# Patient Record
Sex: FEMALE | Race: BLACK OR AFRICAN AMERICAN | Employment: UNEMPLOYED | URBAN - METROPOLITAN AREA
[De-identification: names, ages, dates, MRNs, and addresses within clinical notes are randomized per-mention and may not be internally consistent; named-entity substitution may affect disease eponyms.]

---

## 2019-12-01 ENCOUNTER — APPOINTMENT (OUTPATIENT)
Dept: GENERAL RADIOLOGY | Age: 84
DRG: 312 | End: 2019-12-01
Payer: COMMERCIAL

## 2019-12-01 ENCOUNTER — HOSPITAL ENCOUNTER (INPATIENT)
Age: 84
LOS: 2 days | Discharge: HOME HEALTH | DRG: 312 | End: 2019-12-03
Attending: EMERGENCY MEDICINE | Admitting: INTERNAL MEDICINE
Payer: COMMERCIAL

## 2019-12-01 DIAGNOSIS — I50.9 ACUTE ON CHRONIC CONGESTIVE HEART FAILURE, UNSPECIFIED HEART FAILURE TYPE (HCC): ICD-10-CM

## 2019-12-01 DIAGNOSIS — R55 NEAR SYNCOPE: Primary | ICD-10-CM

## 2019-12-01 DIAGNOSIS — N18.30 CKD (CHRONIC KIDNEY DISEASE) STAGE 3, GFR 30-59 ML/MIN (HCC): ICD-10-CM

## 2019-12-01 LAB
ABSOLUTE EOS #: 0.13 K/UL (ref 0–0.44)
ABSOLUTE IMMATURE GRANULOCYTE: <0.03 K/UL (ref 0–0.3)
ABSOLUTE LYMPH #: 1.14 K/UL (ref 1.1–3.7)
ABSOLUTE MONO #: 0.58 K/UL (ref 0.1–1.2)
ALBUMIN SERPL-MCNC: 3.4 G/DL (ref 3.5–5.2)
ALBUMIN/GLOBULIN RATIO: 1.1 (ref 1–2.5)
ALP BLD-CCNC: 224 U/L (ref 35–104)
ALT SERPL-CCNC: 15 U/L (ref 5–33)
ANION GAP SERPL CALCULATED.3IONS-SCNC: 13 MMOL/L (ref 9–17)
AST SERPL-CCNC: 19 U/L
BASOPHILS # BLD: 1 % (ref 0–2)
BASOPHILS ABSOLUTE: 0.04 K/UL (ref 0–0.2)
BILIRUB SERPL-MCNC: 0.42 MG/DL (ref 0.3–1.2)
BNP INTERPRETATION: ABNORMAL
BUN BLDV-MCNC: 33 MG/DL (ref 8–23)
BUN/CREAT BLD: ABNORMAL (ref 9–20)
CALCIUM SERPL-MCNC: 8.5 MG/DL (ref 8.6–10.4)
CHLORIDE BLD-SCNC: 103 MMOL/L (ref 98–107)
CO2: 26 MMOL/L (ref 20–31)
CREAT SERPL-MCNC: 1.15 MG/DL (ref 0.5–0.9)
DIFFERENTIAL TYPE: ABNORMAL
EOSINOPHILS RELATIVE PERCENT: 2 % (ref 1–4)
GFR AFRICAN AMERICAN: 54 ML/MIN
GFR NON-AFRICAN AMERICAN: 44 ML/MIN
GFR SERPL CREATININE-BSD FRML MDRD: ABNORMAL ML/MIN/{1.73_M2}
GFR SERPL CREATININE-BSD FRML MDRD: ABNORMAL ML/MIN/{1.73_M2}
GLUCOSE BLD-MCNC: 260 MG/DL (ref 70–99)
HCT VFR BLD CALC: 37 % (ref 36.3–47.1)
HEMOGLOBIN: 11.8 G/DL (ref 11.9–15.1)
IMMATURE GRANULOCYTES: 0 %
LYMPHOCYTES # BLD: 19 % (ref 24–43)
MCH RBC QN AUTO: 31.9 PG (ref 25.2–33.5)
MCHC RBC AUTO-ENTMCNC: 31.9 G/DL (ref 28.4–34.8)
MCV RBC AUTO: 100 FL (ref 82.6–102.9)
MONOCYTES # BLD: 10 % (ref 3–12)
NRBC AUTOMATED: 0 PER 100 WBC
PDW BLD-RTO: 12.8 % (ref 11.8–14.4)
PLATELET # BLD: 185 K/UL (ref 138–453)
PLATELET ESTIMATE: ABNORMAL
PMV BLD AUTO: 10.3 FL (ref 8.1–13.5)
POTASSIUM SERPL-SCNC: 3.7 MMOL/L (ref 3.7–5.3)
PRO-BNP: 1869 PG/ML
RBC # BLD: 3.7 M/UL (ref 3.95–5.11)
RBC # BLD: ABNORMAL 10*6/UL
SEG NEUTROPHILS: 68 % (ref 36–65)
SEGMENTED NEUTROPHILS ABSOLUTE COUNT: 4.16 K/UL (ref 1.5–8.1)
SODIUM BLD-SCNC: 142 MMOL/L (ref 135–144)
THYROXINE, FREE: 1.06 NG/DL (ref 0.93–1.7)
TOTAL PROTEIN: 6.4 G/DL (ref 6.4–8.3)
TROPONIN INTERP: ABNORMAL
TROPONIN T: ABNORMAL NG/ML
TROPONIN, HIGH SENSITIVITY: 20 NG/L (ref 0–14)
TROPONIN, HIGH SENSITIVITY: 21 NG/L (ref 0–14)
TROPONIN, HIGH SENSITIVITY: 22 NG/L (ref 0–14)
TSH SERPL DL<=0.05 MIU/L-ACNC: 11.97 MIU/L (ref 0.3–5)
WBC # BLD: 6.1 K/UL (ref 3.5–11.3)
WBC # BLD: ABNORMAL 10*3/UL

## 2019-12-01 PROCEDURE — 6360000002 HC RX W HCPCS: Performed by: STUDENT IN AN ORGANIZED HEALTH CARE EDUCATION/TRAINING PROGRAM

## 2019-12-01 PROCEDURE — 83880 ASSAY OF NATRIURETIC PEPTIDE: CPT

## 2019-12-01 PROCEDURE — 84439 ASSAY OF FREE THYROXINE: CPT

## 2019-12-01 PROCEDURE — 1200000000 HC SEMI PRIVATE

## 2019-12-01 PROCEDURE — 99222 1ST HOSP IP/OBS MODERATE 55: CPT | Performed by: INTERNAL MEDICINE

## 2019-12-01 PROCEDURE — 2580000003 HC RX 258: Performed by: STUDENT IN AN ORGANIZED HEALTH CARE EDUCATION/TRAINING PROGRAM

## 2019-12-01 PROCEDURE — 84443 ASSAY THYROID STIM HORMONE: CPT

## 2019-12-01 PROCEDURE — 6370000000 HC RX 637 (ALT 250 FOR IP): Performed by: STUDENT IN AN ORGANIZED HEALTH CARE EDUCATION/TRAINING PROGRAM

## 2019-12-01 PROCEDURE — 85025 COMPLETE CBC W/AUTO DIFF WBC: CPT

## 2019-12-01 PROCEDURE — 83036 HEMOGLOBIN GLYCOSYLATED A1C: CPT

## 2019-12-01 PROCEDURE — 80053 COMPREHEN METABOLIC PANEL: CPT

## 2019-12-01 PROCEDURE — 36415 COLL VENOUS BLD VENIPUNCTURE: CPT

## 2019-12-01 PROCEDURE — 99285 EMERGENCY DEPT VISIT HI MDM: CPT

## 2019-12-01 PROCEDURE — 93005 ELECTROCARDIOGRAM TRACING: CPT | Performed by: STUDENT IN AN ORGANIZED HEALTH CARE EDUCATION/TRAINING PROGRAM

## 2019-12-01 PROCEDURE — 71046 X-RAY EXAM CHEST 2 VIEWS: CPT

## 2019-12-01 PROCEDURE — 84484 ASSAY OF TROPONIN QUANT: CPT

## 2019-12-01 RX ORDER — GABAPENTIN 300 MG/1
300 CAPSULE ORAL 3 TIMES DAILY
Status: DISCONTINUED | OUTPATIENT
Start: 2019-12-01 | End: 2019-12-03 | Stop reason: HOSPADM

## 2019-12-01 RX ORDER — PANTOPRAZOLE SODIUM 40 MG/1
40 TABLET, DELAYED RELEASE ORAL
Status: DISCONTINUED | OUTPATIENT
Start: 2019-12-02 | End: 2019-12-03 | Stop reason: HOSPADM

## 2019-12-01 RX ORDER — ASPIRIN 81 MG/1
81 TABLET, CHEWABLE ORAL DAILY
Status: ON HOLD | COMMUNITY
End: 2021-02-16 | Stop reason: SDUPTHER

## 2019-12-01 RX ORDER — DULOXETIN HYDROCHLORIDE 60 MG/1
60 CAPSULE, DELAYED RELEASE ORAL DAILY
Status: ON HOLD | COMMUNITY
End: 2021-02-05 | Stop reason: HOSPADM

## 2019-12-01 RX ORDER — FUROSEMIDE 40 MG/1
40 TABLET ORAL DAILY
Status: ON HOLD | COMMUNITY
End: 2019-12-03 | Stop reason: SDUPTHER

## 2019-12-01 RX ORDER — ONDANSETRON 2 MG/ML
4 INJECTION INTRAMUSCULAR; INTRAVENOUS EVERY 6 HOURS PRN
Status: DISCONTINUED | OUTPATIENT
Start: 2019-12-01 | End: 2019-12-03 | Stop reason: HOSPADM

## 2019-12-01 RX ORDER — ASPIRIN 81 MG/1
81 TABLET, CHEWABLE ORAL DAILY
Status: DISCONTINUED | OUTPATIENT
Start: 2019-12-01 | End: 2019-12-03 | Stop reason: HOSPADM

## 2019-12-01 RX ORDER — HEPARIN SODIUM 5000 [USP'U]/ML
5000 INJECTION, SOLUTION INTRAVENOUS; SUBCUTANEOUS EVERY 8 HOURS SCHEDULED
Status: DISCONTINUED | OUTPATIENT
Start: 2019-12-01 | End: 2019-12-03 | Stop reason: HOSPADM

## 2019-12-01 RX ORDER — SODIUM CHLORIDE 0.9 % (FLUSH) 0.9 %
10 SYRINGE (ML) INJECTION PRN
Status: DISCONTINUED | OUTPATIENT
Start: 2019-12-01 | End: 2019-12-03 | Stop reason: HOSPADM

## 2019-12-01 RX ORDER — LEVOTHYROXINE SODIUM 0.1 MG/1
100 TABLET ORAL DAILY
Status: ON HOLD | COMMUNITY
End: 2019-12-03 | Stop reason: HOSPADM

## 2019-12-01 RX ORDER — SODIUM CHLORIDE 0.9 % (FLUSH) 0.9 %
10 SYRINGE (ML) INJECTION EVERY 12 HOURS SCHEDULED
Status: DISCONTINUED | OUTPATIENT
Start: 2019-12-01 | End: 2019-12-03 | Stop reason: HOSPADM

## 2019-12-01 RX ORDER — LOSARTAN POTASSIUM 100 MG/1
100 TABLET ORAL DAILY
Status: ON HOLD | COMMUNITY
End: 2019-12-03 | Stop reason: HOSPADM

## 2019-12-01 RX ORDER — OMEPRAZOLE 40 MG/1
40 CAPSULE, DELAYED RELEASE ORAL DAILY
Status: ON HOLD | COMMUNITY
End: 2021-02-05

## 2019-12-01 RX ORDER — FUROSEMIDE 10 MG/ML
40 INJECTION INTRAMUSCULAR; INTRAVENOUS ONCE
Status: COMPLETED | OUTPATIENT
Start: 2019-12-01 | End: 2019-12-01

## 2019-12-01 RX ORDER — LEVOTHYROXINE SODIUM 0.07 MG/1
112.5 TABLET ORAL DAILY
Status: DISCONTINUED | OUTPATIENT
Start: 2019-12-02 | End: 2019-12-03 | Stop reason: HOSPADM

## 2019-12-01 RX ORDER — ISOSORBIDE MONONITRATE 30 MG/1
30 TABLET, EXTENDED RELEASE ORAL DAILY
Status: DISCONTINUED | OUTPATIENT
Start: 2019-12-01 | End: 2019-12-03

## 2019-12-01 RX ORDER — GABAPENTIN 300 MG/1
300 CAPSULE ORAL 3 TIMES DAILY
Status: ON HOLD | COMMUNITY
End: 2021-02-16 | Stop reason: HOSPADM

## 2019-12-01 RX ORDER — DULOXETIN HYDROCHLORIDE 30 MG/1
60 CAPSULE, DELAYED RELEASE ORAL DAILY
Status: DISCONTINUED | OUTPATIENT
Start: 2019-12-01 | End: 2019-12-03 | Stop reason: HOSPADM

## 2019-12-01 RX ORDER — ISOSORBIDE MONONITRATE 30 MG/1
30 TABLET, EXTENDED RELEASE ORAL DAILY
Status: ON HOLD | COMMUNITY
End: 2019-12-03 | Stop reason: HOSPADM

## 2019-12-01 RX ORDER — BUDESONIDE AND FORMOTEROL FUMARATE DIHYDRATE 160; 4.5 UG/1; UG/1
2 AEROSOL RESPIRATORY (INHALATION) 2 TIMES DAILY
Status: ON HOLD | COMMUNITY
End: 2021-02-16 | Stop reason: SDUPTHER

## 2019-12-01 RX ADMIN — HEPARIN SODIUM 5000 UNITS: 5000 INJECTION INTRAVENOUS; SUBCUTANEOUS at 22:10

## 2019-12-01 RX ADMIN — Medication 10 ML: at 22:10

## 2019-12-01 RX ADMIN — DULOXETINE HYDROCHLORIDE 60 MG: 30 CAPSULE, DELAYED RELEASE ORAL at 22:09

## 2019-12-01 RX ADMIN — FUROSEMIDE 40 MG: 10 INJECTION, SOLUTION INTRAMUSCULAR; INTRAVENOUS at 22:10

## 2019-12-01 RX ADMIN — GABAPENTIN 300 MG: 300 CAPSULE ORAL at 22:09

## 2019-12-01 ASSESSMENT — ENCOUNTER SYMPTOMS
RHINORRHEA: 0
COUGH: 0
SHORTNESS OF BREATH: 0
BACK PAIN: 0
SORE THROAT: 0
VOMITING: 0
EYE ITCHING: 0
DIARRHEA: 0
ABDOMINAL PAIN: 0
EYE REDNESS: 0
NAUSEA: 0
BLOOD IN STOOL: 0

## 2019-12-01 ASSESSMENT — PAIN SCALES - GENERAL
PAINLEVEL_OUTOF10: 0
PAINLEVEL_OUTOF10: 0

## 2019-12-02 PROBLEM — E03.8 OTHER SPECIFIED HYPOTHYROIDISM: Status: ACTIVE | Noted: 2019-12-02

## 2019-12-02 PROBLEM — J44.9 PULMONARY HYPERTENSION DUE TO COPD (HCC): Status: ACTIVE | Noted: 2019-12-02

## 2019-12-02 PROBLEM — E11.29 TYPE 2 DIABETES MELLITUS WITH KIDNEY COMPLICATION, WITHOUT LONG-TERM CURRENT USE OF INSULIN (HCC): Status: ACTIVE | Noted: 2019-12-02

## 2019-12-02 PROBLEM — I50.810 RIGHT-SIDED HEART FAILURE (HCC): Status: ACTIVE | Noted: 2019-12-02

## 2019-12-02 PROBLEM — E03.9 HYPOTHYROIDISM: Status: ACTIVE | Noted: 2019-12-02

## 2019-12-02 PROBLEM — E03.8 OTHER SPECIFIED HYPOTHYROIDISM: Status: RESOLVED | Noted: 2019-12-02 | Resolved: 2019-12-02

## 2019-12-02 PROBLEM — G90.9 AUTONOMIC NEUROPATHY: Status: ACTIVE | Noted: 2019-12-02

## 2019-12-02 PROBLEM — J44.9 COPD (CHRONIC OBSTRUCTIVE PULMONARY DISEASE) (HCC): Status: ACTIVE | Noted: 2019-12-02

## 2019-12-02 PROBLEM — I27.23 PULMONARY HYPERTENSION DUE TO COPD (HCC): Status: ACTIVE | Noted: 2019-12-02

## 2019-12-02 PROBLEM — N18.30 CKD (CHRONIC KIDNEY DISEASE) STAGE 3, GFR 30-59 ML/MIN (HCC): Status: ACTIVE | Noted: 2019-12-02

## 2019-12-02 PROBLEM — I10 ESSENTIAL HYPERTENSION: Status: ACTIVE | Noted: 2019-12-02

## 2019-12-02 LAB
-: NORMAL
ABSOLUTE EOS #: 0.12 K/UL (ref 0–0.44)
ABSOLUTE IMMATURE GRANULOCYTE: <0.03 K/UL (ref 0–0.3)
ABSOLUTE LYMPH #: 1.29 K/UL (ref 1.1–3.7)
ABSOLUTE MONO #: 0.46 K/UL (ref 0.1–1.2)
ANION GAP SERPL CALCULATED.3IONS-SCNC: 14 MMOL/L (ref 9–17)
BASOPHILS # BLD: 1 % (ref 0–2)
BASOPHILS ABSOLUTE: 0.04 K/UL (ref 0–0.2)
BILIRUBIN URINE: NEGATIVE
BUN BLDV-MCNC: 31 MG/DL (ref 8–23)
BUN/CREAT BLD: ABNORMAL (ref 9–20)
CALCIUM SERPL-MCNC: 8.5 MG/DL (ref 8.6–10.4)
CHLORIDE BLD-SCNC: 102 MMOL/L (ref 98–107)
CO2: 26 MMOL/L (ref 20–31)
COLOR: YELLOW
COMMENT UA: NORMAL
CREAT SERPL-MCNC: 1.13 MG/DL (ref 0.5–0.9)
DIFFERENTIAL TYPE: ABNORMAL
EKG ATRIAL RATE: 64 BPM
EKG P AXIS: 70 DEGREES
EKG P-R INTERVAL: 142 MS
EKG Q-T INTERVAL: 470 MS
EKG QRS DURATION: 156 MS
EKG QTC CALCULATION (BAZETT): 484 MS
EKG R AXIS: 93 DEGREES
EKG T AXIS: 1 DEGREES
EKG VENTRICULAR RATE: 64 BPM
EOSINOPHILS RELATIVE PERCENT: 2 % (ref 1–4)
ESTIMATED AVERAGE GLUCOSE: 209 MG/DL
ESTIMATED AVERAGE GLUCOSE: 212 MG/DL
GFR AFRICAN AMERICAN: 55 ML/MIN
GFR NON-AFRICAN AMERICAN: 45 ML/MIN
GFR SERPL CREATININE-BSD FRML MDRD: ABNORMAL ML/MIN/{1.73_M2}
GFR SERPL CREATININE-BSD FRML MDRD: ABNORMAL ML/MIN/{1.73_M2}
GLUCOSE BLD-MCNC: 197 MG/DL (ref 70–99)
GLUCOSE BLD-MCNC: 205 MG/DL (ref 65–105)
GLUCOSE URINE: NEGATIVE
HBA1C MFR BLD: 8.9 % (ref 4–6)
HBA1C MFR BLD: 9 % (ref 4–6)
HCT VFR BLD CALC: 38.1 % (ref 36.3–47.1)
HEMOGLOBIN: 12.1 G/DL (ref 11.9–15.1)
IMMATURE GRANULOCYTES: 0 %
KETONES, URINE: NEGATIVE
LEUKOCYTE ESTERASE, URINE: NEGATIVE
LYMPHOCYTES # BLD: 20 % (ref 24–43)
MCH RBC QN AUTO: 32.6 PG (ref 25.2–33.5)
MCHC RBC AUTO-ENTMCNC: 31.8 G/DL (ref 28.4–34.8)
MCV RBC AUTO: 102.7 FL (ref 82.6–102.9)
MONOCYTES # BLD: 7 % (ref 3–12)
NITRITE, URINE: NEGATIVE
NRBC AUTOMATED: 0 PER 100 WBC
PDW BLD-RTO: 13.1 % (ref 11.8–14.4)
PH UA: 5 (ref 5–8)
PLATELET # BLD: 196 K/UL (ref 138–453)
PLATELET ESTIMATE: ABNORMAL
PMV BLD AUTO: 10.4 FL (ref 8.1–13.5)
POTASSIUM SERPL-SCNC: 4 MMOL/L (ref 3.7–5.3)
PROTEIN UA: NEGATIVE
RBC # BLD: 3.71 M/UL (ref 3.95–5.11)
RBC # BLD: ABNORMAL 10*6/UL
REASON FOR REJECTION: NORMAL
SEG NEUTROPHILS: 70 % (ref 36–65)
SEGMENTED NEUTROPHILS ABSOLUTE COUNT: 4.38 K/UL (ref 1.5–8.1)
SODIUM BLD-SCNC: 142 MMOL/L (ref 135–144)
SPECIFIC GRAVITY UA: 1.01 (ref 1–1.03)
TURBIDITY: CLEAR
URINE HGB: NEGATIVE
UROBILINOGEN, URINE: NORMAL
WBC # BLD: 6.3 K/UL (ref 3.5–11.3)
WBC # BLD: ABNORMAL 10*3/UL
ZZ NTE CLEAN UP: ORDERED TEST: NORMAL
ZZ NTE WITH NAME CLEAN UP: SPECIMEN SOURCE: NORMAL

## 2019-12-02 PROCEDURE — 82947 ASSAY GLUCOSE BLOOD QUANT: CPT

## 2019-12-02 PROCEDURE — 1200000000 HC SEMI PRIVATE

## 2019-12-02 PROCEDURE — 6370000000 HC RX 637 (ALT 250 FOR IP): Performed by: STUDENT IN AN ORGANIZED HEALTH CARE EDUCATION/TRAINING PROGRAM

## 2019-12-02 PROCEDURE — 36415 COLL VENOUS BLD VENIPUNCTURE: CPT

## 2019-12-02 PROCEDURE — 81003 URINALYSIS AUTO W/O SCOPE: CPT

## 2019-12-02 PROCEDURE — 97161 PT EVAL LOW COMPLEX 20 MIN: CPT

## 2019-12-02 PROCEDURE — 97166 OT EVAL MOD COMPLEX 45 MIN: CPT

## 2019-12-02 PROCEDURE — 2700000000 HC OXYGEN THERAPY PER DAY

## 2019-12-02 PROCEDURE — 80048 BASIC METABOLIC PNL TOTAL CA: CPT

## 2019-12-02 PROCEDURE — 94640 AIRWAY INHALATION TREATMENT: CPT

## 2019-12-02 PROCEDURE — 83036 HEMOGLOBIN GLYCOSYLATED A1C: CPT

## 2019-12-02 PROCEDURE — 99232 SBSQ HOSP IP/OBS MODERATE 35: CPT | Performed by: INTERNAL MEDICINE

## 2019-12-02 PROCEDURE — 93010 ELECTROCARDIOGRAM REPORT: CPT | Performed by: INTERNAL MEDICINE

## 2019-12-02 PROCEDURE — 2580000003 HC RX 258: Performed by: STUDENT IN AN ORGANIZED HEALTH CARE EDUCATION/TRAINING PROGRAM

## 2019-12-02 PROCEDURE — 85025 COMPLETE CBC W/AUTO DIFF WBC: CPT

## 2019-12-02 PROCEDURE — 94761 N-INVAS EAR/PLS OXIMETRY MLT: CPT

## 2019-12-02 PROCEDURE — 97530 THERAPEUTIC ACTIVITIES: CPT

## 2019-12-02 PROCEDURE — 6360000002 HC RX W HCPCS: Performed by: STUDENT IN AN ORGANIZED HEALTH CARE EDUCATION/TRAINING PROGRAM

## 2019-12-02 PROCEDURE — 97535 SELF CARE MNGMENT TRAINING: CPT

## 2019-12-02 RX ORDER — DEXTROSE MONOHYDRATE 50 MG/ML
100 INJECTION, SOLUTION INTRAVENOUS PRN
Status: DISCONTINUED | OUTPATIENT
Start: 2019-12-02 | End: 2019-12-03 | Stop reason: HOSPADM

## 2019-12-02 RX ORDER — DEXTROSE MONOHYDRATE 25 G/50ML
12.5 INJECTION, SOLUTION INTRAVENOUS PRN
Status: DISCONTINUED | OUTPATIENT
Start: 2019-12-02 | End: 2019-12-03 | Stop reason: HOSPADM

## 2019-12-02 RX ORDER — NICOTINE POLACRILEX 4 MG
15 LOZENGE BUCCAL PRN
Status: DISCONTINUED | OUTPATIENT
Start: 2019-12-02 | End: 2019-12-03 | Stop reason: HOSPADM

## 2019-12-02 RX ORDER — SODIUM CHLORIDE 9 MG/ML
INJECTION, SOLUTION INTRAVENOUS CONTINUOUS
Status: DISCONTINUED | OUTPATIENT
Start: 2019-12-02 | End: 2019-12-03 | Stop reason: HOSPADM

## 2019-12-02 RX ADMIN — ASPIRIN 81 MG: 81 TABLET, CHEWABLE ORAL at 09:04

## 2019-12-02 RX ADMIN — GABAPENTIN 300 MG: 300 CAPSULE ORAL at 21:43

## 2019-12-02 RX ADMIN — MOMETASONE FUROATE AND FORMOTEROL FUMARATE DIHYDRATE 2 PUFF: 100; 5 AEROSOL RESPIRATORY (INHALATION) at 21:39

## 2019-12-02 RX ADMIN — GABAPENTIN 300 MG: 300 CAPSULE ORAL at 17:32

## 2019-12-02 RX ADMIN — Medication 10 ML: at 09:05

## 2019-12-02 RX ADMIN — GABAPENTIN 300 MG: 300 CAPSULE ORAL at 09:04

## 2019-12-02 RX ADMIN — PANTOPRAZOLE SODIUM 40 MG: 40 TABLET, DELAYED RELEASE ORAL at 06:13

## 2019-12-02 RX ADMIN — HEPARIN SODIUM 5000 UNITS: 5000 INJECTION INTRAVENOUS; SUBCUTANEOUS at 17:32

## 2019-12-02 RX ADMIN — HEPARIN SODIUM 5000 UNITS: 5000 INJECTION INTRAVENOUS; SUBCUTANEOUS at 06:13

## 2019-12-02 RX ADMIN — SODIUM CHLORIDE: 9 INJECTION, SOLUTION INTRAVENOUS at 17:24

## 2019-12-02 RX ADMIN — DULOXETINE HYDROCHLORIDE 60 MG: 30 CAPSULE, DELAYED RELEASE ORAL at 09:04

## 2019-12-02 RX ADMIN — LEVOTHYROXINE SODIUM 112.5 MCG: 75 TABLET ORAL at 06:13

## 2019-12-02 RX ADMIN — MOMETASONE FUROATE AND FORMOTEROL FUMARATE DIHYDRATE 2 PUFF: 100; 5 AEROSOL RESPIRATORY (INHALATION) at 08:25

## 2019-12-02 RX ADMIN — HEPARIN SODIUM 5000 UNITS: 5000 INJECTION INTRAVENOUS; SUBCUTANEOUS at 21:43

## 2019-12-03 VITALS
WEIGHT: 175.4 LBS | BODY MASS INDEX: 27.53 KG/M2 | TEMPERATURE: 97.9 F | SYSTOLIC BLOOD PRESSURE: 127 MMHG | DIASTOLIC BLOOD PRESSURE: 71 MMHG | HEART RATE: 61 BPM | RESPIRATION RATE: 16 BRPM | HEIGHT: 67 IN | OXYGEN SATURATION: 98 %

## 2019-12-03 LAB
GLUCOSE BLD-MCNC: 154 MG/DL (ref 65–105)
GLUCOSE BLD-MCNC: 170 MG/DL (ref 65–105)

## 2019-12-03 PROCEDURE — 6370000000 HC RX 637 (ALT 250 FOR IP): Performed by: STUDENT IN AN ORGANIZED HEALTH CARE EDUCATION/TRAINING PROGRAM

## 2019-12-03 PROCEDURE — 94640 AIRWAY INHALATION TREATMENT: CPT

## 2019-12-03 PROCEDURE — 6370000000 HC RX 637 (ALT 250 FOR IP): Performed by: INTERNAL MEDICINE

## 2019-12-03 PROCEDURE — 99238 HOSP IP/OBS DSCHRG MGMT 30/<: CPT | Performed by: INTERNAL MEDICINE

## 2019-12-03 PROCEDURE — 94761 N-INVAS EAR/PLS OXIMETRY MLT: CPT

## 2019-12-03 PROCEDURE — 6360000002 HC RX W HCPCS: Performed by: STUDENT IN AN ORGANIZED HEALTH CARE EDUCATION/TRAINING PROGRAM

## 2019-12-03 PROCEDURE — 82947 ASSAY GLUCOSE BLOOD QUANT: CPT

## 2019-12-03 PROCEDURE — 97116 GAIT TRAINING THERAPY: CPT

## 2019-12-03 RX ORDER — LOSARTAN POTASSIUM 25 MG/1
25 TABLET ORAL DAILY
Qty: 30 TABLET | Refills: 3 | Status: ON HOLD | OUTPATIENT
Start: 2019-12-04 | End: 2021-02-05

## 2019-12-03 RX ORDER — LEVOTHYROXINE SODIUM 0.07 MG/1
112.5 TABLET ORAL DAILY
Qty: 30 TABLET | Refills: 3 | Status: ON HOLD | OUTPATIENT
Start: 2019-12-04 | End: 2021-02-05

## 2019-12-03 RX ORDER — LOSARTAN POTASSIUM 50 MG/1
50 TABLET ORAL DAILY
Status: DISCONTINUED | OUTPATIENT
Start: 2019-12-03 | End: 2019-12-03

## 2019-12-03 RX ORDER — LOSARTAN POTASSIUM 25 MG/1
25 TABLET ORAL DAILY
Status: DISCONTINUED | OUTPATIENT
Start: 2019-12-03 | End: 2019-12-03 | Stop reason: HOSPADM

## 2019-12-03 RX ORDER — ACETAMINOPHEN 325 MG/1
650 TABLET ORAL EVERY 4 HOURS PRN
Status: DISCONTINUED | OUTPATIENT
Start: 2019-12-03 | End: 2019-12-03 | Stop reason: HOSPADM

## 2019-12-03 RX ORDER — FUROSEMIDE 40 MG/1
20 TABLET ORAL DAILY
Qty: 60 TABLET | Refills: 3 | Status: ON HOLD | OUTPATIENT
Start: 2019-12-03 | End: 2021-02-05 | Stop reason: SDUPTHER

## 2019-12-03 RX ADMIN — PANTOPRAZOLE SODIUM 40 MG: 40 TABLET, DELAYED RELEASE ORAL at 07:03

## 2019-12-03 RX ADMIN — MOMETASONE FUROATE AND FORMOTEROL FUMARATE DIHYDRATE 2 PUFF: 100; 5 AEROSOL RESPIRATORY (INHALATION) at 09:19

## 2019-12-03 RX ADMIN — ASPIRIN 81 MG: 81 TABLET, CHEWABLE ORAL at 08:55

## 2019-12-03 RX ADMIN — HEPARIN SODIUM 5000 UNITS: 5000 INJECTION INTRAVENOUS; SUBCUTANEOUS at 07:03

## 2019-12-03 RX ADMIN — LEVOTHYROXINE SODIUM 112.5 MCG: 75 TABLET ORAL at 07:03

## 2019-12-03 RX ADMIN — DULOXETINE HYDROCHLORIDE 60 MG: 30 CAPSULE, DELAYED RELEASE ORAL at 08:55

## 2019-12-03 RX ADMIN — ACETAMINOPHEN 650 MG: 325 TABLET ORAL at 09:47

## 2019-12-03 RX ADMIN — LOSARTAN POTASSIUM 25 MG: 25 TABLET, FILM COATED ORAL at 11:26

## 2019-12-03 RX ADMIN — GABAPENTIN 300 MG: 300 CAPSULE ORAL at 08:54

## 2019-12-03 ASSESSMENT — PAIN SCALES - GENERAL: PAINLEVEL_OUTOF10: 10

## 2020-07-31 ENCOUNTER — HOSPITAL ENCOUNTER (INPATIENT)
Age: 85
LOS: 1 days | Discharge: HOME | DRG: 683 | End: 2020-08-01
Attending: EMERGENCY MEDICINE | Admitting: INTERNAL MEDICINE
Payer: MEDICARE

## 2020-07-31 ENCOUNTER — APPOINTMENT (OUTPATIENT)
Dept: GENERAL RADIOLOGY | Age: 85
DRG: 683 | End: 2020-07-31
Payer: MEDICARE

## 2020-07-31 ENCOUNTER — APPOINTMENT (OUTPATIENT)
Dept: CT IMAGING | Age: 85
DRG: 683 | End: 2020-07-31
Payer: MEDICARE

## 2020-07-31 PROBLEM — N17.9 AKI (ACUTE KIDNEY INJURY) (HCC): Status: ACTIVE | Noted: 2020-07-31

## 2020-07-31 LAB
-: ABNORMAL
ABSOLUTE EOS #: 0.03 K/UL (ref 0–0.44)
ABSOLUTE IMMATURE GRANULOCYTE: <0.03 K/UL (ref 0–0.3)
ABSOLUTE LYMPH #: 0.72 K/UL (ref 1.1–3.7)
ABSOLUTE MONO #: 0.49 K/UL (ref 0.1–1.2)
ALLEN TEST: ABNORMAL
AMORPHOUS: ABNORMAL
ANION GAP SERPL CALCULATED.3IONS-SCNC: 10 MMOL/L (ref 9–17)
ANION GAP: 8 MMOL/L (ref 7–16)
BACTERIA: ABNORMAL
BASOPHILS # BLD: 0 % (ref 0–2)
BASOPHILS ABSOLUTE: <0.03 K/UL (ref 0–0.2)
BETA-HYDROXYBUTYRATE: 0.1 MMOL/L (ref 0.02–0.27)
BILIRUBIN URINE: NEGATIVE
BUN BLDV-MCNC: 24 MG/DL (ref 8–23)
BUN/CREAT BLD: ABNORMAL (ref 9–20)
CALCIUM SERPL-MCNC: 9 MG/DL (ref 8.6–10.4)
CASTS UA: ABNORMAL /LPF (ref 0–8)
CHLORIDE BLD-SCNC: 96 MMOL/L (ref 98–107)
CO2: 32 MMOL/L (ref 20–31)
COLOR: YELLOW
COMMENT UA: ABNORMAL
CREAT SERPL-MCNC: 1.36 MG/DL (ref 0.5–0.9)
CRYSTALS, UA: ABNORMAL /HPF
DIFFERENTIAL TYPE: ABNORMAL
EOSINOPHILS RELATIVE PERCENT: 1 % (ref 1–4)
EPITHELIAL CELLS UA: ABNORMAL /HPF (ref 0–5)
FIO2: ABNORMAL
GFR AFRICAN AMERICAN: 44 ML/MIN
GFR NON-AFRICAN AMERICAN: 33 ML/MIN
GFR NON-AFRICAN AMERICAN: 36 ML/MIN
GFR SERPL CREATININE-BSD FRML MDRD: 40 ML/MIN
GFR SERPL CREATININE-BSD FRML MDRD: ABNORMAL ML/MIN/{1.73_M2}
GLUCOSE BLD-MCNC: 344 MG/DL (ref 65–105)
GLUCOSE BLD-MCNC: 364 MG/DL (ref 70–99)
GLUCOSE BLD-MCNC: 373 MG/DL (ref 74–100)
GLUCOSE URINE: ABNORMAL
HCO3 VENOUS: 33.7 MMOL/L (ref 22–29)
HCT VFR BLD CALC: 38.8 % (ref 36.3–47.1)
HEMOGLOBIN: 11.9 G/DL (ref 11.9–15.1)
IMMATURE GRANULOCYTES: 0 %
KETONES, URINE: NEGATIVE
LEUKOCYTE ESTERASE, URINE: NEGATIVE
LYMPHOCYTES # BLD: 13 % (ref 24–43)
MCH RBC QN AUTO: 31.6 PG (ref 25.2–33.5)
MCHC RBC AUTO-ENTMCNC: 30.7 G/DL (ref 28.4–34.8)
MCV RBC AUTO: 102.9 FL (ref 82.6–102.9)
MODE: ABNORMAL
MONOCYTES # BLD: 9 % (ref 3–12)
MUCUS: ABNORMAL
NEGATIVE BASE EXCESS, VEN: ABNORMAL (ref 0–2)
NITRITE, URINE: NEGATIVE
NRBC AUTOMATED: 0 PER 100 WBC
O2 DEVICE/FLOW/%: ABNORMAL
O2 SAT, VEN: 42 % (ref 60–85)
OTHER OBSERVATIONS UA: ABNORMAL
PATIENT TEMP: ABNORMAL
PCO2, VEN: 54.6 MM HG (ref 41–51)
PDW BLD-RTO: 14.3 % (ref 11.8–14.4)
PH UA: 7.5 (ref 5–8)
PH VENOUS: 7.4 (ref 7.32–7.43)
PLATELET # BLD: 214 K/UL (ref 138–453)
PLATELET ESTIMATE: ABNORMAL
PMV BLD AUTO: 10.1 FL (ref 8.1–13.5)
PO2, VEN: 24.3 MM HG (ref 30–50)
POC CHLORIDE: 97 MMOL/L (ref 98–107)
POC CREATININE: 1.47 MG/DL (ref 0.51–1.19)
POC HEMATOCRIT: 39 % (ref 36–46)
POC HEMOGLOBIN: 13.2 G/DL (ref 12–16)
POC IONIZED CALCIUM: 1.05 MMOL/L (ref 1.15–1.33)
POC LACTIC ACID: 1.73 MMOL/L (ref 0.56–1.39)
POC PCO2 TEMP: ABNORMAL MM HG
POC PH TEMP: ABNORMAL
POC PO2 TEMP: ABNORMAL MM HG
POC POTASSIUM: 4.6 MMOL/L (ref 3.5–4.5)
POC SODIUM: 139 MMOL/L (ref 138–146)
POSITIVE BASE EXCESS, VEN: 7 (ref 0–3)
POTASSIUM SERPL-SCNC: 4.8 MMOL/L (ref 3.7–5.3)
PROTEIN UA: ABNORMAL
RBC # BLD: 3.77 M/UL (ref 3.95–5.11)
RBC # BLD: ABNORMAL 10*6/UL
RBC UA: ABNORMAL /HPF (ref 0–4)
RENAL EPITHELIAL, UA: ABNORMAL /HPF
SAMPLE SITE: ABNORMAL
SEG NEUTROPHILS: 77 % (ref 36–65)
SEGMENTED NEUTROPHILS ABSOLUTE COUNT: 4.26 K/UL (ref 1.5–8.1)
SODIUM BLD-SCNC: 138 MMOL/L (ref 135–144)
SPECIFIC GRAVITY UA: 1.01 (ref 1–1.03)
TOTAL CO2, VENOUS: 35 MMOL/L (ref 23–30)
TRICHOMONAS: ABNORMAL
TROPONIN INTERP: ABNORMAL
TROPONIN INTERP: ABNORMAL
TROPONIN T: ABNORMAL NG/ML
TROPONIN T: ABNORMAL NG/ML
TROPONIN, HIGH SENSITIVITY: 22 NG/L (ref 0–14)
TROPONIN, HIGH SENSITIVITY: 25 NG/L (ref 0–14)
TURBIDITY: CLEAR
URINE HGB: NEGATIVE
UROBILINOGEN, URINE: NORMAL
WBC # BLD: 5.5 K/UL (ref 3.5–11.3)
WBC # BLD: ABNORMAL 10*3/UL
WBC UA: ABNORMAL /HPF (ref 0–5)
YEAST: ABNORMAL

## 2020-07-31 PROCEDURE — 81001 URINALYSIS AUTO W/SCOPE: CPT

## 2020-07-31 PROCEDURE — 84156 ASSAY OF PROTEIN URINE: CPT

## 2020-07-31 PROCEDURE — 1200000000 HC SEMI PRIVATE

## 2020-07-31 PROCEDURE — 83605 ASSAY OF LACTIC ACID: CPT

## 2020-07-31 PROCEDURE — 84132 ASSAY OF SERUM POTASSIUM: CPT

## 2020-07-31 PROCEDURE — 84300 ASSAY OF URINE SODIUM: CPT

## 2020-07-31 PROCEDURE — 85014 HEMATOCRIT: CPT

## 2020-07-31 PROCEDURE — 82010 KETONE BODYS QUAN: CPT

## 2020-07-31 PROCEDURE — 72125 CT NECK SPINE W/O DYE: CPT

## 2020-07-31 PROCEDURE — 82435 ASSAY OF BLOOD CHLORIDE: CPT

## 2020-07-31 PROCEDURE — 2580000003 HC RX 258: Performed by: NURSE PRACTITIONER

## 2020-07-31 PROCEDURE — 70450 CT HEAD/BRAIN W/O DYE: CPT

## 2020-07-31 PROCEDURE — 85025 COMPLETE CBC W/AUTO DIFF WBC: CPT

## 2020-07-31 PROCEDURE — 83036 HEMOGLOBIN GLYCOSYLATED A1C: CPT

## 2020-07-31 PROCEDURE — 82330 ASSAY OF CALCIUM: CPT

## 2020-07-31 PROCEDURE — 99285 EMERGENCY DEPT VISIT HI MDM: CPT

## 2020-07-31 PROCEDURE — 84295 ASSAY OF SERUM SODIUM: CPT

## 2020-07-31 PROCEDURE — 82803 BLOOD GASES ANY COMBINATION: CPT

## 2020-07-31 PROCEDURE — 71045 X-RAY EXAM CHEST 1 VIEW: CPT

## 2020-07-31 PROCEDURE — 72170 X-RAY EXAM OF PELVIS: CPT

## 2020-07-31 PROCEDURE — 82947 ASSAY GLUCOSE BLOOD QUANT: CPT

## 2020-07-31 PROCEDURE — 82565 ASSAY OF CREATININE: CPT

## 2020-07-31 PROCEDURE — 93005 ELECTROCARDIOGRAM TRACING: CPT | Performed by: STUDENT IN AN ORGANIZED HEALTH CARE EDUCATION/TRAINING PROGRAM

## 2020-07-31 PROCEDURE — 99222 1ST HOSP IP/OBS MODERATE 55: CPT | Performed by: NURSE PRACTITIONER

## 2020-07-31 PROCEDURE — 80048 BASIC METABOLIC PNL TOTAL CA: CPT

## 2020-07-31 PROCEDURE — 84484 ASSAY OF TROPONIN QUANT: CPT

## 2020-07-31 RX ORDER — BUDESONIDE AND FORMOTEROL FUMARATE DIHYDRATE 160; 4.5 UG/1; UG/1
2 AEROSOL RESPIRATORY (INHALATION) 2 TIMES DAILY
Status: DISCONTINUED | OUTPATIENT
Start: 2020-07-31 | End: 2020-08-01 | Stop reason: HOSPADM

## 2020-07-31 RX ORDER — NICOTINE POLACRILEX 4 MG
15 LOZENGE BUCCAL PRN
Status: DISCONTINUED | OUTPATIENT
Start: 2020-07-31 | End: 2020-08-01 | Stop reason: HOSPADM

## 2020-07-31 RX ORDER — LATANOPROST 50 UG/ML
1 SOLUTION/ DROPS OPHTHALMIC NIGHTLY
Status: DISCONTINUED | OUTPATIENT
Start: 2020-07-31 | End: 2020-08-01 | Stop reason: HOSPADM

## 2020-07-31 RX ORDER — SODIUM CHLORIDE 0.9 % (FLUSH) 0.9 %
10 SYRINGE (ML) INJECTION PRN
Status: DISCONTINUED | OUTPATIENT
Start: 2020-07-31 | End: 2020-08-01 | Stop reason: HOSPADM

## 2020-07-31 RX ORDER — SODIUM CHLORIDE 0.9 % (FLUSH) 0.9 %
10 SYRINGE (ML) INJECTION EVERY 12 HOURS SCHEDULED
Status: DISCONTINUED | OUTPATIENT
Start: 2020-07-31 | End: 2020-08-01 | Stop reason: HOSPADM

## 2020-07-31 RX ORDER — DULOXETIN HYDROCHLORIDE 30 MG/1
60 CAPSULE, DELAYED RELEASE ORAL DAILY
Status: DISCONTINUED | OUTPATIENT
Start: 2020-08-01 | End: 2020-08-01 | Stop reason: HOSPADM

## 2020-07-31 RX ORDER — INSULIN GLARGINE 100 [IU]/ML
0.25 INJECTION, SOLUTION SUBCUTANEOUS NIGHTLY
Status: DISCONTINUED | OUTPATIENT
Start: 2020-07-31 | End: 2020-08-01 | Stop reason: HOSPADM

## 2020-07-31 RX ORDER — ONDANSETRON 2 MG/ML
4 INJECTION INTRAMUSCULAR; INTRAVENOUS EVERY 6 HOURS PRN
Status: DISCONTINUED | OUTPATIENT
Start: 2020-07-31 | End: 2020-08-01 | Stop reason: HOSPADM

## 2020-07-31 RX ORDER — PANTOPRAZOLE SODIUM 40 MG/1
40 TABLET, DELAYED RELEASE ORAL
Status: DISCONTINUED | OUTPATIENT
Start: 2020-08-01 | End: 2020-08-01 | Stop reason: HOSPADM

## 2020-07-31 RX ORDER — ASPIRIN 81 MG/1
81 TABLET, CHEWABLE ORAL DAILY
Status: DISCONTINUED | OUTPATIENT
Start: 2020-08-01 | End: 2020-08-01 | Stop reason: HOSPADM

## 2020-07-31 RX ORDER — ACETAMINOPHEN 650 MG/1
650 SUPPOSITORY RECTAL EVERY 6 HOURS PRN
Status: DISCONTINUED | OUTPATIENT
Start: 2020-07-31 | End: 2020-08-01 | Stop reason: HOSPADM

## 2020-07-31 RX ORDER — NICOTINE 21 MG/24HR
1 PATCH, TRANSDERMAL 24 HOURS TRANSDERMAL DAILY PRN
Status: DISCONTINUED | OUTPATIENT
Start: 2020-07-31 | End: 2020-08-01 | Stop reason: HOSPADM

## 2020-07-31 RX ORDER — B12/LEVOMEFOLATE CALCIUM/B-6 2-1.13-25
1 TABLET ORAL DAILY
Status: DISCONTINUED | OUTPATIENT
Start: 2020-08-01 | End: 2020-08-01 | Stop reason: HOSPADM

## 2020-07-31 RX ORDER — DEXTROSE MONOHYDRATE 25 G/50ML
12.5 INJECTION, SOLUTION INTRAVENOUS PRN
Status: DISCONTINUED | OUTPATIENT
Start: 2020-07-31 | End: 2020-08-01 | Stop reason: HOSPADM

## 2020-07-31 RX ORDER — ACETAMINOPHEN 325 MG/1
650 TABLET ORAL EVERY 6 HOURS PRN
Status: DISCONTINUED | OUTPATIENT
Start: 2020-07-31 | End: 2020-08-01 | Stop reason: HOSPADM

## 2020-07-31 RX ORDER — FUROSEMIDE 20 MG/1
20 TABLET ORAL DAILY
Status: DISCONTINUED | OUTPATIENT
Start: 2020-08-01 | End: 2020-08-01 | Stop reason: HOSPADM

## 2020-07-31 RX ORDER — SODIUM POLYSTYRENE SULFONATE 15 G/60ML
30 SUSPENSION ORAL; RECTAL
Status: ACTIVE | OUTPATIENT
Start: 2020-07-31 | End: 2020-07-31

## 2020-07-31 RX ORDER — SODIUM POLYSTYRENE SULFONATE 15 G/60ML
15 SUSPENSION ORAL; RECTAL
Status: ACTIVE | OUTPATIENT
Start: 2020-07-31 | End: 2020-07-31

## 2020-07-31 RX ORDER — DEXTROSE MONOHYDRATE 50 MG/ML
100 INJECTION, SOLUTION INTRAVENOUS PRN
Status: DISCONTINUED | OUTPATIENT
Start: 2020-07-31 | End: 2020-08-01 | Stop reason: HOSPADM

## 2020-07-31 RX ORDER — LEVOTHYROXINE SODIUM 0.07 MG/1
112.5 TABLET ORAL DAILY
Status: DISCONTINUED | OUTPATIENT
Start: 2020-08-01 | End: 2020-08-01 | Stop reason: HOSPADM

## 2020-07-31 RX ORDER — PROMETHAZINE HYDROCHLORIDE 25 MG/1
12.5 TABLET ORAL EVERY 6 HOURS PRN
Status: DISCONTINUED | OUTPATIENT
Start: 2020-07-31 | End: 2020-08-01 | Stop reason: HOSPADM

## 2020-07-31 RX ORDER — SODIUM CHLORIDE 9 MG/ML
INJECTION, SOLUTION INTRAVENOUS CONTINUOUS
Status: DISCONTINUED | OUTPATIENT
Start: 2020-07-31 | End: 2020-08-01

## 2020-07-31 RX ORDER — LOSARTAN POTASSIUM 25 MG/1
25 TABLET ORAL DAILY
Status: DISCONTINUED | OUTPATIENT
Start: 2020-08-01 | End: 2020-08-01 | Stop reason: HOSPADM

## 2020-07-31 RX ORDER — GABAPENTIN 300 MG/1
300 CAPSULE ORAL 3 TIMES DAILY
Status: DISCONTINUED | OUTPATIENT
Start: 2020-07-31 | End: 2020-08-01 | Stop reason: HOSPADM

## 2020-07-31 RX ADMIN — Medication 10 ML: at 23:47

## 2020-07-31 RX ADMIN — SODIUM CHLORIDE: 9 INJECTION, SOLUTION INTRAVENOUS at 23:28

## 2020-07-31 ASSESSMENT — PAIN DESCRIPTION - LOCATION
LOCATION: HEAD
LOCATION: HEAD

## 2020-07-31 ASSESSMENT — PAIN DESCRIPTION - DESCRIPTORS
DESCRIPTORS: ACHING
DESCRIPTORS: ACHING

## 2020-07-31 ASSESSMENT — PAIN DESCRIPTION - ORIENTATION: ORIENTATION: MID

## 2020-07-31 ASSESSMENT — PAIN DESCRIPTION - PAIN TYPE: TYPE: ACUTE PAIN

## 2020-07-31 ASSESSMENT — PAIN SCALES - GENERAL
PAINLEVEL_OUTOF10: 6
PAINLEVEL_OUTOF10: 5

## 2020-07-31 ASSESSMENT — PAIN DESCRIPTION - FREQUENCY: FREQUENCY: CONTINUOUS

## 2020-07-31 NOTE — ED PROVIDER NOTES
Social History     Socioeconomic History    Marital status:      Spouse name: Not on file    Number of children: Not on file    Years of education: Not on file    Highest education level: Not on file   Occupational History    Not on file   Social Needs    Financial resource strain: Not on file    Food insecurity     Worry: Not on file     Inability: Not on file    Transportation needs     Medical: Not on file     Non-medical: Not on file   Tobacco Use    Smoking status: Never Smoker    Smokeless tobacco: Never Used   Substance and Sexual Activity    Alcohol use: Never    Drug use: Never    Sexual activity: Never   Lifestyle    Physical activity     Days per week: Not on file     Minutes per session: Not on file    Stress: Not on file   Relationships    Social connections     Talks on phone: Not on file     Gets together: Not on file     Attends Mu-ism service: Not on file     Active member of club or organization: Not on file     Attends meetings of clubs or organizations: Not on file     Relationship status: Not on file    Intimate partner violence     Fear of current or ex partner: Not on file     Emotionally abused: Not on file     Physically abused: Not on file     Forced sexual activity: Not on file   Other Topics Concern    Not on file   Social History Narrative    Not on file       History reviewed. No pertinent family history. Allergies:  Pcn [penicillins] and Avelox [moxifloxacin]    Home Medications:  Prior to Admission medications    Medication Sig Start Date End Date Taking?  Authorizing Provider   metFORMIN (GLUCOPHAGE) 500 MG tablet Take 1 tablet by mouth 2 times daily (with meals) 8/1/20  Yes Getachew Cisneros MD   glipiZIDE (GLUCOTROL) 5 MG tablet Take 0.5 tablets by mouth daily 8/1/20  Yes Getachew Cisneros MD   furosemide (LASIX) 40 MG tablet Take 0.5 tablets by mouth daily 12/3/19  Yes Elaina Akhtar MD   levothyroxine (SYNTHROID) 75 MCG tablet Take 1.5 tablets Mouth: Mucous membranes are moist.   Eyes:      Pupils: Pupils are equal, round, and reactive to light. Neck:      Musculoskeletal: Normal range of motion and neck supple. Cardiovascular:      Rate and Rhythm: Normal rate and regular rhythm. Pulses: Normal pulses. Pulmonary:      Effort: Pulmonary effort is normal. No respiratory distress. Breath sounds: Normal breath sounds. No stridor. Abdominal:      General: Bowel sounds are normal. There is no distension. Palpations: Abdomen is soft. Comments: Abdomen is soft, nontender, no rigidity no guarding, no lacerations or abrasions   Musculoskeletal: Normal range of motion. General: No deformity. Comments: Patient tender on the left hip, pelvis is stable   Skin:     General: Skin is warm and dry. Capillary Refill: Capillary refill takes less than 2 seconds. Neurological:      General: No focal deficit present. Mental Status: She is alert and oriented to person, place, and time.    Psychiatric:         Mood and Affect: Mood normal.         Behavior: Behavior normal.         DIFFERENTIAL  DIAGNOSIS     PLAN (LABS / IMAGING / EKG):  Orders Placed This Encounter   Procedures    CT HEAD WO CONTRAST    CT CERVICAL SPINE WO CONTRAST    XR CHEST PORTABLE    XR PELVIS (1-2 VIEWS)    US RENAL COMPLETE    CBC Auto Differential    Basic Metabolic Panel    Beta-Hydroxybutyrate    BLOOD GAS, VENOUS    Urinalysis Reflex to Culture    Hemoglobin and hematocrit, blood    SODIUM (POC)    POTASSIUM (POC)    CHLORIDE (POC)    CALCIUM, IONIC (POC)    Troponin    Microscopic Urinalysis    Comprehensive Metabolic Panel w/ Reflex to MG    Magnesium    CBC    Protime-INR    Urinalysis with microscopic    Sodium, urine, random    Protein, urine, random    Hemoglobin A1C    Inpatient consult to Hospitalist    Inpatient consult to Home Care Needs    Venous Blood Gas, POC    Creatinine W/GFR Point of Care    Lactic Acid, POC    POCT Glucose    Anion Gap (Calc) POC    POC Glucose Fingerstick    POC Glucose Fingerstick    EKG 12 Lead    EKG REPORT    RHYTHM STRIP REPORT    PATIENT STATUS (FROM ED OR OR/PROCEDURAL) Inpatient    PATIENT STATUS (FROM ED OR OR/PROCEDURAL) Inpatient    Discharge patient       MEDICATIONS ORDERED:  Orders Placed This Encounter   Medications    DISCONTD: aspirin chewable tablet 81 mg    DISCONTD: budesonide-formoterol (SYMBICORT) 160-4.5 MCG/ACT inhaler 2 puff    DISCONTD: DULoxetine (CYMBALTA) extended release capsule 60 mg    DISCONTD: folic acid-pyridoxine-cyancobalamin (FOLTX) 1.13-25-2 MG TABS 1 tablet    DISCONTD: furosemide (LASIX) tablet 20 mg    DISCONTD: gabapentin (NEURONTIN) capsule 300 mg    DISCONTD: levothyroxine (SYNTHROID) tablet 112.5 mcg    DISCONTD: losartan (COZAAR) tablet 25 mg    DISCONTD: pantoprazole (PROTONIX) tablet 40 mg    DISCONTD: latanoprost (XALATAN) 0.005 % ophthalmic solution 1 drop    DISCONTD: 0.9 % sodium chloride infusion    DISCONTD: sodium chloride flush 0.9 % injection 10 mL    DISCONTD: sodium chloride flush 0.9 % injection 10 mL    DISCONTD: acetaminophen (TYLENOL) tablet 650 mg    DISCONTD: acetaminophen (TYLENOL) suppository 650 mg    DISCONTD: promethazine (PHENERGAN) tablet 12.5 mg    DISCONTD: ondansetron (ZOFRAN) injection 4 mg    DISCONTD: nicotine (NICODERM CQ) 21 MG/24HR 1 patch     As needed if patient is a smoker.     DISCONTD: enoxaparin (LOVENOX) injection 30 mg    DISCONTD: glucose (GLUTOSE) 40 % oral gel 15 g    DISCONTD: dextrose 50 % IV solution    DISCONTD: glucagon (rDNA) injection 1 mg    DISCONTD: dextrose 5 % solution    sodium polystyrene (KAYEXALATE) 15 GM/60ML suspension 15 g    sodium polystyrene (KAYEXALATE) 15 GM/60ML suspension 30 g    DISCONTD: insulin glargine (LANTUS) injection vial 23 Units    DISCONTD: insulin lispro (HUMALOG) injection vial 0-6 Units    DISCONTD: insulin lispro (HUMALOG) Non- 36 (*)     GFR  44 (*)     All other components within normal limits   URINE RT REFLEX TO CULTURE - Abnormal; Notable for the following components:    Glucose, Ur TRACE (*)     Protein, UA NEGATIVE  Verified by sulfosalicylic acid test. (*)     All other components within normal limits   POTASSIUM (POC) - Abnormal; Notable for the following components:    POC Potassium 4.6 (*)     All other components within normal limits   CHLORIDE (POC) - Abnormal; Notable for the following components:    POC Chloride 97 (*)     All other components within normal limits   CALCIUM, IONIC (POC) - Abnormal; Notable for the following components:    POC Ionized Calcium 1.05 (*)     All other components within normal limits   TROPONIN - Abnormal; Notable for the following components:    Troponin, High Sensitivity 22 (*)     All other components within normal limits   TROPONIN - Abnormal; Notable for the following components:    Troponin, High Sensitivity 25 (*)     All other components within normal limits   MICROSCOPIC URINALYSIS - Abnormal; Notable for the following components:    Bacteria, UA FEW (*)     All other components within normal limits   COMPREHENSIVE METABOLIC PANEL W/ REFLEX TO MG FOR LOW K - Abnormal; Notable for the following components:    Glucose 146 (*)     CREATININE 1.24 (*)     Potassium 3.4 (*)     Alkaline Phosphatase 215 (*)     Total Protein 5.7 (*)     Alb 3.3 (*)     GFR Non- 41 (*)     GFR  49 (*)     All other components within normal limits   CBC - Abnormal; Notable for the following components:    RBC 3.59 (*)     Hemoglobin 11.1 (*)     All other components within normal limits   URINALYSIS WITH MICROSCOPIC - Abnormal; Notable for the following components:    Glucose, Ur 2+ (*)     Protein, UA 2+ (*)     All other components within normal limits   HEMOGLOBIN A1C - Abnormal; Notable for the following components:    Hemoglobin A1C 12.1 (*)     All other components within normal limits   VENOUS BLOOD GAS, POINT OF CARE - Abnormal; Notable for the following components:    pCO2, Sam 54.6 (*)     pO2, Sam 24.3 (*)     HCO3, Venous 33.7 (*)     Total CO2, Venous 35 (*)     Positive Base Excess, Sam 7 (*)     O2 Sat, Sam 42 (*)     All other components within normal limits   CREATININE W/GFR POINT OF CARE - Abnormal; Notable for the following components:    POC Creatinine 1.47 (*)     GFR Comment 40 (*)     GFR Non- 33 (*)     All other components within normal limits   LACTIC ACID,POINT OF CARE - Abnormal; Notable for the following components:    POC Lactic Acid 1.73 (*)     All other components within normal limits   POCT GLUCOSE - Abnormal; Notable for the following components:    POC Glucose 373 (*)     All other components within normal limits   POC GLUCOSE FINGERSTICK - Abnormal; Notable for the following components:    POC Glucose 344 (*)     All other components within normal limits   BETA-HYDROXYBUTYRATE   HGB/HCT   SODIUM (POC)   MAGNESIUM   PROTIME-INR   SODIUM, URINE, RANDOM   PROTEIN, URINE, RANDOM   BLOOD GAS, VENOUS   ANION GAP (CALC) POC   POC GLUCOSE FINGERSTICK   POCT GLUCOSE   POCT GLUCOSE         RADIOLOGY:  No results found. EKG  EKG Interpretation    Interpreted by me    Rhythm: normal sinus   Rate: normal  Axis: normal  Ectopy: none  Conductionrbb unchanged from prior study  ST Segments: no acute change  T Waves: T wave inversion in lead V1, V2, V3, ST depression V4, V5 V6, T wave inversion in lead II, 3, aVF  Q Waves: none    Clinical Impression: no acute changes and normal EKG    All EKG's are interpreted by the Emergency Department Physicianwho either signs or Co-signs this chart in the absence of a cardiologist.    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 CT head, CT cervical spine within normal limits. [KEHINDE]   2142 Amorphous, UA: NOT REPORTED [YAKELIN]      ED Course User Index  [KEHINDE] Fer Sotomayor DO  [YAKELIN] Paula Chopra DO           PROCEDURES:  None    CONSULTS:  IP CONSULT TO HOSPITALIST  IP CONSULT TO HOME CARE NEEDS    CRITICAL CARE:  Please see attending note    FINAL IMPRESSION      1.  Falls frequently          DISPOSITION / PLAN     DISPOSITION Admitted 07/31/2020 08:38:38 PM      PATIENT REFERRED TO:  Elissa Espinosa MD  [address] [place] [address]  [phone]            DISCHARGE MEDICATIONS:  Discharge Medication List as of 8/1/2020 12:45 PM      START taking these medications    Details   glipiZIDE (GLUCOTROL) 5 MG tablet Take 0.5 tablets by mouth daily, Disp-60 tablet,R-3Normal             Fer Sotomayor DO  Emergency Medicine Resident    (Please note that portions of this note were completed with a voice recognition program.Efforts were made to edit the dictations but occasionally words are mis-transcribed.)        Fer Sotomayor DO  Resident  08/03/20 2607

## 2020-07-31 NOTE — ED NOTES
Pt to ED cc fall earlier today  Pt states that she was walking in the yard and tripped over uneven ground  Pt states that she fell and hit her head, denies any LOC  Pt denies any dizziness, states she has a slight headache, denies any CP/SOB  Pt denies abd pain, pt alert and oriented x4, resp even and unlabored, pt wears 2L NC at home   Pt shows no signs of distress, side rails upx2, will cont to monitor      Cheyanne Tamez RN  07/31/20 2352

## 2020-07-31 NOTE — ED PROVIDER NOTES
[facility]     Emergency Department     Faculty Attestation    I performed a history and physical examination of the patient and discussed management with the resident. I reviewed the residents note and agree with the documented findings and plan of care. Any areas of disagreement are noted on the chart. I was personally present for the key portions of any procedures. I have documented in the chart those procedures where I was not present during the key portions. I have reviewed the emergency nurses triage note. I agree with the chief complaint, past medical history, past surgical history, allergies, medications, social and family history as documented unless otherwise noted below. For Physician Assistant/ Nurse Practitioner cases/documentation I have personally evaluated this patient and have completed at least one if not all key elements of the E/M (history, physical exam, and MDM). Additional findings are as noted. I have personally seen and evaluated the patient. I find the patient's history and physical exam are consistent with the NP/PA documentation. I agree with the care provided, treatment rendered, disposition and follow-up plan. 60-year-old female with history of left-sided heart failure, CKD (baseline creatinine 1.1), diabetes presenting after fall. Patient had a non-syncopal fall when she tripped in her grass. She landed on her left hip and struck the left side of her head with no LOC. Family states that she is fallen multiple times in the last several days, typically on uneven grass. Family is also concerned that she has not been urinating well for the approximately 1 month. Patient denies any fevers or chills. She states that she is feeling normal.  Denies any chest pain, shortness of breath.     Exam:  General: Laying on the bed, awake, alert and in no acute distress  CV: normal rate and regular rhythm  Lungs: Breathing comfortably on room air with no

## 2020-07-31 NOTE — ED NOTES
Bed: 09  Expected date:   Expected time:   Means of arrival:   Comments:  [facility] [id number] [facility],[address] Dia Larry  07/31/20 1194

## 2020-08-01 ENCOUNTER — APPOINTMENT (OUTPATIENT)
Dept: ULTRASOUND IMAGING | Age: 85
DRG: 683 | End: 2020-08-01
Payer: MEDICARE

## 2020-08-01 VITALS
TEMPERATURE: 98.3 F | HEIGHT: 67 IN | OXYGEN SATURATION: 97 % | HEART RATE: 59 BPM | WEIGHT: 207 LBS | SYSTOLIC BLOOD PRESSURE: 122 MMHG | DIASTOLIC BLOOD PRESSURE: 73 MMHG | RESPIRATION RATE: 16 BRPM | BODY MASS INDEX: 32.49 KG/M2

## 2020-08-01 PROBLEM — F33.40 RECURRENT MAJOR DEPRESSION IN REMISSION (HCC): Status: ACTIVE | Noted: 2020-08-01

## 2020-08-01 PROBLEM — K21.9 GASTROESOPHAGEAL REFLUX DISEASE: Status: ACTIVE | Noted: 2020-08-01

## 2020-08-01 PROBLEM — I87.2 PERIPHERAL VENOUS INSUFFICIENCY: Status: ACTIVE | Noted: 2020-08-01

## 2020-08-01 PROBLEM — R55 NEAR SYNCOPE: Status: RESOLVED | Noted: 2019-12-01 | Resolved: 2020-08-01

## 2020-08-01 PROBLEM — R00.1 BRADYCARDIA: Status: ACTIVE | Noted: 2020-08-01

## 2020-08-01 PROBLEM — I50.32 CHRONIC DIASTOLIC HEART FAILURE (HCC): Status: ACTIVE | Noted: 2020-08-01

## 2020-08-01 PROBLEM — E11.43 AUTONOMIC NEUROPATHY DUE TO TYPE 2 DIABETES MELLITUS (HCC): Status: ACTIVE | Noted: 2020-08-01

## 2020-08-01 PROBLEM — J44.9 CHRONIC OBSTRUCTIVE PULMONARY DISEASE (HCC): Status: ACTIVE | Noted: 2020-08-01
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
## 2020-08-01 PROBLEM — J38.00 VOCAL CORD PALSY: Status: ACTIVE | Noted: 2020-08-01

## 2020-08-01 PROBLEM — Z90.11 ACQUIRED ABSENCE OF RIGHT BREAST AND NIPPLE: Status: ACTIVE | Noted: 2020-08-01
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
## 2020-08-01 PROBLEM — I27.20 PULMONARY HYPERTENSION (HCC): Status: ACTIVE | Noted: 2020-08-01

## 2020-08-01 PROBLEM — N18.30 CHRONIC RENAL INSUFFICIENCY, STAGE III (MODERATE) (HCC): Status: ACTIVE | Noted: 2020-08-01

## 2020-08-01 PROBLEM — K21.9 LARYNGOPHARYNGEAL REFLUX: Status: ACTIVE | Noted: 2020-08-01

## 2020-08-01 PROBLEM — R73.9 HYPERGLYCEMIA: Status: ACTIVE | Noted: 2020-08-01

## 2020-08-01 PROBLEM — E78.2 MIXED HYPERLIPIDEMIA: Status: ACTIVE | Noted: 2020-08-01

## 2020-08-01 PROBLEM — I25.10 ATHEROSCLEROTIC HEART DISEASE OF NATIVE CORONARY ARTERY WITHOUT ANGINA PECTORIS: Status: ACTIVE | Noted: 2020-08-01

## 2020-08-01 LAB
-: ABNORMAL
ALBUMIN SERPL-MCNC: 3.3 G/DL (ref 3.5–5.2)
ALBUMIN/GLOBULIN RATIO: 1.4 (ref 1–2.5)
ALP BLD-CCNC: 215 U/L (ref 35–104)
ALT SERPL-CCNC: 28 U/L (ref 5–33)
AMORPHOUS: ABNORMAL
ANION GAP SERPL CALCULATED.3IONS-SCNC: 11 MMOL/L (ref 9–17)
AST SERPL-CCNC: 27 U/L
BACTERIA: ABNORMAL
BILIRUB SERPL-MCNC: 0.72 MG/DL (ref 0.3–1.2)
BILIRUBIN URINE: NEGATIVE
BUN BLDV-MCNC: 21 MG/DL (ref 8–23)
BUN/CREAT BLD: ABNORMAL (ref 9–20)
CALCIUM SERPL-MCNC: 8.8 MG/DL (ref 8.6–10.4)
CASTS UA: ABNORMAL /LPF (ref 0–8)
CHLORIDE BLD-SCNC: 100 MMOL/L (ref 98–107)
CO2: 29 MMOL/L (ref 20–31)
COLOR: YELLOW
CREAT SERPL-MCNC: 1.24 MG/DL (ref 0.5–0.9)
CRYSTALS, UA: ABNORMAL /HPF
EKG ATRIAL RATE: 64 BPM
EKG P AXIS: 67 DEGREES
EKG P-R INTERVAL: 138 MS
EKG Q-T INTERVAL: 462 MS
EKG QRS DURATION: 158 MS
EKG QTC CALCULATION (BAZETT): 476 MS
EKG R AXIS: 117 DEGREES
EKG T AXIS: -22 DEGREES
EKG VENTRICULAR RATE: 64 BPM
EPITHELIAL CELLS UA: ABNORMAL /HPF (ref 0–5)
GFR AFRICAN AMERICAN: 49 ML/MIN
GFR NON-AFRICAN AMERICAN: 41 ML/MIN
GFR SERPL CREATININE-BSD FRML MDRD: ABNORMAL ML/MIN/{1.73_M2}
GFR SERPL CREATININE-BSD FRML MDRD: ABNORMAL ML/MIN/{1.73_M2}
GLUCOSE BLD-MCNC: 146 MG/DL (ref 70–99)
GLUCOSE BLD-MCNC: 97 MG/DL (ref 65–105)
GLUCOSE URINE: ABNORMAL
HCT VFR BLD CALC: 36.5 % (ref 36.3–47.1)
HEMOGLOBIN: 11.1 G/DL (ref 11.9–15.1)
INR BLD: 1.1
KETONES, URINE: NEGATIVE
LEUKOCYTE ESTERASE, URINE: NEGATIVE
MAGNESIUM: 2.5 MG/DL (ref 1.6–2.6)
MCH RBC QN AUTO: 30.9 PG (ref 25.2–33.5)
MCHC RBC AUTO-ENTMCNC: 30.4 G/DL (ref 28.4–34.8)
MCV RBC AUTO: 101.7 FL (ref 82.6–102.9)
MUCUS: ABNORMAL
NITRITE, URINE: NEGATIVE
NRBC AUTOMATED: 0 PER 100 WBC
OTHER OBSERVATIONS UA: ABNORMAL
PDW BLD-RTO: 14.3 % (ref 11.8–14.4)
PH UA: 5.5 (ref 5–8)
PLATELET # BLD: 211 K/UL (ref 138–453)
PMV BLD AUTO: 10 FL (ref 8.1–13.5)
POTASSIUM SERPL-SCNC: 3.4 MMOL/L (ref 3.7–5.3)
PROTEIN UA: ABNORMAL
PROTHROMBIN TIME: 11.3 SEC (ref 9–12)
RBC # BLD: 3.59 M/UL (ref 3.95–5.11)
RBC UA: ABNORMAL /HPF (ref 0–4)
RENAL EPITHELIAL, UA: ABNORMAL /HPF
SODIUM BLD-SCNC: 140 MMOL/L (ref 135–144)
SODIUM,UR: 57 MMOL/L
SPECIFIC GRAVITY UA: 1.02 (ref 1–1.03)
TOTAL PROTEIN, URINE: 95 MG/DL
TOTAL PROTEIN: 5.7 G/DL (ref 6.4–8.3)
TRICHOMONAS: ABNORMAL
TURBIDITY: CLEAR
URINE HGB: NEGATIVE
UROBILINOGEN, URINE: NORMAL
WBC # BLD: 5 K/UL (ref 3.5–11.3)
WBC UA: ABNORMAL /HPF (ref 0–5)
YEAST: ABNORMAL

## 2020-08-01 PROCEDURE — 2580000003 HC RX 258: Performed by: NURSE PRACTITIONER

## 2020-08-01 PROCEDURE — 36415 COLL VENOUS BLD VENIPUNCTURE: CPT

## 2020-08-01 PROCEDURE — 85027 COMPLETE CBC AUTOMATED: CPT

## 2020-08-01 PROCEDURE — 83735 ASSAY OF MAGNESIUM: CPT

## 2020-08-01 PROCEDURE — 6370000000 HC RX 637 (ALT 250 FOR IP): Performed by: NURSE PRACTITIONER

## 2020-08-01 PROCEDURE — 6360000002 HC RX W HCPCS: Performed by: NURSE PRACTITIONER

## 2020-08-01 PROCEDURE — 99239 HOSP IP/OBS DSCHRG MGMT >30: CPT | Performed by: INTERNAL MEDICINE

## 2020-08-01 PROCEDURE — 85610 PROTHROMBIN TIME: CPT

## 2020-08-01 PROCEDURE — 76770 US EXAM ABDO BACK WALL COMP: CPT

## 2020-08-01 PROCEDURE — 82947 ASSAY GLUCOSE BLOOD QUANT: CPT

## 2020-08-01 PROCEDURE — 80053 COMPREHEN METABOLIC PANEL: CPT

## 2020-08-01 RX ORDER — GLIPIZIDE 5 MG/1
2.5 TABLET ORAL DAILY
Qty: 60 TABLET | Refills: 3 | Status: ON HOLD | OUTPATIENT
Start: 2020-08-01 | End: 2021-02-16 | Stop reason: SDUPTHER

## 2020-08-01 RX ADMIN — ASPIRIN 81 MG: 81 TABLET, CHEWABLE ORAL at 09:20

## 2020-08-01 RX ADMIN — INSULIN GLARGINE 23 UNITS: 100 INJECTION, SOLUTION SUBCUTANEOUS at 00:59

## 2020-08-01 RX ADMIN — INSULIN LISPRO 2 UNITS: 100 INJECTION, SOLUTION INTRAVENOUS; SUBCUTANEOUS at 00:59

## 2020-08-01 RX ADMIN — GABAPENTIN 300 MG: 300 CAPSULE ORAL at 09:20

## 2020-08-01 RX ADMIN — LOSARTAN POTASSIUM 25 MG: 25 TABLET, FILM COATED ORAL at 09:20

## 2020-08-01 RX ADMIN — LATANOPROST 1 DROP: 50 SOLUTION OPHTHALMIC at 00:59

## 2020-08-01 RX ADMIN — LEVOTHYROXINE SODIUM 112.5 MCG: 75 TABLET ORAL at 06:12

## 2020-08-01 RX ADMIN — FUROSEMIDE 20 MG: 20 TABLET ORAL at 09:20

## 2020-08-01 RX ADMIN — Medication 1 TABLET: at 09:20

## 2020-08-01 RX ADMIN — PANTOPRAZOLE SODIUM 40 MG: 40 TABLET, DELAYED RELEASE ORAL at 06:12

## 2020-08-01 RX ADMIN — Medication 10 ML: at 09:20

## 2020-08-01 RX ADMIN — ENOXAPARIN SODIUM 30 MG: 30 INJECTION SUBCUTANEOUS at 09:20

## 2020-08-01 ASSESSMENT — ENCOUNTER SYMPTOMS
BLOOD IN STOOL: 0
DIARRHEA: 0
NAUSEA: 0
WHEEZING: 0
CHEST TIGHTNESS: 0
VOMITING: 0
CONSTIPATION: 0
ABDOMINAL PAIN: 0
COUGH: 0
SHORTNESS OF BREATH: 0

## 2020-08-01 ASSESSMENT — PAIN SCALES - GENERAL: PAINLEVEL_OUTOF10: 0

## 2020-08-01 NOTE — H&P
[facility]    HISTORY AND PHYSICAL EXAMINATION            Date:   7/31/2020  Patient name:  Billy Ta  Date of admission:  7/31/2020  4:53 PM  MRN:   [id number]  Account:  [de-identified]  YOB: 1929  PCP:    Alisia Holt MD  Room:   09/09  Code Status:    Prior    Chief Complaint:     Chief Complaint   Patient presents with    Fall       History Obtained From:     patient, electronic medical record    History of Present Illness:     Billy Ta is a 80 y.o. Non-/non  female who presents with Fall   and is admitted to the hospital for the management of diamond    Patient presents to the emergency room ws/p fall while losing her footing in the grass. Patient denied hitting her head or any loc, no prodrome symptomology and is firm  that it was secondary to her footing and the grass. There was a family member at bedside that voiced the patient had multiple falls within the past week and her blood glucose was elevated. EMS obtained glucose and it was noted to be 450. Patient denies a history of Diabetes, however in the chart there is documentation of diabetes, however she does not take any medications for diabetes. Patient denies the ROS and states that she is fine, she seems to be a poor historian, however answers appropriate with neuro exam and questions. Her work up in the emergency room showed  Metabolic panel with a sodium of 138, potassium of 4.8, chloride of 96, co2 of 32, bun of 24 and creat of 1.36. Glucose of 364,. Hemogram without acute leukocytosis or anemias, however she appeared dehydrated. Cardiac markers with high sensitivity troponin of 22  Urinalysis without signs of UTI or ketosis or hyperproteineuremia. VBG with ph 7.39, pco2 54.6, p02 24,fot383.7  CXR showed Mild-moderate pulmonary edema  Hip/Pelvis xray showed - No acute abnormality involving the pelvis.  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    Historical Provider, MD   omeprazole (PRILOSEC) 40 MG delayed release capsule Take 40 mg by mouth daily    Historical Provider, MD   travoprost, benzalkonium, (TRAVATAN) 0.004 % ophthalmic solution Place 1 drop into both eyes daily    Historical Provider, MD   NONFORMULARY Take 500 mg by mouth daily Ascorbic acid w/ vitamin C    Historical Provider, MD   folic acid-pyridoxine-cyanocobalamine (FOLTX) 2.5-25-1 MG TABS tablet Take 1 tablet by mouth daily    Historical Provider, MD        Allergies:     Pcn [penicillins] and Avelox [moxifloxacin]    Social History:     Tobacco:    reports that she has never smoked. She has never used smokeless tobacco.  Alcohol:      reports no history of alcohol use. Drug Use:  reports no history of drug use. Family History:     History reviewed. No pertinent family history. Review of Systems:     Positive and Negative as described in HPI. Review of Systems   Constitutional: Negative for chills, diaphoresis and fever. HENT: Negative for congestion. Eyes: Negative for visual disturbance. Respiratory: Negative for cough, chest tightness, shortness of breath and wheezing. Cardiovascular: Negative for chest pain, palpitations and leg swelling. Gastrointestinal: Negative for abdominal pain, blood in stool, constipation, diarrhea, nausea and vomiting. Genitourinary: Negative for difficulty urinating. Neurological: Negative for dizziness, weakness, light-headedness, numbness and headaches. All other systems reviewed and are negative. Physical Exam:   BP (!) 157/124   Pulse 64   Temp 98.3 °F (36.8 °C)   Resp 18   LMP  (LMP Unknown)   SpO2 (!) 89%   Temp (24hrs), Av.3 °F (36.8 °C), Min:98.3 °F (36.8 °C), Max:98.3 °F (36.8 °C)    Recent Labs     20  1803   POCGLU 373*     No intake or output data in the 24 hours ending 20    Physical Exam  Vitals signs and nursing note reviewed.    Constitutional:       General: She is not in acute distress. Appearance: She is well-developed. She is not diaphoretic. HENT:      Head: Normocephalic and atraumatic. Right Ear: Hearing normal.      Left Ear: Hearing normal.      Nose: Nose normal. No rhinorrhea. Eyes:      General: Lids are normal.      Extraocular Movements:      Right eye: Normal extraocular motion. Left eye: Normal extraocular motion. Conjunctiva/sclera: Conjunctivae normal.      Right eye: Right conjunctiva is not injected. Left eye: Left conjunctiva is not injected. Pupils: Pupils are equal, round, and reactive to light. Pupils are equal.      Right eye: Pupil is reactive. Left eye: Pupil is reactive. Neck:      Musculoskeletal: Neck supple. Thyroid: No thyromegaly. Vascular: No carotid bruit. Trachea: Trachea and phonation normal. No tracheal deviation. Cardiovascular:      Rate and Rhythm: Normal rate and regular rhythm. Pulses: Normal pulses. Heart sounds: Normal heart sounds. No murmur. Pulmonary:      Effort: Pulmonary effort is normal. No respiratory distress. Breath sounds: Normal breath sounds. No stridor. No decreased breath sounds. Abdominal:      General: Bowel sounds are normal. There is no distension. Palpations: Abdomen is soft. There is no mass. Tenderness: There is no abdominal tenderness. There is no guarding. Musculoskeletal:         General: No tenderness. Skin:     General: Skin is warm and dry. Findings: No erythema, lesion or rash. Neurological:      Mental Status: She is alert and oriented to person, place, and time. She is not disoriented. Cranial Nerves: No cranial nerve deficit. Psychiatric:         Speech: Speech normal.         Behavior: Behavior normal. Behavior is cooperative.          Investigations:      Laboratory Testing:  Recent Results (from the past 24 hour(s))   CBC Auto Differential    Collection Time: 07/31/20  6:02 PM   Result Value Ref Range WBC 5.5 3.5 - 11.3 k/uL    RBC 3.77 (L) 3.95 - 5.11 m/uL    Hemoglobin 11.9 11.9 - 15.1 g/dL    Hematocrit 38.8 36.3 - 47.1 %    .9 82.6 - 102.9 fL    MCH 31.6 25.2 - 33.5 pg    MCHC 30.7 28.4 - 34.8 g/dL    RDW 14.3 11.8 - 14.4 %    Platelets 356 896 - 776 k/uL    MPV 10.1 8.1 - 13.5 fL    NRBC Automated 0.0 0.0 per 100 WBC    Differential Type NOT REPORTED     Seg Neutrophils 77 (H) 36 - 65 %    Lymphocytes 13 (L) 24 - 43 %    Monocytes 9 3 - 12 %    Eosinophils % 1 1 - 4 %    Basophils 0 0 - 2 %    Immature Granulocytes 0 0 %    Segs Absolute 4.26 1.50 - 8.10 k/uL    Absolute Lymph # 0.72 (L) 1.10 - 3.70 k/uL    Absolute Mono # 0.49 0.10 - 1.20 k/uL    Absolute Eos # 0.03 0.00 - 0.44 k/uL    Basophils Absolute <0.03 0.00 - 0.20 k/uL    Absolute Immature Granulocyte <0.03 0.00 - 0.30 k/uL    WBC Morphology NOT REPORTED     RBC Morphology NOT REPORTED     Platelet Estimate NOT REPORTED    Basic Metabolic Panel    Collection Time: 07/31/20  6:02 PM   Result Value Ref Range    Glucose 364 (H) 70 - 99 mg/dL    BUN 24 (H) 8 - 23 mg/dL    CREATININE 1.36 (H) 0.50 - 0.90 mg/dL    Bun/Cre Ratio NOT REPORTED 9 - 20    Calcium 9.0 8.6 - 10.4 mg/dL    Sodium 138 135 - 144 mmol/L    Potassium 4.8 3.7 - 5.3 mmol/L    Chloride 96 (L) 98 - 107 mmol/L    CO2 32 (H) 20 - 31 mmol/L    Anion Gap 10 9 - 17 mmol/L    GFR Non-African American 36 (L) >60 mL/min    GFR  44 (L) >60 mL/min    GFR Comment          GFR Staging NOT REPORTED    Beta-Hydroxybutyrate    Collection Time: 07/31/20  6:02 PM   Result Value Ref Range    Beta-Hydroxybutyrate 0.10 0.02 - 0.27 mmol/L   Troponin    Collection Time: 07/31/20  6:02 PM   Result Value Ref Range    Troponin, High Sensitivity 25 (H) 0 - 14 ng/L    Troponin T NOT REPORTED <0.03 ng/mL    Troponin Interp NOT REPORTED    Venous Blood Gas, POC    Collection Time: 07/31/20  6:03 PM   Result Value Ref Range    pH, Sam 7.399 7.320 - 7.430    pCO2, Sam 54.6 (H) 41.0 - 51.0 mm Hg Turbidity UA CLEAR CLEAR    Glucose, Ur TRACE (A) NEGATIVE    Bilirubin Urine NEGATIVE NEGATIVE    Ketones, Urine NEGATIVE NEGATIVE    Specific Gravity, UA 1.011 1.005 - 1.030    Urine Hgb NEGATIVE NEGATIVE    pH, UA 7.5 5.0 - 8.0    Protein, UA NEGATIVE  Verified by sulfosalicylic acid test. (A) NEGATIVE    Urobilinogen, Urine Normal Normal    Nitrite, Urine NEGATIVE NEGATIVE    Leukocyte Esterase, Urine NEGATIVE NEGATIVE    Urinalysis Comments NOT REPORTED    Microscopic Urinalysis    Collection Time: 07/31/20  7:49 PM   Result Value Ref Range    -          WBC, UA 2 TO 5 0 - 5 /HPF    RBC, UA 0 TO 2 0 - 4 /HPF    Casts UA  0 - 8 /LPF     2 TO 5 HYALINE Reference range defined for non-centrifuged specimen. Crystals, UA NOT REPORTED None /HPF    Epithelial Cells UA 2 TO 5 0 - 5 /HPF    Renal Epithelial, UA NOT REPORTED 0 /HPF    Bacteria, UA FEW (A) None    Mucus, UA NOT REPORTED None    Trichomonas, UA NOT REPORTED None    Amorphous, UA NOT REPORTED None    Other Observations UA NOT REPORTED NOT REQ. Yeast, UA NOT REPORTED None       Imaging/Diagnostics:  Xr Pelvis (1-2 Views)    Result Date: 7/31/2020  Mild-moderate pulmonary edema. No acute abnormality involving the pelvis. 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 Ct Head Wo Contrast    Result Date: 7/31/2020  CT head: No acute intracranial abnormality. Senescent changes including chronic microvascular change. CT C-spine: No acute fracture or traumatic malalignment. Ct Cervical Spine Wo Contrast    Result Date: 7/31/2020  CT head: No acute intracranial abnormality. Senescent changes including chronic microvascular change. CT C-spine: No acute fracture or traumatic malalignment. Xr Chest Portable    Result Date: 7/31/2020  Mild-moderate pulmonary edema. No acute abnormality involving the pelvis. 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        Assessment :      Hospital Problems           Last Modified POA * (Principal) CRIS (acute kidney injury) ([facility] [address] 8/1/2020 Yes    Hyperglycemia 8/1/2020 Yes    COPD (chronic obstructive pulmonary disease) ([facility] [address] 8/1/2020 Yes    Pulmonary hypertension due to COPD ([facility] [address] 8/1/2020 Yes    Essential hypertension 8/1/2020 Yes    CKD (chronic kidney disease) stage 3, GFR 30-59 ml/min ([facility] [address] 8/1/2020 Yes    Type 2 diabetes mellitus with kidney complication, without long-term current use of insulin ([facility] [address] 8/1/2020 Yes    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 angina pectoris 8/1/2020 Yes    Chronic diastolic heart failure ([facility] [address] 8/1/2020 Yes    Fall at home, initial encounter 8/1/2020 Yes          Plan:     Patient status inpatient in the Med/Surge    1. CRIS on CKD- known history of CKD in chart. Last creat is 1.13, bun 31. Gentle hydration. Recheck bmp in am, if stable consider discharge   2. Diabetes type 2 with hyperglycemia- Patient may have denied this and may not take medications however she has a history documented in the chart, and her last HGA1C is 8.9 in December,  Patient was placed on long acting insulin on admission, prior to visit, insulin sliding scale, given her age allow permissive hyperglycemia of some sort to prevent hypoglycemia. She has metformin on her med list in the computer, however, unsure how accurate it is, will need to get list from pharmacy. I do not recommend metformin in this patient with her age and known kidney disease. 3. CAD- no angina or symptomology , unsure of compliance with medications. 4. Diastolic heart failure- I do not see any clinical exacerbation symptomology, she denies dyspnea and has very little edema to lower extremities. 5. COPD- without exacerbation- No acute exacerbation, follows with pulmonary service. 6. GI proph/DVT proph  7. medication reconciliation- order placed for nursing to call pharmacy and get medication list for reconciliation.    8. PT/OT/Care coordination- Needs follow up- appt with pcp to discuss hyperglycemia and DM-     Consultations:   IP CONSULT TO HOSPITALIST     Patient is admitted as inpatient status because of co-morbidities listed above, severity of signs and symptoms as outlined, requirement for current medical therapies and most importantly because of direct risk to patient if care not provided in a hospital setting. Expected length of stay > 48 hours.     DOM Davis - [place]  2020  8:59 PM    Copy sent to Dr. Bernardo Cabrera MD

## 2020-08-01 NOTE — PROGRESS NOTES
CLINICAL PHARMACY NOTE: MEDS TO [address] Select Patient?: No  Total # of Prescriptions Filled: 1   The following medications were delivered to the patient:  · Glipizide   Total # of Interventions Completed: 1  Time Spent (min): 30    Additional Documentation:    PATIENTS DAUGHTER PICKING UP AT PHARMACY   dion stewart also let me know that metformin is discontinued

## 2020-08-01 NOTE — DISCHARGE SUMMARY
I added glipizide follow-up with PCP uncontrolled patient noncompliant    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 angina pectoris continue home medication    Chronic diastolic heart failure ([address] continue diuresis    Fall at home, initial encounter follow-up with PCP as outpatient patient is adamant about going home today  CT scan shows concern for Paget disease follow-up with PCP  Chronic troponin elevation follow-up with established cardiologist may need echo as outpatient      Physical exam     Alert  Chest: Clear to auscultation bilateral  Abdomen: Nontender nondistended  CVS: S1-S2  Neurology: Moves extremity no focal finding      Significant therapeutic interventions:     Significant Diagnostic Studies:   Labs / Micro:  CBC:   Lab Results   Component Value Date    WBC 5.0 08/01/2020    RBC 3.59 08/01/2020    HGB 11.1 08/01/2020    HCT 36.5 08/01/2020    .7 08/01/2020    MCH 30.9 08/01/2020    MCHC 30.4 08/01/2020    RDW 14.3 08/01/2020     08/01/2020     BMP:    Lab Results   Component Value Date    GLUCOSE 146 08/01/2020     08/01/2020    K 3.4 08/01/2020     08/01/2020    CO2 29 08/01/2020    ANIONGAP 11 08/01/2020    BUN 21 08/01/2020    CREATININE 1.24 08/01/2020    BUNCRER NOT REPORTED 08/01/2020    CALCIUM 8.8 08/01/2020    LABGLOM 41 08/01/2020    GFRAA 49 08/01/2020    GFR      08/01/2020    GFR NOT REPORTED 08/01/2020     HFP:    Lab Results   Component Value Date    PROT 5.7 08/01/2020     CMP:    Lab Results   Component Value Date    GLUCOSE 146 08/01/2020     08/01/2020    K 3.4 08/01/2020     08/01/2020    CO2 29 08/01/2020    BUN 21 08/01/2020    CREATININE 1.24 08/01/2020    ANIONGAP 11 08/01/2020    ALKPHOS 215 08/01/2020    ALT 28 08/01/2020    AST 27 08/01/2020    BILITOT 0.72 08/01/2020    LABALBU 3.3 08/01/2020    ALBUMIN 1.4 08/01/2020    LABGLOM 41 08/01/2020    GFRAA 49 08/01/2020    GFR      08/01/2020    GFR NOT REPORTED 08/01/2020    PROT 5.7 08/01/2020    CALCIUM 8.8 08/01/2020     PT/INR:    Lab Results   Component Value Date    PROTIME 11.3 08/01/2020    INR 1.1 08/01/2020     PTT: No results found for: APTT  FLP:  No results found for: CHOL, TRIG, HDL  U/A:    Lab Results   Component Value Date    COLORU YELLOW 07/31/2020    TURBIDITY CLEAR 07/31/2020    SPECGRAV 1.011 07/31/2020    HGBUR NEGATIVE 07/31/2020    PHUR 7.5 07/31/2020    PROTEINU NEGATIVE  Verified by sulfosalicylic acid test. 54/87/4185    GLUCOSEU TRACE 07/31/2020    KETUA NEGATIVE 07/31/2020    BILIRUBINUR NEGATIVE 07/31/2020    UROBILINOGEN Normal 07/31/2020    NITRU NEGATIVE 07/31/2020    LEUKOCYTESUR NEGATIVE 07/31/2020     TSH:    Lab Results   Component Value Date    TSH 11.97 12/01/2019        Radiology:  [facility] Pelvis (1-2 Views)    Result Date: 7/31/2020  Mild-moderate pulmonary edema. No acute abnormality involving the pelvis. 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 Ct Head Wo Contrast    Result Date: 7/31/2020  CT head: No acute intracranial abnormality. Senescent changes including chronic microvascular change. CT C-spine: No acute fracture or traumatic malalignment. Ct Cervical Spine Wo Contrast    Result Date: 7/31/2020  CT head: No acute intracranial abnormality. Senescent changes including chronic microvascular change. CT C-spine: No acute fracture or traumatic malalignment. Us Renal Complete    Result Date: 8/1/2020  Unremarkable ultrasound of the kidneys and urinary bladder. Trace ascites. Xr Chest Portable    Result Date: 7/31/2020  Mild-moderate pulmonary edema. No acute abnormality involving the pelvis. 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        Consultations:    Consults:     Final Specialist Recommendations/Findings:   IP CONSULT TO HOSPITALIST  IP CONSULT TO HOME CARE NEEDS      The patient was seen and examined on day of discharge and this discharge summary is in conjunction with any prescriptions for required medications, discharge plan and follow up. Electronically signed by   Jett Escobedo MD  8/1/2020  10:03 AM      Thank you Dr. Wen Thomas MD for the opportunity to be involved in this patient's care.

## 2020-08-01 NOTE — DISCHARGE INSTR - COC
Continuity of Care Form    Patient Name: Shanelle Light   :  1929  MRN:  [id number]    Admit date:  2020  Discharge date:  20    Code Status Order: Full Code   Advance Directives:   Advance Care Flowsheet Documentation     Date/Time Healthcare Directive Type of Healthcare Directive Copy in [address] Agent's Name Healthcare Agent's Phone Number    20 1435  Yes, patient has an advance directive for healthcare treatment  --  --  --  --  --          Admitting Physician:  Yelena Phillips MD  PCP: Charles Molina MD    [place] [facility] [place] 4080/7837-18  Discharging Unit Phone Number: [phone]    Emergency Contact:   Extended Emergency Contact Information  Primary Emergency Contact: Loretta Garcia Phone: [phone]  Relation: Child  Secondary Emergency Contact: Alina Zabala, [address] Phone: [phone]  Relation: Child    Past Surgical History:  Past Surgical History:   Procedure Laterality Date    CHOLECYSTECTOMY      COLONOSCOPY      HYSTERECTOMY      KNEE SURGERY      MASTECTOMY      THYROID SURGERY         Immunization History: There is no immunization history on file for this patient.     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    Pulmonary hypertension due to COPD ([facility]) I27.23, J44.9    Essential hypertension I10    Autonomic neuropathy G90.9    CKD (chronic kidney disease) stage 3, GFR 30-59 ml/min ([facility]) N18.3    Type 2 diabetes mellitus with kidney complication, without long-term current use of insulin ([facility]) E11.29    CRIS (acute kidney injury) ([facility] [address] N17.9    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 Ventilator:    - No ventilator support    Rehab Therapies: Physical Therapy and Occupational Therapy  Weight Bearing Status/Restrictions: No weight bearing restirctions  Other Medical Equipment (for information only, NOT a DME order):  walker  Other Treatments: na    Patient's personal belongings (please select all that are sent with patient):  None    RN SIGNATURE:  Electronically signed by Jurline Mortimer, RN on 8/1/20 at 10:17 AM EDT    CASE MANAGEMENT/SOCIAL WORK SECTION    Inpatient Status Date: ***    Readmission Risk Assessment Score:  Readmission Risk              Risk of Unplanned Readmission:        16           Discharging to Facility/ Agency   · Name:   · Address:  · Phone:  · Fax:    Dialysis Facility (if applicable)   · Name:  · Address:  · Dialysis Schedule:  · Phone:  · Fax:    / signature: {Esignature:[id number]}    PHYSICIAN SECTION    Prognosis: Fair    Condition at Discharge: Stable    Rehab Potential (if transferring to Rehab): Good    Recommended Labs or Other Treatments After Discharge: cbc cmp in one week     Physician Certification: I certify the above information and transfer of Fifi Lenz  is necessary for the continuing treatment of the diagnosis listed and that she requires Home Care for less 30 days.      Update Admission H&P: No change in H&P    PHYSICIAN SIGNATURE:  Electronically signed by Jett Escobedo MD on 8/1/20 at 10:03 AM EDT

## 2020-08-01 NOTE — PROGRESS NOTES
Pharmacy Note     Renal Dose Adjustment    Kofi Espinal is a 80 y.o. female. Pharmacist assessment of renally cleared medications. Recent Labs     07/31/20  1802   BUN 24*       Recent Labs     07/31/20 1802 07/31/20  1803   CREATININE 1.36* 1.47*       Estimated Creatinine Clearance: 29 mL/min (A) (based on SCr of 1.47 mg/dL (H)). Estimated CrCl using Ideal Body Weight: 24 mL/min (based on IBW 61.6 kg)    Height:   Ht Readings from Last 1 Encounters:   07/31/20 5' 7\" (1.702 m)     Weight:  Wt Readings from Last 1 Encounters:   07/31/20 207 lb (93.9 kg)       The following medication dose has been adjusted based upon renal function per P&T Guidelines:             Enoxaparin 40 mg subcutaneously once daily changed to enoxaparin 30 mg subcutaneously once daily.

## 2020-08-01 NOTE — PROGRESS NOTES
Spoke with patients daughter Ana Rosa Barnett and updated on all discharge instructions and education. Updated on all scripts. IV discontinued.

## 2020-08-02 LAB
ESTIMATED AVERAGE GLUCOSE: 301 MG/DL
HBA1C MFR BLD: 12.1 % (ref 4–6)

## 2020-10-28 ENCOUNTER — HOSPITAL ENCOUNTER (OUTPATIENT)
Age: 85
Setting detail: SPECIMEN
Discharge: HOME | End: 2020-10-28
Payer: MEDICARE

## 2020-10-29 LAB
BNP INTERPRETATION: ABNORMAL
PRO-BNP: 6729 PG/ML

## 2020-11-03 PROBLEM — K21.9 GASTROESOPHAGEAL REFLUX DISEASE: Status: RESOLVED | Noted: 2020-08-01 | Resolved: 2020-11-03

## 2020-11-03 PROBLEM — J44.9 COPD (CHRONIC OBSTRUCTIVE PULMONARY DISEASE) (HCC): Status: RESOLVED | Noted: 2019-12-02 | Resolved: 2020-11-03

## 2021-02-04 ENCOUNTER — APPOINTMENT (OUTPATIENT)
Dept: GENERAL RADIOLOGY | Age: 86
DRG: 309 | End: 2021-02-04
Payer: MEDICARE

## 2021-02-04 ENCOUNTER — APPOINTMENT (OUTPATIENT)
Dept: CT IMAGING | Age: 86
DRG: 309 | End: 2021-02-04
Payer: MEDICARE

## 2021-02-04 ENCOUNTER — HOSPITAL ENCOUNTER (INPATIENT)
Age: 86
LOS: 1 days | Discharge: HOME HEALTH | DRG: 309 | End: 2021-02-05
Attending: EMERGENCY MEDICINE | Admitting: INTERNAL MEDICINE
Payer: MEDICARE

## 2021-02-04 DIAGNOSIS — M79.89 SWELLING OF RIGHT UPPER EXTREMITY: ICD-10-CM

## 2021-02-04 DIAGNOSIS — R00.1 BRADYCARDIA: ICD-10-CM

## 2021-02-04 DIAGNOSIS — I47.29 NONSUSTAINED VENTRICULAR TACHYCARDIA: ICD-10-CM

## 2021-02-04 DIAGNOSIS — H11.32 SUBCONJUNCTIVAL HEMORRHAGE OF LEFT EYE: ICD-10-CM
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
## 2021-02-04 DIAGNOSIS — I49.3 MULTIPLE PREMATURE VENTRICULAR COMPLEXES: ICD-10-CM

## 2021-02-04 DIAGNOSIS — I50.9 CONGESTIVE HEART FAILURE, UNSPECIFIED HF CHRONICITY, UNSPECIFIED HEART FAILURE TYPE (HCC): ICD-10-CM

## 2021-02-04 LAB
ABSOLUTE EOS #: 0 K/UL (ref 0–0.4)
ABSOLUTE IMMATURE GRANULOCYTE: 0 K/UL (ref 0–0.3)
ABSOLUTE LYMPH #: 0.52 K/UL (ref 1–4.8)
ABSOLUTE MONO #: 0.47 K/UL (ref 0.1–0.8)
ALLEN TEST: POSITIVE
ANION GAP SERPL CALCULATED.3IONS-SCNC: 9 MMOL/L (ref 9–17)
BASOPHILS # BLD: 0 % (ref 0–2)
BASOPHILS ABSOLUTE: 0 K/UL (ref 0–0.2)
BNP INTERPRETATION: ABNORMAL
BNP INTERPRETATION: ABNORMAL
BUN BLDV-MCNC: 33 MG/DL (ref 8–23)
BUN/CREAT BLD: ABNORMAL (ref 9–20)
CALCIUM SERPL-MCNC: 9.6 MG/DL (ref 8.6–10.4)
CHLORIDE BLD-SCNC: 97 MMOL/L (ref 98–107)
CO2: 27 MMOL/L (ref 20–31)
CREAT SERPL-MCNC: 1.46 MG/DL (ref 0.5–0.9)
DIFFERENTIAL TYPE: ABNORMAL
EKG ATRIAL RATE: 61 BPM
EKG P AXIS: 74 DEGREES
EKG P-R INTERVAL: 150 MS
EKG Q-T INTERVAL: 474 MS
EKG QRS DURATION: 160 MS
EKG QTC CALCULATION (BAZETT): 477 MS
EKG R AXIS: 175 DEGREES
EKG T AXIS: 10 DEGREES
EKG VENTRICULAR RATE: 61 BPM
EOSINOPHILS RELATIVE PERCENT: 0 % (ref 1–4)
ESTIMATED AVERAGE GLUCOSE: 163 MG/DL
FIO2: 100
GFR AFRICAN AMERICAN: 41 ML/MIN
GFR NON-AFRICAN AMERICAN: 34 ML/MIN
GFR SERPL CREATININE-BSD FRML MDRD: ABNORMAL ML/MIN/{1.73_M2}
GFR SERPL CREATININE-BSD FRML MDRD: ABNORMAL ML/MIN/{1.73_M2}
GLUCOSE BLD-MCNC: 168 MG/DL (ref 70–99)
HBA1C MFR BLD: 7.3 % (ref 4–6)
HCT VFR BLD CALC: 41.5 % (ref 36.3–47.1)
HEMOGLOBIN: 12.6 G/DL (ref 11.9–15.1)
IMMATURE GRANULOCYTES: 0 %
LYMPHOCYTES # BLD: 11 % (ref 24–44)
MAGNESIUM: 2.4 MG/DL (ref 1.6–2.6)
MCH RBC QN AUTO: 29.3 PG (ref 25.2–33.5)
MCHC RBC AUTO-ENTMCNC: 30.4 G/DL (ref 28.4–34.8)
MCV RBC AUTO: 96.5 FL (ref 82.6–102.9)
MODE: ABNORMAL
MONOCYTES # BLD: 10 % (ref 1–7)
MORPHOLOGY: ABNORMAL
NEGATIVE BASE EXCESS, ART: ABNORMAL (ref 0–2)
NRBC AUTOMATED: 0 PER 100 WBC
O2 DEVICE/FLOW/%: ABNORMAL
PATIENT TEMP: ABNORMAL
PDW BLD-RTO: 16.2 % (ref 11.8–14.4)
PLATELET # BLD: 192 K/UL (ref 138–453)
PLATELET ESTIMATE: ABNORMAL
PMV BLD AUTO: 10.8 FL (ref 8.1–13.5)
POC HCO3: 30.2 MMOL/L (ref 21–28)
POC O2 SATURATION: 100 % (ref 94–98)
POC PCO2 TEMP: ABNORMAL MM HG
POC PCO2: 49.8 MM HG (ref 35–48)
POC PH TEMP: ABNORMAL
POC PH: 7.39 (ref 7.35–7.45)
POC PO2 TEMP: ABNORMAL MM HG
POC PO2: 188.7 MM HG (ref 83–108)
POSITIVE BASE EXCESS, ART: 4 (ref 0–3)
POTASSIUM SERPL-SCNC: 4.9 MMOL/L (ref 3.7–5.3)
PRO-BNP: 9340 PG/ML
PRO-BNP: 9504 PG/ML
RBC # BLD: 4.3 M/UL (ref 3.95–5.11)
RBC # BLD: ABNORMAL 10*6/UL
SAMPLE SITE: ABNORMAL
SEG NEUTROPHILS: 79 % (ref 36–66)
SEGMENTED NEUTROPHILS ABSOLUTE COUNT: 3.71 K/UL (ref 1.8–7.7)
SODIUM BLD-SCNC: 133 MMOL/L (ref 135–144)
TCO2 (CALC), ART: 32 MMOL/L (ref 22–29)
THYROXINE, FREE: 1.06 NG/DL (ref 0.93–1.7)
TROPONIN INTERP: ABNORMAL
TROPONIN T: ABNORMAL NG/ML
TROPONIN, HIGH SENSITIVITY: 35 NG/L (ref 0–14)
TROPONIN, HIGH SENSITIVITY: 36 NG/L (ref 0–14)
TROPONIN, HIGH SENSITIVITY: 41 NG/L (ref 0–14)
TROPONIN, HIGH SENSITIVITY: 41 NG/L (ref 0–14)
TSH SERPL DL<=0.05 MIU/L-ACNC: 9.17 MIU/L (ref 0.3–5)
WBC # BLD: 4.7 K/UL (ref 3.5–11.3)
WBC # BLD: ABNORMAL 10*3/UL

## 2021-02-04 PROCEDURE — 83036 HEMOGLOBIN GLYCOSYLATED A1C: CPT

## 2021-02-04 PROCEDURE — 72125 CT NECK SPINE W/O DYE: CPT

## 2021-02-04 PROCEDURE — 83880 ASSAY OF NATRIURETIC PEPTIDE: CPT

## 2021-02-04 PROCEDURE — 71045 X-RAY EXAM CHEST 1 VIEW: CPT

## 2021-02-04 PROCEDURE — G0378 HOSPITAL OBSERVATION PER HR: HCPCS

## 2021-02-04 PROCEDURE — 73562 X-RAY EXAM OF KNEE 3: CPT

## 2021-02-04 PROCEDURE — 94640 AIRWAY INHALATION TREATMENT: CPT

## 2021-02-04 PROCEDURE — 70486 CT MAXILLOFACIAL W/O DYE: CPT

## 2021-02-04 PROCEDURE — 96372 THER/PROPH/DIAG INJ SC/IM: CPT

## 2021-02-04 PROCEDURE — 85025 COMPLETE CBC W/AUTO DIFF WBC: CPT

## 2021-02-04 PROCEDURE — 94761 N-INVAS EAR/PLS OXIMETRY MLT: CPT

## 2021-02-04 PROCEDURE — 84439 ASSAY OF FREE THYROXINE: CPT

## 2021-02-04 PROCEDURE — 2700000000 HC OXYGEN THERAPY PER DAY

## 2021-02-04 PROCEDURE — 70450 CT HEAD/BRAIN W/O DYE: CPT

## 2021-02-04 PROCEDURE — 6370000000 HC RX 637 (ALT 250 FOR IP): Performed by: NURSE PRACTITIONER

## 2021-02-04 PROCEDURE — 99285 EMERGENCY DEPT VISIT HI MDM: CPT

## 2021-02-04 PROCEDURE — 80048 BASIC METABOLIC PNL TOTAL CA: CPT

## 2021-02-04 PROCEDURE — 93010 ELECTROCARDIOGRAM REPORT: CPT | Performed by: INTERNAL MEDICINE

## 2021-02-04 PROCEDURE — 1200000000 HC SEMI PRIVATE

## 2021-02-04 PROCEDURE — 82803 BLOOD GASES ANY COMBINATION: CPT

## 2021-02-04 PROCEDURE — 93005 ELECTROCARDIOGRAM TRACING: CPT | Performed by: STUDENT IN AN ORGANIZED HEALTH CARE EDUCATION/TRAINING PROGRAM

## 2021-02-04 PROCEDURE — 36600 WITHDRAWAL OF ARTERIAL BLOOD: CPT

## 2021-02-04 PROCEDURE — 96361 HYDRATE IV INFUSION ADD-ON: CPT

## 2021-02-04 PROCEDURE — 84443 ASSAY THYROID STIM HORMONE: CPT

## 2021-02-04 PROCEDURE — 97166 OT EVAL MOD COMPLEX 45 MIN: CPT

## 2021-02-04 PROCEDURE — 74176 CT ABD & PELVIS W/O CONTRAST: CPT

## 2021-02-04 PROCEDURE — 6360000002 HC RX W HCPCS

## 2021-02-04 PROCEDURE — 3209999900 CT THORACIC SPINE TRAUMA RECONSTRUCTION

## 2021-02-04 PROCEDURE — 97162 PT EVAL MOD COMPLEX 30 MIN: CPT

## 2021-02-04 PROCEDURE — 96365 THER/PROPH/DIAG IV INF INIT: CPT

## 2021-02-04 PROCEDURE — 6370000000 HC RX 637 (ALT 250 FOR IP): Performed by: STUDENT IN AN ORGANIZED HEALTH CARE EDUCATION/TRAINING PROGRAM

## 2021-02-04 PROCEDURE — 97530 THERAPEUTIC ACTIVITIES: CPT

## 2021-02-04 PROCEDURE — 36415 COLL VENOUS BLD VENIPUNCTURE: CPT

## 2021-02-04 PROCEDURE — 83735 ASSAY OF MAGNESIUM: CPT

## 2021-02-04 PROCEDURE — APPSS180 APP SPLIT SHARED TIME > 60 MINUTES: Performed by: NURSE PRACTITIONER

## 2021-02-04 PROCEDURE — 3209999900 CT LUMBAR SPINE TRAUMA RECONSTRUCTION

## 2021-02-04 PROCEDURE — 99223 1ST HOSP IP/OBS HIGH 75: CPT | Performed by: INTERNAL MEDICINE

## 2021-02-04 PROCEDURE — 2580000003 HC RX 258: Performed by: NURSE PRACTITIONER

## 2021-02-04 PROCEDURE — 84484 ASSAY OF TROPONIN QUANT: CPT

## 2021-02-04 PROCEDURE — 6360000002 HC RX W HCPCS: Performed by: NURSE PRACTITIONER

## 2021-02-04 RX ORDER — PROMETHAZINE HYDROCHLORIDE 12.5 MG/1
12.5 TABLET ORAL EVERY 6 HOURS PRN
Status: DISCONTINUED | OUTPATIENT
Start: 2021-02-04 | End: 2021-02-05 | Stop reason: HOSPADM

## 2021-02-04 RX ORDER — POTASSIUM CHLORIDE 7.45 MG/ML
10 INJECTION INTRAVENOUS PRN
Status: DISCONTINUED | OUTPATIENT
Start: 2021-02-04 | End: 2021-02-05 | Stop reason: HOSPADM

## 2021-02-04 RX ORDER — FUROSEMIDE 20 MG/1
20 TABLET ORAL DAILY
Status: DISCONTINUED | OUTPATIENT
Start: 2021-02-04 | End: 2021-02-04

## 2021-02-04 RX ORDER — LOSARTAN POTASSIUM 25 MG/1
25 TABLET ORAL DAILY
Status: DISCONTINUED | OUTPATIENT
Start: 2021-02-04 | End: 2021-02-04

## 2021-02-04 RX ORDER — NICOTINE 21 MG/24HR
1 PATCH, TRANSDERMAL 24 HOURS TRANSDERMAL DAILY PRN
Status: DISCONTINUED | OUTPATIENT
Start: 2021-02-04 | End: 2021-02-05 | Stop reason: HOSPADM

## 2021-02-04 RX ORDER — POLYETHYLENE GLYCOL 3350 17 G/17G
17 POWDER, FOR SOLUTION ORAL DAILY PRN
Status: DISCONTINUED | OUTPATIENT
Start: 2021-02-04 | End: 2021-02-05 | Stop reason: HOSPADM

## 2021-02-04 RX ORDER — LEVOTHYROXINE SODIUM 0.07 MG/1
112.5 TABLET ORAL DAILY
Status: DISCONTINUED | OUTPATIENT
Start: 2021-02-04 | End: 2021-02-05 | Stop reason: HOSPADM

## 2021-02-04 RX ORDER — PROPARACAINE HYDROCHLORIDE 5 MG/ML
1 SOLUTION/ DROPS OPHTHALMIC ONCE
Status: DISCONTINUED | OUTPATIENT
Start: 2021-02-04 | End: 2021-02-05 | Stop reason: HOSPADM

## 2021-02-04 RX ORDER — OMEPRAZOLE 20 MG/1
40 CAPSULE, DELAYED RELEASE ORAL DAILY
Status: DISCONTINUED | OUTPATIENT
Start: 2021-02-04 | End: 2021-02-05 | Stop reason: HOSPADM

## 2021-02-04 RX ORDER — LATANOPROST 50 UG/ML
1 SOLUTION/ DROPS OPHTHALMIC NIGHTLY
Status: DISCONTINUED | OUTPATIENT
Start: 2021-02-04 | End: 2021-02-05 | Stop reason: HOSPADM

## 2021-02-04 RX ORDER — B12/LEVOMEFOLATE CALCIUM/B-6 2-1.13-25
1 TABLET ORAL DAILY
Status: DISCONTINUED | OUTPATIENT
Start: 2021-02-04 | End: 2021-02-04

## 2021-02-04 RX ORDER — ONDANSETRON 2 MG/ML
4 INJECTION INTRAMUSCULAR; INTRAVENOUS EVERY 6 HOURS PRN
Status: DISCONTINUED | OUTPATIENT
Start: 2021-02-04 | End: 2021-02-05 | Stop reason: HOSPADM

## 2021-02-04 RX ORDER — MAGNESIUM SULFATE 1 G/100ML
1000 INJECTION INTRAVENOUS ONCE
Status: COMPLETED | OUTPATIENT
Start: 2021-02-04 | End: 2021-02-04

## 2021-02-04 RX ORDER — GABAPENTIN 300 MG/1
300 CAPSULE ORAL 3 TIMES DAILY
Status: DISCONTINUED | OUTPATIENT
Start: 2021-02-04 | End: 2021-02-05 | Stop reason: HOSPADM

## 2021-02-04 RX ORDER — GLIPIZIDE 5 MG/1
2.5 TABLET ORAL DAILY
Status: DISCONTINUED | OUTPATIENT
Start: 2021-02-04 | End: 2021-02-05 | Stop reason: HOSPADM

## 2021-02-04 RX ORDER — ACETAMINOPHEN 650 MG/1
650 SUPPOSITORY RECTAL EVERY 6 HOURS PRN
Status: DISCONTINUED | OUTPATIENT
Start: 2021-02-04 | End: 2021-02-05 | Stop reason: HOSPADM

## 2021-02-04 RX ORDER — MAGNESIUM SULFATE 1 G/100ML
INJECTION INTRAVENOUS
Status: COMPLETED
Start: 2021-02-04 | End: 2021-02-04

## 2021-02-04 RX ORDER — DULOXETIN HYDROCHLORIDE 30 MG/1
60 CAPSULE, DELAYED RELEASE ORAL DAILY
Status: DISCONTINUED | OUTPATIENT
Start: 2021-02-04 | End: 2021-02-04

## 2021-02-04 RX ORDER — SODIUM CHLORIDE 0.9 % (FLUSH) 0.9 %
10 SYRINGE (ML) INJECTION EVERY 12 HOURS SCHEDULED
Status: DISCONTINUED | OUTPATIENT
Start: 2021-02-04 | End: 2021-02-05 | Stop reason: HOSPADM

## 2021-02-04 RX ORDER — ACETAMINOPHEN 325 MG/1
650 TABLET ORAL EVERY 6 HOURS PRN
Status: DISCONTINUED | OUTPATIENT
Start: 2021-02-04 | End: 2021-02-05 | Stop reason: HOSPADM

## 2021-02-04 RX ORDER — NICOTINE POLACRILEX 4 MG
15 LOZENGE BUCCAL PRN
Status: DISCONTINUED | OUTPATIENT
Start: 2021-02-04 | End: 2021-02-05 | Stop reason: HOSPADM

## 2021-02-04 RX ORDER — HEPARIN SODIUM 5000 [USP'U]/ML
5000 INJECTION, SOLUTION INTRAVENOUS; SUBCUTANEOUS EVERY 8 HOURS SCHEDULED
Status: DISCONTINUED | OUTPATIENT
Start: 2021-02-04 | End: 2021-02-05 | Stop reason: HOSPADM

## 2021-02-04 RX ORDER — BUDESONIDE AND FORMOTEROL FUMARATE DIHYDRATE 160; 4.5 UG/1; UG/1
2 AEROSOL RESPIRATORY (INHALATION) 2 TIMES DAILY
Status: DISCONTINUED | OUTPATIENT
Start: 2021-02-04 | End: 2021-02-05 | Stop reason: HOSPADM

## 2021-02-04 RX ORDER — POTASSIUM CHLORIDE 20 MEQ/1
40 TABLET, EXTENDED RELEASE ORAL PRN
Status: DISCONTINUED | OUTPATIENT
Start: 2021-02-04 | End: 2021-02-05 | Stop reason: HOSPADM

## 2021-02-04 RX ORDER — DEXTROSE MONOHYDRATE 50 MG/ML
100 INJECTION, SOLUTION INTRAVENOUS PRN
Status: DISCONTINUED | OUTPATIENT
Start: 2021-02-04 | End: 2021-02-05 | Stop reason: HOSPADM

## 2021-02-04 RX ORDER — ACETAMINOPHEN 325 MG/1
650 TABLET ORAL EVERY 4 HOURS PRN
Status: DISCONTINUED | OUTPATIENT
Start: 2021-02-04 | End: 2021-02-04 | Stop reason: SDUPTHER

## 2021-02-04 RX ORDER — SODIUM CHLORIDE 0.9 % (FLUSH) 0.9 %
10 SYRINGE (ML) INJECTION PRN
Status: DISCONTINUED | OUTPATIENT
Start: 2021-02-04 | End: 2021-02-05 | Stop reason: HOSPADM

## 2021-02-04 RX ORDER — FUROSEMIDE 40 MG/1
40 TABLET ORAL DAILY
Status: DISCONTINUED | OUTPATIENT
Start: 2021-02-05 | End: 2021-02-05 | Stop reason: HOSPADM

## 2021-02-04 RX ORDER — SILDENAFIL CITRATE 20 MG/1
20 TABLET ORAL 3 TIMES DAILY
Status: DISCONTINUED | OUTPATIENT
Start: 2021-02-04 | End: 2021-02-05 | Stop reason: HOSPADM

## 2021-02-04 RX ORDER — MAGNESIUM SULFATE 1 G/100ML
1000 INJECTION INTRAVENOUS PRN
Status: DISCONTINUED | OUTPATIENT
Start: 2021-02-04 | End: 2021-02-05 | Stop reason: HOSPADM

## 2021-02-04 RX ORDER — ASPIRIN 81 MG/1
81 TABLET, CHEWABLE ORAL DAILY
Status: DISCONTINUED | OUTPATIENT
Start: 2021-02-04 | End: 2021-02-05 | Stop reason: HOSPADM

## 2021-02-04 RX ORDER — DEXTROSE MONOHYDRATE 25 G/50ML
12.5 INJECTION, SOLUTION INTRAVENOUS PRN
Status: DISCONTINUED | OUTPATIENT
Start: 2021-02-04 | End: 2021-02-05 | Stop reason: HOSPADM

## 2021-02-04 RX ORDER — POLYMYXIN B SULFATE AND TRIMETHOPRIM 1; 10000 MG/ML; [USP'U]/ML
1 SOLUTION OPHTHALMIC EVERY 6 HOURS SCHEDULED
Status: DISCONTINUED | OUTPATIENT
Start: 2021-02-04 | End: 2021-02-05 | Stop reason: HOSPADM

## 2021-02-04 RX ADMIN — ACETAMINOPHEN 650 MG: 325 TABLET ORAL at 03:21

## 2021-02-04 RX ADMIN — SILDENAFIL 20 MG: 20 TABLET ORAL at 17:17

## 2021-02-04 RX ADMIN — MAGNESIUM SULFATE 1000 MG: 1 INJECTION INTRAVENOUS at 04:34

## 2021-02-04 RX ADMIN — MAGNESIUM SULFATE HEPTAHYDRATE 1000 MG: 1 INJECTION, SOLUTION INTRAVENOUS at 04:34

## 2021-02-04 RX ADMIN — HEPARIN SODIUM 5000 UNITS: 5000 INJECTION INTRAVENOUS; SUBCUTANEOUS at 17:17

## 2021-02-04 RX ADMIN — OMEPRAZOLE 40 MG: 20 CAPSULE, DELAYED RELEASE ORAL at 10:16

## 2021-02-04 RX ADMIN — SILDENAFIL 20 MG: 20 TABLET ORAL at 11:00

## 2021-02-04 RX ADMIN — LATANOPROST 1 DROP: 50 SOLUTION OPHTHALMIC at 21:01

## 2021-02-04 RX ADMIN — POLYMYXIN B SULFATE, TRIMETHOPRIM SULFATE 1 DROP: 10000; 1 SOLUTION/ DROPS OPHTHALMIC at 17:34

## 2021-02-04 RX ADMIN — GABAPENTIN 300 MG: 300 CAPSULE ORAL at 21:01

## 2021-02-04 RX ADMIN — GABAPENTIN 300 MG: 300 CAPSULE ORAL at 10:16

## 2021-02-04 RX ADMIN — BUDESONIDE AND FORMOTEROL FUMARATE DIHYDRATE 2 PUFF: 160; 4.5 AEROSOL RESPIRATORY (INHALATION) at 21:18

## 2021-02-04 RX ADMIN — GLIPIZIDE 2.5 MG: 5 TABLET ORAL at 10:17

## 2021-02-04 RX ADMIN — HEPARIN SODIUM 5000 UNITS: 5000 INJECTION INTRAVENOUS; SUBCUTANEOUS at 22:16

## 2021-02-04 RX ADMIN — ASPIRIN 81 MG: 81 TABLET, CHEWABLE ORAL at 10:16

## 2021-02-04 RX ADMIN — GABAPENTIN 300 MG: 300 CAPSULE ORAL at 17:17

## 2021-02-04 RX ADMIN — SODIUM CHLORIDE, PRESERVATIVE FREE 10 ML: 5 INJECTION INTRAVENOUS at 10:17

## 2021-02-04 RX ADMIN — SILDENAFIL 20 MG: 20 TABLET ORAL at 21:01

## 2021-02-04 RX ADMIN — LEVOTHYROXINE SODIUM 112.5 MCG: 75 TABLET ORAL at 10:17

## 2021-02-04 RX ADMIN — SODIUM CHLORIDE, PRESERVATIVE FREE 10 ML: 5 INJECTION INTRAVENOUS at 21:01

## 2021-02-04 ASSESSMENT — PAIN DESCRIPTION - PAIN TYPE: TYPE: ACUTE PAIN

## 2021-02-04 ASSESSMENT — PAIN DESCRIPTION - DESCRIPTORS: DESCRIPTORS: DISCOMFORT

## 2021-02-04 ASSESSMENT — PAIN DESCRIPTION - ORIENTATION: ORIENTATION: LEFT

## 2021-02-04 ASSESSMENT — PAIN DESCRIPTION - LOCATION: LOCATION: EYE

## 2021-02-04 ASSESSMENT — PAIN SCALES - GENERAL: PAINLEVEL_OUTOF10: 4

## 2021-02-04 ASSESSMENT — PAIN DESCRIPTION - FREQUENCY: FREQUENCY: INTERMITTENT

## 2021-02-04 NOTE — H&P
TRAUMA HISTORY AND PHYSICAL EXAMINATION    PATIENT NAME: Tamia Euceda  YOB: 1929  MEDICAL RECORD NO. [id number]   DATE: 2/4/2021  PRIMARY CARE PHYSICIAN: Sharon Garcia MD  PATIENT EVALUATED AT THE REQUEST OF : Josse Marlow    ACTIVATION   []Trauma Alert     [] Trauma Priority     [x]Trauma Consult.      IMPRESSION:     Patient Active Problem List   Diagnosis    Hypothyroidism    Right-sided heart failure ([address]    Pulmonary hypertension due to COPD ([address]    Essential hypertension    Autonomic neuropathy    CKD (chronic kidney disease) stage 3, GFR 30-59 ml/min    Type 2 diabetes mellitus with kidney complication, without long-term current use of insulin ([address]    CRIS (acute kidney injury) ([address]    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    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 angina pectoris    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 (HCC)    Bradycardia    Mixed hyperlipidemia    Laryngopharyngeal reflux    Peripheral venous insufficiency    Pulmonary hypertension (HCC)    Recurrent major depression in remission ([address]    Vocal cord palsy    Chronic renal insufficiency, stage III (moderate)    Chronic obstructive pulmonary disease (HCC)    Chronic diastolic heart failure ([address]    Fall at home, initial encounter    Hyperglycemia    Falls frequently       MEDICAL DECISION MAKING AND PLAN:       Fall    -mechanical    -Aspirin daily, 81 mg    -left eye subconjunctival hemorrhage     -fluorescein exam performed by ED resident negative      -No EOM entrapment    -PERRL   -CT head, facial bones, c spine negative for acute traumatic fractures     -soft tissue edema of left periorbital region    - No loss of consciousness   - FAST negative     Hx of CHF    -BNP 9,500     Dispo: pending imaging, further recs to follow         CONSULT SERVICES    [] Neurosurgery     [] Orthopedic Surgery    [] Cardiothoracic     [] Facial Trauma    [] Plastic Surgery (Burn)    [] Pediatric Surgery     [] Internal Medicine    [] Pulmonary Medicine    [] Other:      HISTORY:     Chief Complaint:  \"Mechanical Fall \"    INJURY SUMMARY  Eye - subconjunctival hemorrhage     If intracranial hemorrhage is present, is it a BIG 1 category: [] YES  []NO    GENERAL DATA  Age 80 y.o.  female   Patient information was obtained from patient. History/Exam limitations: none.   Patient presented to the Emergency Department by ambulance where the patient received oxygen prior to arrival.  Injury Date: 2/4/2021   Approximate Injury Time: 2200        Transport mode:   [x]Ambulance      [] Helicopter     []Car       [] Other  Referring Hospital: [place] [address], (e.g., home, farm, industry, street)  Specific Details of Location (e.g., bedroom, kitchen, garage): home  Type of Residence (if occurred in home setting) (e.g., apartment, mobile home, single family home): home    MECHANISM OF INJURY    [] Motor Vehicle Collision   Specific vehicle type involved (e.g., sedan, minivan, SUV, pickup truck):   Collision with (e.g., type of vehicle, building, barn, ditch, tree):     Type of collision  [] Single Vehicle Collision  []Multiple Vehicle Collision  [] unknown collision type    Mechanism considerations  [] Fatality in Same Vehicle      []Ejected       []Rollover          []Extricated    Internal Compartment   []                      []Passenger:      []Front Seat        []Rear Seat     Personal Restraints  [] Unrestrained   []Lap Belt Only Restrained   [] Shoulder Belt Only Restrained  [] 3 Point Restrained  [] unknown     Air Bags  [] Front Air Bag  []Side Air Bag  []Curtain Airbag [][employer] Not Deployed    []No Air Bag equipped in vehicle      Pediatric Consideration:      [] Booster Seat  []Infant Car Seat  [] Child Car Seat      [] Motorcycle Collision   Wearing Helmet     []Yes     []No    []Unknown    [] ATV crash  Wearing Helmet     []Yes     []No    []Unknown    [] Bicycle Collision Wearing Helmet     []Yes     []No    []Unknown    [] Pedestrian Struck         [] Fall    [x]From Standing     []From Height  Ft     []Down Stairs ___steps    [] Assault    [] Gunshot  Specify caliber / type of gun: ____________________________    [] Stabbing  Specify weapon type, size: _____________________________    [] Burn  []Flame   []Scald   []Electrical   []Chemical  []Inhalation   []House fire    [] Other ______________________________________________________    [] Other protective devices used / worn ___________________________    HISTORY:     James Vargas is a 80 y.o. female that presented to the Emergency Department following fall from standing height after patient tripped over her oxygen tubing. Patient denies loss of consciousness. Patient has GCS of 15, alert, oriented and answering all questions appropriately. Does take 81 mg of aspirin daily. Is noting left eye pain with no blurred vision, no change in vision. No other significant complaints at this time. Denies chest pain, dizziness, shortness of breath, abdominal pain, numbness or tingling. PMH: CHF, DM, HTN     Loss of Consciousness [x]No   []Yes Duration(min)       [] Unknown     Total Fluids Given Prior To Arrival  mL    MEDICATIONS:   []  None     []  Information not available due to exam limitations documented above  Prior to Admission medications    Medication Sig Start Date End Date Taking?  Authorizing Provider   metFORMIN (GLUCOPHAGE) 500 MG tablet Take 1 tablet by mouth 2 times daily (with meals) 8/1/20   Jose Antonio Leos MD   glipiZIDE (GLUCOTROL) 5 MG tablet Take 0.5 tablets by mouth daily 8/1/20   Jose Antonio Leos MD   furosemide (LASIX) 40 MG tablet Take 0.5 tablets by mouth daily 12/3/19   Maame Ramirez MD   levothyroxine (SYNTHROID) 75 MCG tablet Take 1.5 tablets by mouth Daily 12/4/19   Maame Ramirez MD   losartan (COZAAR) 25 MG tablet Take 1 tablet by mouth daily 12/4/19   Maame Ramirez MD   aspirin 81 MG chewable tablet Take 81 mg by mouth daily    Historical Provider, MD   budesonide-formoterol (SYMBICORT) 160-4.5 MCG/ACT AERO Inhale 2 puffs into the lungs 2 times daily    Historical Provider, MD   DULoxetine (CYMBALTA) 60 MG extended release capsule Take 60 mg by mouth daily    Historical Provider, MD   gabapentin (NEURONTIN) 300 MG capsule Take 300 mg by mouth 3 times daily. Historical Provider, MD   omeprazole (PRILOSEC) 40 MG delayed release capsule Take 40 mg by mouth daily    Historical Provider, MD   travoprost, benzalkonium, (TRAVATAN) 0.004 % ophthalmic solution Place 1 drop into both eyes daily    Historical Provider, MD   folic acid-pyridoxine-cyanocobalamine (FOLTX) 2.5-25-1 MG TABS tablet Take 1 tablet by mouth daily    Historical Provider, MD       ALLERGIES:   []  None    []   Information not available due to exam limitations documented above   Pcn [penicillins] and Avelox [moxifloxacin]    PAST MEDICAL HISTORY: []  None   []   Information not available due to exam limitations documented above    has a past medical history of Arthritis, Breast cancer ([address] CAD (coronary artery disease), CHF (congestive heart failure) ([address] CKD (chronic kidney disease) stage 3, GFR 30-59 ml/min ([address] COPD (chronic obstructive pulmonary disease) ([address] Gastroesophageal reflux disease, Hyperlipidemia, Hypertension, Recurrent major depression in remission ([address] Thyroid disease, and Type 2 diabetes mellitus with kidney complication, without long-term current use of insulin ([address] has a past surgical history that includes Thyroid surgery; Mastectomy; Hysterectomy; Cholecystectomy; Colonoscopy; and knee surgery. FAMILY HISTORY   []   Information not available due to exam limitations documented above    family history is not on file. SOCIAL HISTORY  []   Information not available due to exam limitations documented above     reports that she has never smoked.  She has never used smokeless tobacco.   reports no history of alcohol use. reports no history of drug use. PERTINENT SYSTEMIC REVIEW:    []   Information not available due to exam limitations documented above    Pertinent items are noted in HPI. PHYSICAL EXAMINATION:     GLASCOW COMA SCALE  NEUROMUSCULAR BLOCKADE PRIOR TO ARRIVAL     [x]No        []Yes      Variable  Score   Variable  Score  Eye opening [x]Spontaneous 4 Verbal  [x]Oriented  5     []To voice  3   []Confused  4    []To pain  2   []Inapp words  3    []None  1   []Incomp words 2       []None  1   Motor   [x]Obeys  6    []Localizes pain 5    []Withdraws(pain) 4    []Flexion(pain) 3  []Extension(pain) 2    []None  1     GCS Total = 15    PHYSICAL EXAMINATION    VITAL SIGNS:   Vitals:    02/04/21 0404   BP:    Pulse:    Resp:    Temp:    SpO2: 98%       Physical Exam  Vitals signs and nursing note reviewed. Constitutional:       General: She is not in acute distress. Appearance: Normal appearance. She is not ill-appearing. HENT:      Head: Normocephalic. Comments: Left subconjunctival hemorrhage, EOMI , PERRL       Nose: Nose normal.      Mouth/Throat:      Mouth: Mucous membranes are moist.      Pharynx: Oropharynx is clear. Eyes:      General:         Right eye: No discharge. Left eye: No discharge. Extraocular Movements: Extraocular movements intact. Pupils: Pupils are equal, round, and reactive to light. Neck:      Comments: No c spine, t spine, or l spine tenderness, no abrasions, ecchymosis or lacerations noted over back or sacrum. Cardiovascular:      Rate and Rhythm: Normal rate and regular rhythm. Heart sounds: No murmur. No friction rub. No gallop. Comments: Radial, femoral and dorsalis pedis pulses intact and equal bilaterally, 2+   Pulmonary:      Effort: Pulmonary effort is normal. No respiratory distress. Breath sounds: Normal breath sounds.       Comments: Speaking in full sentences, on home O2 requirements, breathing comfortably Abdominal:      General: There is no distension. Palpations: Abdomen is soft. Tenderness: There is no abdominal tenderness. Musculoskeletal:         General: Signs of injury (abrasion over left knee. ) present. No swelling or tenderness. Right lower leg: Edema (chronic edema 1+) present. Left lower leg: Edema (chronic edema 1+ ) present. Comments: Dorsiflexion and plantarflexion intact and equal bilaterally    Skin:     General: Skin is warm and dry. Neurological:      General: No focal deficit present. Mental Status: She is alert and oriented to person, place, and time. Psychiatric:         Mood and Affect: Mood normal.         Thought Content: Thought content normal.          FOCUSED ABDOMINAL SONOGRAM FOR TRAUMA (FAST): A good  quality examination was performed by Dr. Lance Diaz and representative images were obtained. [x] No free fluid in the abdomen   [] Free fluid in RUQ   [] Free fluid in LUQ  [] Free fluid in Pelvis  [] Pericardial fluid  [] Other:        RADIOLOGY  XR CHEST PORTABLE   Final Result   Cardiomegaly and chronic pulmonary change with small bilateral effusions. XR KNEE LEFT (3 VIEWS)   Final Result   Mild soft tissue swelling without acute osseous abnormality. CT HEAD WO CONTRAST   Final Result   No acute intracranial abnormality. CT CERVICAL SPINE WO CONTRAST   Final Result   No acute abnormality of the cervical spine. CT FACIAL BONES WO CONTRAST   Final Result   No acute traumatic injury of the facial bones. Minimal left cheek and left periorbital soft tissue swelling.                LABS    Labs Reviewed   TROPONIN - Abnormal; Notable for the following components:       Result Value    Troponin, High Sensitivity 41 (*)     All other components within normal limits   BRAIN NATRIURETIC PEPTIDE - Abnormal; Notable for the following components:    Pro-BNP 9,504 (*)     All other components within normal limits   BASIC METABOLIC PANEL - Abnormal; Notable for the following components:    Glucose 168 (*)     BUN 33 (*)     CREATININE 1.46 (*)     Sodium 133 (*)     Chloride 97 (*)     GFR Non- 34 (*)     GFR  41 (*)     All other components within normal limits   CBC WITH AUTO DIFFERENTIAL - Abnormal; Notable for the following components:    RDW 16.2 (*)     Seg Neutrophils 79 (*)     Lymphocytes 11 (*)     Monocytes 10 (*)     Eosinophils % 0 (*)     Absolute Lymph # 0.52 (*)     All other components within normal limits   TROPONIN - Abnormal; Notable for the following components:    Troponin, High Sensitivity 41 (*)     All other components within normal limits   MAGNESIUM         Bella Ortez DO  2/4/21, 5:02 AM

## 2021-02-04 NOTE — CONSULTS
[facility]   Consult Note         Today's Date: 2/4/2021  Patient Name: Ren Almanza  Date of admission: 2/4/2021  2:25 AM  Patient's age: 80 y.o., 7/6/1929  Admission Dx: Symptomatic bradycardia [R00.1]    Reason for Consult:  Cardiac evaluation    Requesting Physician: Kareem Talbert DO    REASON FOR CONSULT:  bradycardia    History Obtained From:  Patient, chart, staff, records    HISTORY OF PRESENT ILLNESS:      The patient is a 80 y.o. female with PMH CHFpEF, HTN who is admitted to the hospital after having a fall from standing height at home. Patient's daughter states she had recent change to her cardiac medication including changing her Lasix from 60 BID to 80 mg daily 2-3 weeks ago. Daughter also states she has had multiple falls at home. Trauma consulted from ED, CT head and spine negative for acute abnormalities. This AM, patient's O2 sats were dropping in 70s and is currently on non-rebreather and will require bipap. Patient not providing much history. Per daughter, bradycardia a new finding. ABG was showing very mild hypercarbia. Cardiology consulted for bradycardia. Patient does have multiple admissions with sinus bradycardia in the past. On examination, patient HR in mid 50s. Pro BNP 9504. Trop 41-41. EKG This AM was NSR with RBBB (HR 61). Past Medical History:   has a past medical history of Arthritis, Breast cancer ([address] CAD (coronary artery disease), CHF (congestive heart failure) ([address] CKD (chronic kidney disease) stage 3, GFR 30-59 ml/min, COPD (chronic obstructive pulmonary disease) ([address] Gastroesophageal reflux disease, Hyperlipidemia, Hypertension, Recurrent major depression in remission ([address] Thyroid disease, and Type 2 diabetes mellitus with kidney complication, without long-term current use of insulin ([address] Past Surgical History:   has a past surgical history that includes Thyroid surgery; Mastectomy; Hysterectomy;  Cholecystectomy; Colonoscopy; and knee surgery. Home Medications:    Prior to Admission medications    Medication Sig Start Date End Date Taking? Authorizing Provider   metFORMIN (GLUCOPHAGE) 500 MG tablet Take 1 tablet by mouth 2 times daily (with meals) 8/1/20  Yes Clinton Terrell MD   losartan (COZAAR) 25 MG tablet Take 1 tablet by mouth daily 12/4/19  Yes Esther Isaac MD   DULoxetine (CYMBALTA) 60 MG extended release capsule Take 60 mg by mouth daily   Yes Historical Provider, MD   glipiZIDE (GLUCOTROL) 5 MG tablet Take 0.5 tablets by mouth daily 8/1/20   Clinton Terrell MD   furosemide (LASIX) 40 MG tablet Take 0.5 tablets by mouth daily 12/3/19   Esther Isaac MD   levothyroxine (SYNTHROID) 75 MCG tablet Take 1.5 tablets by mouth Daily 12/4/19   Esther Isaac MD   aspirin 81 MG chewable tablet Take 81 mg by mouth daily    Historical Provider, MD   budesonide-formoterol (SYMBICORT) 160-4.5 MCG/ACT AERO Inhale 2 puffs into the lungs 2 times daily    Historical Provider, MD   gabapentin (NEURONTIN) 300 MG capsule Take 300 mg by mouth 3 times daily.     Historical Provider, MD   omeprazole (PRILOSEC) 40 MG delayed release capsule Take 40 mg by mouth daily    Historical Provider, MD   travoprost, benzalkonium, (TRAVATAN) 0.004 % ophthalmic solution Place 1 drop into both eyes daily    Historical Provider, MD   folic acid-pyridoxine-cyanocobalamine (FOLTX) 2.5-25-1 MG TABS tablet Take 1 tablet by mouth daily    Historical Provider, MD       fluorescein, 1 strip, Left Eye, Once    proparacaine, 1 drop, Left Eye, Once    aspirin, 81 mg, Oral, Daily    budesonide-formoterol, 2 puff, Inhalation, BID    furosemide, 20 mg, Oral, Daily    gabapentin, 300 mg, Oral, TID    glipiZIDE, 2.5 mg, Oral, Daily    levothyroxine, 112.5 mcg, Oral, Daily    losartan, 25 mg, Oral, Daily    omeprazole, 40 mg, Oral, Daily    latanoprost, 1 drop, Both Eyes, Nightly    sodium chloride flush, 10 mL, Intravenous, 2 times per day    enoxaparin, 40 mg, displaced  · bradycardia  · Heart tones are crisp and normal. regular S1 and S2.  · Jugular venous pulsation Normal  · The carotid upstroke is normal in amplitude and contour without delay or bruit  · Peripheral pulses are symmetrical and full   Abdomen:   · No masses or tenderness  · Bowel sounds present  Extremities:  ·  No Cyanosis or Clubbing  ·  Lower extremity edema: No  ·  Skin: Warm and dry  Neurological:  · Alert and oriented. · Moves all extremities well  · No abnormalities of mood, affect, memory, mentation, or behavior are noted      Labs:     CBC:   Recent Labs     02/04/21  0309   WBC 4.7   HGB 12.6   HCT 41.5        BMP:   Recent Labs     02/04/21 0309   *   K 4.9   CO2 27   BUN 33*   CREATININE 1.46*   LABGLOM 34*   GLUCOSE 168*     BNP: No results for input(s): BNP in the last 72 hours. PT/INR: No results for input(s): PROTIME, INR in the last 72 hours. APTT:No results for input(s): APTT in the last 72 hours. CARDIAC ENZYMES:No results for input(s): CKTOTAL, CKMB, CKMBINDEX, TROPONINI in the last 72 hours. FASTING LIPID PANEL:No results found for: HDL, LDLDIRECT, LDLCALC, TRIG  LIVER PROFILE:No results for input(s): AST, ALT, LABALBU in the last 72 hours.   Troponins: Invalid input(s): TROPONIN     Other Current Problems  Patient Active Problem List   Diagnosis    Hypothyroidism    Right-sided heart failure ([address]    Pulmonary hypertension due to COPD ([address]    Essential hypertension    Autonomic neuropathy    CKD (chronic kidney disease) stage 3, GFR 30-59 ml/min    Type 2 diabetes mellitus with kidney complication, without long-term current use of insulin ([address]    CRIS (acute kidney injury) ([address]    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    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 angina pectoris    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 (HCC)    Bradycardia    Mixed hyperlipidemia    Laryngopharyngeal reflux    Peripheral venous insufficiency    Pulmonary hypertension (HCC)    Recurrent major depression in remission ([facility]    Vocal cord palsy    Chronic renal insufficiency, stage III (moderate)    Chronic obstructive pulmonary disease (HCC)    Chronic diastolic heart failure ([facility]    Fall at home, initial encounter    Hyperglycemia    Falls frequently    Symptomatic bradycardia    Fall           IMPRESSION    1. Sinus bradycardia  2. CHFpEF (EF 87-66%, diastolic dysfunction)  3. HTN  4. CKD  5. Hypothyroidism  6. DM2    Recommendations:   1. Continue telemetry  2. Hold BB. 3. continue cozaar 25 mg daily, lasix 40 mg daily, ASA  4. No need for echo as patient just had one 1/23/21  5. Will consider Holter monitor on DC if r/o other causes for sinus bradycardia  6. Keep K>4, mag > 2.  7. DM and medical management per primary. Discussed with patient, family, and Nurse. Electronically signed by Yara Medina MD on 2/4/2021 at 9:00 AM     Attending note,   The patient was seen and examined, agree with above. Sinus bradycardia HR 50-60. No chest pain or SOB, no dizziness or syncope. Mechanical fall. Will observe. Echo reviewed. Holter as outpatient. Adjustment of Levothyroxine per primary service.

## 2021-02-04 NOTE — FLOWSHEET NOTE
[facility]     Emergency/Trauma Note    PATIENT NAME: Quintin Chen    Shift date: 2/03/2021  Shift day: Wednesday   Shift # 3    Room # ED24 Name: Quintin Chen            Age: 80 y.o. Gender: female          Denominational: [address] [place] [address] of Restorationist: Unknown    Trauma/Incident type: Adult Trauma Consult  Admit Date & Time: 2/4/2021  2:25 AM  TRAUMA NAME: N/A    ADVANCE DIRECTIVES IN CHART? No    NAME OF DECISION MAKER: Juaquin Panda ([phone])    RELATIONSHIP OF DECISION MAKER TO PATIENT: Daughter    PATIENT/EVENT DESCRIPTION:  Quintin Chen is a 80 y.o. female who arrived to ED24 and was paged out as an \"Adult Trauma Consult,\" due to a \"Fall. \" Patient was receiving care from medical team, when  visited. Pt to be admitted to [facility]. SPIRITUAL ASSESSMENT/INTERVENTION:   responded to page and gathered patient information outside room.  introduced herself to patient's daughter, Freedom Dean, who was at bedside, at time of 's visit. Patient's daughter indicated that she was not present for patient's fall, however, her sister was at home with patient at time of fall.  provided comfort and support to patient's family. Per daughter, she is patient's POA-H.  reviewed patient's chart and no documentation is scanned into chart. Patient's daughter thanked  for support. PATIENT BELONGINGS:  No belongings noted    ANY BELONGINGS OF SIGNIFICANT VALUE NOTED:  N/A    REGISTRATION STAFF NOTIFIED?   Yes      WHAT IS YOUR SPIRITUAL CARE PLAN FOR THIS PATIENT?:  [facility] can make follow-up visit, per request. Jaylyn Gaytan can make follow-up visit, per request.    Electronically signed by Ruth Sterling on 2/4/2021 at 6:39 AM.  [address]  [phone]

## 2021-02-04 NOTE — ED TRIAGE NOTES
Pt arrived via medic 9. Pt fell at home from a standing height and hit her head on the wall. Bruising and bleed to left eye noted. Pt denies LOC, change in vision, headache. Edema in left arm noted, hx of lymphedema. Daughter at bedside. Pt respirations are even and unlabored, pt is oriented X 4, speaking in complete sentences, bed is in the lowest position, call light is within reach. Will continue to monitor. \

## 2021-02-04 NOTE — ED NOTES
Bed: 24  Expected date:   Expected time:   Means of arrival:   Comments:  Wanda [facility]  02/04/21 0224

## 2021-02-04 NOTE — ED PROVIDER NOTES
[facility]  ED  Emergency Department Encounter  EmergencyMedicine Resident     Pt Name:Luisana Lamas  MRN: [id number]  Rob 7/6/1929  Date of evaluation: 2/4/21  PCP:  Nan Dale MD    [address] [place] [address]       Chief Complaint   Patient presents with    Fall       HISTORY OF PRESENT ILLNESS  (Location/Symptom, Timing/Onset, Context/Setting, Quality, Duration, Modifying Factors, Severity.)      Ren Almanza is a 80 y.o. female who presents with fall from standing height while carrying her gantry tonight. Unknown if there are any medical events patient not the clear [place] however she believes she might of tripped, daughter who is in the room was not there but found patient on the ground. Patient states she landed on her left side her face, had a nosebleed for a few minutes, and is having pain around her left eye. Patient is on 81 mg aspirin no other anticoagulation, she currently just has pain around her left eye, and a mild headache. No other complaints    Patient has a history of CHF recently admitted to hospital 1 week ago for CHF exacerbation    She denies any worsening shortness of breath does notice she has had some right upper extremity swelling since discharge this is been persistent for 1 week and is not new today. While she has pain around her eyes she has no decreased vision in her left eye      PAST MEDICAL / SURGICAL / SOCIAL / FAMILY HISTORY      has a past medical history of Arthritis, Breast cancer ([address] CAD (coronary artery disease), CHF (congestive heart failure) ([address] CKD (chronic kidney disease) stage 3, GFR 30-59 ml/min ([facility]), COPD (chronic obstructive pulmonary disease) ([address] Gastroesophageal reflux disease, Hyperlipidemia, Hypertension, Recurrent major depression in remission ([address] Thyroid disease, and Type 2 diabetes mellitus with kidney complication, without long-term current use of insulin ([address]        has a past surgical history that Krystian Ross MD   levothyroxine (SYNTHROID) 75 MCG tablet Take 1.5 tablets by mouth Daily 12/4/19   Aaron Duque MD   losartan (COZAAR) 25 MG tablet Take 1 tablet by mouth daily 12/4/19   Aaron Duque MD   aspirin 81 MG chewable tablet Take 81 mg by mouth daily    Historical Provider, MD   budesonide-formoterol (SYMBICORT) 160-4.5 MCG/ACT AERO Inhale 2 puffs into the lungs 2 times daily    Historical Provider, MD   DULoxetine (CYMBALTA) 60 MG extended release capsule Take 60 mg by mouth daily    Historical Provider, MD   gabapentin (NEURONTIN) 300 MG capsule Take 300 mg by mouth 3 times daily.     Historical Provider, MD   omeprazole (PRILOSEC) 40 MG delayed release capsule Take 40 mg by mouth daily    Historical Provider, MD   travoprost, benzalkonium, (TRAVATAN) 0.004 % ophthalmic solution Place 1 drop into both eyes daily    Historical Provider, MD   folic acid-pyridoxine-cyanocobalamine (FOLTX) 2.5-25-1 MG TABS tablet Take 1 tablet by mouth daily    Historical Provider, MD       REVIEW OF SYSTEMS    (2-9 systems for level 4, 10 or more for level 5)      General ROS - No fevers, No chills, no gradual weight loss, no night sweats  Ophthalmic ROS - No discharge, No changes in vision  ENT ROS -  No sore throat, No rhinorrhea,   Respiratory ROS - no shortness of breath, no cough, no  wheezing  Cardiovascular ROS - No chest pain, no dyspnea on exertion  Gastrointestinal ROS - No abdominal pain, no nausea, no vomiting, no change in bowel habits, no black or bloody stools  Genito-Urinary ROS - No dysuria, trouble voiding, or hematuria  Musculoskeletal ROS - No myalgias, positive arthalgias  Neurological ROS - positive headache, no dizziness/lightheadedness, No focal weakness, no loss of sensation  Dermatological ROS - No lesions, No rash         PHYSICAL EXAM   (up to 7 for level 4, 8 or more for level 5)      INITIAL VITALS:   /73   Pulse 67   Temp 97.4 °F (36.3 °C) (Oral)   Resp 15   LMP  (LMP Unknown)   SpO2 98% General Appearance: Well-appearing, in no acute distress  HEENT: Head: normocephalic/atraumatic eyes: There is subconjunctival hemorrhage in the inferior portion of the left eye, there is no hyphema, pupils are 3 mm equally round and reactive, extraocular muscles intact there is no clinical entrapment, there is bruising over the left superior orbital rim      Dried blood in left nare, nasal bridge nontender no malalignment no nasal septal hematoma     throat:mucous membranes moist, oropharynx clear     Neck: Trachea midline, no JVD. Mild tenderness to the cervical spine in the C5 region    Lungs: No evidence of increased work of breathing. CTA B/L, no wheezes/rhonchi     Cardiovascular: RRR, no murmur, 2+ peripheral pulses bilaterally. Cap refill less than 2 seconds. No lower extremity edema noted    Abdomen: Soft, nontender. No guarding or rebound tenderness. Neurologic: OLIVAS  to person, place, time, and event. No sensation deficits. Moving all extremities     Extremities:      There is swelling of the right hand and forearm compared to left, patient states is been present for 1 week      DIFFERENTIAL  DIAGNOSIS     PLAN (LABS / IMAGING / EKG):  Orders Placed This Encounter   Procedures    XR CHEST PORTABLE    CT HEAD WO CONTRAST    CT CERVICAL SPINE WO CONTRAST    CT FACIAL BONES WO CONTRAST    XR KNEE LEFT (3 VIEWS)    CT CHEST ABDOMEN PELVIS WO CONTRAST    CT THORACIC SPINE TRAUMA RECONSTRUCTION    CT LUMBAR SPINE TRAUMA RECONSTRUCTION    Troponin    Brain Natriuretic Peptide    BASIC METABOLIC PANEL    CBC WITH AUTO DIFFERENTIAL    Troponin    Magnesium    Inpatient consult to Trauma Surgery    Inpatient consult to Hospitalist    EKG 12 Lead       MEDICATIONS ORDERED:  Orders Placed This Encounter   Medications    acetaminophen (TYLENOL) tablet 650 mg    fluorescein ophthalmic strip 1 mg    proparacaine (ALCAINE) 0.5 % ophthalmic solution 1 drop    magnesium sulfate 1000 mg in dextrose 5% 100 mL IVPB    magnesium sulfate 1-5 GM/100ML-% IVPB (premix)     Kana Miranda R: cabinet override           DIAGNOSTIC RESULTS / EMERGENCY DEPARTMENT COURSE / MDM     LABS:  Results for orders placed or performed during the hospital encounter of 02/04/21   Troponin   Result Value Ref Range    Troponin, High Sensitivity 41 (H) 0 - 14 ng/L    Troponin T NOT REPORTED <0.03 ng/mL    Troponin Interp NOT REPORTED    Brain Natriuretic Peptide   Result Value Ref Range    Pro-BNP 9,504 (H) <300 pg/mL    BNP Interpretation Pro-BNP Reference Range:    BASIC METABOLIC PANEL   Result Value Ref Range    Glucose 168 (H) 70 - 99 mg/dL    BUN 33 (H) 8 - 23 mg/dL    CREATININE 1.46 (H) 0.50 - 0.90 mg/dL    Bun/Cre Ratio NOT REPORTED 9 - 20    Calcium 9.6 8.6 - 10.4 mg/dL    Sodium 133 (L) 135 - 144 mmol/L    Potassium 4.9 3.7 - 5.3 mmol/L    Chloride 97 (L) 98 - 107 mmol/L    CO2 27 20 - 31 mmol/L    Anion Gap 9 9 - 17 mmol/L    GFR Non-African American 34 (L) >60 mL/min    GFR  41 (L) >60 mL/min    GFR Comment          GFR Staging NOT REPORTED    CBC WITH AUTO DIFFERENTIAL   Result Value Ref Range    WBC 4.7 3.5 - 11.3 k/uL    RBC 4.30 3.95 - 5.11 m/uL    Hemoglobin 12.6 11.9 - 15.1 g/dL    Hematocrit 41.5 36.3 - 47.1 %    MCV 96.5 82.6 - 102.9 fL    MCH 29.3 25.2 - 33.5 pg    MCHC 30.4 28.4 - 34.8 g/dL    RDW 16.2 (H) 11.8 - 14.4 %    Platelets 135 881 - 065 k/uL    MPV 10.8 8.1 - 13.5 fL    NRBC Automated 0.0 0.0 per 100 WBC    Differential Type NOT REPORTED     WBC Morphology NOT REPORTED     RBC Morphology NOT REPORTED     Platelet Estimate NOT REPORTED     Immature Granulocytes 0 0 %    Seg Neutrophils 79 (H) 36 - 66 %    Lymphocytes 11 (L) 24 - 44 %    Monocytes 10 (H) 1 - 7 %    Eosinophils % 0 (L) 1 - 4 %    Basophils 0 0 - 2 %    Absolute Immature Granulocyte 0.00 0.00 - 0.30 k/uL    Segs Absolute 3.71 1.8 - 7.7 k/uL    Absolute Lymph # 0.52 (L) 1.0 - 4.8 k/uL    Absolute Mono # 0.47 0.1 - 0.8 k/uL    Absolute Eos # 0.00 0.0 - 0.4 k/uL    Basophils Absolute 0.00 0.0 - 0.2 k/uL    Morphology ANISOCYTOSIS PRESENT    Troponin   Result Value Ref Range    Troponin, High Sensitivity 41 (H) 0 - 14 ng/L    Troponin T NOT REPORTED <0.03 ng/mL    Troponin Interp NOT REPORTED    Magnesium   Result Value Ref Range    Magnesium 2.4 1.6 - 2.6 mg/dL   EKG 12 Lead   Result Value Ref Range    Ventricular Rate 61 BPM    Atrial Rate 61 BPM    P-R Interval 150 ms    QRS Duration 160 ms    Q-T Interval 474 ms    QTc Calculation (Bazett) 477 ms    P Axis 74 degrees    R Axis 175 degrees    T Axis 10 degrees           RADIOLOGY:  No results found. EKG  None    All EKG's are interpreted by the Emergency Department Physician who either signs or Co-signs this chart in the absence of a cardiologist.    EMERGENCY DEPARTMENT COURSE/IMPRESSION:    Patient with what appears to be possible mechanical fall, trauma to the left eye looks like a subconjunctival hemorrhage possible orbital rim contusion versus fracture. Given age and mechanism will get CT head cervical spine and facial bones.   Will stain the eye and check for corneal abrasion, there is no obvious entrapment on extraocular muscle exam, pupils equally round and reactive, low concern for elevated intraocular pressures, field of vision is confrontations intact bilaterally to each eye, there does not appear to be in any hyphema on exam and this all appears to be subconjunctival involving the sclera without any adjacent lacerations need repair    While this does not appear to be medical, patient somewhat poor historian was recently discharged for CHF exacerbation will check basic cardiac work-up, chest x-ray BMP, patient does have a daughter who will stay at home with her if discharged, if looking discharge well patient ambulate with a cane    ED Course as of Feb 04 0541   Thu Feb 04, 2021   3569 EKG shows rate 61 sinus rhythm, right axis unchanged from prior EKG there is a right bundle branch block which is unchanged, septal infarct and T wave inversions in V2 V3 also unchanged from prior EKG    [EF]   0350 BMP elevated from baseline last week of 6000, now 9500, troponin is 41 which is approximately double patient's baseline however no EKG changes slight increase in creatinine could contribute to this we will get repeat troponin ensure no uptrend, possible Lasix,    [EF]   0350  awaiting CT scans    [EF]   0409 Patient having nonsustained V. tach, new onset A. fib on monitor, multiple PVCs, will get magnesium check level, given elevated troponin, questionable mechanism of fall will likely admit to medicine service cardiology evaluation, will have trauma team assess given mechanism patient being admitted    [EF]   0410 Bedside fluorescein test shows no uptake no obvious corneal abrasion    [EF]   0417 CT scans negative, chest x-ray shows no traumatic injury small bilateral effusions consistent with patient's BNP    [EF]   3343 Will update cardiology patient be admitted, based on mechanism of trauma evaluate    [EF]   0428 Reached out to cardiology fellow regarding admission    [EF]   4902 Cardiology update on patient's condition and plan for admission, they will follow on floor    [EF]   0533 Trauma states okay to admit to medicine service as well as no traumatic injuries found on abdominal CT    [EF]      ED Course User Index  [EF] Sandie Castelan DO       PROCEDURES:  None    CONSULTS:  IP CONSULT TO TRAUMA SURGERY  IP CONSULT TO HOSPITALIST    CRITICAL CARE:  None    FINAL IMPRESSION      1. Fall, initial encounter    2. Nonsustained ventricular tachycardia ([address]    3. Congestive heart failure, unspecified HF chronicity, unspecified heart failure type ([address]    4. Bradycardia    5. Subconjunctival hemorrhage of left eye    6. Swelling of right upper extremity    7.  Multiple premature ventricular complexes          DISPOSITION / PLAN     DISPOSITION Decision To Admit 02/04/2021 04:10:49 AM      PATIENT REFERRED TO:  No follow-up provider specified. DISCHARGE MEDICATIONS:  New Prescriptions    No medications on file       DO Nicholas Kline D.O.   Emergency Medicine Resident    (Please note that portions of this note were completed with a voice recognition program.  Efforts were made to edit the dictations but occasionally words aremis-transcribed.)       Alysha Deluca DO  Resident  02/04/21 [id number]

## 2021-02-04 NOTE — ED PROVIDER NOTES
[address]  Emergency Department  Faculty Attestation       I performed a history and physical examination of the patient and discussed management with the resident. I reviewed the residents note and agree with the documented findings including all diagnostic interpretations and plan of care. Any areas of disagreement are noted on the chart. I was personally present for the key portions of any procedures. I have documented in the chart those procedures where I was not present during the key portions. I have reviewed the emergency nurses triage note. I agree with the chief complaint, past medical history, past surgical history, allergies, medications, social and family history as documented unless otherwise noted below. Documentation of the HPI, Physical Exam and Medical Decision Making performed by braedenibjuanita is based on my personal performance of the HPI, PE and MDM. For Physician Assistant/ Nurse Practitioner cases/documentation I have personally evaluated this patient and have completed at least one if not all key elements of the E/M (history, physical exam, and MDM). Additional findings are as noted. Pertinent Comments     Primary Care Physician: Ivet Colon MD    ED Triage Vitals   BP Temp Temp Source Pulse Resp SpO2 Height Weight   02/04/21 0234 02/04/21 0232 02/04/21 0232 02/04/21 0232 02/04/21 0232 02/04/21 0404 02/04/21 0703 02/04/21 0703   103/73 97.4 °F (36.3 °C) Oral 67 15 98 % 5' 7\" (1.702 m) 208 lb 9.6 oz (94.6 kg)        History/Physical: This is a 80 y.o. female who presents to the Emergency Department with complaint of fall. Patient tripped and fell tonight. No significant lightheadedness prior to it. Patient is sitting up in bed no acute distress. She is normocephalic. She has tenderness over the left eye with some chemosis. But normal range of motion some pupils are equal and reactive. Heart sounds are regular lungs good auscultation. Abdomen soft nontender.   She does have

## 2021-02-04 NOTE — PROGRESS NOTES
[facility]  Documentation Note                Admission Dx: Symptomatic bradycardia [R00.1]    Past Medical History:   has a past medical history of Arthritis, Breast cancer ([facility] CAD (coronary artery disease), CHF (congestive heart failure) ([facility] CKD (chronic kidney disease) stage 3, GFR 30-59 ml/min, COPD (chronic obstructive pulmonary disease) ([facility] Gastroesophageal reflux disease, Hyperlipidemia, Hypertension, Recurrent major depression in remission ([facility] Thyroid disease, and Type 2 diabetes mellitus with kidney complication, without long-term current use of insulin ([facility] Previous Testing:     ECHO 1/23/2021: EF 55-60%, RA severely dilated, trace MR, severe TR, RVSP is 78 mmHg, mild SC. Previous office/hospital visit:   Dr. Hansa Allen 12/3/19:   1. Syncope/near syncope  2. Chronic diastolic congestive heart failure  3. Severe pulmonary hypertension  4. Severe tricuspid regurgitation  5. Orthostatic hypotension  6. Chronic renal failure (Creatinine at baseline)  7. Mild coronary artery disease by history - angina free  8. Essential hypertension  9. Dyslipidemia  10. COPD - (?interstitial lung disease)  11.  Type 2 diabetes    Plan --   This is the second such admission in the last 2 months  On the morning of her admission she did not drink any water and only had a cup of coffee and toast.  Advised to always drink water in the morning  She also has compression stockings at home but was not wearing them that morning   Blood pressure was low on admission  I suspect her CHF is compensated  No angina reported  She is preload dependent given pulmonary hypertension and RV dysfunction  Would stop Isosorbide since she has been angina free (venodilator and decreases preload)  Resume losartan at 50 mg   Knee high compression stockings (20-30 mmHg)  Continue lasix per home dose  She has been off metoprolol (keep off)  If remain stable, can be discharged and follow up with Dr. Tarsha Ontiveros in 1-2 liana Packer, [facility]

## 2021-02-04 NOTE — PROGRESS NOTES
Physical Therapy    Facility/Department: [facility]  Initial Assessment    NAME: Madelyn Brown  : 1929  MRN: [id number]    Chief Complaint   Patient presents with   [facility] Fall       Date of Service: 2021    Discharge Recommendations:    Further therapy recommended at discharge. PT Equipment Recommendations  Other: CTA, possibly RW    Assessment   Body structures, Functions, Activity limitations: Decreased functional mobility ; Decreased strength;Decreased endurance;Decreased balance  Assessment: Pt grossly CGA to Monty for mobility, amb 4' RW CGA, fatigues, limitied by fatigue and balance. Pt would benefit from continued acute PT to address deficits. Prognosis: Good  Decision Making: Medium Complexity  PT Education: Plan of Care;PT Role;General Safety  REQUIRES PT FOLLOW UP: Yes  Activity Tolerance  Activity Tolerance: Patient Tolerated treatment well;Patient limited by fatigue;Patient limited by endurance       Patient Diagnosis(es): The primary encounter diagnosis was Fall, initial encounter. Diagnoses of Nonsustained ventricular tachycardia (HCC), Congestive heart failure, unspecified HF chronicity, unspecified heart failure type ([address] Bradycardia, Subconjunctival hemorrhage of left eye, Swelling of right upper extremity, and Multiple premature ventricular complexes were also pertinent to this visit. has a past medical history of Arthritis, Breast cancer ([address] CAD (coronary artery disease), CHF (congestive heart failure) ([address] CKD (chronic kidney disease) stage 3, GFR 30-59 ml/min, COPD (chronic obstructive pulmonary disease) ([address] Gastroesophageal reflux disease, Hyperlipidemia, Hypertension, Recurrent major depression in remission ([address] Thyroid disease, and Type 2 diabetes mellitus with kidney complication, without long-term current use of insulin ([address] has a past surgical history that includes Thyroid surgery; Mastectomy; Hysterectomy;  Cholecystectomy; Colonoscopy; knee surgery; and Cataract removal (Right). Restrictions  Restrictions/Precautions  Restrictions/Precautions: General Precautions, Fall Risk  Required Braces or Orthoses?: No  Vision/Hearing  Vision: Impaired  Vision Exceptions: Wears glasses for reading  Hearing: Within functional limits     Subjective  General  Chart Reviewed: Yes  Patient assessed for rehabilitation services?: Yes  Response To Previous Treatment: Not applicable  Family / Caregiver Present: No  Follows Commands: Within Functional Limits  General Comment  Comments: OT co-eval  Subjective  Subjective: RN and pt agreeable to PT.  Pt alert in bed upon arrival.  Pain Screening  Patient Currently in Pain: Denies  Vital Signs  Patient Currently in Pain: Denies  Pre Treatment Pain Screening  Intervention List: Patient able to continue with treatment    Orientation  Orientation  Overall Orientation Status: Within Functional Limits  Social/Functional History  Social/Functional History  Lives With: Alone  Type of Home: House  Home Layout: One level  Home Access: Stairs to enter with rails  Entrance Stairs - Number of Steps: 2  Entrance Stairs - Rails: (pt unsure which side)  Bathroom Shower/Tub: Tub/Shower unit  Bathroom Toilet: Standard  Bathroom Equipment: Grab bars in shower, Tub transfer bench  Bathroom Accessibility: Accessible  Home Equipment: Oxygen, Standard walker(2L. walker not used)  ADL Assistance: [address] [place] [address] Avenue: Independent  Homemaking Responsibilities: Yes(hires for yard work)  Ambulation Assistance: Independent  Transfer Assistance: Independent  Active : No  Patient's  Info: daughter  Cognition        Objective          AROM RLE (degrees)  RLE AROM: WFL  AROM LLE (degrees)  LLE AROM : WFL  Strength RLE  Comment: 4-  Strength LLE  Comment: 4-  Strength RUE  Comment: antigravity, see OT  Strength LUE  Comment: antigravity, see OT     Sensation  Overall Sensation Status: WFL(Pt denies any numbness or tingling)  Bed mobility  Supine to Sit: Contact guard assistance  Sit to Supine: (left in chair)  Scooting: Minimal assistance  Transfers  Sit to Stand: Minimal Assistance  Stand to sit: Contact guard assistance  Ambulation  Ambulation?: Yes  Ambulation 1  Surface: level tile  Device: Rolling Walker  Assistance: Contact guard assistance  Quality of Gait: slowed, reciprocal, no LOB, no buckling  Distance: 4'  Comments: denies any dizziness/ lightheadedness, chest pain  Stairs/Curb  Stairs?: No     Balance  Posture: Fair  Sitting - Static: Good  Sitting - Dynamic: Good  Standing - Static: Good;-  Standing - Dynamic: Fair;+  Comments: RW used while assessing standing balance        Plan   Plan  Times per week: 5-6x/wk  Current Treatment Recommendations: Strengthening, Balance Training, Functional Mobility Training, Transfer Training, Gait Training, Endurance Training, Home Exercise Program, Safety Education & Training, Patient/Caregiver Education & Training, Equipment Evaluation, Education, & procurement, Stair training  Safety Devices  Type of devices: Call light within reach, Nurse notified, Gait belt, Patient at risk for falls, All fall risk precautions in place, Left in chair, Chair alarm in place  Restraints  Initially in place: No    AM-PAC Score  AM-PAC Inpatient Mobility Raw Score : 17 (02/04/21 1630)  AM-PAC Inpatient T-Scale Score : 42.13 (02/04/21 1630)  Mobility Inpatient CMS 0-100% Score: 50.57 (02/04/21 1630)  Mobility Inpatient CMS G-Code Modifier : CK (02/04/21 1630)          Goals  Short term goals  Time Frame for Short term goals: 14 visits  Short term goal 1: Pt will be Herson bed mobility  Short term goal 2: Pt will be [place] transfers  Short term goal 3: Pt will be [place] amb 125' RW or least restrictive AD  Short term goal 4: Pt will navigate 4 steps Herson       Therapy Time   Individual Concurrent Group Co-treatment   Time In 1420         Time Out 1444         Minutes 24         Timed Code Treatment Minutes: 8

## 2021-02-04 NOTE — PROGRESS NOTES
Occupational Therapy   Occupational Therapy Initial Assessment  Date: 2021   Patient Name: Renetta Cotter  MRN: [id number]     : 1929    Date of Service: 2021    Discharge Recommendations: Pt likely close to baseline, CTA pending progress. Patient would benefit from continued therapy after discharge     Assessment   Performance deficits / Impairments: Decreased functional mobility ; Decreased endurance;Decreased ADL status; Decreased balance;Decreased high-level IADLs;Decreased safe awareness  Prognosis: Good  Decision Making: Medium Complexity  OT Education: OT Role;Plan of Care;ADL Adaptive Strategies  REQUIRES OT FOLLOW UP: Yes  Activity Tolerance  Activity Tolerance: Patient limited by fatigue;Patient Tolerated treatment well  Safety Devices  Safety Devices in place: Yes  Type of devices: All fall risk precautions in place;Call light within reach;Gait belt;Left in chair;Chair alarm in place;Nurse notified  Restraints  Initially in place: No           Patient Diagnosis(es): The primary encounter diagnosis was Fall, initial encounter. Diagnoses of Nonsustained ventricular tachycardia (HCC), Congestive heart failure, unspecified HF chronicity, unspecified heart failure type ([address] Bradycardia, Subconjunctival hemorrhage of left eye, Swelling of right upper extremity, and Multiple premature ventricular complexes were also pertinent to this visit. has a past medical history of Arthritis, Breast cancer ([address] CAD (coronary artery disease), CHF (congestive heart failure) ([address] CKD (chronic kidney disease) stage 3, GFR 30-59 ml/min, COPD (chronic obstructive pulmonary disease) ([address] Gastroesophageal reflux disease, Hyperlipidemia, Hypertension, Recurrent major depression in remission ([address] Thyroid disease, and Type 2 diabetes mellitus with kidney complication, without long-term current use of insulin ([address] has a past surgical history that includes Thyroid surgery; Mastectomy;  Hysterectomy; Cholecystectomy; Colonoscopy; knee surgery; and Cataract removal (Right). Restrictions  Restrictions/Precautions  Restrictions/Precautions: General Precautions, Fall Risk  Required Braces or Orthoses?: No  Position Activity Restriction  Other position/activity restrictions: up with A    Subjective   General  Patient assessed for rehabilitation services?: Yes  Family / Caregiver Present: No  Diagnosis: Fall, B/L pleural effusions, syncope  Patient Currently in Pain: Denies  Pain Assessment  Pain Assessment: 0-10  Pain Level: 0  Vital Signs  Temp: 98.1 °F (36.7 °C)  Temp Source: Oral  Pulse: 59  Heart Rate Source: Monitor  Resp: 16  BP: (!) 118/90  BP Location: Left upper arm  MAP (mmHg): 97  Patient Position: Up in chair  Patient Currently in Pain: Denies  Oxygen Therapy  SpO2: 96 %  Social/Functional History  Social/Functional History  Lives With: Alone  Type of Home: House  Home Layout: One level  Home Access: Stairs to enter with rails  Entrance Stairs - Number of Steps: 2  Entrance Stairs - Rails: (pt unsure which side)  Bathroom Shower/Tub: Tub/Shower unit  Bathroom Toilet: Standard  Bathroom Equipment: Grab bars in shower, Tub transfer bench  Bathroom Accessibility: Accessible  Home Equipment: Oxygen, Standard walker(2L. walker not used)  ADL Assistance: Independent  Homemaking Assistance: Independent  Homemaking Responsibilities: Yes(hires for yard work)  Ambulation Assistance: Independent  Transfer Assistance: Independent  Active : No  Patient's  Info: Not for >1 year, dtr drives  Occupation: Retired  IADL Comments:  Yard work hired out     Objective        Orientation  Overall Orientation Status: Within Functional Limits     Balance  Sitting Balance: Supervision  Standing Balance: Stand by assistance  Standing Balance  Time: 1.5 min  Activity: Pt stood bedside, short func mob to recliner  Functional Mobility  Functional - Mobility Device: Rolling Walker  Activity: Other  Assist Level: Contact guard assistance  Functional Mobility Comments: Pt fatigues with activity, RW improved balance, O2 left on entire session  ADL  Feeding: Modified independent   Grooming: Stand by assistance  UE Bathing: Stand by assistance  LE Bathing: Contact guard assistance; Increased time to complete  UE Dressing: Contact guard assistance; Increased time to complete  LE Dressing: Minimal assistance; Increased time to complete  Toileting: Minimal assistance; Increased time to complete  Additional Comments: Pt fatigued quickly, kept close to bed/recliner d/t bradycardia, pt declining many ADLs this date  Tone RUE  RUE Tone: Normotonic  Tone LUE  LUE Tone: Normotonic  Coordination  Movements Are Fluid And Coordinated: No  Coordination and Movement description: Decreased speed     Bed mobility  Supine to Sit: Contact guard assistance  Sit to Supine: Unable to assess  Scooting: Minimal assistance  Comment: Pt supine in bed upon arrival, retired sitting in recliner with alarm on upon exit  Transfers  Sit to stand: Contact guard assistance  Stand to sit: Stand by assistance     Cognition  Overall Cognitive Status: WFL     Sensation  Overall Sensation Status: WFL      LUE AROM (degrees)  LUE AROM : WFL  RUE AROM (degrees)  RUE AROM : WFL  LUE Strength  Gross LUE Strength: WFL  L Hand General: 5/5  RUE Strength  Gross RUE Strength: WFL  R Hand General: 5/5      Plan   Plan  Times per week: 3-5x  AM-PAC Score        AM-[facility] Inpatient Daily Activity Raw Score: 17 (02/04/21 1701)  AM-PAC Inpatient ADL T-Scale Score : 37.26 (02/04/21 1701)  ADL Inpatient CMS 0-100% Score: 50.11 (02/04/21 1701)  ADL Inpatient CMS G-Code Modifier : CK (02/04/21 1701)    Goals  Short term goals  Time Frame for Short term goals: Pt will by discharge  Short term goal 1: demo good safety awarenes sduring func mob around room using LRD and SBA  Short term goal 2: demo ADL UB/LB dressing/bathing activity with CGA and increased time only  Short term goal 3: demo 2

## 2021-02-04 NOTE — PROGRESS NOTES
Spoke with Patients Daughter and Ellie Paulino. Home medications reviewed. Patient doesn't take metformin due to kidney issues per daughter it was discontinued by her physician. She does not take Cymbalta and she does not take folic acid. Medicine resident richard serve paged to update on home medications for reconciliation of orders.

## 2021-02-04 NOTE — CARE COORDINATION
Case Management Initial Discharge Plan  Juan Rendon,             Met with:patient to discuss discharge plans. Information verified: address, contacts, phone number, , insurance Yes    Emergency Contact/Next of Kin name & number: Octavio Sanchez daughter [phone], Connell Saint, [phone]    PCP: Herb Rivera MD  Date of last visit: 2021    Insurance Provider: Florian bourgeois    Discharge Planning    Living Arrangements:  Alone   Support Systems:  [id number] has 1 story  2 stairs to climb to get into front door,   Location of bedroom/bathroom in home main    Patient able to perform ADL's:Independent    Current Services (outpatient & in home) none  DME equipment: walker oxygen  DME provider:     Receiving oral anticoagulation therapy? No    If indicated:   Physician managing anticoagulation treatment:   Where does patient obtain lab work for ATC treatment? Potential Assistance Needed:  N/A    Patient agreeable to home care: No  [place] of choice provided:  no    Prior SNF/Rehab Placement and Facility: no  Agreeable to SNF/Rehab: No  [place] of choice provided: n/a     Evaluation: n/a    Expected Discharge date:  21    Patient expects to be discharged to:  home  Follow Up Appointment: Best Day/ Time: Monday AM    Transportation provider: family  Transportation arrangements needed for discharge: No    Readmission Risk              Risk of Unplanned Readmission:        16             Does patient have a readmission risk score greater than 14?: Yes  If yes, follow-up appointment must be made within 7 days of discharge.      Goals of Care: regular heartbeat      Discharge Plan: home           Electronically signed by Brennon Marquez RN on 21 at 12:35 PM EST

## 2021-02-04 NOTE — CARE COORDINATION
Met with pt and daughter to complete an SBIRT. Pt was alert and oriented. She denies alcohol or drug use. SBIRT is negative. Alcohol Screening and Brief Intervention        No results for input(s): ALC in the last 72 hours. Alcohol Pre-screening     (WOMEN ONLY) How many times in the past year have you had 4 or more drinks in a day?: None    Alcohol Screening Audit       Drug Pre-Screening   How many times in the past year have you used a recreational drug or used a prescription medication for nonmedical reasons?: None    Drug Screening DAST       Mood Pre-Screening (PHQ-2)  During the past two weeks, have you been bothered by little interest or pleasure in doing things?: No  During the past two weeks, have you been bothered by feeling down, depressed, or hopeless?: No    Mood Pre-Screening (PHQ-9)         I have interviewed Tamia Euceda, [id number] regarding  Her alcohol consumption/drug use and risk for excessive use. Screenings were negative. Patient  N/A intervention at this time.    Deferred []    Completed on: 2/4/2021   LAYLA Pedro

## 2021-02-04 NOTE — PROGRESS NOTES
Trauma Tertiary Survey    Admit Date: 2/4/2021  Hospital day 1    [facility]       Past Medical History:   Diagnosis Date    Arthritis     [facility]     CAD (coronary artery disease)     [facility]congestive [facility] failure) [facility]     CKD (chronic kidney disease) stage 3, GFR 30-59 ml/min [facility] 12/2/2019    COPD (chronic obstructive pulmonary disease) ([facility])     Gastroesophageal reflux disease 8/1/2020    Hyperlipidemia     Hypertension     Recurrent major depression in remission ([facility] 8/1/2020    Thyroid disease     Type 2 diabetes mellitus with kidney complication, without long-term current use of insulin ([facility] 12/2/2019       Scheduled Meds:   fluorescein  1 strip Left Eye Once    proparacaine  1 drop Left Eye Once     Continuous Infusions:  PRN Meds:acetaminophen    Subjective:     Patient seen and examined at bedside. Pain controlled. Denies numbness/tingling, no N/V. Denies CP/SOB. Objective:     Patient Vitals for the past 8 hrs:   BP Temp Temp src Pulse Resp SpO2   02/04/21 0404 -- -- -- -- -- 98 %   02/04/21 0234 103/73 -- -- -- -- --   02/04/21 0232 -- 97.4 °F (36.3 °C) Oral 67 15 --       No intake/output data recorded. No intake/output data recorded. Radiology:  Xr Knee Left (3 Views)    Result Date: 2/4/2021  Mild soft tissue swelling without acute osseous abnormality. Ct Head Wo Contrast    Result Date: 2/4/2021  No acute intracranial abnormality. Ct Facial Bones Wo Contrast    Result Date: 2/4/2021  No acute traumatic injury of the facial bones. Minimal left cheek and left periorbital soft tissue swelling. Ct Cervical Spine Wo Contrast    Result Date: 2/4/2021  No acute abnormality of the cervical spine. Xr Chest Portable    Result Date: 2/4/2021  Cardiomegaly and chronic pulmonary change with small bilateral effusions. Ct Chest Abdomen Pelvis Wo Contrast    Result Date: 2/4/2021  Mild right and trace left pleural effusion.  No acute abdominal or pelvic abnormality. Mild abdominal ascites. Anasarca. Ct Lumbar Spine Trauma Reconstruction    Result Date: 2/4/2021  No acute fracture or malalignment of the lumbar spine. Ct Thoracic Spine Trauma Reconstruction    Result Date: 2/4/2021  No acute fracture or malalignment of the thoracic spine. Bilateral pleural effusions. PHYSICAL EXAM:   GCS:  4 - Opens eyes on own   6 - Follows simple motor commands  5 - Alert and oriented    Pupil size:  Left 3 mm Right 3 mm  Pupil reaction: Yes  Wiggles fingers: Left Yes Right Yes  Hand grasp:   Left normal   Right normal  Wiggles toes: Left Yes    Right Yes  Plantar flexion: Left normal  Right normal      BP (!) 142/94   Pulse 53   Temp 97.7 °F (36.5 °C) (Oral)   Resp 13   Ht 5' 7\" (1.702 m)   Wt 208 lb 9.6 oz (94.6 kg)   LMP  (LMP Unknown)   SpO2 91%   BMI 32.67 kg/m²   General appearance: alert, appears stated age and cooperative  Head: Normocephalic, without obvious abnormality, atraumatic  Eyes: negative findings: lids and lashes normal, positive findings: conjunctiva: + subconjunctival hemorrhage on the left, visual acuity is grossly intact, EOMI  Ears: hearing grossly normal b/l  Nose: no discharge  Neck: supple, symmetrical, trachea midline and no posterior midline C-spine pain  Back: symmetric, no curvature. ROM normal. No CVA tenderness. Lungs: no use of accessory muscles, breathing comfortably w/NC satting low 90's. Heart: regular, normal rate  Abdomen: normal findings: soft, non-tender  Pelvic: stable  Extremities: extremities atraumatic.  There is a surgical scar to the left knee and the right arm is edematous at baseline  Pulses: 2+ and symmetric  Skin: Skin color, texture, turgor normal. No rashes or lesions  Neurologic: Grossly normal    Spine:     Spine Tenderness ROM   Cervical 0 /10 Normal   Thoracic 0 /10 Normal   Lumbar 0 /10 Normal     Musculoskeletal    Joint Tenderness Swelling ROM   Right shoulder absent absent normal   Left shoulder absent absent normal   Right elbow absent absent normal   Left elbow absent absent normal   Right wrist absent absent normal   Left wrist absent absent normal   Right hand grasp absent absent normal   Left hand grasp absent absent normal   Right hip absent absent normal   Left hip absent absent normal   Right knee absent absent normal   Left knee present absent normal   Right ankle absent absent normal   Left ankle absent absent normal   Right foot absent absent normal   Left foot absent absent normal           CONSULTS: Medicine    PROCEDURES: None    INJURIES:        Patient Active Problem List   Diagnosis    Hypothyroidism    Right-sided heart failure (HCC)    Pulmonary hypertension due to COPD ([address]    Essential hypertension    Autonomic neuropathy    CKD (chronic kidney disease) stage 3, GFR 30-59 ml/min    Type 2 diabetes mellitus with kidney complication, without long-term current use of insulin ([address]    CRIS (acute kidney injury) ([address]    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    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 angina pectoris    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 (HCC)    Bradycardia    Mixed hyperlipidemia    Laryngopharyngeal reflux    Peripheral venous insufficiency    Pulmonary hypertension ([address]    Recurrent major depression in remission ([address]    Vocal cord palsy    Chronic renal insufficiency, stage III (moderate)    Chronic obstructive pulmonary disease (HCC)    Chronic diastolic heart failure ([address]    Fall at home, initial encounter    Hyperglycemia    Falls frequently    Symptomatic bradycardia         Assessment/Plan:      Trauma imaging reviewed, no acute intracranial, intrathoracic or intraabdominal injury or acute fractures noted.      Noted to have subconjunctival hemorrhage without any visual deficit, no intervention or specialty consult indicated  o Admitted to medicine     Available imaging reviewed, no new

## 2021-02-04 NOTE — H&P
[facility]  Office: [address], DO, Kaveh Madrid, DO, Ron Brewer, DO, Jessika Santos, DO, Augustus James MD, Tera Gauthier MD, Louisa Monreal MD, Callie Ennis MD, Brooklynn Abraham MD, Jl Segura MD, Amy Canseco MD, Yehuda Abbott MD, Jamarcus Ramos MD, Brenda Bishop DO, Violette Forrest MD, Whit Davis MD, Lewis Richard DO, Cedric Amado MD,  Sebastien Roblero DO, Natalee Ray MD, Glenroy Navarro MD, Jake Mercado [facility], [facility], CNP, Glendy Riojas, CNP, Ric Odonnell, CNS, Hazel Gray, CNP, Jaspreet Amezcua, CNP, Ruy Polanco, CNP, Gabino Mitchell, CNP, Leonarda Rhodes, CNP, Esther Fox PA-C, Linda Dial DNP, Sivan Mcdermott, CNP, Prateek Betts, CNP, George Logan, CNP, Whitney Dockery, CNP, Jorge Mcdonald, CNP         [facility]    HISTORY AND PHYSICAL EXAMINATION            Date:   2/4/2021  Patient name:  Venkata Villarreal  Date of admission:  2/4/2021  2:25 AM  MRN:   [id number]  Account:  [de-identified]  YOB: 1929  PCP:    Laurel Durham MD  Room:   2022/2022-01  Code Status:    Full Code    Chief Complaint:     Chief Complaint   Patient presents with    Fall       History Obtained From:     patient, electronic medical record    History of Present Illness:     Venkata Villarreal is a 80 y.o. Non-/non  female who presents with Fall   and is admitted to the hospital for the management of Symptomatic bradycardia. This is a 79 y/o female who presented to the ED after a fall from standing height at home resulting in a right eye contusion. She states that her cardiac medications has been changed recently including an increased in her Lasix, and follows with Dr. Adrianna Hill at the [facility]. Initial Trauma eval in the ED and workup negative for acute fracture. Bradycardia on presentation warranted further admission and workup.      Called to assess patient per nursing due to hypoxia    On assessment paitent is awake and alert, communicative and following simple commands. PERRLA, right eye contusion, injected. Bradycardia on bedside tele and regular rhythm noted with S1S2. O2 saturations in the low 70s on nonrebreather. Despite this paitent not exhibiting increased work or breathing or labored breathing. R posterior lung sounds absent, Right A & P CTA. Abd is soft, nontender, nondistended. +1 BLE pitting edema appreciated with RUE lymphedema. Mood is appropriate. Past Medical History:     Past Medical History:   Diagnosis Date    Arthritis     [facility]     CAD (coronary artery disease)     [facility]congest[facility] failure) [facility]     CKD (chronic kidney disease) stage 3, GFR 30-59 ml/min 12/2/2019    COPD (ch[facility] obstructive pulmonary disease) ([facility])     Gastroesophageal reflux disease 8/1/2020    Hyperlipidemia     Hypertension     Recurrent major depression in remission ([facility] 8/1/2020    Thyroid disease     Type 2 diabetes mellitus with kidney complication, without long-term current use of insulin ([facility] 12/2/2019        Past Surgical History:     Past Surgical History:   Procedure Laterality Date    CATARACT REMOVAL Right     CHOLECYSTECTOMY      COLONOSCOPY      HYSTERECTOMY      KNEE SURGERY      MASTECTOMY      THYROID SURGERY          Medications Prior to Admission:     Prior to Admission medications    Medication Sig Start Date End Date Taking?  Authorizing Provider   metFORMIN (GLUCOPHAGE) 500 MG tablet Take 1 tablet by mouth 2 times daily (with meals) 8/1/20  Yes Josef Obrien MD   losartan (COZAAR) 25 MG tablet Take 1 tablet by mouth daily 12/4/19  Yes Aaron Duque MD   DULoxetine (CYMBALTA) 60 MG extended release capsule Take 60 mg by mouth daily   Yes Historical Provider, MD   glipiZIDE (GLUCOTROL) 5 MG tablet Take 0.5 tablets by mouth daily 8/1/20   Josef Obrien MD   furosemide (LASIX) 40 MG tablet Take 0.5 tablets by mouth daily 12/3/19   Doyne Mcburney, MD   levothyroxine (SYNTHROID) 75 MCG tablet Take 1.5 tablets by mouth Daily 12/4/19   Doyne Mcburney, MD   aspirin 81 MG chewable tablet Take 81 mg by mouth daily    Historical Provider, MD   budesonide-formoterol (SYMBICORT) 160-4.5 MCG/ACT AERO Inhale 2 puffs into the lungs 2 times daily    Historical Provider, MD   gabapentin (NEURONTIN) 300 MG capsule Take 300 mg by mouth 3 times daily. Historical Provider, MD   omeprazole (PRILOSEC) 40 MG delayed release capsule Take 40 mg by mouth daily    Historical Provider, MD   travoprost, benzalkonium, (TRAVATAN) 0.004 % ophthalmic solution Place 1 drop into both eyes daily    Historical Provider, MD   folic acid-pyridoxine-cyanocobalamine (FOLTX) 2.5-25-1 MG TABS tablet Take 1 tablet by mouth daily    Historical Provider, MD        Allergies:     Pcn [penicillins] and Avelox [moxifloxacin]    Social History:     Tobacco:    reports that she has never smoked. She has never used smokeless tobacco.  Alcohol:      reports no history of alcohol use. Drug Use:  reports no history of drug use. Family History:     Family History   Problem Relation Age of Onset    No Known Problems Mother     No Known Problems Father        Review of Systems:     Positive and Negative as described in HPI.     CONSTITUTIONAL:  negative for fevers, chills, sweats, fatigue, weight loss  HEENT:  negative for vision, hearing changes, runny nose, throat pain  RESPIRATORY:  negative for shortness of breath, cough, congestion, wheezing  CARDIOVASCULAR:  negative for chest pain, palpitations  GASTROINTESTINAL:  negative for nausea, vomiting, diarrhea, constipation, change in bowel habits, abdominal pain   GENITOURINARY:  negative for difficulty of urination, burning with urination, frequency   INTEGUMENT:  negative for rash, skin lesions, easy bruising   HEMATOLOGIC/LYMPHATIC:  + chronic RUE swelling/edema   ALLERGIC/IMMUNOLOGIC:  negative for urticaria , itching  ENDOCRINE: negative increase in drinking, increase in urination, hot or cold intolerance  MUSCULOSKELETAL:  negative joint pains, muscle aches, swelling of joints  NEUROLOGICAL:  negative for headaches,+ dizziness, lightheadedness, numbness, pain, tingling extremities  BEHAVIOR/PSYCH:  negative for depression, anxiety    Physical Exam:   BP (!) 142/94   Pulse 53   Temp 97.7 °F (36.5 °C) (Oral)   Resp 13   Ht 5' 7\" (1.702 m)   Wt 208 lb 9.6 oz (94.6 kg)   LMP  (LMP Unknown)   SpO2 91%   BMI 32.67 kg/m²   Temp (24hrs), Av.6 °F (36.4 °C), Min:97.4 °F (36.3 °C), Max:97.7 °F (36.5 °C)    No results for input(s): POCGLU in the last 72 hours.   No intake or output data in the 24 hours ending 21 1004    General Appearance: alert, well appearing, and in no resp distress on nonrebreather  Mental status: oriented to person, place, and time  Head: normocephalic, atraumatic  Eye: no icterus,left eye redness present with swelling, pupils equal and reactive, extraocular eye movements intact, conjunctiva clear  Ear: normal external ear, no discharge, hearing intact  Mouth: mucous membranes moist  Neck: supple, no carotid bruits  Lungs: Right posterior air entry absent, left-sided A/P CTA  Cardiovascular: Bradycardic regular rhythm, no murmur, gallop, rub  Abdomen: Soft, nontender, nondistended, normal bowel sounds, no hepatomegaly or splenomegaly  Neurologic: There are no new focal motor or sensory deficits, normal muscle tone and bulk, no abnormal sensation, normal speech, cranial nerves II through XII grossly intact  Skin: No gross lesions, rashes, bruising or bleeding on exposed skin area  Extremities: peripheral pulses palpable +2, no pedal edema or calf pain with palpation, right upper extremity lymphedema  Psych: normal affect    Investigations:      Laboratory Testing:  Recent Results (from the past 24 hour(s))   EKG 12 Lead    Collection Time: 21  3:08 AM   Result Value Ref Range    Ventricular Rate 61 BPM Atrial Rate 61 BPM    P-R Interval 150 ms    QRS Duration 160 ms    Q-T Interval 474 ms    QTc Calculation (Bazett) 477 ms    P Axis 74 degrees    R Axis 175 degrees    T Axis 10 degrees   Troponin    Collection Time: 02/04/21  3:09 AM   Result Value Ref Range    Troponin, High Sensitivity 41 (H) 0 - 14 ng/L    Troponin T NOT REPORTED <0.03 ng/mL    Troponin Interp NOT REPORTED    Brain Natriuretic Peptide    Collection Time: 02/04/21  3:09 AM   Result Value Ref Range    Pro-BNP 9,504 (H) <300 pg/mL    BNP Interpretation Pro-BNP Reference Range:    BASIC METABOLIC PANEL    Collection Time: 02/04/21  3:09 AM   Result Value Ref Range    Glucose 168 (H) 70 - 99 mg/dL    BUN 33 (H) 8 - 23 mg/dL    CREATININE 1.46 (H) 0.50 - 0.90 mg/dL    Bun/Cre Ratio NOT REPORTED 9 - 20    Calcium 9.6 8.6 - 10.4 mg/dL    Sodium 133 (L) 135 - 144 mmol/L    Potassium 4.9 3.7 - 5.3 mmol/L    Chloride 97 (L) 98 - 107 mmol/L    CO2 27 20 - 31 mmol/L    Anion Gap 9 9 - 17 mmol/L    GFR Non-African American 34 (L) >60 mL/min    GFR  41 (L) >60 mL/min    GFR Comment          GFR Staging NOT REPORTED    CBC WITH AUTO DIFFERENTIAL    Collection Time: 02/04/21  3:09 AM   Result Value Ref Range    WBC 4.7 3.5 - 11.3 k/uL    RBC 4.30 3.95 - 5.11 m/uL    Hemoglobin 12.6 11.9 - 15.1 g/dL    Hematocrit 41.5 36.3 - 47.1 %    MCV 96.5 82.6 - 102.9 fL    MCH 29.3 25.2 - 33.5 pg    MCHC 30.4 28.4 - 34.8 g/dL    RDW 16.2 (H) 11.8 - 14.4 %    Platelets 506 529 - 179 k/uL    MPV 10.8 8.1 - 13.5 fL    NRBC Automated 0.0 0.0 per 100 WBC    Differential Type NOT REPORTED     WBC Morphology NOT REPORTED     RBC Morphology NOT REPORTED     Platelet Estimate NOT REPORTED     Immature Granulocytes 0 0 %    Seg Neutrophils 79 (H) 36 - 66 %    Lymphocytes 11 (L) 24 - 44 %    Monocytes 10 (H) 1 - 7 %    Eosinophils % 0 (L) 1 - 4 %    Basophils 0 0 - 2 %    Absolute Immature Granulocyte 0.00 0.00 - 0.30 k/uL    Segs Absolute 3.71 1.8 - 7.7 k/uL Absolute Lymph # 0.52 (L) 1.0 - 4.8 k/uL    Absolute Mono # 0.47 0.1 - 0.8 k/uL    Absolute Eos # 0.00 0.0 - 0.4 k/uL    Basophils Absolute 0.00 0.0 - 0.2 k/uL    Morphology ANISOCYTOSIS PRESENT    Troponin    Collection Time: 02/04/21  4:04 AM   Result Value Ref Range    Troponin, High Sensitivity 41 (H) 0 - 14 ng/L    Troponin T NOT REPORTED <0.03 ng/mL    Troponin Interp NOT REPORTED    Magnesium    Collection Time: 02/04/21  4:04 AM   Result Value Ref Range    Magnesium 2.4 1.6 - 2.6 mg/dL   Arterial Blood Gas, POC    Collection Time: 02/04/21  9:08 AM   Result Value Ref Range    POC pH 7.391 7.350 - 7.450    POC pCO2 49.8 (H) 35.0 - 48.0 mm Hg    POC PO2 188.7 (H) 83.0 - 108.0 mm Hg    POC HCO3 30.2 (H) 21.0 - 28.0 mmol/L    TCO2 (calc), Art 32 (H) 22.0 - 29.0 mmol/L    Negative Base Excess, Art NOT REPORTED 0.0 - 2.0    Positive Base Excess, Art 4 (H) 0.0 - 3.0    POC O2  (H) 94.0 - 98.0 %    O2 Device/Flow/% NRB     Jett Test POSITIVE     Sample Site Left Radial Artery     Mode NOT REPORTED     FIO2 100.0     Pt Temp NOT REPORTED     POC pH Temp NOT REPORTED     POC pCO2 Temp NOT REPORTED mm Hg    POC pO2 Temp NOT REPORTED mm Hg       Imaging/Diagnostics:  Xr Knee Left (3 Views)    Result Date: 2/4/2021  Mild soft tissue swelling without acute osseous abnormality. Ct Head Wo Contrast    Result Date: 2/4/2021  No acute intracranial abnormality. Ct Facial Bones Wo Contrast    Result Date: 2/4/2021  No acute traumatic injury of the facial bones. Minimal left cheek and left periorbital soft tissue swelling. Ct Cervical Spine Wo Contrast    Result Date: 2/4/2021  No acute abnormality of the cervical spine. Xr Chest Portable    Result Date: 2/4/2021  Cardiomegaly and chronic pulmonary change with small bilateral effusions. Ct Chest Abdomen Pelvis Wo Contrast    Result Date: 2/4/2021  Mild right and trace left pleural effusion. No acute abdominal or pelvic abnormality.  Mild abdominal ascites. Anasarca. Ct Lumbar Spine Trauma Reconstruction    Result Date: 2/4/2021  No acute fracture or malalignment of the lumbar spine. Ct Thoracic Spine Trauma Reconstruction    Result Date: 2/4/2021  No acute fracture or malalignment of the thoracic spine. Bilateral pleural effusions. Assessment :      Hospital Problems           Last Modified POA    * (Principal) Symptomatic bradycardia 2/4/2021 Yes    Hypothyroidism 2/4/2021 Yes    CKD (chronic kidney disease) stage 3, GFR 30-59 ml/min 2/4/2021 Yes    Type 2 diabetes mellitus with kidney complication, without long-term current use of insulin ([facility] 2/4/2021 Yes    Mixed hyperlipidemia 2/4/2021 Yes    Chronic obstructive pulmonary disease ([facility] 2/4/2021 Yes    Falls frequently 2/4/2021 Yes    Fall 2/4/2021 Yes          Plan:     Patient status inpatient in the Med/Surge with tele    Symptomatic bradycardia: cardiology consulted. Medications reviewed. Will call  Pharmacy to verify home dosing. Acute hypoxic resp distress with H/O COPD: currently on nonrebreather with fluctuating O2 sats. CXR this morning, ABG normal, BiPap if warranted. Cont pulse ox, inhalers. Patient wears 2 L low flow nasal cannula at home  Frequent falls: from standing height without LOC: left eye contusion without corneal injury. Start polytrim gtts due to allergy to cipro  CHK stage 3: at baseline CRT between 1.0 - 1.3. Essential hypertension: Continue Lasix, losartan. Kidney function at documented baseline  DMT2: on glipizide at home increased to 5 mg to reflect home medications, metformin has been discontinued. Monitor patient for hyper/hypoglycemic event  Chronic diastoic dysfunction: Lasix recently decreased from 60 MG bid to 80 mg daily by [facility] inpatient stay for acute on chronic diastolic heart failure exacerbation, discharged on 1/24. ECHO reviewed showed ejection fraction 55-60%.   Monitor potassium, continue BB  Pulmonary hypertension: Patient taking Revatio 20 mg every 8 hours- continue   Thyroidism: Check TSH/T4 on admit. Continue levothyroxine 112 mcg, adjust if warranted  Mixed hyperlipidemia: Continue statin  GI/DVT prophylaxis: Prilosec, heparin  Previous right mastectomy: ~ 20 years previous, no lab draws blood pressure on right upper extremity. Lymphedema present  PT, OT    Consultations:   IP CONSULT TO TRAUMA SURGERY  IP CONSULT TO HOSPITALIST  IP CONSULT TO CARDIOLOGY  IP CONSULT TO SOCIAL WORK  IP CONSULT TO CARDIOLOGY    Patient is admitted as inpatient status because of co-morbidities listed above, severity of signs and symptoms as outlined, requirement for current medical therapies and most importantly because of direct risk to patient if care not provided in a hospital setting. Expected length of stay > 48 hours.     DOM Calixto NP  2/4/2021  10:04 AM    Copy sent to Dr. Pebbles Moses MD

## 2021-02-04 NOTE — ED NOTES
[facility]  02/04/21 2485

## 2021-02-05 VITALS
HEIGHT: 67 IN | DIASTOLIC BLOOD PRESSURE: 78 MMHG | BODY MASS INDEX: 32.58 KG/M2 | SYSTOLIC BLOOD PRESSURE: 130 MMHG | OXYGEN SATURATION: 95 % | TEMPERATURE: 97.7 F | WEIGHT: 207.6 LBS | HEART RATE: 64 BPM | RESPIRATION RATE: 15 BRPM

## 2021-02-05 LAB
ANION GAP SERPL CALCULATED.3IONS-SCNC: 10 MMOL/L (ref 9–17)
BUN BLDV-MCNC: 34 MG/DL (ref 8–23)
BUN/CREAT BLD: ABNORMAL (ref 9–20)
CALCIUM SERPL-MCNC: 9.1 MG/DL (ref 8.6–10.4)
CHLORIDE BLD-SCNC: 101 MMOL/L (ref 98–107)
CO2: 25 MMOL/L (ref 20–31)
CREAT SERPL-MCNC: 1.38 MG/DL (ref 0.5–0.9)
EKG ATRIAL RATE: 66 BPM
EKG P AXIS: 73 DEGREES
EKG P-R INTERVAL: 134 MS
EKG Q-T INTERVAL: 456 MS
EKG QRS DURATION: 158 MS
EKG QTC CALCULATION (BAZETT): 478 MS
EKG R AXIS: 135 DEGREES
EKG T AXIS: 13 DEGREES
EKG VENTRICULAR RATE: 66 BPM
GFR AFRICAN AMERICAN: 43 ML/MIN
GFR NON-AFRICAN AMERICAN: 36 ML/MIN
GFR SERPL CREATININE-BSD FRML MDRD: ABNORMAL ML/MIN/{1.73_M2}
GFR SERPL CREATININE-BSD FRML MDRD: ABNORMAL ML/MIN/{1.73_M2}
GLUCOSE BLD-MCNC: 125 MG/DL (ref 65–105)
GLUCOSE BLD-MCNC: 137 MG/DL (ref 65–105)
GLUCOSE BLD-MCNC: 147 MG/DL (ref 70–99)
HCT VFR BLD CALC: 41.5 % (ref 36.3–47.1)
HEMOGLOBIN: 12.9 G/DL (ref 11.9–15.1)
MAGNESIUM: 2.2 MG/DL (ref 1.6–2.6)
MCH RBC QN AUTO: 30.4 PG (ref 25.2–33.5)
MCHC RBC AUTO-ENTMCNC: 31.1 G/DL (ref 28.4–34.8)
MCV RBC AUTO: 97.9 FL (ref 82.6–102.9)
NRBC AUTOMATED: 0 PER 100 WBC
PDW BLD-RTO: 16.6 % (ref 11.8–14.4)
PLATELET # BLD: 215 K/UL (ref 138–453)
PMV BLD AUTO: 11 FL (ref 8.1–13.5)
POTASSIUM SERPL-SCNC: 4 MMOL/L (ref 3.7–5.3)
RBC # BLD: 4.24 M/UL (ref 3.95–5.11)
SODIUM BLD-SCNC: 136 MMOL/L (ref 135–144)
WBC # BLD: 4.2 K/UL (ref 3.5–11.3)

## 2021-02-05 PROCEDURE — 93225 XTRNL ECG REC<48 HRS REC: CPT

## 2021-02-05 PROCEDURE — 99232 SBSQ HOSP IP/OBS MODERATE 35: CPT | Performed by: INTERNAL MEDICINE

## 2021-02-05 PROCEDURE — 93010 ELECTROCARDIOGRAM REPORT: CPT | Performed by: INTERNAL MEDICINE

## 2021-02-05 PROCEDURE — 6370000000 HC RX 637 (ALT 250 FOR IP): Performed by: NURSE PRACTITIONER

## 2021-02-05 PROCEDURE — 82947 ASSAY GLUCOSE BLOOD QUANT: CPT

## 2021-02-05 PROCEDURE — 80048 BASIC METABOLIC PNL TOTAL CA: CPT

## 2021-02-05 PROCEDURE — 6360000002 HC RX W HCPCS: Performed by: NURSE PRACTITIONER

## 2021-02-05 PROCEDURE — 2580000003 HC RX 258: Performed by: NURSE PRACTITIONER

## 2021-02-05 PROCEDURE — 96372 THER/PROPH/DIAG INJ SC/IM: CPT

## 2021-02-05 PROCEDURE — G0378 HOSPITAL OBSERVATION PER HR: HCPCS

## 2021-02-05 PROCEDURE — 36415 COLL VENOUS BLD VENIPUNCTURE: CPT

## 2021-02-05 PROCEDURE — 97116 GAIT TRAINING THERAPY: CPT

## 2021-02-05 PROCEDURE — 83735 ASSAY OF MAGNESIUM: CPT

## 2021-02-05 PROCEDURE — 97110 THERAPEUTIC EXERCISES: CPT

## 2021-02-05 PROCEDURE — 93005 ELECTROCARDIOGRAM TRACING: CPT | Performed by: NURSE PRACTITIONER

## 2021-02-05 PROCEDURE — 93226 XTRNL ECG REC<48 HR SCAN A/R: CPT

## 2021-02-05 PROCEDURE — 85027 COMPLETE CBC AUTOMATED: CPT

## 2021-02-05 RX ORDER — FUROSEMIDE 40 MG/1
40 TABLET ORAL DAILY
Qty: 60 TABLET | Refills: 3 | Status: ON HOLD | OUTPATIENT
Start: 2021-02-05 | End: 2021-02-16 | Stop reason: HOSPADM

## 2021-02-05 RX ORDER — PROPARACAINE HYDROCHLORIDE 5 MG/ML
1 SOLUTION/ DROPS OPHTHALMIC ONCE
Qty: 1 BOTTLE | Refills: 0 | Status: SHIPPED | OUTPATIENT
Start: 2021-02-05 | End: 2021-02-05 | Stop reason: HOSPADM

## 2021-02-05 RX ORDER — SILDENAFIL CITRATE 20 MG/1
20 TABLET ORAL 3 TIMES DAILY
Qty: 30 TABLET | Refills: 3 | Status: ON HOLD | OUTPATIENT
Start: 2021-02-05 | End: 2021-02-16 | Stop reason: SDUPTHER

## 2021-02-05 RX ORDER — POLYMYXIN B SULFATE AND TRIMETHOPRIM 1; 10000 MG/ML; [USP'U]/ML
1 SOLUTION OPHTHALMIC EVERY 6 HOURS
Qty: 1 BOTTLE | Refills: 0 | Status: ON HOLD | OUTPATIENT
Start: 2021-02-05 | End: 2021-02-16 | Stop reason: HOSPADM

## 2021-02-05 RX ADMIN — HEPARIN SODIUM 5000 UNITS: 5000 INJECTION INTRAVENOUS; SUBCUTANEOUS at 06:05

## 2021-02-05 RX ADMIN — GABAPENTIN 300 MG: 300 CAPSULE ORAL at 14:44

## 2021-02-05 RX ADMIN — FUROSEMIDE 40 MG: 40 TABLET ORAL at 08:18

## 2021-02-05 RX ADMIN — POLYMYXIN B SULFATE, TRIMETHOPRIM SULFATE 1 DROP: 10000; 1 SOLUTION/ DROPS OPHTHALMIC at 00:21

## 2021-02-05 RX ADMIN — OMEPRAZOLE 40 MG: 20 CAPSULE, DELAYED RELEASE ORAL at 08:18

## 2021-02-05 RX ADMIN — SODIUM CHLORIDE, PRESERVATIVE FREE 10 ML: 5 INJECTION INTRAVENOUS at 08:17

## 2021-02-05 RX ADMIN — LEVOTHYROXINE SODIUM 112.5 MCG: 75 TABLET ORAL at 06:04

## 2021-02-05 RX ADMIN — POLYMYXIN B SULFATE, TRIMETHOPRIM SULFATE 1 DROP: 10000; 1 SOLUTION/ DROPS OPHTHALMIC at 06:05

## 2021-02-05 RX ADMIN — GABAPENTIN 300 MG: 300 CAPSULE ORAL at 08:18

## 2021-02-05 RX ADMIN — ASPIRIN 81 MG: 81 TABLET, CHEWABLE ORAL at 08:18

## 2021-02-05 RX ADMIN — SILDENAFIL 20 MG: 20 TABLET ORAL at 14:44

## 2021-02-05 RX ADMIN — ACETAMINOPHEN 650 MG: 325 TABLET ORAL at 00:29

## 2021-02-05 RX ADMIN — SILDENAFIL 20 MG: 20 TABLET ORAL at 08:18

## 2021-02-05 RX ADMIN — GLIPIZIDE 2.5 MG: 5 TABLET ORAL at 08:17

## 2021-02-05 RX ADMIN — POLYMYXIN B SULFATE, TRIMETHOPRIM SULFATE 1 DROP: 10000; 1 SOLUTION/ DROPS OPHTHALMIC at 12:35

## 2021-02-05 ASSESSMENT — PAIN SCALES - GENERAL: PAINLEVEL_OUTOF10: 0

## 2021-02-05 NOTE — PROGRESS NOTES
[facility]  Office: [address], DO, Dawna Lesches, DO, Jodie Loco, DO, Tray Gardner Hernandez, DO, Tara Bond MD, Garima Raya MD, Rufina Lora MD, Hayden Guevara MD, Ferdinand Canseco MD, Mihaela Richard MD, Bhupinder Husain MD, Ashlyn Mohan MD, Jamarcus Frye MD, Wedny Chávez DO, Ulices Barriga MD, Jerri Quinteros MD, Shaniqua Foley DO, Ray Clancy MD,  Rainer Reno DO, Lito Jensen MD, Isabel Carrillo MD, Lucian Floyd, [facility], [facility], [facility], Radha Davis, CNP, Jayde Jean, CNS, Mita Parker, CNP, Reynaldo Ribeiro, CNP, Jaylin Sosa, CNP, Nelly Low, CNP, Gem Chatman, CNP, Stone Shukla PA-C, Gabriel Romero, [facility], Michael Palacios, CNP, Jose Manuel Anguiano, CNP, Niki Lopez, CNP, Duyen Duff, CNP, Noreen Gastelum, [address] [place] [address]    Progress Note    2/5/2021    9:40 AM    Name:   Yari Kamara  MRN:     [id number]     [place]:      [de-identified]   Room:   2022/2022-01   Day:  1  Admit Date:  2/4/2021  2:25 AM    PCP:   Rosa Maria Ronquillo MD  Code Status:  Full Code    Subjective:     C/C:   Chief Complaint   Patient presents with   Theo Elizabeth     Interval History Status: improved. Patient is resting company denies any complaints of chest pain, shortness of breath, nausea or vomiting. Brief History: This is a 59-year-old female who presents to [place] [address] for a fall at home where she struck the left side of her head. She apparently tripped over her oxygen tubing and fell striking her left face. She denies any loss of consciousness or other complaints. She was eval by trauma service and was admitted for evaluation. She had transient bradycardia in the emergency room and remained in slow sinus rhythm throughout her hospital stay.     Review of Systems:     Constitutional:  negative for chills, fevers, sweats  Respiratory:  negative for cough, dyspnea on exertion, shortness of breath, wheezing  Cardiovascular:  negative for chest pain, chest pressure/discomfort, lower extremity edema, palpitations  Gastrointestinal:  negative for abdominal pain, constipation, diarrhea, nausea, vomiting  Neurological:  negative for dizziness, headache    Medications: Allergies: Allergies   Allergen Reactions    Pcn [Penicillins]     Avelox [Moxifloxacin] Rash       Current Meds:   Scheduled Meds:    fluorescein  1 strip Left Eye Once    proparacaine  1 drop Left Eye Once    aspirin  81 mg Oral Daily    budesonide-formoterol  2 puff Inhalation BID    gabapentin  300 mg Oral TID    glipiZIDE  2.5 mg Oral Daily    levothyroxine  112.5 mcg Oral Daily    omeprazole  40 mg Oral Daily    latanoprost  1 drop Both Eyes Nightly    sodium chloride flush  10 mL Intravenous 2 times per day    trimethoprim-polymyxin b  1 drop Left Eye 4 times per day    furosemide  40 mg Oral Daily    heparin (porcine)  5,000 Units Subcutaneous 3 times per day    sildenafil  20 mg Oral TID     Continuous Infusions:    dextrose       PRN Meds: sodium chloride flush, potassium chloride **OR** potassium alternative oral replacement **OR** potassium chloride, magnesium sulfate, nicotine, promethazine **OR** ondansetron, polyethylene glycol, acetaminophen **OR** acetaminophen, glucose, dextrose, glucagon (rDNA), dextrose    Data:     Past Medical History:   has a past medical history of Arthritis, Breast cancer ([facility] CAD (coronary artery disease), CHF (congestive heart failure) ([facility] CKD (chronic kidney disease) stage 3, GFR 30-59 ml/min, COPD (chronic obstructive pulmonary disease) ([facility] Gastroesophageal reflux disease, Hyperlipidemia, Hypertension, Recurrent major depression in remission ([facility] Thyroid disease, and Type 2 diabetes mellitus with kidney complication, without long-term current use of insulin [facility] Social History:   reports that she has never smoked.  She has never used smokeless tobacco. She reports that she does not drink alcohol or use drugs. Family History:   Family History   Problem Relation Age of Onset    No Known Problems Mother     No Known Problems Father        Vitals:  /78   Pulse 64   Temp 97.7 °F (36.5 °C) (Oral)   Resp 15   Ht 5' 7\" (1.702 m)   Wt 207 lb 9.6 oz (94.2 kg)   LMP  (LMP Unknown)   SpO2 95%   BMI 32.51 kg/m²   Temp (24hrs), Av.9 °F (36.6 °C), Min:97.3 °F (36.3 °C), Max:98.4 °F (36.9 °C)    Recent Labs     21  0639   POCGLU 125*       I/O (24Hr):     Intake/Output Summary (Last 24 hours) at 2021 0940  Last data filed at 2021 2118  Gross per 24 hour   Intake 100 ml   Output --   Net 100 ml       Labs:  Hematology:  Recent Labs     21  0309 21  0857   WBC 4.7 4.2   RBC 4.30 4.24   HGB 12.6 12.9   HCT 41.5 41.5   MCV 96.5 97.9   MCH 29.3 30.4   MCHC 30.4 31.1   RDW 16.2* 16.6*    215   MPV 10.8 11.0     Chemistry:  Recent Labs     21  0309 21  0404 21  0818 21  1106 21  0857   *  --   --   --  136   K 4.9  --   --   --  4.0   CL 97*  --   --   --  101   CO2 27  --   --   --  25   GLUCOSE 168*  --   --   --  147*   BUN 33*  --   --   --  34*   CREATININE 1.46*  --   --   --  1.38*   MG  --  2.4  --   --  2.2   ANIONGAP 9  --   --   --  10   LABGLOM 34*  --   --   --  36*   GFRAA 41*  --   --   --  43*   CALCIUM 9.6  --   --   --  9.1   PROBNP 9,504*  --  9,340*  --   --    TROPHS 41* 41* 35* 36*  --      Recent Labs     21  0309 21  0818 21  0639   LABA1C 7.3*  --   --    TSH  --  9.17*  --    POCGLU  --   --  125*     ABG:  Lab Results   Component Value Date    POCPH 7.391 2021    POCPCO2 49.8 2021    POCPO2 188.7 2021    POCHCO3 30.2 2021    NBEA NOT REPORTED 2021    PBEA 4 2021    VQE0YZS 32 2021    ZNCJ2RFB 100 2021    FIO2 100.0 2021     No results found for: SPECIAL  No results found for: CULTURE    Radiology:  Xr Knee Left (3 Views)    Result Date: 2/4/2021  Mild soft tissue swelling without acute osseous abnormality. Ct Head Wo Contrast    Result Date: 2/4/2021  No acute intracranial abnormality. Ct Facial Bones Wo Contrast    Result Date: 2/4/2021  No acute traumatic injury of the facial bones. Minimal left cheek and left periorbital soft tissue swelling. Ct Cervical Spine Wo Contrast    Result Date: 2/4/2021  No acute abnormality of the cervical spine. Xr Chest Portable    Result Date: 2/4/2021  Moderate cardiomegaly. Trace bilateral pleural effusions with associated basilar airspace disease. Xr Chest Portable    Result Date: 2/4/2021  Cardiomegaly and chronic pulmonary change with small bilateral effusions. Ct Chest Abdomen Pelvis Wo Contrast    Result Date: 2/4/2021  Mild right and trace left pleural effusion. No acute abdominal or pelvic abnormality. Mild abdominal ascites. Anasarca. Ct Lumbar Spine Trauma Reconstruction    Result Date: 2/4/2021  No acute fracture or malalignment of the lumbar spine. Ct Thoracic Spine Trauma Reconstruction    Result Date: 2/4/2021  No acute fracture or malalignment of the thoracic spine. Bilateral pleural effusions.        Physical Examination:        General appearance:  alert, cooperative and no distress  Mental Status:  oriented to person, place and time and normal affect  Lungs:  clear to auscultation bilaterally, normal effort  Heart:  regular rate and rhythm, no murmur  Abdomen:  soft, nontender, nondistended, normal bowel sounds, no masses, hepatomegaly, splenomegaly  Extremities:  no edema, redness, tenderness in the calves  Skin:  no gross lesions, rashes, induration    Assessment:        Hospital Problems           Last Modified POA    * (Principal) Symptomatic bradycardia 2/4/2021 Yes    Hypothyroidism 2/4/2021 Yes    CKD (chronic kidney disease) stage 3, GFR 30-59 ml/min 2/4/2021 Yes    Type 2 diabetes mellitus with kidney complication, without long-term current use of insulin ([address] 2/4/2021 Yes    Mixed hyperlipidemia 2/4/2021 Yes    Pulmonary hypertension ([address] 2/4/2021 Yes    COPD without exacerbation ([address] 2/4/2021 Yes    Falls frequently 2/4/2021 Yes    Fall 2/4/2021 Yes    Subconjunctival hemorrhage of left eye 2/4/2021 Yes          Plan:        Cardiology evaluation  Continue present medications  GI DVT prophylaxis  Oxygen as ordered  Aerosol protocol  Discharge home outpatient Holter monitor    Niall Syed DO  2/5/2021  9:40 AM

## 2021-02-05 NOTE — PROGRESS NOTES
Resp 15   Ht 5' 7\" (1.702 m)   Wt 207 lb 9.6 oz (94.2 kg)   LMP  (LMP Unknown)   SpO2 95%   BMI 32.51 kg/m²   General appearance: alert and cooperative with exam  HEENT: Head: Normocephalic, no lesions, without obvious abnormality. Neck:no JVD, trachea midline, no adenopathy  Lungs: Clear to auscultation  Heart: Regular rate and rhythm, s1/s2 auscultated, no murmurs  Abdomen: soft, non-tender, bowel sounds active  Extremities: trace edema  Neurologic: not done        Assessment / Acute Cardiac Problems:   1. Sinus bradycardia  2. CHFpEF (EF 64-86%, diastolic dysfunction)  3. HTN  4. CKD  5. Hypothyroidism  6. DM2       Patient Active Problem List:     Hypothyroidism     Right-sided heart failure ([address]     Pulmonary hypertension due to COPD [facility])     Essential hypertension     Autonomic neuropathy     CKD (chronic kidney disease) stage 3, GFR 30-59 ml/min     Type 2 diabetes mellitus with kidney complication, without long-term current use of insulin (HCC)     CRIS (acute kidney injury) ([address]     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 angina pectoris     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 (HCC)     Bradycardia     Mixed hyperlipidemia     Laryngopharyngeal reflux     Peripheral venous insufficiency     Pulmonary hypertension (HCC)     Recurrent major depression in remission ([address]     Vocal cord palsy     Chronic renal insufficiency, stage III (moderate)     COPD without exacerbation (HCC)     Chronic diastolic heart failure ([address]     Fall at home, initial encounter     Hyperglycemia     Falls frequently     Symptomatic bradycardia     Fall     Subconjunctival hemorrhage of left eye      Plan of Treatment:   1. Sinus bradycardia. No herve events overnight. SR on tele. HR 72 this am.   Recommend holter monitor on discharge. 2. Hypertension stable. BB held d/t bradycardia. 3. Cardiology will sign off.   Patient reports she follow with Dr Nita Kelley at [facility]. Will need f/u as out patient.      Electronically signed by DOM Castillo NP on 2/5/2021 at 10:14 AM  [address]  [phone]

## 2021-02-05 NOTE — PROGRESS NOTES
Physical Therapy  Facility/Department: [facility] CAR 2  Daily Treatment Note  NAME: Supriya Mccann  : 1929  MRN: [id number]    Date of Service: 2021    Discharge Recommendations:    Further therapy recommended at discharge.     PT Equipment Recommendations  Other: CTA, possibly RW    Assessment   Body structures, Functions, Activity limitations: Decreased functional mobility ; Decreased strength;Decreased endurance;Decreased balance  Assessment: Pt able to amb 150ft RW CGA, 4L of O2. Pt limitied by fatigue and weakness. Pt would benefit from continued acute PT to address deficits. Prognosis: Good  Decision Making: Medium Complexity  PT Education: Plan of Care;PT Role;General Safety, sitting exercises  REQUIRES PT FOLLOW UP: Yes  Activity Tolerance  Activity Tolerance: Patient Tolerated treatment well;Patient limited by fatigue;Patient limited by endurance   Patient Diagnosis(es): The primary encounter diagnosis was Fall, initial encounter. Diagnoses of Nonsustained ventricular tachycardia (HCC), Congestive heart failure, unspecified HF chronicity, unspecified heart failure type ([address] Bradycardia, Subconjunctival hemorrhage of left eye, Swelling of right upper extremity, and Multiple premature ventricular complexes were also pertinent to this visit. has a past medical history of Arthritis, Breast cancer ([address] CAD (coronary artery disease), CHF (congestive heart failure) ([address] CKD (chronic kidney disease) stage 3, GFR 30-59 ml/min, COPD (chronic obstructive pulmonary disease) ([address] Gastroesophageal reflux disease, Hyperlipidemia, Hypertension, Recurrent major depression in remission ([address] Thyroid disease, and Type 2 diabetes mellitus with kidney complication, without long-term current use of insulin ([address] has a past surgical history that includes Thyroid surgery; Mastectomy; Hysterectomy;  Cholecystectomy; Colonoscopy; knee surgery; and Cataract removal (Right). Restrictions  Restrictions/Precautions  Restrictions/Precautions: General Precautions, Fall Risk  Required Braces or Orthoses?: No  Position Activity Restriction  Other position/activity restrictions: up with A  Subjective    Pt and RN agreeable to PT. Pt in chair upon arriavl. Pt reports 5/10 chest pain. Orientation   WFL  Objective   Transfers  Sit to Stand: Minimal Assistance  Stand to sit: Contact guard assistance  Ambulation  Ambulation?: Yes  Ambulation 1  Surface: level tile  Device: Rolling Walker  Assistance: Contact guard assistance  Quality of Gait: slowed, reciprocal, no LOB, no buckling  Distance: 150'  Comments: Pt amb limited by fatigue and weakness. Stairs/Curb  Stairs?: No  Balance  Posture: Fair  Sitting - Static: Good  Sitting - Dynamic: Good  Standing - Static: fair;+  Standing - Dynamic: Fair;  Comments: RW used while assessing standing balance  Seated LE exercise program: Long Arc Quads, hip abduction/adduction, heel/toe raises, and marches.  Reps: 10    Goals  Short term goals  Time Frame for Short term goals: 14 visits  Short term goal 1: Pt will be Herson bed mobility  Short term goal 2: Pt will be Herson transfers  Short term goal 3: Pt will be Herson amb 125' RW or least restrictive AD  Short term goal 4: Pt will navigate 4 steps Herson    Plan    Plan  Times per week: 5-6x/wk  Current Treatment Recommendations: Strengthening, Balance Training, Functional Mobility Training, Transfer Training, Gait Training, Endurance Training, Home Exercise Program, Safety Education & Training, Patient/Caregiver Education & Training, Equipment Evaluation, Education, & procurement, Stair training  Safety Devices  Type of devices: Call light within reach, Nurse notified, Gait belt, Patient at risk for falls, All fall risk precautions in place, Left in chair  Restraints  Initially in place: No     Therapy Time   Individual Concurrent Group Co-treatment   Time In  2:00         Time Out  2:30 Minutes  [facility] SPTA  Treatment performed by Student PTA under the supervision of co-signing PTA who agrees with all treatment and documentation.    Jesenia Jara PTA

## 2021-02-05 NOTE — CARE COORDINATION
Transitional planning-talked with patient about home care. Wants to talk with her daughter first.    Found out patient has [facility] Called Leida and told her patient was being discharged.  They will collect needed information from [facility]

## 2021-02-05 NOTE — DISCHARGE SUMMARY
[facility]  Office: [address], DO, Greta El Dorado, DO, Ema Sav, DO, Nathen gN, DO, Abdulkadir Johnson MD, Birgit Yeboah MD, Dorota Gonzalez MD, Mj Loza MD, Kesha Bangura MD, Shanti Nolen MD, Bonnie Powers MD, Phu Dobbs MD, Jamarcus Tucker MD, Moose Valenzuela, DO, Sol Colón MD, Cindy Irene MD, Gilson Balbuena, DO, Michele Weber MD,  Josy Jerez, DO, Robbie Lazcano MD, Darryle Lew, MD, Phyllis Abbott, [facility], [facility] Vitaliy, [facility], Venessa Lehman, CNP, Neno Caban, CNS, Mae Melchor, CNP, Demond Hughes, CNP, Moisés Alexandre, CNP, Terri Hanks, CNP, Lakisha Turcios, CNP, Marco Stallings PA-C, Amira Jason, [facility], Alan Ramos, CNP, Yaritza Sims, CNP, Shai Cordoba, CNP, Brandyn Dean, [facility], Dianne Oconnell, [address] [place] [address]    Discharge Summary     Patient ID: Ren Almanza  :  1929   MRN: [id number]     ACCOUNT:  [de-identified]   Patient's PCP: Nan Dale MD  Admit Date: 2021   Discharge Date: 2021     Length of Stay: 1  Code Status:  Full Code  Admitting Physician: Kareem Talbert DO  Discharge Physician: Kareem Talbert DO     Active Discharge Diagnoses:     Hospital Problem Lists:  Principal Problem:    Symptomatic bradycardia  Active Problems:    Hypothyroidism    CKD (chronic kidney disease) stage 3, GFR 30-59 ml/min    Type 2 diabetes mellitus with kidney complication, without long-term current use of insulin (HCC)    Mixed hyperlipidemia    Pulmonary hypertension ([facility]    COPD without exacerbation ([facility]    Falls frequently    Fall    Subconjunctival hemorrhage of left eye  Resolved Problems:    * No resolved hospital problems.  *      Admission Condition:  fair     Discharged Condition: stable    Hospital Stay:     Hospital Course:  Ren Almanza is a 80 y.o. female who was admitted for the management of   Symptomatic bradycardia , presented to ER with Fall    This is a 80-year-old female who presents to [place] [address] after a fall at home where she tripped over her oxygen tubing. She struck the left side of her face and had transient epistaxis and was brought to the emergency room by family. Imaging did not show evidence of fractures and she was evaluated by the trauma service to the fall. She is admitted for further investigation of her bradycardia and she recovered to a slow sinus rhythm. She remained asymptomatic without lightheadedness or dizziness and was cleared by cardiology for discharge on the date of the fifth. They recommended outpatient Holter monitor which we placed prior to departure today. Significant therapeutic interventions: As above    Significant Diagnostic Studies:   Labs / Micro:  CBC:   Lab Results   Component Value Date    WBC 4.2 02/05/2021    RBC 4.24 02/05/2021    HGB 12.9 02/05/2021    HCT 41.5 02/05/2021    MCV 97.9 02/05/2021    MCH 30.4 02/05/2021    MCHC 31.1 02/05/2021    RDW 16.6 02/05/2021     02/05/2021     BMP:    Lab Results   Component Value Date    GLUCOSE 147 02/05/2021     02/05/2021    K 4.0 02/05/2021     02/05/2021    CO2 25 02/05/2021    ANIONGAP 10 02/05/2021    BUN 34 02/05/2021    CREATININE 1.38 02/05/2021    BUNCRER NOT REPORTED 02/05/2021    CALCIUM 9.1 02/05/2021    LABGLOM 36 02/05/2021    GFRAA 43 02/05/2021    GFR      02/05/2021    GFR NOT REPORTED 02/05/2021        Radiology:  Xr Knee Left (3 Views)    Result Date: 2/4/2021  Mild soft tissue swelling without acute osseous abnormality. Ct Head Wo Contrast    Result Date: 2/4/2021  No acute intracranial abnormality. Ct Facial Bones Wo Contrast    Result Date: 2/4/2021  No acute traumatic injury of the facial bones. Minimal left cheek and left periorbital soft tissue swelling. Ct Cervical Spine Wo Contrast    Result Date: 2/4/2021  No acute abnormality of the cervical spine.      Xr Chest Portable    Result Date: 2/4/2021  Moderate cardiomegaly. Trace bilateral pleural effusions with associated basilar airspace disease. Xr Chest Portable    Result Date: 2/4/2021  Cardiomegaly and chronic pulmonary change with small bilateral effusions. Ct Chest Abdomen Pelvis Wo Contrast    Result Date: 2/4/2021  Mild right and trace left pleural effusion. No acute abdominal or pelvic abnormality. Mild abdominal ascites. Anasarca. Ct Lumbar Spine Trauma Reconstruction    Result Date: 2/4/2021  No acute fracture or malalignment of the lumbar spine. Ct Thoracic Spine Trauma Reconstruction    Result Date: 2/4/2021  No acute fracture or malalignment of the thoracic spine. Bilateral pleural effusions. Consultations:    Consults:     Final Specialist Recommendations/Findings:   IP CONSULT TO TRAUMA SURGERY  IP CONSULT TO HOSPITALIST  IP CONSULT TO CARDIOLOGY  IP CONSULT TO SOCIAL WORK  IP CONSULT TO CARDIOLOGY      The patient was seen and examined on day of discharge and this discharge summary is in conjunction with any daily progress note from day of discharge. Discharge plan:     Disposition: Home    Physician Follow Up:    Kaur Aldridge MD  . [address] [address]  [phone]    Call in 1 week  f/u [facility]       Requiring Further Evaluation/Follow Up POST HOSPITALIZATION/Incidental Findings: Holter monitor    Diet: cardiac diet    Activity: As tolerated    Instructions to Patient: Take medication as prescribed    Discharge Medications:      Medication List      START taking these medications    fluorescein 1 MG ophthalmic strip  Place 1 strip into the left eye once for 1 dose     proparacaine 0.5 % ophthalmic solution  Commonly known as: ALCAINE  Place 1 drop into the left eye once for 1 dose     sildenafil 20 MG tablet  Commonly known as: REVATIO  Take 1 tablet by mouth 3 times daily     trimethoprim-polymyxin b 64324-9.1 UNIT/ML-% ophthalmic solution  Commonly known as: POLYTRIM  Place 1 drop into the left eye every 6 hours for 10 days        CHANGE how you take these medications    furosemide 40 MG tablet  Commonly known as: LASIX  Take 1 tablet by mouth daily  What changed: how much to take        CONTINUE taking these medications    aspirin 81 MG chewable tablet     folic acid-pyridoxine-cyanocobalamine 2.5-25-1 MG Tabs tablet  Commonly known as: FOLTX     gabapentin 300 MG capsule  Commonly known as: NEURONTIN     glipiZIDE 5 MG tablet  Commonly known as: Glucotrol  Take 0.5 tablets by mouth daily     levothyroxine 75 MCG tablet  Commonly known as: SYNTHROID  Take 1.5 tablets by mouth Daily     losartan 25 MG tablet  Commonly known as: COZAAR  Take 1 tablet by mouth daily     metFORMIN 500 MG tablet  Commonly known as: GLUCOPHAGE  Take 1 tablet by mouth 2 times daily (with meals)     PriLOSEC 40 MG delayed release capsule  Generic drug: omeprazole     Symbicort 160-4.5 MCG/ACT Aero  Generic drug: budesonide-formoterol     travoprost (benzalkonium) 0.004 % ophthalmic solution  Commonly known as: TRAVATAN        STOP taking these medications    DULoxetine 60 MG extended release capsule  Commonly known as: CYMBALTA           Where to Get Your Medications      These medications were sent to [facility] [address] [facility], [address], [address] [address]    Phone: [phone]   fluorescein 1 MG ophthalmic strip  furosemide 40 MG tablet  proparacaine 0.5 % ophthalmic solution  sildenafil 20 MG tablet  trimethoprim-polymyxin b 15822-3.1 UNIT/ML-% ophthalmic solution         No discharge procedures on file. Time Spent on discharge is  29 minutes in patient examination, evaluation, counseling as well as medication reconciliation, prescriptions for required medications, discharge plan and follow up.     Electronically signed by   Li Fulton DO  2/5/2021  2:14 PM      Thank you Dr. Herb Rivera MD for the opportunity to be involved in this patient's care.

## 2021-02-05 NOTE — FLOWSHEET NOTE
Holter Monitor was applied as ordered. Monitor is to be worn  for 48 hrs. Written and verbal instructions were given. [address].

## 2021-02-05 NOTE — DISCHARGE INSTR - COC
Continuity of Care Form    Patient Name: Venkata Villarreal   :  1929  MRN:  [id number]    Admit date:  2021  Discharge date:  21    Code Status Order: Full Code   Advance Directives:   Advance Care Flowsheet Documentation       Date/Time Healthcare Directive Type of Healthcare Directive Copy in [address] Agent's Name Healthcare Agent's Phone Number    21 [phone]  Yes, patient has an advance directive for healthcare treatment  Durable power of  for health care patients states Oralee Vipule her Daughter is her POA for Healthcare  No, copy requested from family  Healthcare power of   Hever Estrada  see [facility] emergency contact            Admitting Physician:  Sandra Hurst DO  PCP: Laurel Durham MD    Discharging Nurse: Phuong Unit/Room#:   Discharging Unit Phone Number: [phone]    Emergency Contact:   Extended Emergency Contact Information  Primary Emergency Contact: [address] Phone: [phone]  Mobile Phone: [phone]  Relation: Child  Secondary Emergency Contact: Elia Going, [address] Phone: [phone]  Mobile Phone: [phone]  Relation: Child    Past Surgical History:  Past Surgical History:   Procedure Laterality Date    CATARACT REMOVAL Right     CHOLECYSTECTOMY      COLONOSCOPY      HYSTERECTOMY      KNEE SURGERY      MASTECTOMY      THYROID SURGERY         Immunization History: There is no immunization history on file for this patient.     Active Problems:  Patient Active Problem List   Diagnosis Code    Hypothyroidism E03.9    Right-sided heart failure (HCC) I50.810    Pulmonary hypertension due to COPD ([facility] I27.23, J44.9    Essential hypertension I10    Autonomic neuropathy G90.9    CKD (chronic kidney disease) stage 3, GFR 30-59 ml/min N18.30    Type 2 diabetes mellitus with kidney complication, without long-term current use of insulin (HCC) E11.29    CRIS (acute data filed at 2/4/2021 2118  Gross per 24 hour   Intake 100 ml   Output --   Net 100 ml     I/O last 3 completed shifts: In: 100 [P.O.:100]  Out: -     Safety Concerns:     History of Falls (last 30 days) and At Risk for Falls    Impairments/Disabilities:      None    Nutrition Therapy:  Current Nutrition Therapy:   - Oral Diet:  Cardiac    Routes of Feeding: Oral  Liquids: No Restrictions  Daily Fluid Restriction: no  Last Modified Barium Swallow with Video (Video Swallowing Test): not done    Treatments at the Time of Hospital Discharge:   Respiratory Treatments: inhalers  Oxygen Therapy:  is on oxygen at 2 L/min per nasal cannula. Ventilator:    - No ventilator support    Rehab Therapies: Physical Therapy and Occupational Therapy  Weight Bearing Status/Restrictions: No weight bearing restirctions  Other Medical Equipment (for information only, NOT a DME order):  walker  Other Treatments: home health aide and skilled nursing    Patient's personal belongings (please select all that are sent with patient):  None    RN SIGNATURE:  Electronically signed by Noah Trammell RN on 2/5/21 at 3:46 PM EST    CASE MANAGEMENT/SOCIAL WORK SECTION    Inpatient Status Date: ***    Readmission Risk Assessment Score:  Readmission Risk              Risk of Unplanned Readmission:        17           Discharging to Facility/ Agency   · Name: [facility]  · Address:  · Phone:  · Fax:    Dialysis Facility (if applicable)   · Name:  · Address:  · Dialysis Schedule:  · Phone:  · Fax:    / signature: Electronically signed by Portia Salvador RN on 2/5/21 at 4:08 PM EST    PHYSICIAN SECTION    Prognosis: Fair    Condition at Discharge: Stable    Rehab Potential (if transferring to Rehab):  Fair    Recommended Labs or Other Treatments After Discharge: ***    Physician Certification: I certify the above information and transfer of Mary Ellen Castañeda  is necessary for the continuing treatment of the diagnosis listed and

## 2021-02-05 NOTE — PLAN OF CARE
Problem: Falls - Risk of:  Goal: Will remain free from falls  Description: Will remain free from falls  Outcome: Ongoing  Goal: Absence of physical injury  Description: Absence of physical injury  Outcome: Ongoing     Problem: Gas Exchange - Impaired:  Goal: Levels of oxygenation will improve  Description: Levels of oxygenation will improve  Outcome: Ongoing     Problem: Pain:  Goal: Pain level will decrease  Description: Pain level will decrease  Outcome: Ongoing  Goal: Control of acute pain  Description: Control of acute pain  Outcome: Ongoing  Goal: Control of chronic pain  Description: Control of chronic pain  Outcome: Ongoing

## 2021-02-05 NOTE — CARE COORDINATION
Discharge [facility] Case Management Department  Written by: Estella Peng RN    Patient Name: Marcus Sanchez  Attending Provider: No att. providers found  Admit Date: 2021  2:25 AM  MRN: [id number]  Account: [de-identified]                     : 1929  Discharge Date: 2021      Disposition: home with [facility] Grazyna FERMIN RN

## 2021-02-06 NOTE — CARE COORDINATION
Entered pt's chart to provide SS# to [address] [facility] at [facility]

## 2021-02-10 ENCOUNTER — APPOINTMENT (OUTPATIENT)
Dept: GENERAL RADIOLOGY | Age: 86
DRG: 291 | End: 2021-02-10
Payer: MEDICARE

## 2021-02-10 ENCOUNTER — HOSPITAL ENCOUNTER (INPATIENT)
Age: 86
LOS: 7 days | Discharge: NURSING, REHAB OR LONG-TERM CARE | DRG: 291 | End: 2021-02-17
Attending: EMERGENCY MEDICINE | Admitting: INTERNAL MEDICINE
Payer: MEDICARE

## 2021-02-10 ENCOUNTER — APPOINTMENT (OUTPATIENT)
Dept: CT IMAGING | Age: 86
DRG: 291 | End: 2021-02-10
Payer: MEDICARE
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
## 2021-02-10 DIAGNOSIS — R00.1 BRADYCARDIA: ICD-10-CM

## 2021-02-10 DIAGNOSIS — I50.23 ACUTE ON CHRONIC SYSTOLIC CONGESTIVE HEART FAILURE (HCC): ICD-10-CM

## 2021-02-10 PROBLEM — I50.9 DECOMPENSATED HEART FAILURE (HCC): Status: ACTIVE | Noted: 2021-02-10

## 2021-02-10 LAB
ABSOLUTE EOS #: 0 K/UL (ref 0–0.4)
ABSOLUTE IMMATURE GRANULOCYTE: 0 K/UL (ref 0–0.3)
ABSOLUTE LYMPH #: 0.43 K/UL (ref 1–4.8)
ABSOLUTE MONO #: 0.43 K/UL (ref 0.1–0.8)
ALBUMIN SERPL-MCNC: 3.6 G/DL (ref 3.5–5.2)
ALBUMIN/GLOBULIN RATIO: 1.1 (ref 1–2.5)
ALP BLD-CCNC: 311 U/L (ref 35–104)
ALT SERPL-CCNC: 28 U/L (ref 5–33)
ANION GAP SERPL CALCULATED.3IONS-SCNC: 13 MMOL/L (ref 9–17)
AST SERPL-CCNC: 41 U/L
BASOPHILS # BLD: 1 % (ref 0–2)
BASOPHILS ABSOLUTE: 0.05 K/UL (ref 0–0.2)
BILIRUB SERPL-MCNC: 1.24 MG/DL (ref 0.3–1.2)
BNP INTERPRETATION: ABNORMAL
BUN BLDV-MCNC: 34 MG/DL (ref 8–23)
BUN/CREAT BLD: ABNORMAL (ref 9–20)
CALCIUM SERPL-MCNC: 8.6 MG/DL (ref 8.6–10.4)
CHLORIDE BLD-SCNC: 102 MMOL/L (ref 98–107)
CO2: 23 MMOL/L (ref 20–31)
CREAT SERPL-MCNC: 1.41 MG/DL (ref 0.5–0.9)
DIFFERENTIAL TYPE: ABNORMAL
EOSINOPHILS RELATIVE PERCENT: 0 % (ref 1–4)
GFR AFRICAN AMERICAN: 42 ML/MIN
GFR NON-AFRICAN AMERICAN: 35 ML/MIN
GFR SERPL CREATININE-BSD FRML MDRD: ABNORMAL ML/MIN/{1.73_M2}
GFR SERPL CREATININE-BSD FRML MDRD: ABNORMAL ML/MIN/{1.73_M2}
GLUCOSE BLD-MCNC: 154 MG/DL (ref 70–99)
HCT VFR BLD CALC: 42 % (ref 36.3–47.1)
HEMOGLOBIN: 12.8 G/DL (ref 11.9–15.1)
IMMATURE GRANULOCYTES: 0 %
INR BLD: 1.3
LYMPHOCYTES # BLD: 9 % (ref 24–44)
MCH RBC QN AUTO: 29.6 PG (ref 25.2–33.5)
MCHC RBC AUTO-ENTMCNC: 30.5 G/DL (ref 28.4–34.8)
MCV RBC AUTO: 97.2 FL (ref 82.6–102.9)
MONOCYTES # BLD: 9 % (ref 1–7)
MORPHOLOGY: ABNORMAL
NRBC AUTOMATED: 0 PER 100 WBC
PARTIAL THROMBOPLASTIN TIME: 24.4 SEC (ref 20.5–30.5)
PDW BLD-RTO: 16.5 % (ref 11.8–14.4)
PLATELET # BLD: 174 K/UL (ref 138–453)
PLATELET ESTIMATE: ABNORMAL
PMV BLD AUTO: 10.8 FL (ref 8.1–13.5)
POTASSIUM SERPL-SCNC: 4.3 MMOL/L (ref 3.7–5.3)
PRO-BNP: ABNORMAL PG/ML
PROTHROMBIN TIME: 13.3 SEC (ref 9.1–12.3)
RBC # BLD: 4.32 M/UL (ref 3.95–5.11)
RBC # BLD: ABNORMAL 10*6/UL
SEG NEUTROPHILS: 81 % (ref 36–66)
SEGMENTED NEUTROPHILS ABSOLUTE COUNT: 3.89 K/UL (ref 1.8–7.7)
SODIUM BLD-SCNC: 138 MMOL/L (ref 135–144)
THYROXINE, FREE: 1.41 NG/DL (ref 0.93–1.7)
TOTAL PROTEIN: 6.8 G/DL (ref 6.4–8.3)
TROPONIN INTERP: ABNORMAL
TROPONIN INTERP: ABNORMAL
TROPONIN T: ABNORMAL NG/ML
TROPONIN T: ABNORMAL NG/ML
TROPONIN, HIGH SENSITIVITY: 38 NG/L (ref 0–14)
TROPONIN, HIGH SENSITIVITY: 40 NG/L (ref 0–14)
TSH SERPL DL<=0.05 MIU/L-ACNC: 12.34 MIU/L (ref 0.3–5)
WBC # BLD: 4.8 K/UL (ref 3.5–11.3)
WBC # BLD: ABNORMAL 10*3/UL

## 2021-02-10 PROCEDURE — 96374 THER/PROPH/DIAG INJ IV PUSH: CPT

## 2021-02-10 PROCEDURE — 2060000000 HC ICU INTERMEDIATE R&B

## 2021-02-10 PROCEDURE — 70450 CT HEAD/BRAIN W/O DYE: CPT

## 2021-02-10 PROCEDURE — 99285 EMERGENCY DEPT VISIT HI MDM: CPT

## 2021-02-10 PROCEDURE — 85025 COMPLETE CBC W/AUTO DIFF WBC: CPT

## 2021-02-10 PROCEDURE — 85610 PROTHROMBIN TIME: CPT

## 2021-02-10 PROCEDURE — 2700000000 HC OXYGEN THERAPY PER DAY

## 2021-02-10 PROCEDURE — 94760 N-INVAS EAR/PLS OXIMETRY 1: CPT

## 2021-02-10 PROCEDURE — 80053 COMPREHEN METABOLIC PANEL: CPT

## 2021-02-10 PROCEDURE — 84443 ASSAY THYROID STIM HORMONE: CPT

## 2021-02-10 PROCEDURE — 36415 COLL VENOUS BLD VENIPUNCTURE: CPT

## 2021-02-10 PROCEDURE — 93005 ELECTROCARDIOGRAM TRACING: CPT | Performed by: STUDENT IN AN ORGANIZED HEALTH CARE EDUCATION/TRAINING PROGRAM

## 2021-02-10 PROCEDURE — 99223 1ST HOSP IP/OBS HIGH 75: CPT | Performed by: NURSE PRACTITIONER

## 2021-02-10 PROCEDURE — 6360000002 HC RX W HCPCS: Performed by: STUDENT IN AN ORGANIZED HEALTH CARE EDUCATION/TRAINING PROGRAM

## 2021-02-10 PROCEDURE — 83880 ASSAY OF NATRIURETIC PEPTIDE: CPT

## 2021-02-10 PROCEDURE — 71045 X-RAY EXAM CHEST 1 VIEW: CPT

## 2021-02-10 PROCEDURE — 85730 THROMBOPLASTIN TIME PARTIAL: CPT

## 2021-02-10 PROCEDURE — 84484 ASSAY OF TROPONIN QUANT: CPT

## 2021-02-10 PROCEDURE — 84439 ASSAY OF FREE THYROXINE: CPT

## 2021-02-10 PROCEDURE — 83036 HEMOGLOBIN GLYCOSYLATED A1C: CPT

## 2021-02-10 RX ORDER — ONDANSETRON 2 MG/ML
4 INJECTION INTRAMUSCULAR; INTRAVENOUS EVERY 6 HOURS PRN
Status: DISCONTINUED | OUTPATIENT
Start: 2021-02-10 | End: 2021-02-17 | Stop reason: HOSPADM

## 2021-02-10 RX ORDER — DEXTROSE MONOHYDRATE 50 MG/ML
100 INJECTION, SOLUTION INTRAVENOUS PRN
Status: DISCONTINUED | OUTPATIENT
Start: 2021-02-10 | End: 2021-02-17 | Stop reason: HOSPADM

## 2021-02-10 RX ORDER — LATANOPROST 50 UG/ML
1 SOLUTION/ DROPS OPHTHALMIC NIGHTLY
Status: DISCONTINUED | OUTPATIENT
Start: 2021-02-10 | End: 2021-02-17 | Stop reason: HOSPADM

## 2021-02-10 RX ORDER — FUROSEMIDE 10 MG/ML
20 INJECTION INTRAMUSCULAR; INTRAVENOUS ONCE
Status: DISCONTINUED | OUTPATIENT
Start: 2021-02-10 | End: 2021-02-10

## 2021-02-10 RX ORDER — PROMETHAZINE HYDROCHLORIDE 12.5 MG/1
12.5 TABLET ORAL EVERY 6 HOURS PRN
Status: DISCONTINUED | OUTPATIENT
Start: 2021-02-10 | End: 2021-02-17 | Stop reason: HOSPADM

## 2021-02-10 RX ORDER — BUDESONIDE AND FORMOTEROL FUMARATE DIHYDRATE 160; 4.5 UG/1; UG/1
2 AEROSOL RESPIRATORY (INHALATION) 2 TIMES DAILY
Status: DISCONTINUED | OUTPATIENT
Start: 2021-02-10 | End: 2021-02-17 | Stop reason: HOSPADM

## 2021-02-10 RX ORDER — NICOTINE 21 MG/24HR
1 PATCH, TRANSDERMAL 24 HOURS TRANSDERMAL DAILY PRN
Status: DISCONTINUED | OUTPATIENT
Start: 2021-02-10 | End: 2021-02-17 | Stop reason: HOSPADM

## 2021-02-10 RX ORDER — FUROSEMIDE 10 MG/ML
20 INJECTION INTRAMUSCULAR; INTRAVENOUS DAILY
Status: DISCONTINUED | OUTPATIENT
Start: 2021-02-11 | End: 2021-02-11

## 2021-02-10 RX ORDER — ASPIRIN 81 MG/1
81 TABLET, CHEWABLE ORAL DAILY
Status: DISCONTINUED | OUTPATIENT
Start: 2021-02-11 | End: 2021-02-17 | Stop reason: HOSPADM

## 2021-02-10 RX ORDER — GABAPENTIN 300 MG/1
300 CAPSULE ORAL 3 TIMES DAILY
Status: DISCONTINUED | OUTPATIENT
Start: 2021-02-10 | End: 2021-02-12

## 2021-02-10 RX ORDER — ACETAMINOPHEN 650 MG/1
650 SUPPOSITORY RECTAL EVERY 6 HOURS PRN
Status: DISCONTINUED | OUTPATIENT
Start: 2021-02-10 | End: 2021-02-17 | Stop reason: HOSPADM

## 2021-02-10 RX ORDER — SODIUM CHLORIDE 0.9 % (FLUSH) 0.9 %
10 SYRINGE (ML) INJECTION PRN
Status: DISCONTINUED | OUTPATIENT
Start: 2021-02-10 | End: 2021-02-17 | Stop reason: HOSPADM

## 2021-02-10 RX ORDER — POLYETHYLENE GLYCOL 3350 17 G/17G
17 POWDER, FOR SOLUTION ORAL DAILY PRN
Status: DISCONTINUED | OUTPATIENT
Start: 2021-02-10 | End: 2021-02-17 | Stop reason: HOSPADM

## 2021-02-10 RX ORDER — FUROSEMIDE 40 MG/1
40 TABLET ORAL DAILY
Status: DISCONTINUED | OUTPATIENT
Start: 2021-02-11 | End: 2021-02-11

## 2021-02-10 RX ORDER — NICOTINE POLACRILEX 4 MG
15 LOZENGE BUCCAL PRN
Status: DISCONTINUED | OUTPATIENT
Start: 2021-02-10 | End: 2021-02-17 | Stop reason: HOSPADM

## 2021-02-10 RX ORDER — DEXTROSE MONOHYDRATE 25 G/50ML
12.5 INJECTION, SOLUTION INTRAVENOUS PRN
Status: DISCONTINUED | OUTPATIENT
Start: 2021-02-10 | End: 2021-02-17 | Stop reason: HOSPADM

## 2021-02-10 RX ORDER — ACETAMINOPHEN 325 MG/1
650 TABLET ORAL EVERY 6 HOURS PRN
Status: DISCONTINUED | OUTPATIENT
Start: 2021-02-10 | End: 2021-02-17 | Stop reason: HOSPADM

## 2021-02-10 RX ORDER — CARVEDILOL 3.12 MG/1
3.12 TABLET ORAL 2 TIMES DAILY WITH MEALS
Status: DISCONTINUED | OUTPATIENT
Start: 2021-02-11 | End: 2021-02-17 | Stop reason: HOSPADM

## 2021-02-10 RX ORDER — FUROSEMIDE 10 MG/ML
10 INJECTION INTRAMUSCULAR; INTRAVENOUS ONCE
Status: COMPLETED | OUTPATIENT
Start: 2021-02-10 | End: 2021-02-10

## 2021-02-10 RX ORDER — SILDENAFIL CITRATE 20 MG/1
20 TABLET ORAL 3 TIMES DAILY
Status: DISCONTINUED | OUTPATIENT
Start: 2021-02-10 | End: 2021-02-11

## 2021-02-10 RX ORDER — LISINOPRIL 5 MG/1
5 TABLET ORAL DAILY
Status: DISCONTINUED | OUTPATIENT
Start: 2021-02-11 | End: 2021-02-17 | Stop reason: HOSPADM

## 2021-02-10 RX ORDER — SODIUM CHLORIDE 0.9 % (FLUSH) 0.9 %
10 SYRINGE (ML) INJECTION EVERY 12 HOURS SCHEDULED
Status: DISCONTINUED | OUTPATIENT
Start: 2021-02-10 | End: 2021-02-17 | Stop reason: HOSPADM

## 2021-02-10 RX ADMIN — FUROSEMIDE 10 MG: 10 INJECTION, SOLUTION INTRAMUSCULAR; INTRAVENOUS at 20:52

## 2021-02-10 ASSESSMENT — ENCOUNTER SYMPTOMS
NAUSEA: 0
DIARRHEA: 0
CHEST TIGHTNESS: 0
TROUBLE SWALLOWING: 0
SHORTNESS OF BREATH: 0
VOMITING: 0
ABDOMINAL PAIN: 0
BACK PAIN: 0
PHOTOPHOBIA: 0

## 2021-02-10 NOTE — ED PROVIDER NOTES
[facility]  Emergency Department  Faculty Attestation     I performed a history and physical examination of the patient and discussed management with the resident. I reviewed the residents note and agree with the documented findings and plan of care. Any areas of disagreement are noted on the chart. I was personally present for the key portions of any procedures. I have documented in the chart those procedures where I was not present during the key portions. I have reviewed the emergency nurses triage note. I agree with the chief complaint, past medical history, past surgical history, allergies, medications, social and family history as documented unless otherwise noted below. For Physician Assistant/ Nurse Practitioner cases/documentation I have personally evaluated this patient and have completed at least one if not all key elements of the E/M (history, physical exam, and MDM). Additional findings are as noted. Primary Care Physician:  Raina Cranker, MD    Screenings:  [unfilled]    CHIEF COMPLAINT       Chief Complaint   Patient presents with    Fall    Hypotension    Bradycardia       RECENT VITALS:   Temp: 97.9 °F (36.6 °C),  Pulse: (!) 48, Resp: 18, BP: 109/79    LABS:  Labs Reviewed   CBC WITH AUTO DIFFERENTIAL   COMPREHENSIVE METABOLIC PANEL W/ REFLEX TO MG FOR LOW K   TROPONIN   TROPONIN   BRAIN NATRIURETIC PEPTIDE   APTT   PROTIME-INR   TSH WITH REFLEX       Radiology  XR CHEST PORTABLE    (Results Pending)   CT Head WO Contrast    (Results Pending)       CRITICAL CARE: There was a high probability of clinically significant/life threatening deterioration in this patient's condition which required my urgent intervention. Total critical care time was 10 minutes. This excludes any time for separately reportable procedures.      EKG:   EKG Interpretation    Interpreted by me    Rhythm: normal sinus   Rate: Bradycardia  Axis: Right  Ectopy: PACs  Conduction: Right bundle branch block pattern  ST Segments: no acute change  T Waves: no acute change  Q Waves: none    Clinical Impression: Bradycardia, right bundle branch block    Attending Physician Additional  Notes    Patient fell in her home today. EMS was called for lift assist.  Patient and family did not notice any new injuries. They noticed she was bradycardic and hypotensive with heart rate of 44 and blood pressure 80 systolic. Blood pressure improved without any intervention. Heart rate remains in the 40s. Patient is slow to answer questions but denies any new pain or injuries. She does have an abrasion over the right index finger. There is bruising on the dorsal dorsal aspect of the left wrist.  She denies headache neck pain. No chest pain. No change in her abdominal wall edema or leg edema. No difficulty breathing. Patient was seen here within the past week with a fall and had CT imaging at that time. During that injury she had bruising to the left infraorbital region and left eye subconjunctival hemorrhage. She is had intermittent diplopia since. On exam she is bradycardic low normal blood pressure, feels cooler, no respiratory distress. There is dramatic JVD with tortuous EJs. Lungs are diminished. Card exam is without obvious murmur. Abdomen has mild distention as well as abdominal wall edema. There is induration in both lower extremities with hyperpigmentation. There is lymphedema of the right upper extremity from prior mastectomy. There is a bruise over the left dorsal aspect of the wrist but no bony tenderness. Neck is supple nontender. Mouth is dry. Face is symmetrical.  There is bruising beneath the left orbit. There is significant left eye conjunctival hemorrhage with chemosis. Impression is fall, bradycardia, hypotension.   Plan is CT brain, cardiac monitoring, EKG, laboratory studies, TSH with reflex, anticipate admission for cardiology consultation for

## 2021-02-11 ENCOUNTER — APPOINTMENT (OUTPATIENT)
Dept: GENERAL RADIOLOGY | Age: 86
DRG: 291 | End: 2021-02-11
Payer: MEDICARE

## 2021-02-11 LAB
-: NORMAL
ALBUMIN SERPL-MCNC: 3.7 G/DL (ref 3.5–5.2)
ALBUMIN SERPL-MCNC: 4 G/DL (ref 3.5–5.2)
ALBUMIN SERPL-MCNC: 4 G/DL (ref 3.5–5.2)
ALBUMIN/GLOBULIN RATIO: 1.2 (ref 1–2.5)
ALLEN TEST: POSITIVE
ALP BLD-CCNC: 330 U/L (ref 35–104)
ALT SERPL-CCNC: 36 U/L (ref 5–33)
AMORPHOUS: NORMAL
ANION GAP SERPL CALCULATED.3IONS-SCNC: 12 MMOL/L (ref 9–17)
ANION GAP SERPL CALCULATED.3IONS-SCNC: 20 MMOL/L (ref 9–17)
AST SERPL-CCNC: 53 U/L
BACTERIA: NORMAL
BILIRUB SERPL-MCNC: 1.04 MG/DL (ref 0.3–1.2)
BILIRUBIN DIRECT: 0.54 MG/DL
BILIRUBIN URINE: ABNORMAL
BILIRUBIN, INDIRECT: 0.5 MG/DL (ref 0–1)
BNP INTERPRETATION: ABNORMAL
BNP INTERPRETATION: ABNORMAL
BUN BLDV-MCNC: 40 MG/DL (ref 8–23)
BUN BLDV-MCNC: 44 MG/DL (ref 8–23)
BUN/CREAT BLD: ABNORMAL (ref 9–20)
BUN/CREAT BLD: ABNORMAL (ref 9–20)
C-REACTIVE PROTEIN: 14.6 MG/L (ref 0–5)
CALCIUM SERPL-MCNC: 9.4 MG/DL (ref 8.6–10.4)
CALCIUM SERPL-MCNC: 9.5 MG/DL (ref 8.6–10.4)
CASTS UA: NORMAL /LPF (ref 0–8)
CHLORIDE BLD-SCNC: 100 MMOL/L (ref 98–107)
CHLORIDE BLD-SCNC: 99 MMOL/L (ref 98–107)
CHOLESTEROL/HDL RATIO: 1.8
CHOLESTEROL: 154 MG/DL
CO2: 20 MMOL/L (ref 20–31)
CO2: 25 MMOL/L (ref 20–31)
COLOR: ABNORMAL
COMMENT UA: ABNORMAL
CREAT SERPL-MCNC: 1.59 MG/DL (ref 0.5–0.9)
CREAT SERPL-MCNC: 1.91 MG/DL (ref 0.5–0.9)
CRYSTALS, UA: NORMAL /HPF
D-DIMER QUANTITATIVE: 1.62 MG/L FEU
EKG ATRIAL RATE: 47 BPM
EKG P AXIS: 47 DEGREES
EKG P-R INTERVAL: 134 MS
EKG Q-T INTERVAL: 554 MS
EKG QRS DURATION: 156 MS
EKG QTC CALCULATION (BAZETT): 490 MS
EKG R AXIS: 127 DEGREES
EKG T AXIS: 14 DEGREES
EKG VENTRICULAR RATE: 47 BPM
EPITHELIAL CELLS UA: NORMAL /HPF (ref 0–5)
ESTIMATED AVERAGE GLUCOSE: 160 MG/DL
FERRITIN: 66 UG/L (ref 13–150)
FIBRINOGEN: 307 MG/DL (ref 140–420)
FIO2: 90
GFR AFRICAN AMERICAN: 30 ML/MIN
GFR AFRICAN AMERICAN: 37 ML/MIN
GFR NON-AFRICAN AMERICAN: 25 ML/MIN
GFR NON-AFRICAN AMERICAN: 30 ML/MIN
GFR SERPL CREATININE-BSD FRML MDRD: ABNORMAL ML/MIN/{1.73_M2}
GLOBULIN: ABNORMAL G/DL (ref 1.5–3.8)
GLUCOSE BLD-MCNC: 134 MG/DL (ref 65–105)
GLUCOSE BLD-MCNC: 147 MG/DL (ref 65–105)
GLUCOSE BLD-MCNC: 148 MG/DL (ref 65–105)
GLUCOSE BLD-MCNC: 148 MG/DL (ref 65–105)
GLUCOSE BLD-MCNC: 151 MG/DL (ref 70–99)
GLUCOSE BLD-MCNC: 179 MG/DL (ref 70–99)
GLUCOSE BLD-MCNC: 87 MG/DL (ref 65–105)
GLUCOSE URINE: NEGATIVE
HBA1C MFR BLD: 7.2 % (ref 4–6)
HCT VFR BLD CALC: 40.8 % (ref 36.3–47.1)
HDLC SERPL-MCNC: 88 MG/DL
HEMOGLOBIN: 12.8 G/DL (ref 11.9–15.1)
INR BLD: 1.3
KETONES, URINE: NEGATIVE
LACTATE DEHYDROGENASE: 327 U/L (ref 135–214)
LDL CHOLESTEROL: 53 MG/DL (ref 0–130)
LEUKOCYTE ESTERASE, URINE: NEGATIVE
MAGNESIUM: 2.4 MG/DL (ref 1.6–2.6)
MCH RBC QN AUTO: 30.3 PG (ref 25.2–33.5)
MCHC RBC AUTO-ENTMCNC: 31.4 G/DL (ref 28.4–34.8)
MCV RBC AUTO: 96.5 FL (ref 82.6–102.9)
MODE: ABNORMAL
MUCUS: NORMAL
NEGATIVE BASE EXCESS, ART: ABNORMAL (ref 0–2)
NITRITE, URINE: NEGATIVE
NRBC AUTOMATED: 0 PER 100 WBC
O2 DEVICE/FLOW/%: ABNORMAL
OTHER OBSERVATIONS UA: NORMAL
PATIENT TEMP: ABNORMAL
PDW BLD-RTO: 16.9 % (ref 11.8–14.4)
PH UA: 5 (ref 5–8)
PHOSPHORUS: 4.4 MG/DL (ref 2.6–4.5)
PHOSPHORUS: 4.7 MG/DL (ref 2.6–4.5)
PLATELET # BLD: ABNORMAL K/UL (ref 138–453)
PLATELET, FLUORESCENCE: 126 K/UL (ref 138–453)
PLATELET, IMMATURE FRACTION: 5.5 % (ref 1.1–10.3)
PMV BLD AUTO: ABNORMAL FL (ref 8.1–13.5)
POC HCO3: 25.6 MMOL/L (ref 21–28)
POC O2 SATURATION: 99 % (ref 94–98)
POC PCO2 TEMP: ABNORMAL MM HG
POC PCO2: 41.7 MM HG (ref 35–48)
POC PH TEMP: ABNORMAL
POC PH: 7.4 (ref 7.35–7.45)
POC PO2 TEMP: ABNORMAL MM HG
POC PO2: 141.1 MM HG (ref 83–108)
POSITIVE BASE EXCESS, ART: 1 (ref 0–3)
POTASSIUM SERPL-SCNC: 4.7 MMOL/L (ref 3.7–5.3)
POTASSIUM SERPL-SCNC: 5.8 MMOL/L (ref 3.7–5.3)
PRO-BNP: ABNORMAL PG/ML
PRO-BNP: ABNORMAL PG/ML
PROCALCITONIN: 0.16 NG/ML
PROTEIN UA: ABNORMAL
PROTHROMBIN TIME: 13.3 SEC (ref 9.1–12.3)
RBC # BLD: 4.23 M/UL (ref 3.95–5.11)
RBC UA: NORMAL /HPF (ref 0–4)
RENAL EPITHELIAL, UA: NORMAL /HPF
SAMPLE SITE: ABNORMAL
SARS-COV-2, RAPID: NOT DETECTED
SARS-COV-2: NORMAL
SARS-COV-2: NORMAL
SODIUM BLD-SCNC: 136 MMOL/L (ref 135–144)
SODIUM BLD-SCNC: 140 MMOL/L (ref 135–144)
SODIUM,UR: 30 MMOL/L
SOURCE: NORMAL
SPECIFIC GRAVITY UA: 1.02 (ref 1–1.03)
TCO2 (CALC), ART: 27 MMOL/L (ref 22–29)
TOTAL PROTEIN: 7.3 G/DL (ref 6.4–8.3)
TRICHOMONAS: NORMAL
TRIGL SERPL-MCNC: 64 MG/DL
TROPONIN INTERP: ABNORMAL
TROPONIN INTERP: ABNORMAL
TROPONIN T: ABNORMAL NG/ML
TROPONIN T: ABNORMAL NG/ML
TROPONIN, HIGH SENSITIVITY: 30 NG/L (ref 0–14)
TROPONIN, HIGH SENSITIVITY: 34 NG/L (ref 0–14)
TSH SERPL DL<=0.05 MIU/L-ACNC: 7.15 MIU/L (ref 0.3–5)
TURBIDITY: CLEAR
URINE HGB: NEGATIVE
UROBILINOGEN, URINE: ABNORMAL
VLDLC SERPL CALC-MCNC: NORMAL MG/DL (ref 1–30)
WBC # BLD: 4.9 K/UL (ref 3.5–11.3)
WBC UA: NORMAL /HPF (ref 0–5)
YEAST: NORMAL

## 2021-02-11 PROCEDURE — 80076 HEPATIC FUNCTION PANEL: CPT

## 2021-02-11 PROCEDURE — 83615 LACTATE (LD) (LDH) ENZYME: CPT

## 2021-02-11 PROCEDURE — 82728 ASSAY OF FERRITIN: CPT

## 2021-02-11 PROCEDURE — 6370000000 HC RX 637 (ALT 250 FOR IP): Performed by: INTERNAL MEDICINE

## 2021-02-11 PROCEDURE — 94761 N-INVAS EAR/PLS OXIMETRY MLT: CPT

## 2021-02-11 PROCEDURE — 97530 THERAPEUTIC ACTIVITIES: CPT

## 2021-02-11 PROCEDURE — 82248 BILIRUBIN DIRECT: CPT

## 2021-02-11 PROCEDURE — 6370000000 HC RX 637 (ALT 250 FOR IP): Performed by: NURSE PRACTITIONER

## 2021-02-11 PROCEDURE — 86140 C-REACTIVE PROTEIN: CPT

## 2021-02-11 PROCEDURE — 85055 RETICULATED PLATELET ASSAY: CPT

## 2021-02-11 PROCEDURE — 71045 X-RAY EXAM CHEST 1 VIEW: CPT

## 2021-02-11 PROCEDURE — 83880 ASSAY OF NATRIURETIC PEPTIDE: CPT

## 2021-02-11 PROCEDURE — 51798 US URINE CAPACITY MEASURE: CPT

## 2021-02-11 PROCEDURE — 84145 PROCALCITONIN (PCT): CPT

## 2021-02-11 PROCEDURE — 84443 ASSAY THYROID STIM HORMONE: CPT

## 2021-02-11 PROCEDURE — 84484 ASSAY OF TROPONIN QUANT: CPT

## 2021-02-11 PROCEDURE — APPNB180 APP NON BILLABLE TIME > 60 MINS: Performed by: NURSE PRACTITIONER

## 2021-02-11 PROCEDURE — 2700000000 HC OXYGEN THERAPY PER DAY

## 2021-02-11 PROCEDURE — 94660 CPAP INITIATION&MGMT: CPT

## 2021-02-11 PROCEDURE — 97535 SELF CARE MNGMENT TRAINING: CPT

## 2021-02-11 PROCEDURE — 2580000003 HC RX 258: Performed by: NURSE PRACTITIONER

## 2021-02-11 PROCEDURE — 36600 WITHDRAWAL OF ARTERIAL BLOOD: CPT

## 2021-02-11 PROCEDURE — 85610 PROTHROMBIN TIME: CPT

## 2021-02-11 PROCEDURE — 81001 URINALYSIS AUTO W/SCOPE: CPT

## 2021-02-11 PROCEDURE — 36415 COLL VENOUS BLD VENIPUNCTURE: CPT

## 2021-02-11 PROCEDURE — 6360000002 HC RX W HCPCS: Performed by: INTERNAL MEDICINE

## 2021-02-11 PROCEDURE — 94640 AIRWAY INHALATION TREATMENT: CPT

## 2021-02-11 PROCEDURE — 99232 SBSQ HOSP IP/OBS MODERATE 35: CPT | Performed by: INTERNAL MEDICINE

## 2021-02-11 PROCEDURE — 97167 OT EVAL HIGH COMPLEX 60 MIN: CPT

## 2021-02-11 PROCEDURE — 80069 RENAL FUNCTION PANEL: CPT

## 2021-02-11 PROCEDURE — 85027 COMPLETE CBC AUTOMATED: CPT

## 2021-02-11 PROCEDURE — 80061 LIPID PANEL: CPT

## 2021-02-11 PROCEDURE — 84300 ASSAY OF URINE SODIUM: CPT

## 2021-02-11 PROCEDURE — 85379 FIBRIN DEGRADATION QUANT: CPT

## 2021-02-11 PROCEDURE — 2060000000 HC ICU INTERMEDIATE R&B

## 2021-02-11 PROCEDURE — 82947 ASSAY GLUCOSE BLOOD QUANT: CPT

## 2021-02-11 PROCEDURE — 6360000002 HC RX W HCPCS: Performed by: NURSE PRACTITIONER

## 2021-02-11 PROCEDURE — 93010 ELECTROCARDIOGRAM REPORT: CPT | Performed by: INTERNAL MEDICINE

## 2021-02-11 PROCEDURE — 84100 ASSAY OF PHOSPHORUS: CPT

## 2021-02-11 PROCEDURE — U0002 COVID-19 LAB TEST NON-CDC: HCPCS

## 2021-02-11 PROCEDURE — 97162 PT EVAL MOD COMPLEX 30 MIN: CPT

## 2021-02-11 PROCEDURE — 51702 INSERT TEMP BLADDER CATH: CPT

## 2021-02-11 PROCEDURE — 82803 BLOOD GASES ANY COMBINATION: CPT

## 2021-02-11 PROCEDURE — 83735 ASSAY OF MAGNESIUM: CPT

## 2021-02-11 PROCEDURE — 85384 FIBRINOGEN ACTIVITY: CPT

## 2021-02-11 PROCEDURE — 80053 COMPREHEN METABOLIC PANEL: CPT

## 2021-02-11 RX ORDER — LANOLIN ALCOHOL/MO/W.PET/CERES
3 CREAM (GRAM) TOPICAL ONCE
Status: COMPLETED | OUTPATIENT
Start: 2021-02-11 | End: 2021-02-11

## 2021-02-11 RX ORDER — FUROSEMIDE 10 MG/ML
40 INJECTION INTRAMUSCULAR; INTRAVENOUS ONCE
Status: COMPLETED | OUTPATIENT
Start: 2021-02-11 | End: 2021-02-11

## 2021-02-11 RX ORDER — FUROSEMIDE 10 MG/ML
80 INJECTION INTRAMUSCULAR; INTRAVENOUS ONCE
Status: DISCONTINUED | OUTPATIENT
Start: 2021-02-11 | End: 2021-02-11

## 2021-02-11 RX ORDER — LEVOTHYROXINE SODIUM 0.05 MG/1
50 TABLET ORAL DAILY
Status: DISCONTINUED | OUTPATIENT
Start: 2021-02-12 | End: 2021-02-17 | Stop reason: HOSPADM

## 2021-02-11 RX ORDER — FUROSEMIDE 10 MG/ML
20 INJECTION INTRAMUSCULAR; INTRAVENOUS ONCE
Status: COMPLETED | OUTPATIENT
Start: 2021-02-11 | End: 2021-02-11

## 2021-02-11 RX ORDER — ACETAMINOPHEN 650 MG/1
650 SUPPOSITORY RECTAL EVERY 6 HOURS PRN
Status: DISCONTINUED | OUTPATIENT
Start: 2021-02-11 | End: 2021-02-11 | Stop reason: SDUPTHER

## 2021-02-11 RX ORDER — ACETAMINOPHEN 325 MG/1
650 TABLET ORAL EVERY 6 HOURS PRN
Status: DISCONTINUED | OUTPATIENT
Start: 2021-02-11 | End: 2021-02-11 | Stop reason: SDUPTHER

## 2021-02-11 RX ORDER — FUROSEMIDE 10 MG/ML
80 INJECTION INTRAMUSCULAR; INTRAVENOUS 2 TIMES DAILY
Status: DISCONTINUED | OUTPATIENT
Start: 2021-02-12 | End: 2021-02-12

## 2021-02-11 RX ORDER — IPRATROPIUM BROMIDE AND ALBUTEROL SULFATE 2.5; .5 MG/3ML; MG/3ML
1 SOLUTION RESPIRATORY (INHALATION)
Status: DISCONTINUED | OUTPATIENT
Start: 2021-02-11 | End: 2021-02-17 | Stop reason: HOSPADM

## 2021-02-11 RX ORDER — HEPARIN SODIUM 5000 [USP'U]/ML
5000 INJECTION, SOLUTION INTRAVENOUS; SUBCUTANEOUS EVERY 8 HOURS SCHEDULED
Status: DISCONTINUED | OUTPATIENT
Start: 2021-02-12 | End: 2021-02-17 | Stop reason: HOSPADM

## 2021-02-11 RX ORDER — CARBOXYMETHYLCELLULOSE SODIUM 10 MG/ML
1 GEL OPHTHALMIC 4 TIMES DAILY
Status: DISCONTINUED | OUTPATIENT
Start: 2021-02-11 | End: 2021-02-17 | Stop reason: HOSPADM

## 2021-02-11 RX ORDER — FUROSEMIDE 10 MG/ML
40 INJECTION INTRAMUSCULAR; INTRAVENOUS 2 TIMES DAILY
Status: DISCONTINUED | OUTPATIENT
Start: 2021-02-11 | End: 2021-02-11

## 2021-02-11 RX ORDER — FUROSEMIDE 10 MG/ML
20 INJECTION INTRAMUSCULAR; INTRAVENOUS 2 TIMES DAILY
Status: DISCONTINUED | OUTPATIENT
Start: 2021-02-11 | End: 2021-02-11

## 2021-02-11 RX ADMIN — FUROSEMIDE 20 MG: 10 INJECTION, SOLUTION INTRAMUSCULAR; INTRAVENOUS at 10:28

## 2021-02-11 RX ADMIN — FUROSEMIDE 20 MG: 10 INJECTION, SOLUTION INTRAMUSCULAR; INTRAVENOUS at 08:35

## 2021-02-11 RX ADMIN — GABAPENTIN 300 MG: 300 CAPSULE ORAL at 08:34

## 2021-02-11 RX ADMIN — BUDESONIDE AND FORMOTEROL FUMARATE DIHYDRATE 2 PUFF: 160; 4.5 AEROSOL RESPIRATORY (INHALATION) at 08:30

## 2021-02-11 RX ADMIN — GABAPENTIN 300 MG: 300 CAPSULE ORAL at 21:41

## 2021-02-11 RX ADMIN — BUDESONIDE AND FORMOTEROL FUMARATE DIHYDRATE 2 PUFF: 160; 4.5 AEROSOL RESPIRATORY (INHALATION) at 20:12

## 2021-02-11 RX ADMIN — FUROSEMIDE 40 MG: 10 INJECTION, SOLUTION INTRAMUSCULAR; INTRAVENOUS at 17:27

## 2021-02-11 RX ADMIN — Medication 3 MG: at 22:59

## 2021-02-11 RX ADMIN — SILDENAFIL 20 MG: 20 TABLET ORAL at 00:49

## 2021-02-11 RX ADMIN — LATANOPROST 1 DROP: 50 SOLUTION OPHTHALMIC at 00:49

## 2021-02-11 RX ADMIN — GABAPENTIN 300 MG: 300 CAPSULE ORAL at 00:49

## 2021-02-11 RX ADMIN — INSULIN LISPRO 1 UNITS: 100 INJECTION, SOLUTION INTRAVENOUS; SUBCUTANEOUS at 22:20

## 2021-02-11 RX ADMIN — INSULIN LISPRO 1 UNITS: 100 INJECTION, SOLUTION INTRAVENOUS; SUBCUTANEOUS at 11:52

## 2021-02-11 RX ADMIN — POLYETHYLENE GLYCOL 3350 17 G: 17 POWDER, FOR SOLUTION ORAL at 21:41

## 2021-02-11 RX ADMIN — ASPIRIN 81 MG: 81 TABLET ORAL at 08:35

## 2021-02-11 RX ADMIN — SODIUM CHLORIDE, PRESERVATIVE FREE 10 ML: 5 INJECTION INTRAVENOUS at 21:17

## 2021-02-11 RX ADMIN — GABAPENTIN 300 MG: 300 CAPSULE ORAL at 15:51

## 2021-02-11 RX ADMIN — CARBOXYMETHYLCELLULOSE SODIUM 1 DROP: 10 GEL OPHTHALMIC at 15:51

## 2021-02-11 RX ADMIN — FUROSEMIDE 40 MG: 10 INJECTION, SOLUTION INTRAMUSCULAR; INTRAVENOUS at 18:25

## 2021-02-11 RX ADMIN — SODIUM CHLORIDE, PRESERVATIVE FREE 10 ML: 5 INJECTION INTRAVENOUS at 08:35

## 2021-02-11 RX ADMIN — LATANOPROST 1 DROP: 50 SOLUTION OPHTHALMIC at 21:40

## 2021-02-11 RX ADMIN — IPRATROPIUM BROMIDE AND ALBUTEROL SULFATE 1 AMPULE: .5; 3 SOLUTION RESPIRATORY (INHALATION) at 20:12

## 2021-02-11 RX ADMIN — CARBOXYMETHYLCELLULOSE SODIUM 1 DROP: 10 GEL OPHTHALMIC at 22:09

## 2021-02-11 RX ADMIN — SILDENAFIL 20 MG: 20 TABLET ORAL at 08:34

## 2021-02-11 RX ADMIN — ENOXAPARIN SODIUM 40 MG: 40 INJECTION SUBCUTANEOUS at 08:35

## 2021-02-11 ASSESSMENT — ENCOUNTER SYMPTOMS
EYE REDNESS: 1
BLOOD IN STOOL: 0
CONSTIPATION: 0
VOMITING: 0
STRIDOR: 0
DIARRHEA: 0
NAUSEA: 0
WHEEZING: 0
EYE DISCHARGE: 1
SHORTNESS OF BREATH: 0
COUGH: 0
ABDOMINAL PAIN: 0

## 2021-02-11 ASSESSMENT — PAIN SCALES - GENERAL
PAINLEVEL_OUTOF10: 0
PAINLEVEL_OUTOF10: 0

## 2021-02-11 NOTE — PROGRESS NOTES
[facility]  Office: [address], DO, Ricardo Lizama, DO, Brenda Joiner, DO, [facility], DO, Colton Bravo MD, Trang Cottrell MD, Ibrahima Tucker MD, Yash Rowe MD, Duc Mann MD, Bipin Chacon MD, La Rangel MD, Patricio Bumpers, MD, Jamarcus Duong MD, Celine Candelaria DO, Eden Barry MD, Hosea Vines MD, Jhony Awad DO, Roxie Vazquez MD,  Dinesh Rodriguez, DO, Juanjose Reeder MD, Dhara Talavera MD, Margarita Camacho, [facility], [facility], Dmitry Zavaleta, CNP, Mirza Iyer, CNS, Austin Arce, CNP, Clara Mccarty, CNP, Familia Goldsmith, CNP, Shari Morales, CNP, Callum Barrera, CNP, Wendy Wall PA-C, Marcello Kayser, [facility], Mihir Stone, CNP, Denver Macadam, [facility], Neel Kidd, [facility], Augustin Cohen, CNP, Brown Duarte, [address] [place] [address]    Progress Note    2/11/2021    10:29 AM    Name:   Kyra Kearney  MRN:     [id number]     [place]:      [de-identified]   Room:   [address] [place] [address] Day:  1  Admit Date:  2/10/2021  6:44 PM    PCP:   Raina Cranker, MD  Code Status:  Full Code    Subjective:     C/C:   Chief Complaint   Patient presents with   [facility]    Hypotension    Bradycardia     Interval History Status: improved. Seen and examined at bedside, patient with mechanical trip and fall in the kitchen yesterday, no head injury no loss of consciousness. Patient found to be bradycardic by EMS, currently undergoing evaluation by cardiology for symptomatic bradycardia. No complaints at this time    Brief History:     Kyra Kearney is a 80 y.o. Non-/non  female who presents with Fall, Hypotension, and Bradycardia   and is admitted to the hospital for the management of syncope and collapse with bradycardia.      Patient was brought into the emergency room via EMS after a syncopized and fall at home. . Patient states that she was standing and opening a can and started to \"feel sick\" and proceeded to turn around and fall to the ground. She is unsure if she hit her head. She states she recalls physically falling, but is unsure how long she was out. She states that her family member was there at the home heard her fall and came to check on her and she can recall watching him walk into check on her. . She is unable to explain to me how she felt \"sick\" prior to the fall but tells me she did not have any convulsions shaking loss of bowel bladder or emesis after the event during. And this is never happened to her before. She denied any chest pain shortness of breath palpitations dizziness or lightheadedness or headache prior to the fall but just states \"I felt sick\". On EMS arrival she was noted to be hypotensive with systolic blood pressure in 80s. With a heart rate in the 40s. Upon arrival to the emergency room patient was mildly bradycardic in the high 40s to low 50s and mentating well. With a systolic blood pressure in the 100s  Her work-up in the emergency room revealed: A metabolic panel essentially normal outside of mild elevation of the BUN at 34 and creatinine of 1.41 and a glucose of 154. Her liver profile shows alk phos of 311 ALT normal at 28, and AST 41 with a bilirubin of 1.24. Her hemogram was unremarkable without acute leukocytosis or anemias. Cardiac markers show a proBNP of 12,391 with a high sensitive troponin of 38. Her TSH elevated at 12.34 and a T4 of 1.41. Her chest x-ray showed Stable cardiomegaly with mild pulmonary vascular congestion. In the CT brain showed  Mildchronic deep white matter ischemic changes No acute intracranial abnormalities are noted.      However in review of her last admission  laboratories(2/4/2021) her kidney function was with a BUN of 34 and creatinine of 1.38. A proBNP of 6729  -9340 and high sensitive troponin of 36 which was downward trend of 41 on admission.   And a TSH of 9.17 with a normal T4 of 1.06.       Patient was just recently (chronic obstructive pulmonary disease) (HCC), Gastroesophageal reflux disease, Hyperlipidemia, Hypertension, Recurrent major depression in remission ([facility] [address] Thyroid disease, and Type 2 diabetes mellitus with kidney complication, without long-term current use of insulin ([facility] Social History:   reports that she has never smoked. She has never used smokeless tobacco. She reports that she does not drink alcohol or use drugs. Family History:   Family History   Problem Relation Age of Onset    No Known Problems Mother     No Known Problems Father        Vitals:  /71   Pulse 67   Temp 97.8 °F (36.6 °C) (Oral)   Resp 12   Ht 5' 7\" (1.702 m)   Wt 214 lb 1.1 oz (97.1 kg)   LMP  (LMP Unknown)   SpO2 97%   BMI 33.53 kg/m²   Temp (24hrs), Av.2 °F (36.2 °C), Min:94.3 °F (34.6 °C), Max:98.2 °F (36.8 °C)    Recent Labs     21  0054   POCGLU 134*       I/O (24Hr):     Intake/Output Summary (Last 24 hours) at 2021 1029  Last data filed at 2021 0603  Gross per 24 hour   Intake 240 ml   Output --   Net 240 ml       Labs:  Hematology:  Recent Labs     02/10/21  1902 02/11/21  0633   WBC 4.8 4.9   RBC 4.32 4.23   HGB 12.8 12.8   HCT 42.0 40.8   MCV 97.2 96.5   MCH 29.6 30.3   MCHC 30.5 31.4   RDW 16.5* 16.9*    See Reflexed IPF Result   MPV 10.8 NOT REPORTED   INR 1.3  --      Chemistry:  Recent Labs     02/10/21  1902 02/10/21  2058 02/10/21  2336 21  0249     --   --   --    K 4.3  --   --   --      --   --   --    CO2 23  --   --   --    GLUCOSE 154*  --   --   --    BUN 34*  --   --   --    CREATININE 1.41*  --   --   --    MG  --   --   --  2.4   ANIONGAP 13  --   --   --    LABGLOM 35*  --   --   --    GFRAA 42*  --   --   --    CALCIUM 8.6  --   --   --    PROBNP 12,391*  --   --  11,783*   TROPHS 40* 38* 34* 30*     Recent Labs     02/10/21  1902 21  0054 21  0249   PROT 6.8  --   --    LABALBU 3.6  --   --    TSH 12.34*  --  7.15*   AST 41*  --   -- ALT 28  --   --    ALKPHOS 311*  --   --    BILITOT 1.24*  --   --    CHOL  --   --  154   HDL  --   --  88   LDLCHOLESTEROL  --   --  53   CHOLHDLRATIO  --   --  1.8   TRIG  --   --  64   VLDL  --   --  NOT REPORTED   POCGLU  --  134*  --      ABG:  Lab Results   Component Value Date    POCPH 7.391 02/04/2021    POCPCO2 49.8 02/04/2021    POCPO2 188.7 02/04/2021    POCHCO3 30.2 02/04/2021    NBEA NOT REPORTED 02/04/2021    PBEA 4 02/04/2021    RSH1ABL 32 02/04/2021    GVNR2BLC 100 02/04/2021    FIO2 100.0 02/04/2021     No results found for: SPECIAL  No results found for: CULTURE    Radiology:  Ct Head Wo Contrast    Result Date: 2/10/2021  [ Mild central and cortical cerebral atrophy. Mildchronic deep white matter ischemic changes No acute intracranial abnormalities are noted. Xr Chest Portable    Result Date: 2/10/2021  Stable cardiomegaly with mild pulmonary vascular congestion. Xr Chest Portable    Result Date: 2/4/2021  Moderate cardiomegaly. Trace bilateral pleural effusions with associated basilar airspace disease.        Physical Examination:        General appearance:  alert, cooperative and no distress  Mental Status:  oriented to person, place and time and normal affect  Lungs:  clear to auscultation bilaterally, normal effort  Heart:  regular rate and rhythm, no murmur  Abdomen:  soft, nontender, nondistended, normal bowel sounds, no masses, hepatomegaly, splenomegaly  Extremities:  no edema, redness, tenderness in the calves  Skin:  no gross lesions, rashes, induration    Assessment:        Hospital Problems           Last Modified POA    * (Principal) Syncope and collapse 2/11/2021 Yes    CKD (chronic kidney disease) stage 3, GFR 30-59 ml/min 2/11/2021 Yes    Type 2 diabetes mellitus with kidney complication, without long-term current use of insulin ([facility] [address] 2/11/2021 Yes    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 angina pectoris 2/11/2021 Yes    Bradycardia 2/11/2021 Yes Subconjunctival hemorrhage of left eye 2/11/2021 Yes    Decompensated heart failure ([address] 2/10/2021 Yes          Plan:        Syncope -collapse likely secondary to mechanical trip and fall, no loss of consciousness, no head trauma. Cardiology evaluating for symptomatic bradycardia, considering pacemaker placement  Symptomatic bradycardia-patient heart rate was in the 40s initial EKG, holding beta-blocker currently, cardiology evaluation. Will obtain Holter monitor reports and possible pacemaker placement today  Acute exacerbation CHF-continue Lasix for now, monitor kidney function. Increase Lasix to 40 mg twice daily, additional 20 mg now  CKD-monitor kidney function  Coronary artery disease-continue aspirin statin  Subconjunctival hemorrhage of left eye -patient seen ophthalmology, secondary to dry eye.   Continue lubricating drops  Type 2 diabetes-continue insulin sliding scale, point-of-care glucose    Neeraj Douglass DO  2/11/2021  10:29 AM

## 2021-02-11 NOTE — DISCHARGE INSTR - COC
Continuity of Care Form    Patient Name: Negar Stark   :  1929  MRN:  [id number]    [facility] date:  2/10/2021  Discharge date:  2021    Code Status Order: Full Code   Advance Directives:      Admitting Physician:  Zulema Louie DO  PCP: Juan Prater MD    Discharging Nurse: Melva Reece Unit/Room#: 1353/3440-09  Discharging Unit Phone Number: [phone]    Emergency Contact:   Extended Emergency Contact Information  Primary Emergency Contact: Loretta Garcia Phone: [phone]  Mobile Phone: [phone]  Relation: Child  Secondary Emergency Contact: Yessy Puentes, [address] Phone: [phone]  Mobile Phone: [phone]  Relation: Child    Past Surgical History:  Past Surgical History:   Procedure Laterality Date    CATARACT REMOVAL Right     CHOLECYSTECTOMY      COLONOSCOPY      HYSTERECTOMY      KNEE SURGERY      MASTECTOMY      THYROID SURGERY         Immunization History: There is no immunization history on file for this patient.     Active Problems:  Patient Active Problem List   Diagnosis Code    Syncope and collapse R55    Hypothyroidism E03.9    Right-sided heart failure (HCC) I50.810    Pulmonary hypertension due to COPD ([facility]) I27.23, J44.9    Essential hypertension I10    Autonomic neuropathy G90.9    CKD (chronic kidney disease) stage 3, GFR 30-59 ml/min N18.30    Type 2 diabetes mellitus with kidney complication, without long-term current use of insulin ([facility]) E11.29    CRIS (acute kidney injury) ([facility] [address] N17.9    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 Z90.11    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 angina pectoris I25.10    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 (HCC) E11.43    Bradycardia R00.1    Mixed hyperlipidemia E78.2    Laryngopharyngeal reflux K21.9    Peripheral venous insufficiency I87.2    Pulmonary hypertension ([facility]) I27.20    Recurrent major depression in remission ([facility] [address] F33.40 Last Modified Barium Swallow with Video (Video Swallowing Test): not done    Treatments at the Time of Hospital Discharge:   Respiratory Treatments: yes  Oxygen Therapy:  is on oxygen at 2 L/min per nasal cannula. Ventilator:    - No ventilator support    Rehab Therapies: Physical Therapy and Occupational Therapy  Weight Bearing Status/Restrictions: No weight bearing restirctions  Other Medical Equipment (for information only, NOT a DME order):  cane and walker  Other Treatments: skilled RN    Patient's personal belongings (please select all that are sent with patient):  Dentures upper    RN SIGNATURE:  Electronically signed by Raphael Novak RN on 2/17/21 at 2:01 PM EST    CASE MANAGEMENT/SOCIAL WORK SECTION    Inpatient Status Date: ***    Readmission Risk Assessment Score:  Readmission Risk              Risk of Unplanned Readmission:        17           Discharging to Facility/ Agency   · Name: [facility]  Address:   [address] [place] [address]         Phone: [phone]          ·   · Phone:  · Fax:    Dialysis Facility (if applicable)   · Name:  · Address:  · Dialysis Schedule:  · Phone:  · Fax:    / signature: Electronically signed by Nathan Aguiar RN on 2/17/21 at 8:18 AM EST    PHYSICIAN SECTION    Prognosis: Fair    Condition at Discharge: Stable    Rehab Potential (if transferring to Rehab): Fair    Recommended Labs or Other Treatments After Discharge: Keesha Mackey as warranted     Physician Certification: I certify the above information and transfer of Kyra Kearney  is necessary for the continuing treatment of the diagnosis listed and that she requires [facility] for greater 30 days.      Update Admission H&P: No change in H&P    PHYSICIAN SIGNATURE:  Electronically signed by Everett Ng DO on 2/17/21 at 10:54 AM EST

## 2021-02-11 NOTE — PROGRESS NOTES
Congestive Heart Failure Education completed and charted. CHF booklet given. Patient was receptive to education. Discussed the  importance of medication compliance. Discussed the importance of a heart healthy diet. Discussed 2000 mg sodium-restricted daily diet. Patient instructed to limit fluid intake to  1.5 to 2 liters per day. Patient instructed to weigh self at the same time of each day each morning, reinforced teaching to monitor for 3-5 lb weight increase over 1-2 days notify physician if change noted. Signs and symptoms of CHF discussed with patient, such as feeling more tired than normal, feeling short of breath, coughing that increases when lying down, sudden weight gain, swelling of the feet, legs or belly. Patient verbalized understanding to notify physician office if these symptoms occur.     Echo   1/2021  EF 55-60%

## 2021-02-11 NOTE — PROGRESS NOTES
Called to room by RN, pt satting in high 60s. Placed pt on BIPAP 14/8 90%, and pt O2 level slowly started to rise to high 90s. ABG and Rapid Covid were ordered and obtained per [place]. ABG results are as follwed:  pH: 7.39 CO2: 41.7 pO2: 141.1 HCO3-: 25.6  Covid results pending.    Will continue to monitor pt

## 2021-02-11 NOTE — PROGRESS NOTES
Vision: Impaired(Pt's L eye is red which pt states is chronic, writer thought it was from recent fall as writer worked with pt 1 week socorro and eye was bloodshot at that time)  Hearing: Within functional limits     Subjective  General  Patient assessed for rehabilitation services?: Yes  Pain Screening  Patient Currently in Pain: Denies     Orientation  Orientation  Overall Orientation Status: Within Functional Limits  Social/Functional History  Social/Functional History  Lives With: Alone  Type of Home: House  Home Layout: One level  Home Access: Stairs to enter with rails  Entrance Stairs - Number of Steps: 2  Bathroom Shower/Tub: Tub/Shower unit  Bathroom Toilet: Handicap height  Bathroom Accessibility: Accessible  Home Equipment: Rolling walker, Oxygen(2L O2)  Receives Help From: Family  ADL Assistance: Needs assistance(Pt stated having difficulty with LB dressing tasks and often wears slip-on shoes, etc. Dtr assists with pants according to pt)  Homemaking Assistance: Needs assistance  Homemaking Responsibilities: No  Ambulation Assistance: Independent  Transfer Assistance: Independent  Active : No  Patient's  Info: Not for >1 year, dtr drives  Mode of Transportation: Family  Occupation: Retired  Additional Comments: Pt states dtr comes over daily to assist pt with chores and LB ADL's, pt is a questionable historian this date, no family at bedside  Cognition      Objective     AROM RLE (degrees)  RLE AROM: WFL  AROM LLE (degrees)  LLE AROM : WFL  AROM RUE (degrees)  RUE AROM : WFL  AROM LUE (degrees)  LUE AROM : WFL  Strength RLE  Strength RLE: WFL  Strength LLE  Strength LLE: WFL  Strength RUE  Strength RUE: WFL  Strength LUE  Strength LUE: WFL     Sensation  Overall Sensation Status: Impaired(Reports numbness in B feet)  Bed mobility  Supine to Sit: Contact guard assistance  Sit to Supine: Contact guard assistance  Scooting: Contact guard assistance  Transfers Sit to Stand: Contact guard assistance  Stand to sit: Contact guard assistance  Ambulation  Ambulation?: Yes  Ambulation 1  Surface: level tile  Device: Rolling Walker  Assistance: Contact guard assistance  Distance: amb 30 ft with a RW x CGA while on 4L of 02     Balance  Posture: Good  Sitting - Static: Good  Sitting - Dynamic: Poor  Standing - Static: Fair  Standing - Dynamic: [address]  Times per week: 5-6x wk  Current Treatment Recommendations: Strengthening, Endurance Training, Gait Training, Safety Education & Training, Stair training, Functional Mobility Training  Safety Devices  Type of devices: Nurse notified, Left in chair, Chair alarm in place, Call light within reach, Patient at risk for falls    G-Code     OutComes Score            AM-PAC Score  AM-PAC Inpatient Mobility Raw Score : 16 (02/11/21 1430)  AM-PAC Inpatient T-Scale Score : 40.78 (02/11/21 1430)  Mobility Inpatient CMS 0-100% Score: 54.16 (02/11/21 1430)  Mobility Inpatient CMS G-Code Modifier : CK (02/11/21 1430)        Goals  Short term goals  Time Frame for Short term goals: 10 visits  Short term goal 1: transfers with SBA  Short term goal 2: amb 125 ft with a RW x SBA  Short term goal 3: ascend/descend 4 steps with SBA  Short term goal 4: 20 min exercise program x SBA  Patient Goals   Patient goals : Return home       Therapy Time   Individual Concurrent Group Co-treatment   Time In 1373         Time Out 1420         Minutes 25             1 of [facility], PT

## 2021-02-11 NOTE — PROGRESS NOTES
Occupational Therapy   Occupational Therapy Initial Assessment  Date: 2021   Patient Name: Kat Christina  MRN: [id number]     : 1929    Date of Service: 2021    Discharge Recommendations: Pt would strongly benefit from a short rehab stay, pt limited by SOB, max A for sit<>stand transfers, and some confusion. CTA  Patient would benefit from continued therapy after discharge  OT Equipment Recommendations  Other: CTA    Assessment   Performance deficits / Impairments: Decreased functional mobility ; Decreased endurance;Decreased ADL status; Decreased high-level IADLs;Decreased safe awareness;Decreased cognition;Decreased balance  Prognosis: Fair  Decision Making: High Complexity  OT Education: OT Role;Plan of Care;ADL Adaptive Strategies;Transfer Training  REQUIRES OT FOLLOW UP: Yes  Activity Tolerance  Activity Tolerance: Patient limited by fatigue;Treatment limited secondary to decreased cognition  Safety Devices  Safety Devices in place: Yes  Type of devices: All fall risk precautions in place;Call light within reach;Gait belt;Left in chair;Chair alarm in place  Restraints  Initially in place: No           Patient Diagnosis(es): The primary encounter diagnosis was Fall, initial encounter. Diagnoses of Bradycardia and Acute on chronic systolic congestive heart failure ([address] were also pertinent to this visit. has a past medical history of Arthritis, Breast cancer ([address] CAD (coronary artery disease), CHF (congestive heart failure) ([address] CKD (chronic kidney disease) stage 3, GFR 30-59 ml/min, COPD (chronic obstructive pulmonary disease) ([address] Gastroesophageal reflux disease, Hyperlipidemia, Hypertension, Recurrent major depression in remission ([address] Thyroid disease, and Type 2 diabetes mellitus with kidney complication, without long-term current use of insulin ([address] has a past surgical history that includes Thyroid surgery; Mastectomy; Hysterectomy; Cholecystectomy; Colonoscopy; knee surgery; and Cataract removal (Right).          Restrictions  Restrictions/Precautions  Restrictions/Precautions: General Precautions, Fall Risk  Required Braces or Orthoses?: No  Position Activity Restriction  Other position/activity restrictions: up with A, ~4L O2 via NC    Subjective   General  Patient assessed for rehabilitation services?: Yes  Family / Caregiver Present: No  Diagnosis: CHF, sinus bradycardia, syncope/fall, DM2  Patient Currently in Pain: Denies  Pain Assessment  Pain Assessment: 0-10  Pain Level: 0  Vital Signs  Patient Currently in Pain: Denies  Oxygen Therapy  SpO2: 94 %  O2 Device: Nasal cannula  Social/Functional History  Social/Functional History  Lives With: Alone  Type of Home: House  Home Layout: One level  Home Access: Stairs to enter with rails  Entrance Stairs - Number of Steps: 2  Bathroom Shower/Tub: Tub/Shower unit  Bathroom Toilet: Handicap height  Bathroom Accessibility: Accessible  Home Equipment: Rolling walker, Oxygen(2L O2)  Receives Help From: Family  ADL Assistance: Needs assistance(Pt stated having difficulty with LB dressing tasks and often wears slip-on shoes, etc. Dtr assists with pants according to pt)  Homemaking Assistance: Needs assistance  Homemaking Responsibilities: No  Ambulation Assistance: Independent  Transfer Assistance: Independent  Active : No  Patient's  Info: Not for >1 year, dtr drives  Mode of Transportation: Family  Occupation: Retired  Additional Comments: Pt states dtr comes over daily to assist pt with chores and LB ADL's, pt is a questionable historian this date, no family at bedside       Objective   Vision: Impaired(Pt's L eye is red which pt states is chronic, writer thought it was from recent fall as writer worked with pt 1 week [place] and eye was bloodshot at that time)  Hearing: Within functional limits Orientation  Overall Orientation Status: Within Functional Limits     Balance  Sitting Balance: Stand by assistance  Standing Balance: Contact guard assistance  Standing Balance  Time: 13 min  Activity: Pt stood bedside, sinkside, func mob to/from bathroom  Functional Mobility  Functional - Mobility Device: Rolling Walker  Activity: To/from bathroom  Assist Level: Minimal assistance  Functional Mobility Comments: 4L O2 on entire session, pt winded quickly, fall risk, RW improved balance  Toilet Transfers  Toilet - Technique: Ambulating  Equipment Used: Standard toilet(No grab bars available to be used)  Toilet Transfer: Maximum assistance  ADL  Feeding: Supervision;Setup; Increased time to complete(Pt eating breakfast upon arrival, ripped open and dumped creamer and artificial sweetener into coffee.)  Grooming: Increased time to complete;Contact guard assistance(Pt stood sinkside for hand/oral care activity, fatigued quickly, finished grooming tasks sitting sinkside)  UE Bathing: Minimal assistance; Increased time to complete  LE Bathing: Increased time to complete;Minimal assistance  UE Dressing: Increased time to complete;Minimal assistance  LE Dressing: Increased time to complete;Maximum assistance(Pt has difficulty with LB dressing at baseline d/t poor LB flexibility and advanced age)  Toileting: Increased time to complete;Maximum assistance(Pt sat on toilet this date, successful urination, pt performed all ivan-care while sitting, max A for transfers however)  Tone RUE  RUE Tone: Normotonic  Tone LUE  LUE Tone: Normotonic  Coordination  Movements Are Fluid And Coordinated: No  Coordination and Movement description: Decreased speed     Bed mobility  Supine to Sit: Contact guard assistance  Sit to Supine: Unable to assess  Scooting: Stand by assistance  Comment: Pt supine in bed upon arrival, pt retired sitting in recliner with alarm on and legs elevated  Transfers  Sit to stand: Maximum assistance Stand to sit: Moderate assistance  Transfer Comments: BP standing chairside (158/128) and then pt sat and pt's BP was (91/49), RN provided these numbers despite odd results     Cognition  Overall Cognitive Status: Exceptions  Attention Span: Difficulty dividing attention  Safety Judgement: Decreased awareness of need for assistance  Insights: Decreased awareness of deficits  Cognition Comment: Pt slow to respond at times, some cinfusion, major fall risk, not asking for writer's assistance from toilet despite asking pt to call out when done toileting       LUE AROM (degrees)  LUE AROM : WFL  RUE AROM (degrees)  RUE AROM : WFL  LUE Strength  Gross LUE Strength: WFL  L Shoulder Flex: 4+/5  L Elbow Flex: 4+/5  L Elbow Ext: 4+/5  L Hand General: 4+/5  RUE Strength  Gross RUE Strength: WFL  R Shoulder Flex: 4+/5  R Elbow Flex: 4+/5  R Elbow Ext: 4+/5  R Hand General: 4+/5         Plan   Plan  Times per week: 3-5x         AM-PAC Score        AM-[facility] Inpatient Daily Activity Raw Score: 15 (02/11/21 1150)  AM-PAC Inpatient ADL T-Scale Score : 34.69 (02/11/21 1150)  ADL Inpatient CMS 0-100% Score: 56.46 (02/11/21 1150)  ADL Inpatient CMS G-Code Modifier : CK (02/11/21 1150)    Goals  Short term goals  Time Frame for Short term goals: Pt will by discharge  Short term goal 1: demo good safety awareness during func mob around room using RW and SUP  Short term goal 2: demo ADL UB bathing/dressing activity with mod I and increased time  Short term goal 3: demo ADL LB bathing/dressing activity with AD PRN, mod A, and increased time  Short term goal 4: recall 2 EC/WS tech's during func activity with 1 vc  Short term goal 5: demo min Ax1 and RW for all transfers, including from toilet     Therapy Time   Individual Concurrent Group Co-treatment   Time In 0912         Time Out 1006         Minutes 54         Timed Code Treatment Minutes: [address] [facility], OTR/L

## 2021-02-11 NOTE — ED PROVIDER NOTES
[facility] [facility]  Emergency Department  Emergency Medicine Resident Sign-out     Care of Madelyn Brown was assumed from Dr. Maco North and is being seen for Fall, Hypotension, and Bradycardia  . The patient's initial evaluation and plan have been discussed with the prior provider who initially evaluated the patient. EMERGENCY DEPARTMENT COURSE / MEDICAL DECISION MAKING:       MEDICATIONS GIVEN:  No orders of the defined types were placed in this encounter. LABS / RADIOLOGY:     Labs Reviewed   CBC WITH AUTO DIFFERENTIAL - Abnormal; Notable for the following components:       Result Value    RDW 16.5 (*)     Seg Neutrophils 81 (*)     Lymphocytes 9 (*)     Monocytes 9 (*)     Eosinophils % 0 (*)     Absolute Lymph # 0.43 (*)     All other components within normal limits   COMPREHENSIVE METABOLIC PANEL W/ REFLEX TO MG FOR LOW K - Abnormal; Notable for the following components:    Glucose 154 (*)     BUN 34 (*)     CREATININE 1.41 (*)     Alkaline Phosphatase 311 (*)     AST 41 (*)     Total Bilirubin 1.24 (*)     GFR Non- 35 (*)     GFR  42 (*)     All other components within normal limits   TROPONIN - Abnormal; Notable for the following components:    Troponin, High Sensitivity 40 (*)     All other components within normal limits   BRAIN NATRIURETIC PEPTIDE - Abnormal; Notable for the following components:    Pro-BNP 12,391 (*)     All other components within normal limits   PROTIME-INR - Abnormal; Notable for the following components:    Protime 13.3 (*)     All other components within normal limits   TSH WITH REFLEX - Abnormal; Notable for the following components:    TSH 12.34 (*)     All other components within normal limits   APTT   TROPONIN   T4, FREE       Xr Knee Left (3 Views)    Result Date: 2/4/2021  EXAMINATION: THREE XRAY VIEWS OF THE LEFT KNEE 2/4/2021 3:32 am COMPARISON: None.  HISTORY: ORDERING SYSTEM PROVIDED HISTORY: fall TECHNOLOGIST intravenous contrast. Dose modulation, iterative reconstruction, and/or weight based adjustment of the mA/kV was utilized to reduce the radiation dose to as low as reasonably achievable. COMPARISON: CT brain performed 07/31/2020. HISTORY: ORDERING SYSTEM PROVIDED HISTORY: fall TECHNOLOGIST PROVIDED HISTORY: fall Decision Support Exception->Emergency Medical Condition (MA) Reason for Exam: fall Acuity: Acute Type of Exam: Initial FINDINGS: BRAIN/VENTRICLES: There is no acute intracranial hemorrhage, mass effect, or midline shift. There is satisfactory overall gray-white matter differentiation. The ventricular structures are symmetric and unremarkable. The infratentorial structures are unremarkable. ORBITS: The visualized portion of the orbits demonstrate no acute abnormality. SINUSES: The visualized paranasal sinuses and mastoid air cells demonstrate no acute abnormality. SOFT TISSUES/SKULL:  No acute abnormality of the visualized skull or soft tissues. No acute intracranial abnormality. Ct Facial Bones Wo Contrast    Result Date: 2/4/2021  EXAMINATION: CT OF THE FACE WITHOUT CONTRAST  2/4/2021 3:21 am TECHNIQUE: CT of the face was performed without the administration of intravenous contrast. Multiplanar reformatted images are provided for review. Dose modulation, iterative reconstruction, and/or weight based adjustment of the mA/kV was utilized to reduce the radiation dose to as low as reasonably achievable. COMPARISON: None HISTORY: ORDERING SYSTEM PROVIDED HISTORY: fall left periorbital swelling assess orbital rim fx, nasal bleed assess nasal fx TECHNOLOGIST PROVIDED HISTORY: fall left periorbital swelling assess orbital rim fx, nasal bleed assess nasal fx Decision Support Exception->Emergency Medical Condition (MA) Reason for Exam: fall Acuity: Acute Type of Exam: Initial FINDINGS: FACIAL BONES:  The maxilla, pterygoid plates and zygomatic arches are intact. The mandible is intact.   The mandibular condyles are normally situated. The nasal bones and maxillary nasal processes are intact. ORBITS:  The globes appear intact. The extraocular muscles, optic nerve sheath complexes and lacrimal glands appear unremarkable. No retrobulbar hematoma or mass is seen. The orbital walls and rims are intact. SINUSES/MASTOIDS:  The paranasal sinuses and mastoid air cells are well aerated. No acute fracture is seen. SOFT TISSUES:  There is minimal left cheek and left periorbital soft tissue swelling. No acute traumatic injury of the facial bones. Minimal left cheek and left periorbital soft tissue swelling. Ct Cervical Spine Wo Contrast    Result Date: 2/4/2021  EXAMINATION: CT OF THE CERVICAL SPINE WITHOUT CONTRAST 2/4/2021 3:21 am TECHNIQUE: CT of the cervical spine was performed without the administration of intravenous contrast. Multiplanar reformatted images are provided for review. Dose modulation, iterative reconstruction, and/or weight based adjustment of the mA/kV was utilized to reduce the radiation dose to as low as reasonably achievable. COMPARISON: None. HISTORY: ORDERING SYSTEM PROVIDED HISTORY: fall TECHNOLOGIST PROVIDED HISTORY: fall Decision Support Exception->Emergency Medical Condition (MA) Reason for Exam: fall Acuity: Acute Type of Exam: Initial FINDINGS: BONES/ALIGNMENT: There is no acute fracture or traumatic malalignment. DEGENERATIVE CHANGES: No significant degenerative changes. SOFT TISSUES: There is no prevertebral soft tissue swelling. No acute abnormality of the cervical spine. Xr Chest Portable    Result Date: 2/10/2021  EXAMINATION: ONE XRAY VIEW OF THE CHEST 2/10/2021 4:10 pm COMPARISON: 02/04/2021 HISTORY: ORDERING SYSTEM PROVIDED HISTORY: Syncope and collapse, bradycardia TECHNOLOGIST PROVIDED HISTORY: Syncope and collapse, bradycardia Reason for Exam: Upright port, Collapse FINDINGS: Cardiac size is enlarged. No acute infiltrates are seen .  The pulmonary vascularity is hazy and indistinct. No pneumothorax. Small bilateral pleural effusions identified. No other significant changes. .     Stable cardiomegaly with mild pulmonary vascular congestion. Xr Chest Portable    Result Date: 2/4/2021  EXAMINATION: ONE XRAY VIEW OF THE CHEST 2/4/2021 10:22 am COMPARISON: July 31, 2020 HISTORY: ORDERING SYSTEM PROVIDED HISTORY: hypoxia TECHNOLOGIST PROVIDED HISTORY: hypoxia Reason for Exam: upr Acuity: Unknown Type of Exam: Unknown FINDINGS: Moderate cardiomegaly appears unchanged. Trace bilateral pleural effusions and associated bibasilar airspace disease. No pneumothorax or subdiaphragmatic free air. No acute osseous abnormality identified. Moderate cardiomegaly. Trace bilateral pleural effusions with associated basilar airspace disease. Xr Chest Portable    Result Date: 2/4/2021  EXAMINATION: ONE XRAY VIEW OF THE CHEST 2/4/2021 3:32 am COMPARISON: Chest radiograph performed 07/31/2020. HISTORY: ORDERING SYSTEM PROVIDED HISTORY: assess pnuemo, effusion, fall standing, recent admit for CHF TECHNOLOGIST PROVIDED HISTORY: assess pnuemo, effusion, fall standing, recent admit for CHF Reason for Exam: upr FINDINGS: There is chronic pulmonary change. There are small bibasilar effusions. There is no pneumothorax. The heart is enlarged. The upper abdomen is unremarkable. The extrathoracic soft tissues are unremarkable. Cardiomegaly and chronic pulmonary change with small bilateral effusions. Ct Chest Abdomen Pelvis Wo Contrast    Result Date: 2/4/2021  EXAMINATION: CT OF THE CHEST, ABDOMEN, AND PELVIS WITHOUT CONTRAST 2/4/2021 5:14 am TECHNIQUE: CT of the chest, abdomen and pelvis was performed without the administration of intravenous contrast. Multiplanar reformatted images are provided for review. Dose modulation, iterative reconstruction, and/or weight based adjustment of the mA/kV was utilized to reduce the radiation dose to as low as reasonably achievable.  COMPARISON: None HISTORY: ORDERING SYSTEM PROVIDED HISTORY: Trauma TECHNOLOGIST PROVIDED HISTORY: Trauma Decision Support Exception->Emergency Medical Condition (MA) Reason for Exam: Trauma Acuity: Acute Type of Exam: Initial FINDINGS: Chest: Mediastinum: Soft tissues of the thoracic inlet are unremarkable. The thoracic aorta is normal caliber. The main pulmonary artery is normal in caliber. There is no pericardial effusion. There is no mediastinal or hilar adenopathy. Lungs/pleura: There is a mild right-sided effusion. There is a trace left-sided effusion. There is no pneumothorax. The tracheobronchial tree is patent. Soft Tissues/Bones: The extrathoracic soft tissues are unremarkable. There is no axillary adenopathy. Abdomen/Pelvis: Organs: The liver and spleen are normal size and overall attenuation. The gallbladder is not visualized and may have been removed. The pancreas and adrenal glands are unremarkable. The kidneys are without obstructive uropathy. The urinary bladder is unremarkable and mildly distended. GI/Bowel: The stomach is contracted and otherwise unremarkable. Loops of small bowel are normal in caliber without evidence for obstruction. Colon contains air and fecal residue. There is free fluid adjacent to the liver and spleen as well as in the pericolic gutters and pelvis. There is no intraperitoneal free air. Pelvis: The uterus has been removed. Phleboliths are seen in the pelvis. Peritoneum/Retroperitoneum: The psoas muscles are symmetric. The abdominal aorta is normal in caliber. The inferior vena cava is unremarkable. There is no retroperitoneal or mesenteric adenopathy. Bones/Soft Tissues: There is anasarca. There is no acute osseous abnormality. There is a pagetoid appearance to the left iliac wing. Mild right and trace left pleural effusion. No acute abdominal or pelvic abnormality. Mild abdominal ascites. Anasarca.      Ct Lumbar Spine Trauma Reconstruction    Result Date: 2/4/2021  EXAMINATION: CT OF THE LUMBAR SPINE WITHOUT CONTRAST  2/4/2021 TECHNIQUE: CT of the lumbar spine was performed without the administration of intravenous contrast. Multiplanar reformatted images are provided for review. Dose modulation, iterative reconstruction, and/or weight based adjustment of the mA/kV was utilized to reduce the radiation dose to as low as reasonably achievable. COMPARISON: None HISTORY: ORDERING SYSTEM PROVIDED HISTORY: TRAUMA TECHNOLOGIST PROVIDED HISTORY: Trauma Reason for Exam: Trauma Acuity: Acute Type of Exam: Initial FINDINGS: BONES/ALIGNMENT: There is normal alignment of the spine. The vertebral body heights are maintained. No osseous destructive lesion is seen. There is no acute fracture or malalignment of the lumbar spine. There is pagetoid appearance of the visualized left iliac wing. DEGENERATIVE CHANGES: There is mild degenerative change most pronounced in the lower lumbar spine. There is bilateral foraminal narrowing at L5-S1. SOFT TISSUES/RETROPERITONEUM: No paraspinal mass is seen. No acute fracture or malalignment of the lumbar spine. Ct Thoracic Spine Trauma Reconstruction    Result Date: 2/4/2021  EXAMINATION: CT OF THE THORACIC SPINE WITHOUT CONTRAST  2/4/2021 5:14 am: TECHNIQUE: CT of the thoracic spine was performed without the administration of intravenous contrast. Multiplanar reformatted images are provided for review. Dose modulation, iterative reconstruction, and/or weight based adjustment of the mA/kV was utilized to reduce the radiation dose to as low as reasonably achievable. COMPARISON: None. HISTORY: ORDERING SYSTEM PROVIDED HISTORY: Trauma TECHNOLOGIST PROVIDED HISTORY: Trauma Reason for Exam: Trauma Acuity: Acute Type of Exam: Initial FINDINGS: BONES/ALIGNMENT: There is normal alignment of the spine. The vertebral body heights are maintained. No osseous destructive lesion is seen. No acute fracture or malalignment of the thoracic spine. DEGENERATIVE CHANGES: No gross spinal canal stenosis or bony neural foraminal narrowing of the thoracic spine. SOFT TISSUES: No paraspinal mass is seen. There are bilateral pleural effusions. No acute fracture or malalignment of the thoracic spine. Bilateral pleural effusions. RECENT VITALS:     Temp: 97.9 °F (36.6 °C),  Pulse: (!) 48, Resp: 18, BP: 109/79, SpO2: 98 %      This patient is a 80 y.o. Female with fall, hypotensive on presentation, herve, hemodynamic stable, has a headache      ED Course as of Feb 10 2043   Wed Feb 10, 2021   2041 TSH(!): 12.34 [PS]   2041 Pro-BNP(!): 12,391 [PS]   2041 Negative     CT Head WO Contrast [PS]      ED Course User Index  [PS] Skip Richter MD       OUTSTANDING TASKS / RECOMMENDATIONS:    1. CT head--negative  2. Admit to medicine     FINAL IMPRESSION:   No diagnosis found. DISPOSITION:         DISPOSITION:  []  Discharge   []  Transfer -    [x]  Admission -   Medicine   []  Against Medical Advice   []  Eloped   FOLLOW-UP: No follow-up provider specified.    DISCHARGE MEDICATIONS: New Prescriptions    No medications on file          Skip Richter MD  Emergency Medicine Resident  [facility]       Skip Richter MD  Resident  02/10/21 [id number]

## 2021-02-11 NOTE — PROGRESS NOTES
RAPID Covid 19 swab taken from left nare, labeled, placed in red dot bag, and handed off to second healthcare worker outside of room for transport to laboratory per hospital policy and procedure. Patient tolerated procedure fairly well.

## 2021-02-11 NOTE — ED PROVIDER NOTES
[facility]  ED  Emergency Department Encounter  EmergencyMedicine Resident     Pt Name:Luisana Sotelo  MRN: [id number]  [place] 7/6/1929  Date of evaluation: 2/10/21  PCP:  Dave Cummings MD    [address] [place] [address]       Chief Complaint   Patient presents with   Wandy Richi    Hypotension    Bradycardia       HISTORY OF PRESENT ILLNESS  (Location/Symptom, Timing/Onset, Context/Setting, Quality, Duration, Modifying Factors, Severity.)      Nilsa Stanford is a 80 y.o. female who presents with syncope and collapse. Patient reportedly had a fall at home earlier today and EMS was called out for lift assistance. On EMS arrival she was noted to be hypotensive with systolic blood pressure in 80s. Patient was also bradycardic for EMS on their arrival with heart rate in 40s. Patient states that she thinks she may have lost consciousness earlier today and fell and was unable to get herself up on her own accord. Patient also reports a prior fall approximately 1 week ago for which she sustained some bruising of the cheek but was not found to have any acute surgical traumatic abnormalities. Patient reporting headache at this time. States that she otherwise feels well. Denies chest pain, shortness of breath, abdominal pain, nausea, vomiting, diarrhea, seizure-like activity or vision changes. PAST MEDICAL / SURGICAL / SOCIAL / FAMILY HISTORY      has a past medical history of Arthritis, Breast cancer ([address] CAD (coronary artery disease), CHF (congestive heart failure) ([address] CKD (chronic kidney disease) stage 3, GFR 30-59 ml/min, COPD (chronic obstructive pulmonary disease) ([address] Gastroesophageal reflux disease, Hyperlipidemia, Hypertension, Recurrent major depression in remission ([address] Thyroid disease, and Type 2 diabetes mellitus with kidney complication, without long-term current use of insulin ([address] has a past surgical history that includes Thyroid surgery;  Mastectomy; Hysterectomy; Cholecystectomy; Colonoscopy; knee surgery; and Cataract removal (Right). Social History     Socioeconomic History    Marital status:      Spouse name: Not on file    Number of children: Not on file    Years of education: Not on file    Highest education level: Not on file   Occupational History    Not on file   Social Needs    Financial resource strain: Not on file    Food insecurity     Worry: Not on file     Inability: Not on file    Transportation needs     Medical: Not on file     Non-medical: Not on file   Tobacco Use    Smoking status: Never Smoker    Smokeless tobacco: Never Used   Substance and Sexual Activity    Alcohol use: Never    Drug use: Never    Sexual activity: Never   Lifestyle    Physical activity     Days per week: Not on file     Minutes per session: Not on file    Stress: Not on file   Relationships    Social connections     Talks on phone: Not on file     Gets together: Not on file     Attends Mandaeism service: Not on file     Active member of club or organization: Not on file     Attends meetings of clubs or organizations: Not on file     Relationship status: Not on file    Intimate partner violence     Fear of current or ex partner: Not on file     Emotionally abused: Not on file     Physically abused: Not on file     Forced sexual activity: Not on file   Other Topics Concern    Not on file   Social History Narrative    Not on file       Family History   Problem Relation Age of Onset    No Known Problems Mother     No Known Problems Father        Allergies:  Pcn [penicillins] and Avelox [moxifloxacin]    Home Medications:  Prior to Admission medications    Medication Sig Start Date End Date Taking?  Authorizing Provider   furosemide (LASIX) 40 MG tablet Take 1 tablet by mouth daily 2/5/21   Connell Palm Orlop, DO   sildenafil (REVATIO) 20 MG tablet Take 1 tablet by mouth 3 times daily 2/5/21   Connell Palm Orlop, DO   trimethoprim-polymyxin b (POLYTRIM) 75716-3.1 UNIT/ML-% ophthalmic solution Place 1 drop into the left eye every 6 hours for 10 days 2/5/21 2/15/21  Evorn Band Severino,    glipiZIDE (GLUCOTROL) 5 MG tablet Take 0.5 tablets by mouth daily 8/1/20   Rodriguez Queen MD   aspirin 81 MG chewable tablet Take 81 mg by mouth daily    Historical Provider, MD   budesonide-formoterol (SYMBICORT) 160-4.5 MCG/ACT AERO Inhale 2 puffs into the lungs 2 times daily    Historical Provider, MD   gabapentin (NEURONTIN) 300 MG capsule Take 300 mg by mouth 3 times daily. Historical Provider, MD   travoprost, benzalkonium, (TRAVATAN) 0.004 % ophthalmic solution Place 1 drop into both eyes daily    Historical Provider, MD       REVIEW OF SYSTEMS    (2-9 systems for level 4, 10 or more for level 5)      Review of Systems   Constitutional: Positive for fatigue. Negative for chills and fever. HENT: Negative for nosebleeds and trouble swallowing. Eyes: Negative for photophobia and visual disturbance. Respiratory: Negative for chest tightness and shortness of breath. Cardiovascular: Negative for chest pain. Gastrointestinal: Negative for abdominal pain, diarrhea, nausea and vomiting. Genitourinary: Negative for dysuria, flank pain, pelvic pain and urgency. Musculoskeletal: Negative for back pain, neck pain and neck stiffness. Skin: Negative for rash and wound. Neurological: Positive for syncope, light-headedness and headaches. Negative for seizures, weakness and numbness. Psychiatric/Behavioral: Negative for confusion. PHYSICAL EXAM   (up to 7 for level 4, 8 or more for level 5)      INITIAL VITALS:   BP (!) 122/96   Pulse (!) 47   Temp 97.9 °F (36.6 °C) (Oral)   Resp 13   Wt 210 lb (95.3 kg)   LMP  (LMP Unknown)   SpO2 98%   BMI 32.89 kg/m²     Physical Exam  Constitutional:       General: She is not in acute distress. Appearance: She is ill-appearing. She is not diaphoretic.    HENT:      Head:      Comments: Ecchymosis of dehydration, pneumonia    DIAGNOSTIC RESULTS / EMERGENCY DEPARTMENT COURSE / MDM   LAB RESULTS:  Results for orders placed or performed during the hospital encounter of 02/10/21   CBC Auto Differential   Result Value Ref Range    WBC 4.8 3.5 - 11.3 k/uL    RBC 4.32 3.95 - 5.11 m/uL    Hemoglobin 12.8 11.9 - 15.1 g/dL    Hematocrit 42.0 36.3 - 47.1 %    MCV 97.2 82.6 - 102.9 fL    MCH 29.6 25.2 - 33.5 pg    MCHC 30.5 28.4 - 34.8 g/dL    RDW 16.5 (H) 11.8 - 14.4 %    Platelets 283 724 - 185 k/uL    MPV 10.8 8.1 - 13.5 fL    NRBC Automated 0.0 0.0 per 100 WBC    Differential Type NOT REPORTED     WBC Morphology NOT REPORTED     RBC Morphology NOT REPORTED     Platelet Estimate NOT REPORTED     Immature Granulocytes 0 0 %    Seg Neutrophils 81 (H) 36 - 66 %    Lymphocytes 9 (L) 24 - 44 %    Monocytes 9 (H) 1 - 7 %    Eosinophils % 0 (L) 1 - 4 %    Basophils 1 0 - 2 %    Absolute Immature Granulocyte 0.00 0.00 - 0.30 k/uL    Segs Absolute 3.89 1.8 - 7.7 k/uL    Absolute Lymph # 0.43 (L) 1.0 - 4.8 k/uL    Absolute Mono # 0.43 0.1 - 0.8 k/uL    Absolute Eos # 0.00 0.0 - 0.4 k/uL    Basophils Absolute 0.05 0.0 - 0.2 k/uL    Morphology ANISOCYTOSIS PRESENT    Comprehensive Metabolic Panel w/ Reflex to MG   Result Value Ref Range    Glucose 154 (H) 70 - 99 mg/dL    BUN 34 (H) 8 - 23 mg/dL    CREATININE 1.41 (H) 0.50 - 0.90 mg/dL    Bun/Cre Ratio NOT REPORTED 9 - 20    Calcium 8.6 8.6 - 10.4 mg/dL    Sodium 138 135 - 144 mmol/L    Potassium 4.3 3.7 - 5.3 mmol/L    Chloride 102 98 - 107 mmol/L    CO2 23 20 - 31 mmol/L    Anion Gap 13 9 - 17 mmol/L    Alkaline Phosphatase 311 (H) 35 - 104 U/L    ALT 28 5 - 33 U/L    AST 41 (H) <32 U/L    Total Bilirubin 1.24 (H) 0.3 - 1.2 mg/dL    Total Protein 6.8 6.4 - 8.3 g/dL    Albumin 3.6 3.5 - 5.2 g/dL    Albumin/Globulin Ratio 1.1 1.0 - 2.5    GFR Non- 35 (L) >60 mL/min    GFR  42 (L) >60 mL/min    GFR Comment          GFR Staging NOT REPORTED    Troponin Result Value Ref Range    Troponin, High Sensitivity 40 (H) 0 - 14 ng/L    Troponin T NOT REPORTED <0.03 ng/mL    Troponin Interp NOT REPORTED    Brain Natriuretic Peptide   Result Value Ref Range    Pro-BNP 12,391 (H) <300 pg/mL    BNP Interpretation Pro-BNP Reference Range:    APTT   Result Value Ref Range    PTT 24.4 20.5 - 30.5 sec   Protime-INR   Result Value Ref Range    Protime 13.3 (H) 9.1 - 12.3 sec    INR 1.3    TSH with Reflex   Result Value Ref Range    TSH 12.34 (H) 0.30 - 5.00 mIU/L   T4, Free   Result Value Ref Range    Thyroxine, Free 1.41 0.93 - 1.70 ng/dL       IMPRESSION/ ED Course: 61-year-old female presenting after syncope and collapse with reported hypotension for EMS. Patient mildly bradycardic on arrival with heart rate high 40s to low 50s. Mentating well. Systolic blood pressure in emergency department 100 or higher during entire course. Patient with mild ecchymosis underlying left eye. Prior imaging from recent fall unremarkable. Patient does have proptosis on exam as well. TSH with reflex ordered. Labs reveal no acute abnormalities on CBC with no leukocytosis or evidence of anemia. Troponin mildly elevated at 40, likely secondary to patient's underlying chronic kidney disease as she does have elevated BUN and creatinine today but not significantly elevated compared to patient's baseline. Evidence of CHF as well with elevated BNP at greater than 12,000. Plan for judicious fluid administration only if required for pressure support as there is some risk of decompensated heart failure with fluid overload. Chest x-ray with stable cardiomegaly and mild pulmonary vascular congestion, no acute changes compared to prior. TSH elevated at 12.34. Patient signed out to Dr. Tao Dial awaiting repeat troponin, T4 and radiology read of CT head. Plan for admission for syncope and collapse.     RADIOLOGY:    Xr Chest Portable    Result Date: 2/10/2021  EXAMINATION: ONE XRAY VIEW OF THE CHEST 2/10/2021 4:10 pm COMPARISON: 02/04/2021 HISTORY: ORDERING SYSTEM PROVIDED HISTORY: Syncope and collapse, bradycardia TECHNOLOGIST PROVIDED HISTORY: Syncope and collapse, bradycardia Reason for Exam: Upright port, Collapse FINDINGS: Cardiac size is enlarged. No acute infiltrates are seen . The pulmonary vascularity is hazy and indistinct. No pneumothorax. Small bilateral pleural effusions identified. No other significant changes. .     Stable cardiomegaly with mild pulmonary vascular congestion. EKG  EKG Interpretation    Interpreted by me    Rhythm: Sinus bradycardia  Rate: Bradycardia  Axis: Right axis deviation  Ectopy: PACs  Conduction: Right bundle branch block  ST Segments: no acute change  T Waves: no acute change  Q Waves: none    Clinical Impression: Right bundle branch block, sinus bradycardia    All EKG's are interpreted by the Emergency Department Physician who either signs or Co-signs this chart in the absence of a cardiologist.      PROCEDURES:  None    CONSULTS:  IP CONSULT TO HOSPITALIST    CRITICAL CARE:  Please see attending note    FINAL IMPRESSION      1. Fall, initial encounter    2. Bradycardia    3. Acute on chronic systolic congestive heart failure ([address]          DISPOSITION / PLAN     DISPOSITION Decision To Admit 02/10/2021 08:43:28 PM      PATIENT REFERRED TO:  No follow-up provider specified.     DISCHARGE MEDICATIONS:  New Prescriptions    No medications on file       Shannan Durand DO  Emergency Medicine Resident    (Please note that portions of thisnote were completed with a voice recognition program.  Efforts were made to edit the dictations but occasionally words are mis-transcribed.)       Shannan Durand DO  Resident  02/10/21 [id number]

## 2021-02-11 NOTE — ED NOTES
Pt to ED with complaints of fall. Per EMS, they were called out for lift assist. Pt had fallen today. Upon assessment, patient was hypotensive and bradycardic. Per patient, she has no chest pain, dizziness, lightheadedness, or pain. BP was 80/40 and HR in the 40s. Pt has no history of anything like this. Pt states she fell because she tripped. Pt has been having frequent falls recently and was worked up last week for similar falls. Pt has black eye to left eye. Pt denies any pain or symptoms at this time. Pt placed on full cardiac monitor. EKG obtained. IV established PTA. Dr. Jerri Friedman and Dr. Melony North at bedside.      Drew Mckinney RN  02/10/21 [id number]

## 2021-02-11 NOTE — FLOWSHEET NOTE
ALIREZA/NISHA informed intermed Ronqu[facility] about pt desatting to 68% while getting back into bed on 5L /NC. RN placed pt on nonrebreather at 15L and notified RT. RT placed pt on bipap FiO2 90% and pt started slowly coming up to 100%. RONQUILLO came to bedside and order ABG and other labs. RN will continue to monitor and notified [facility] of any changes.          Pauline Lamas RN

## 2021-02-11 NOTE — H&P
home heard her fall and came to check on her and she can recall watching him walk into check on her. . She is unable to explain to me how she felt \"sick\" prior to the fall but tells me she did not have any convulsions shaking loss of bowel bladder or emesis after the event during. And this is never happened to her before. She denied any chest pain shortness of breath palpitations dizziness or lightheadedness or headache prior to the fall but just states \"I felt sick\". On EMS arrival she was noted to be hypotensive with systolic blood pressure in 80s. With a heart rate in the 40s. Upon arrival to the emergency room patient was mildly bradycardic in the high 40s to low 50s and mentating well. With a systolic blood pressure in the 100s  Her work-up in the emergency room revealed: A metabolic panel essentially normal outside of mild elevation of the BUN at 34 and creatinine of 1.41 and a glucose of 154. Her liver profile shows alk phos of 311 ALT normal at 28, and AST 41 with a bilirubin of 1.24. Her hemogram was unremarkable without acute leukocytosis or anemias. Cardiac markers show a proBNP of 12,391 with a high sensitive troponin of 38. Her TSH elevated at 12.34 and a T4 of 1.41. Her chest x-ray showed Stable cardiomegaly with mild pulmonary vascular congestion. In the CT brain showed  Mildchronic deep white matter ischemic changes No acute intracranial abnormalities are noted. However in review of her last admission  laboratories(2/4/2021) her kidney function was with a BUN of 34 and creatinine of 1.38. A proBNP of 6729  -9340 and high sensitive troponin of 36 which was downward trend of 41 on admission. And a TSH of 9.17 with a normal T4 of 1.06. Patient was just recently admitted and discharged 2/4/2021 -in which she was evaluated for heart failure and bradycardia after a fall with epistaxis after tripping over her oxygen tubing.   She was to utilize Holter monitor at discharge-which was placed at discharge. And follow-up with Dr. Bea Cooks at [facility]. Past Medical History:     Past Medical History:   Diagnosis Date    Arthritis     Breast cancer ([facility]     CAD (coronary artery disease)     [facility]est[facility]     CKD (chronic kidney disease) stage 3, GFR 30-59 ml/min 12/2/2019    COPD (chronic obstructive pulmonary disease) ([facility])     Gastroesophageal reflux disease 8/1/2020    Hyperlipidemia     Hypertension     [facility] 8/1/2020    Thyroid disease     Type 2 diabetes mellitus with kidney complication, without long-term current use of insulin ([facility] 12/2/2019        Past Surgical History:     Past Surgical History:   Procedure Laterality Date    CATARACT REMOVAL Right     CHOLECYSTECTOMY      COLONOSCOPY      HYSTERECTOMY      KNEE SURGERY      MASTECTOMY      THYROID SURGERY          Medications Prior to Admission:     Prior to Admission medications    Medication Sig Start Date End Date Taking? Authorizing Provider   furosemide (LASIX) 40 MG tablet Take 1 tablet by mouth daily 2/5/21   Lanice [place]town Orlop, DO   sildenafil (REVATIO) 20 MG tablet Take 1 tablet by mouth 3 times daily 2/5/21   Lanice [place] Orlop, DO   trimethoprim-polymyxin b (POLYTRIM) 23630-2.1 UNIT/ML-% ophthalmic solution Place 1 drop into the left eye every 6 hours for 10 days 2/5/21 2/15/21  Lanice [place] Orlop, DO   glipiZIDE (GLUCOTROL) 5 MG tablet Take 0.5 tablets by mouth daily 8/1/20   Ephraim Salinas MD   aspirin 81 MG chewable tablet Take 81 mg by mouth daily    Historical Provider, MD   budesonide-formoterol (SYMBICORT) 160-4.5 MCG/ACT AERO Inhale 2 puffs into the lungs 2 times daily    Historical Provider, MD   gabapentin (NEURONTIN) 300 MG capsule Take 300 mg by mouth 3 times daily.     Historical Provider, MD   travoprost, benzalkonium, (TRAVATAN) 0.004 % ophthalmic solution Place 1 drop into both eyes daily    Historical Provider, MD Allergies:     Pcn [penicillins] and Avelox [moxifloxacin]    Social History:     Tobacco:    reports that she has never smoked. She has never used smokeless tobacco.  Alcohol:      reports no history of alcohol use. Drug Use:  reports no history of drug use. Family History:     Family History   Problem Relation Age of Onset    No Known Problems Mother     No Known Problems Father        Review of Systems:     Positive and Negative as described in HPI. Review of Systems   Constitutional: Positive for fatigue. Negative for chills, diaphoresis and fever. Ill feeling    HENT: Negative for congestion and hearing loss. Eyes: Positive for discharge, redness and visual disturbance (not new). Not new     Respiratory: Negative for cough, shortness of breath, wheezing and stridor. Cardiovascular: Negative for chest pain, palpitations and leg swelling. Gastrointestinal: Negative for abdominal pain, blood in stool, constipation, diarrhea, nausea and vomiting. Genitourinary: Negative for dysuria and frequency. Musculoskeletal: Negative for myalgias. Skin: Negative for rash. Neurological: Positive for syncope and weakness (Generalized). Negative for dizziness, seizures and headaches. Psychiatric/Behavioral: The patient is not nervous/anxious. Physical Exam:   BP (!) 142/88   Pulse 50   Temp 97.9 °F (36.6 °C) (Oral)   Resp 16   Wt 210 lb (95.3 kg)   LMP  (LMP Unknown)   SpO2 98%   BMI 32.89 kg/m²   Temp (24hrs), Av.9 °F (36.6 °C), Min:97.9 °F (36.6 °C), Max:97.9 °F (36.6 °C)    No results for input(s): POCGLU in the last 72 hours. No intake or output data in the 24 hours ending 02/10/21 2200    Physical Exam  Vitals signs and nursing note reviewed. Constitutional:       General: She is not in acute distress. Appearance: She is ill-appearing. She is not diaphoretic. HENT:      Head: Normocephalic and atraumatic. Left periorbital erythema present.       Right Ear: Hearing normal.      Left Ear: Hearing normal.      Nose: Nose normal. No rhinorrhea. Eyes:      General:         Left eye: Discharge present. Extraocular Movements:      Right eye: Normal extraocular motion. Left eye: Normal extraocular motion. Conjunctiva/sclera:      Right eye: Right conjunctiva is not injected. Left eye: Left conjunctiva is injected. Exudate and hemorrhage present. Pupils: Pupils are equal, round, and reactive to light. Pupils are equal.      Right eye: Pupil is reactive. Left eye: Pupil is reactive. Neck:      Musculoskeletal: Neck supple. Thyroid: No thyromegaly. Vascular: No carotid bruit. Trachea: Trachea and phonation normal. No tracheal deviation. Cardiovascular:      Rate and Rhythm: Regular rhythm. Bradycardia present. Pulses: Normal pulses. Heart sounds: Murmur present. Pulmonary:      Effort: Pulmonary effort is normal. No respiratory distress. Breath sounds: No stridor. Examination of the right-middle field reveals decreased breath sounds. Examination of the left-middle field reveals decreased breath sounds. Examination of the right-lower field reveals decreased breath sounds. Examination of the left-lower field reveals decreased breath sounds. Decreased breath sounds present. Abdominal:      General: Bowel sounds are normal. There is no distension. Palpations: Abdomen is soft. There is no mass. Tenderness: There is no abdominal tenderness. There is no guarding. Musculoskeletal:      Right shoulder: She exhibits tenderness and swelling. Comments: 3+ swelling of hte right arm. Pulse intact, sensation in tact, cap refill brisk    Skin:     General: Skin is warm and dry. Findings: No erythema, lesion or rash. Neurological:      Mental Status: She is alert and oriented to person, place, and time. She is not disoriented. GCS: GCS eye subscore is 4. GCS verbal subscore is 5.  GCS motor subscore is 6. Cranial Nerves: No cranial nerve deficit. Comments: Mildly forgetful and repetitive, but answers all questions appropriate, some what slow in responses    Psychiatric:         Speech: Speech normal.         Behavior: Behavior normal. Behavior is cooperative.          Investigations:      Laboratory Testing:  Recent Results (from the past 24 hour(s))   CBC Auto Differential    Collection Time: 02/10/21  7:02 PM   Result Value Ref Range    WBC 4.8 3.5 - 11.3 k/uL    RBC 4.32 3.95 - 5.11 m/uL    Hemoglobin 12.8 11.9 - 15.1 g/dL    Hematocrit 42.0 36.3 - 47.1 %    MCV 97.2 82.6 - 102.9 fL    MCH 29.6 25.2 - 33.5 pg    MCHC 30.5 28.4 - 34.8 g/dL    RDW 16.5 (H) 11.8 - 14.4 %    Platelets 947 047 - 350 k/uL    MPV 10.8 8.1 - 13.5 fL    NRBC Automated 0.0 0.0 per 100 WBC    Differential Type NOT REPORTED     WBC Morphology NOT REPORTED     RBC Morphology NOT REPORTED     Platelet Estimate NOT REPORTED     Immature Granulocytes 0 0 %    Seg Neutrophils 81 (H) 36 - 66 %    Lymphocytes 9 (L) 24 - 44 %    Monocytes 9 (H) 1 - 7 %    Eosinophils % 0 (L) 1 - 4 %    Basophils 1 0 - 2 %    Absolute Immature Granulocyte 0.00 0.00 - 0.30 k/uL    Segs Absolute 3.89 1.8 - 7.7 k/uL    Absolute Lymph # 0.43 (L) 1.0 - 4.8 k/uL    Absolute Mono # 0.43 0.1 - 0.8 k/uL    Absolute Eos # 0.00 0.0 - 0.4 k/uL    Basophils Absolute 0.05 0.0 - 0.2 k/uL    Morphology ANISOCYTOSIS PRESENT    Comprehensive Metabolic Panel w/ Reflex to MG    Collection Time: 02/10/21  7:02 PM   Result Value Ref Range    Glucose 154 (H) 70 - 99 mg/dL    BUN 34 (H) 8 - 23 mg/dL    CREATININE 1.41 (H) 0.50 - 0.90 mg/dL    Bun/Cre Ratio NOT REPORTED 9 - 20    Calcium 8.6 8.6 - 10.4 mg/dL    Sodium 138 135 - 144 mmol/L    Potassium 4.3 3.7 - 5.3 mmol/L    Chloride 102 98 - 107 mmol/L    CO2 23 20 - 31 mmol/L    Anion Gap 13 9 - 17 mmol/L    Alkaline Phosphatase 311 (H) 35 - 104 U/L    ALT 28 5 - 33 U/L    AST 41 (H) <32 U/L    Total Bilirubin 1.24 (H) 0.3 - 1.2 mg/dL    Total Protein 6.8 6.4 - 8.3 g/dL    Albumin 3.6 3.5 - 5.2 g/dL    Albumin/Globulin Ratio 1.1 1.0 - 2.5    GFR Non- 35 (L) >60 mL/min    GFR  42 (L) >60 mL/min    GFR Comment          GFR Staging NOT REPORTED    Troponin    Collection Time: 02/10/21  7:02 PM   Result Value Ref Range    Troponin, High Sensitivity 40 (H) 0 - 14 ng/L    Troponin T NOT REPORTED <0.03 ng/mL    Troponin Interp NOT REPORTED    Brain Natriuretic Peptide    Collection Time: 02/10/21  7:02 PM   Result Value Ref Range    Pro-BNP 12,391 (H) <300 pg/mL    BNP Interpretation Pro-BNP Reference Range:    APTT    Collection Time: 02/10/21  7:02 PM   Result Value Ref Range    PTT 24.4 20.5 - 30.5 sec   Protime-INR    Collection Time: 02/10/21  7:02 PM   Result Value Ref Range    Protime 13.3 (H) 9.1 - 12.3 sec    INR 1.3    TSH with Reflex    Collection Time: 02/10/21  7:02 PM   Result Value Ref Range    TSH 12.34 (H) 0.30 - 5.00 mIU/L   T4, Free    Collection Time: 02/10/21  7:02 PM   Result Value Ref Range    Thyroxine, Free 1.41 0.93 - 1.70 ng/dL   Troponin    Collection Time: 02/10/21  8:58 PM   Result Value Ref Range    Troponin, High Sensitivity 38 (H) 0 - 14 ng/L    Troponin T NOT REPORTED <0.03 ng/mL    Troponin Interp NOT REPORTED        Imaging/Diagnostics:  Xr Knee Left (3 Views)    Result Date: 2/4/2021  Mild soft tissue swelling without acute osseous abnormality. Ct Head Wo Contrast    Result Date: 2/10/2021  [ Mild central and cortical cerebral atrophy. Mildchronic deep white matter ischemic changes No acute intracranial abnormalities are noted. Ct Head Wo Contrast    Result Date: 2/4/2021  No acute intracranial abnormality. Ct Facial Bones Wo Contrast    Result Date: 2/4/2021  No acute traumatic injury of the facial bones. Minimal left cheek and left periorbital soft tissue swelling.      Ct Cervical Spine Wo Contrast    Result Date: 2/4/2021  No acute abnormality of the monitor glucose, hypoglycemia protocol, insulin sliding scale  Coronary artery disease without angina-continue to trend out troponin to look for pattern and evaluate in concurrence with EKG consider consultation to cardiology if there is significant elevation beyond her baseline. Decompensated diastolic heart failure-was given a dose of IV diuretic in the emergency room continue diuresis  GI prophylaxis  DVT prophylaxis    Consultations:   IP CONSULT TO HOSPITALIST  IP CONSULT TO HEART FAILURE NURSE/COORDINATOR  IP CONSULT TO DIETITIAN    Patient is admitted as inpatient status because of co-morbidities listed above, severity of signs and symptoms as outlined, requirement for current medical therapies and most importantly because of direct risk to patient if care not provided in a hospital setting. Expected length of stay > 48 hours.     DOM Archuleta - CNP  2/10/2021  10:00 PM    Copy sent to Dr. Juan Prater MD

## 2021-02-11 NOTE — ED NOTES
Report called to [facility] DOUG Cazares RN  02/10/21 2269

## 2021-02-11 NOTE — CARE COORDINATION
Case Management Initial Discharge Plan  Supriya Mccann,             Met with:patient and daughter Antonio Plaza to discuss discharge plans. Information verified: address, contacts, phone number, , insurance Yes    Emergency Contact/Next of Kin name & number: Latasha Frank (daughter) [phone], Misael Bhatia (daughter) [phone]    PCP: Darlyne Gitelman, MD  Date of last visit: Feb    Insurance Provider: [employer]    Discharge Planning    Living Arrangements:  [facility]:  [address] [place], [place] [address]way Staff    Home has 1 stories  0 stairs to climb to get into front door,   Location of bedroom/bathroom in home main    Patient able to perform ADL's:Assisted    Current Services (outpatient & in home)  notified Phoebe Kapoor of patient's admission  DME equipment: rolling walker, O2  DME provider: Porter Whittaker    Receiving oral anticoagulation therapy? If indicated:   Physician managing anticoagulation treatment:   Where does patient obtain lab work for ATC treatment? Potential Assistance Needed:       Patient agreeable to home care: Yes  Freedom of choice provided:  yes, current with     Prior SNF/Rehab Placement and Facility: [address] [place] [address] to SNF/Rehab: No  [place] of choice provided: no     Evaluation: no    Expected Discharge date:       Patient expects to be discharged to:  home  Follow Up Appointment: Best Day/ Time:      Transportation provider: family  Transportation arrangements needed for discharge: No    Readmission Risk              Risk of Unplanned Readmission:        17             Does patient have a readmission risk score greater than 14?: Yes  If yes, follow-up appointment must be made within 7 days of discharge.      Goals of Care:       Discharge Plan: home with daughter, current with , has home O2          Electronically signed by Aramis Ortiz RN on 21 at 11:07 AM EST

## 2021-02-11 NOTE — ED NOTES
Unable to obtain a SpO2 on patient. MD teams all aware. Daughter states that this is baseline for patient. Writer attempted several fingers, toes, and bilateral earlobes with no success.       Kendra Hernandez RN  02/10/21 2126

## 2021-02-11 NOTE — ED NOTES
Report taken from [facility]. Patient currently resting on stretcher. VSS, NAD at this time. Placed on 3L NC per home O2. Daughter at bedside, updated on POC. Daughter stresses that patient is a full code. Call light in reach, bed locked in lowest position. No further concerns at this time.       Ning Salazar RN  02/10/21 8406

## 2021-02-12 LAB
ALBUMIN SERPL-MCNC: 3.6 G/DL (ref 3.5–5.2)
ANION GAP SERPL CALCULATED.3IONS-SCNC: 16 MMOL/L (ref 9–17)
BUN BLDV-MCNC: 44 MG/DL (ref 8–23)
BUN/CREAT BLD: ABNORMAL (ref 9–20)
CALCIUM SERPL-MCNC: 9.1 MG/DL (ref 8.6–10.4)
CHLORIDE BLD-SCNC: 103 MMOL/L (ref 98–107)
CO2: 21 MMOL/L (ref 20–31)
CREAT SERPL-MCNC: 1.68 MG/DL (ref 0.5–0.9)
GFR AFRICAN AMERICAN: 35 ML/MIN
GFR NON-AFRICAN AMERICAN: 29 ML/MIN
GFR SERPL CREATININE-BSD FRML MDRD: ABNORMAL ML/MIN/{1.73_M2}
GFR SERPL CREATININE-BSD FRML MDRD: ABNORMAL ML/MIN/{1.73_M2}
GLUCOSE BLD-MCNC: 144 MG/DL (ref 70–99)
GLUCOSE BLD-MCNC: 152 MG/DL (ref 65–105)
GLUCOSE BLD-MCNC: 225 MG/DL (ref 65–105)
HCT VFR BLD CALC: 39.9 % (ref 36.3–47.1)
HEMOGLOBIN: 12.2 G/DL (ref 11.9–15.1)
MAGNESIUM: 2.1 MG/DL (ref 1.6–2.6)
MCH RBC QN AUTO: 29.4 PG (ref 25.2–33.5)
MCHC RBC AUTO-ENTMCNC: 30.6 G/DL (ref 28.4–34.8)
MCV RBC AUTO: 96.1 FL (ref 82.6–102.9)
NRBC AUTOMATED: 0 PER 100 WBC
PDW BLD-RTO: 16.7 % (ref 11.8–14.4)
PHOSPHORUS: 4.2 MG/DL (ref 2.6–4.5)
PLATELET # BLD: 166 K/UL (ref 138–453)
PMV BLD AUTO: 10.1 FL (ref 8.1–13.5)
POTASSIUM SERPL-SCNC: 4.3 MMOL/L (ref 3.7–5.3)
RBC # BLD: 4.15 M/UL (ref 3.95–5.11)
SODIUM BLD-SCNC: 140 MMOL/L (ref 135–144)
WBC # BLD: 3.3 K/UL (ref 3.5–11.3)

## 2021-02-12 PROCEDURE — 94660 CPAP INITIATION&MGMT: CPT

## 2021-02-12 PROCEDURE — 80069 RENAL FUNCTION PANEL: CPT

## 2021-02-12 PROCEDURE — 99232 SBSQ HOSP IP/OBS MODERATE 35: CPT | Performed by: INTERNAL MEDICINE

## 2021-02-12 PROCEDURE — 6360000002 HC RX W HCPCS: Performed by: INTERNAL MEDICINE

## 2021-02-12 PROCEDURE — 94761 N-INVAS EAR/PLS OXIMETRY MLT: CPT

## 2021-02-12 PROCEDURE — 6370000000 HC RX 637 (ALT 250 FOR IP): Performed by: INTERNAL MEDICINE

## 2021-02-12 PROCEDURE — 2580000003 HC RX 258: Performed by: NURSE PRACTITIONER

## 2021-02-12 PROCEDURE — 6370000000 HC RX 637 (ALT 250 FOR IP): Performed by: NURSE PRACTITIONER

## 2021-02-12 PROCEDURE — 93970 EXTREMITY STUDY: CPT

## 2021-02-12 PROCEDURE — 85027 COMPLETE CBC AUTOMATED: CPT

## 2021-02-12 PROCEDURE — 2060000000 HC ICU INTERMEDIATE R&B

## 2021-02-12 PROCEDURE — 36415 COLL VENOUS BLD VENIPUNCTURE: CPT

## 2021-02-12 PROCEDURE — 99223 1ST HOSP IP/OBS HIGH 75: CPT | Performed by: INTERNAL MEDICINE

## 2021-02-12 PROCEDURE — 83735 ASSAY OF MAGNESIUM: CPT

## 2021-02-12 PROCEDURE — 82947 ASSAY GLUCOSE BLOOD QUANT: CPT

## 2021-02-12 PROCEDURE — 2700000000 HC OXYGEN THERAPY PER DAY

## 2021-02-12 PROCEDURE — 94640 AIRWAY INHALATION TREATMENT: CPT

## 2021-02-12 RX ORDER — GABAPENTIN 300 MG/1
300 CAPSULE ORAL 2 TIMES DAILY
Status: DISCONTINUED | OUTPATIENT
Start: 2021-02-12 | End: 2021-02-13

## 2021-02-12 RX ORDER — FUROSEMIDE 10 MG/ML
80 INJECTION INTRAMUSCULAR; INTRAVENOUS 2 TIMES DAILY
Status: DISCONTINUED | OUTPATIENT
Start: 2021-02-12 | End: 2021-02-13

## 2021-02-12 RX ORDER — MIDODRINE HYDROCHLORIDE 5 MG/1
5 TABLET ORAL ONCE
Status: COMPLETED | OUTPATIENT
Start: 2021-02-12 | End: 2021-02-12

## 2021-02-12 RX ORDER — METOLAZONE 5 MG/1
5 TABLET ORAL DAILY
Status: DISCONTINUED | OUTPATIENT
Start: 2021-02-12 | End: 2021-02-17 | Stop reason: HOSPADM

## 2021-02-12 RX ORDER — FUROSEMIDE 10 MG/ML
INJECTION INTRAMUSCULAR; INTRAVENOUS
Status: DISPENSED
Start: 2021-02-12 | End: 2021-02-13

## 2021-02-12 RX ORDER — SENNA PLUS 8.6 MG/1
2 TABLET ORAL NIGHTLY
Status: DISCONTINUED | OUTPATIENT
Start: 2021-02-12 | End: 2021-02-17 | Stop reason: HOSPADM

## 2021-02-12 RX ADMIN — INSULIN LISPRO 1 UNITS: 100 INJECTION, SOLUTION INTRAVENOUS; SUBCUTANEOUS at 21:19

## 2021-02-12 RX ADMIN — SODIUM CHLORIDE, PRESERVATIVE FREE 10 ML: 5 INJECTION INTRAVENOUS at 09:30

## 2021-02-12 RX ADMIN — BUDESONIDE AND FORMOTEROL FUMARATE DIHYDRATE 2 PUFF: 160; 4.5 AEROSOL RESPIRATORY (INHALATION) at 07:52

## 2021-02-12 RX ADMIN — HEPARIN SODIUM 5000 UNITS: 5000 INJECTION INTRAVENOUS; SUBCUTANEOUS at 06:14

## 2021-02-12 RX ADMIN — ASPIRIN 81 MG: 81 TABLET ORAL at 16:27

## 2021-02-12 RX ADMIN — CARBOXYMETHYLCELLULOSE SODIUM 1 DROP: 10 GEL OPHTHALMIC at 17:30

## 2021-02-12 RX ADMIN — IPRATROPIUM BROMIDE AND ALBUTEROL SULFATE 1 AMPULE: .5; 3 SOLUTION RESPIRATORY (INHALATION) at 07:52

## 2021-02-12 RX ADMIN — IPRATROPIUM BROMIDE AND ALBUTEROL SULFATE 1 AMPULE: .5; 3 SOLUTION RESPIRATORY (INHALATION) at 15:15

## 2021-02-12 RX ADMIN — HEPARIN SODIUM 5000 UNITS: 5000 INJECTION INTRAVENOUS; SUBCUTANEOUS at 21:18

## 2021-02-12 RX ADMIN — CARBOXYMETHYLCELLULOSE SODIUM 1 DROP: 10 GEL OPHTHALMIC at 21:18

## 2021-02-12 RX ADMIN — CARBOXYMETHYLCELLULOSE SODIUM 1 DROP: 10 GEL OPHTHALMIC at 09:30

## 2021-02-12 RX ADMIN — IPRATROPIUM BROMIDE AND ALBUTEROL SULFATE 1 AMPULE: .5; 3 SOLUTION RESPIRATORY (INHALATION) at 21:51

## 2021-02-12 RX ADMIN — IPRATROPIUM BROMIDE AND ALBUTEROL SULFATE 1 AMPULE: .5; 3 SOLUTION RESPIRATORY (INHALATION) at 12:08

## 2021-02-12 RX ADMIN — FUROSEMIDE 80 MG: 10 INJECTION, SOLUTION INTRAMUSCULAR; INTRAVENOUS at 16:26

## 2021-02-12 RX ADMIN — SODIUM CHLORIDE, PRESERVATIVE FREE 10 ML: 5 INJECTION INTRAVENOUS at 21:34

## 2021-02-12 RX ADMIN — CARBOXYMETHYLCELLULOSE SODIUM 1 DROP: 10 GEL OPHTHALMIC at 12:59

## 2021-02-12 RX ADMIN — METOLAZONE 5 MG: 5 TABLET ORAL at 16:25

## 2021-02-12 RX ADMIN — GABAPENTIN 300 MG: 300 CAPSULE ORAL at 21:18

## 2021-02-12 RX ADMIN — LATANOPROST 1 DROP: 50 SOLUTION OPHTHALMIC at 21:18

## 2021-02-12 RX ADMIN — MIDODRINE HYDROCHLORIDE 5 MG: 5 TABLET ORAL at 20:23

## 2021-02-12 RX ADMIN — LEVOTHYROXINE SODIUM 50 MCG: 50 TABLET ORAL at 16:27

## 2021-02-12 RX ADMIN — HEPARIN SODIUM 5000 UNITS: 5000 INJECTION INTRAVENOUS; SUBCUTANEOUS at 14:59

## 2021-02-12 RX ADMIN — STANDARDIZED SENNA CONCENTRATE 17.2 MG: 8.6 TABLET ORAL at 21:18

## 2021-02-12 NOTE — CARE COORDINATION
TRANSITIONAL CARE PLANNING/ 2 Rehab Adriel Day: 2    Reason for Admission: Decompensated heart failure ([facility] [I50.9]       Readmission Risk              Risk of Unplanned Readmission:        19            Patient goals/Treatment Preferences/Transitional Plan:   recv'd call from daughter girma. Reviewed pt guy.  wants referral to [facility]. Said patient lives alone, family checks in on her.

## 2021-02-12 NOTE — CARE COORDINATION
Medications From Care everwhere   Reconcile with Patient's Chart  Medication Sig Dispensed Refills Start Date End Date Status   DULoxetine (CYMBALTA) 60 mg capsule   Take 60 mg by mouth daily.    0 08/25/2016   Active   oxybutynin XL (DITROPAN-XL) 10 mg 24 hr tablet   Take 10 mg by mouth once daily at bedtime.    0 08/25/2016   Active   aspirin 81 mg   Take 81 mg by mouth daily.   0 08/25/2016   Active   ascorbic acid, vitamin C, (ascorbic acid with kevon hips) 500 mg tablet   Take 500 mg by mouth daily.   0 08/25/2016   Active   gabapentin (NEURONTIN) 300 mg capsule   Take 300 mg by mouth 3 (three) times a day.    0 10/03/2016   Active   omeprazole (PriLOSEC) 40 mg capsule   Take 40 mg by mouth daily.   0     Active   TAB-A-ACOSTA tablet   Take 1 tablet by mouth daily.   0 07/09/2019   Active   oxygen   Inhale 2 L/min continuously as needed. sob   0     Active   budesonide-formoterol (SYMBICORT) 160-4.5 mcg/actuation inhaler   Inhale 2 puffs as needed (dyspnea).   0     Active   glipiZIDE (GLUCOTROL) 5 mg tablet   Take 5 mg by mouth daily.   0     Active   levothyroxine (SYNTHROID, LEVOTHROID) 112 MCG tablet   Take 112 mcg by mouth daily.   0     Active   sildenafiL, pulm. hypertension, (REVATIO) 20 mg tablet   Take 20 mg by mouth every 8 (eight) hours.   0     Active   metoprolol succinate XL (TOPROL-XL) 25 mg 24 hr tablet   Take 25 mg by mouth daily.   0     Active   latanoprost (XALATAN) 0.005 % ophthalmic solution   Administer 1 drop to both eyes nightly.   0     Active   furosemide (LASIX) 80 mg tablet   Take 1 tablet (80 mg total) by mouth daily.  30 tablet   0 01/25/2021   Active

## 2021-02-12 NOTE — PROGRESS NOTES
[facility]  Office: [address], DO, Aldo Shamar, DO, Cruz Cortes, DO, Ellen Perrin Blood, DO, Shereen Curiel MD, Vidhya Enciso MD, Dipika Pimentel MD, Citlali Cifuentes MD, Analilia Fraser MD, Pebbles Abdalla MD, Rani Lam MD, Tigist Maldonado MD, Jamarcus Fuentes MD, Lexis Lee, DO, Maurilio Louie MD, Rhea Lyn MD, Hannah Samayoa, DO, Michael Hough MD,  Sally Sadler, DO, Loc Ta MD, Nemesio Jean MD, Wilbur Mendoza, CNP, [facility] Patton, CNP, Jacqueline Cooper, CNP, Kena Scott, CNS, Jeromy Byrd, CNP, Laura Gleason, CNP, Eduarda Alford, CNP, Remy Suggs, CNP, Layne Davey, CNP, Cora Sanchez PA-C, Yadira Dominguez, DNP, Mesfin Morrow, CNP, Kimberley Green, CNP, Yaritza Galloway, CNP, Nida Castillo, CNP, Verenice Webb, [address] [place] [address]    Second Visit Note  For more detailed information please refer to the progress note of the day      2/11/2021    8:51 PM    Name:   Supriya Mccann  MRN:     [id number]     [place]:      [de-identified]   Room:   [address] [place] [address] Day:  1  Admit Date:  2/10/2021  6:44 PM    PCP:   Darlyne Gitelman, MD  Code Status:  Full Code    Asked by off going physician to follow-up and monitor patient over the night for acute changes. Patient was seen with nurse and family ( Wallace Larkin) at bedside. Long discussion on current status and plan of care. New labs noted that: BMP showed a sodium of 140 potassium 4.7 chloride 100 CO2 of 20 BUN of 44 and a creatinine of 1.91 which the creatinine is elevated in comparison to earlier today at 40 and 1.59. Her inflammatory markers such as her CRP was 14.6  ferritin of 66 fibrinogen 307 and D-dimer of 1.62,   Her urine sodium was normal at 30 despite diuretics.   Her proBNP has elevated further to 15,749    She was placed on BiPAP for an acute episode of respiratory distress and nursing reports that she pulled it off and it was off upon her entering the room and saturations were 81% and she was placed on nasal cannula. As I evaluate her she is currently on 5 L nasal cannula saturating 88 to 90%. However she was just eating. Most recent vital signs with heart rate of 63 blood pressure 131/95  She has had a total of 300 cc yellow urine from the IV diuretic dose 40 mg IV @ 1800    Physical Exam  Constitutional:       General: She is not in acute distress. Appearance: She is ill-appearing. Cardiovascular:      Rate and Rhythm: Normal rate. Pulmonary:      Effort: No tachypnea, accessory muscle usage or respiratory distress. Breath sounds: Examination of the right-middle field reveals rales. Examination of the left-middle field reveals rales. Examination of the right-lower field reveals rales. Examination of the left-lower field reveals rales. Rales present. Neurological:      Mental Status: She is alert. She is disoriented. GCS: GCS eye subscore is 4. GCS verbal subscore is 5. GCS motor subscore is 6. Comments: There is obvious disorientation as her date is incorrect as she keeps giving me the same number for the calendar day and calendar month. However she follows all commands however purposeful conversation is not fluent. Psychiatric:         Attention and Perception: She is inattentive (Appears to be following along but is not able to verbally keep up with the conversation content). Mood and Affect: Mood normal.         Speech: Speech is delayed (Mildly slow but understandable). Behavior: Behavior is cooperative. Judgment: Judgment is inappropriate. Updated plan :     1.  Monitor respiratory status: encourage NIVPP  Therapy,  I had a long discussion in the presence of the patient, nurse, and daughter that the use of the noninvasive positive pressure to maintain respiratory status is essential.  I explained that if she does not keep this device on we cannot restrain her as the risk of aspiration and she may require intubation. The DPOA present at bedside states she is open to intubation if needed. The patient was not open however given her underlying disorientation, I do not feel she comprehends the decision outcome if she continues to decline and refuse intubation. She was not able to verbally tell me the consequences associated with refusal of intubation if her respiratory status declined along with the neurological assessment of orientation. Therefore deferred decision-making ability to the daughter was at bedside and POA. As needed ABG order placed and discussed with nursing. 2. Monitor diuresis-no further diuretics planned at this time will watch I's and O's closely and kidney function. 3. Monitor renal function-repeat labs around midnight and evaluate. 4. Monitor CV function-continue to monitor on telemetry no acute bradycardia at this time. Monitor blood pressures. Please feel free to reach out if there are any concerns or changes in this patient's condition overnight. I did discuss this with nurse to alert on-call provider for bedside evaluation.         Cain Mcdonald, DOM - CNP  2/11/2021  8:51 PM

## 2021-02-12 NOTE — PROGRESS NOTES
[facility]  Office: [address], DO, Cecille Ram, DO, Sp Oka, DO, Jnei Bower Blood, DO, Morales Rush MD, Aleena Manzanares MD, Cynthia Doss MD, Gladys Alva MD, Shante Ontiveros MD, Usman Moreno MD, Kanika Harkins MD, Gerson Márquez MD, Jamarcus Ham MD, Aston Carrington, DO, Alysa Ye MD, Amanda Alas MD, Georgette Ewing DO, Serena Cason MD,  Jose Alejandro Roper DO, Alessio Phelps MD, Terrell Lockett MD, Ag Packer, [facility], [facility], Edward Trent, CNP, Leydi Rausch, CNS, Yessy Jose, CNP, Mike Sutton, CNP, Swetha Johnson, CNP, Reta Ocampo, CNP, Darline Prasad, CNP, Candy Calle PA-C, Uri García [facility], Poncho Shabazz, CNP, Martín Zuniga, CNP, Annika Martin, CNP, Aimee Mata, CNP, [facility], [address] [place] [address]    Progress Note    2/12/2021    11:52 AM    Name:   Renetta Cotter  MRN:     [id number]     [place]:      [de-identified]   Room:   [facility] Day:  2  Admit Date:  2/10/2021  6:44 PM    PCP:   Jacinto Fong MD  Code Status:  Full Code    Subjective:     C/C:   Chief Complaint   Patient presents with   Chris Aures    Hypotension    Bradycardia     Interval History Status: improved. Yesterday evening patient's oxygen requirements continued to rise, eventually patient was placed on BiPAP and given an additional dose of Lasix. Throughout the night patient required constant encouragement regarding continued BiPAP use. Morning FiO2 has been weaned to 75% from 100%. Patient diuresed approximately 1 L yesterday, discussed care with family at bedside, tilt table test was canceled due to hypoxia. Brief History:     Renetta Cotter is a 80 y.o.  Non-/non  female who presents with Fall, Hypotension, and Bradycardia   and is admitted to the hospital for the management of syncope and collapse with bradycardia.      Patient was brought into the emergency room via EMS after a syncopized and fall at home. . Patient states that she was standing and opening a can and started to [facility] sick\" and proceeded to turn around and fall to the ground. She is unsure if she hit her head. She states she recalls physically falling, but is unsure how long she was out. She states that her family member was there at the home heard her fall and came to check on her and she can recall watching him walk into check on her. . She is unable to explain to me how she felt \"sick\" prior to the fall but tells me she did not have any convulsions shaking loss of bowel bladder or emesis after the event during. And this is never happened to her before. She denied any chest pain shortness of breath palpitations dizziness or lightheadedness or headache prior to the fall but just states \"I felt sick\". On EMS arrival she was noted to be hypotensive with systolic blood pressure in 80s. With a heart rate in the 40s. Upon arrival to the emergency room patient was mildly bradycardic in the high 40s to low 50s and mentating well. With a systolic blood pressure in the 100s  Her work-up in the emergency room revealed: A metabolic panel essentially normal outside of mild elevation of the BUN at 34 and creatinine of 1.41 and a glucose of 154. Her liver profile shows alk phos of 311 ALT normal at 28, and AST 41 with a bilirubin of 1.24. Her hemogram was unremarkable without acute leukocytosis or anemias. Cardiac markers show a proBNP of 12,391 with a high sensitive troponin of 38. Her TSH elevated at 12.34 and a T4 of 1.41. Her chest x-ray showed Stable cardiomegaly with mild pulmonary vascular congestion. In the CT brain showed  Mildchronic deep white matter ischemic changes No acute intracranial abnormalities are noted.      However in review of her last admission  laboratories(2/4/2021) her kidney function was with a BUN of 34 and creatinine of 1.38.   A proBNP of 6729  -9340 and high sensitive troponin of 36 which was downward trend of 41 on admission. And a TSH of 9.17 with a normal T4 of 1.06.       Patient was just recently admitted and discharged 2/4/2021 -in which she was evaluated for heart failure and bradycardia after a fall with epistaxis after tripping over her oxygen tubing. She was to utilize Holter monitor at discharge-which was placed at discharge. And follow-up with Dr. Erik Ennis at [facility].        Review of Systems:     Constitutional:  negative for chills, fevers, sweats  Respiratory:  negative for cough, dyspnea on exertion, shortness of breath, wheezing  Cardiovascular:  negative for chest pain, chest pressure/discomfort, lower extremity edema, palpitations  Gastrointestinal:  negative for abdominal pain, constipation, diarrhea, nausea, vomiting  Neurological:  negative for dizziness, headache    Medications: Allergies:     Allergies   Allergen Reactions    Pcn [Penicillins]     Avelox [Moxifloxacin] Rash       Current Meds:   Scheduled Meds:    furosemide  80 mg Intravenous BID    metOLazone  5 mg Oral Daily    carboxymethylcellulose PF  1 drop Left Eye 4x Daily    heparin (porcine)  5,000 Units Subcutaneous 3 times per day    ipratropium-albuterol  1 ampule Inhalation Q4H WA    levothyroxine  50 mcg Oral Daily    aspirin  81 mg Oral Daily    budesonide-formoterol  2 puff Inhalation BID    gabapentin  300 mg Oral TID    latanoprost  1 drop Both Eyes Nightly    sodium chloride flush  10 mL Intravenous 2 times per day    [Held by provider] lisinopril  5 mg Oral Daily    [Held by provider] carvedilol  3.125 mg Oral BID     insulin lispro  0-6 Units Subcutaneous TID     insulin lispro  0-3 Units Subcutaneous Nightly     Continuous Infusions:    dextrose       PRN Meds: glucose, dextrose, glucagon (rDNA), dextrose, sodium chloride flush, nicotine, promethazine **OR** ondansetron, polyethylene glycol, acetaminophen **OR** acetaminophen    Data:     Past Medical 140 140   K 4.3  --   --   --  5.8* 4.7 4.3     --   --   --  99 100 103   CO2 23  --   --   --  25 20 21   GLUCOSE 154*  --   --   --  151* 179* 144*   BUN 34*  --   --   --  40* 44* 44*   CREATININE 1.41*  --   --   --  1.59* 1.91* 1.68*   MG  --   --   --  2.4  --   --  2.1   ANIONGAP 13  --   --   --  12 20* 16   LABGLOM 35*  --   --   --  30* 25* 29*   GFRAA 42*  --   --   --  37* 30* 35*   CALCIUM 8.6  --   --   --  9.4 9.5 9.1   PHOS  --   --   --   --  4.7* 4.4 4.2   PROBNP 12,391*  --   --  11,783*  --  15,749*  --    TROPHS 40* 38* 34* 30*  --   --   --      Recent Labs     02/10/21  1902 02/10/21  2336 02/11/21  0054 02/11/21  0249 02/11/21  0633 02/11/21  1026 02/11/21  1147 02/11/21  1752 02/11/21  1755 02/11/21  2007 02/12/21  0705 02/12/21  0729   PROT 6.8  --   --   --   --   --   --   --  7.3  --   --   --    LABALBU 3.6  --   --   --  3.7  --   --   --  4.0  4.0  --   --  3.6   LABA1C  --  7.2*  --   --   --   --   --   --   --   --   --   --    TSH 12.34*  --   --  7.15*  --   --   --   --   --   --   --   --    AST 41*  --   --   --   --   --   --   --  53*  --   --   --    ALT 28  --   --   --   --   --   --   --  36*  --   --   --    LDH  --   --   --   --   --   --   --   --  327*  --   --   --    ALKPHOS 311*  --   --   --   --   --   --   --  330*  --   --   --    BILITOT 1.24*  --   --   --   --   --   --   --  1.04  --   --   --    BILIDIR  --   --   --   --   --   --   --   --  0.54*  --   --   --    CHOL  --   --   --  154  --   --   --   --   --   --   --   --    HDL  --   --   --  88  --   --   --   --   --   --   --   --    LDLCHOLESTEROL  --   --   --  53  --   --   --   --   --   --   --   --    CHOLHDLRATIO  --   --   --  1.8  --   --   --   --   --   --   --   --    TRIG  --   --   --  64  --   --   --   --   --   --   --   --    VLDL  --   --   --  NOT REPORTED  --   --   --   --   --   --   --   --    POCGLU  --   --  134*  --   --  87 148* 148*  --  147* 152*  -- Plan:        Syncope -collapse likely secondary to mechanical trip and fall, no loss of consciousness, no head trauma. Work-up currently on hold secondary to respiratory distress  Symptomatic bradycardia-please secondary to #1, work-up on hold currently  Acute exacerbation CHF-appears to be responding to diuretic treatment, add metolazone and continue Lasix 80 mg twice daily. CKD-monitor kidney function  Coronary artery disease-continue aspirin statin  Subconjunctival hemorrhage of left eye -patient seen ophthalmology, secondary to dry eye.   Continue lubricating drops, improving today  Type 2 diabetes-continue insulin sliding scale, point-of-care glucose    Jennifer Fan DO  2/12/2021  11:52 AM

## 2021-02-12 NOTE — PLAN OF CARE
BRONCHOSPASM/BRONCHOCONSTRICTION     [x]         IMPROVE AERATION/BREATH SOUNDS  [x]   ADMINISTER BRONCHODILATOR THERAPY AS APPROPRIATE  [x]   ASSESS BREATH SOUNDS  []   IMPLEMENT AEROSOL/MDI PROTOCOL  [x]   PATIENT EDUCATION AS NEEDED     PROVIDE ADEQUATE OXYGENATION WITH ACCEPTABLE SP02/ABG'S    [x]  IDENTIFY APPROPRIATE OXYGEN THERAPY  [x]   MONITOR SP02/ABG'S AS NEEDED   [x]   PATIENT EDUCATION AS NEEDED     NON INVASIVE VENTILATION    PROVIDE OPTIMAL VENTILATION/ACCEPTABLE SP02  IMPLEMENT NON INVASIVE VENTILATION PROTOCOL  ASSESSMENT SKIN INTEGRITY  PATIENT EDUCATION AS NEEDED  BIPAP AS NEEDED

## 2021-02-12 NOTE — CONSULTS
PULMONARY & CRITICAL CARE MEDICINE CONSULT NOTE     Patient:  Britton Charles  MRN: [id number]  Admit date: 2/10/2021  Primary Care Physician: Mina Sanchez MD  Consulting Physician: Jake Fournier DO    HISTORY     CHIEF COMPLAINT/REASON FOR CONSULT: Shortness of breath    HISTORY OF PRESENT ILLNESS:  The patient is a 80 y.o. female brought to the emergency department via EMS after she had syncopal episode and collapse. History obtained from chart review. Per chart review she was in the kitchen when she felt weak in her legs had a syncopal episode and passed out. She recalls physically falling, did not hit her head but she does not recall how long she was out. Per chart review from patient report this is never happened to her before. However per previous notes she had similar episode with hypotension and bradycardia. Upon arrival to the ED she was bradycardic in high 40s-50s. Mentation stable. Systolic blood pressure 950U. Work-up in the ED was suggestive of BUN-34, creatinine-1.41, glucose-1 4. LFTs, ALP-311, AST ALT normal, bilirubin-1.24.  H&H stable  Cardiac [id number] 391, high-sensitivity troponin 38. TSH 12.34, T4 1.41. D-dimer 1.62. Fibrinogen 307. She did not undergo CT PE. Chest x-ray cardiomegaly, pulmonary vascular congestion. CT head chronic deep white matter changes. No acute intracranial abnormalities. Baseline CKD with creatinine baseline 1.4-1.7. She was put on Holter monitor for previous episode during the previous admission. Recommended follow-up with Dr. Nela Marley at [facility]. Records of follow-up not available. Cardiology evaluated the patient. Electrophysiology also evaluated the patient. She is due for tilt table study with Dr. Deepthi Muñoz At the time of current evaluation.-She appears somnolent/drowsy/poorly responsive to commands. On BiPAP with settings 16/8 FiO2-65 RR-12. Appears to be in respiratory distress.     Per report from nursing had overnight episodes of desaturation-in 80s. Received Lasix. 550 mL urine output overnight. PAST MEDICAL HISTORY:        Diagnosis Date    Arthritis     Breast cancer ([facility]     CAD (coronary artery disease)     [facility]est[facility]     CKD (chronic kidney disease) stage 3, GFR 30-59 ml/min 12/2/2019    COPD (chronic obstructive pulmonary disease) ([facility]     Gastroesophageal reflux disease 8/1/2020    Hyperlipidemia     Hypertension     Recurrent major depression in remission ([facility] 8/1/2020    Thyroid disease     Type 2 diabetes mellitus with kidney complication, without long-term current use of insulin ([facility] 12/2/2019     PAST SURGICAL HISTORY:        Procedure Laterality Date    CATARACT REMOVAL Right     CHOLECYSTECTOMY      COLONOSCOPY      HYSTERECTOMY      KNEE SURGERY      MASTECTOMY      THYROID SURGERY       FAMILY HISTORY:       Problem Relation Age of Onset    No Known Problems Mother     No Known Problems Father      SOCIAL HISTORY:   TOBACCO:   reports that she has never smoked. She has never used smokeless tobacco.  ETOH:  reports no history of alcohol use. DRUGS: reports no history of drug use. AVOCATION/OCCUPATIONAL EXPOSURE HISTORY:    There is no  history of exposure to asbestos or silica dust.  The patient denies coal, foundry, quarry or [employer] exposure. The patient does not have any new pet dogs, cats, turtles or exotic birds at home. There is no history of TB or TB exposure. The patient denies using Hot Tubs. There is no exposure to sick contacts. Travel history is not significant history of risk factors for pulmonary disease. ALLERGIES:    Allergies   Allergen Reactions    Pcn [Penicillins]     Avelox [Moxifloxacin] Rash         HOME MEDICATIONS:  Prior to Admission medications    Medication Sig Start Date End Date Taking?  Authorizing Provider   furosemide (LASIX) 40 MG tablet Take 1 tablet by mouth daily 2/5/21   Sandra Hurst DO sildenafil (REVATIO) 20 MG tablet Take 1 tablet by mouth 3 times daily 2/5/21   Jefm Rump Orlop, DO   trimethoprim-polymyxin b (POLYTRIM) 54804-2.1 UNIT/ML-% ophthalmic solution Place 1 drop into the left eye every 6 hours for 10 days 2/5/21 2/15/21  Jefm Rump Orlop, DO   glipiZIDE (GLUCOTROL) 5 MG tablet Take 0.5 tablets by mouth daily 8/1/20   Henna Dial MD   aspirin 81 MG chewable tablet Take 81 mg by mouth daily    Historical Provider, MD   budesonide-formoterol (SYMBICORT) 160-4.5 MCG/ACT AERO Inhale 2 puffs into the lungs 2 times daily    Historical Provider, MD   gabapentin (NEURONTIN) 300 MG capsule Take 300 mg by mouth 3 times daily. Historical Provider, MD   travoprost, benzalkonium, (TRAVATAN) 0.004 % ophthalmic solution Place 1 drop into both eyes daily    Historical Provider, MD     IMMUNIZATIONS:    There is no immunization history on file for this patient.     REVIEW OF SYSTEMS:  General: negative for chills, fatigue or fever  ENT: negative for headaches, nasal congestion, sore throat or visual changes  Allergy and Immunology: negative for postnasal drip or seasonal allergies  Hematological and Lymphatic: negative for bleeding problems, swollen lymph nodes  Respiratory: positive for shortness of breath negative for hemoptysis  Cardiovascular: negative for edema or palpitations  Gastrointestinal: negative for abdominal pain, change in bowel habits or nausea/vomiting  Genito-Urinary: negative for dysuria or urinary frequency/urgency  Musculoskeletal: negative for joint pain or joint swelling  Neurological: negative for numbness/tingling, seizures or weakness  Dermatological: negative for pruritus or rash    PHYSICAL EXAMINATION     VITAL SIGNS:   /85   Pulse 70   Temp 98 °F (36.7 °C) (Axillary)   Resp 17   Ht 5' 7\" (1.702 m)   Wt 214 lb 1.1 oz (97.1 kg)   LMP  (LMP Unknown)   SpO2 93%   BMI 33.53 kg/m²     SYSTEMIC EXAMINATION:   General appearance -lethargic and poorly responsive to commands. Appears ill  Mental status - drowsy, uncooperative  Eyes - pupils equal and reactive, extraocular eye movements intact  Mouth - mucous membranes moist, pharynx normal without lesions  Neck - supple, no significant adenopathy  Chest -basilar rales and decreased breath sounds. S3 gallop  heart - normal rate, regular rhythm, normal S1, S2, no murmurs, rubs, clicks or gallops  Abdomen - soft, nontender, nondistended, no masses or organomegaly  Neurological -gross motor and sensory normal however poorly responsive to commands and drowsy.   Extremities -+1 pedal edema   skin - normal coloration and turgor, no rashes, no suspicious skin lesions noted     DATA REVIEW     Medications: Current Inpatient  Scheduled Meds:   furosemide  80 mg Intravenous BID    metOLazone  5 mg Oral Daily    gabapentin  300 mg Oral BID    carboxymethylcellulose PF  1 drop Left Eye 4x Daily    heparin (porcine)  5,000 Units Subcutaneous 3 times per day    ipratropium-albuterol  1 ampule Inhalation Q4H WA    levothyroxine  50 mcg Oral Daily    aspirin  81 mg Oral Daily    budesonide-formoterol  2 puff Inhalation BID    latanoprost  1 drop Both Eyes Nightly    sodium chloride flush  10 mL Intravenous 2 times per day    [Held by provider] lisinopril  5 mg Oral Daily    [Held by provider] carvedilol  3.125 mg Oral BID     insulin lispro  0-6 Units Subcutaneous TID     insulin lispro  0-3 Units Subcutaneous Nightly     Continuous Infusions:   dextrose       INPUT/OUTPUT:  In: -   Out: 1000 [Urine:1000]    Ventilator Settings:  Vent Information  Skin Assessment: Clean, dry, & intact  FiO2 : 65 %  SpO2: 93 %  SpO2/FiO2 ratio: 143.08  I Time/ I Time %: 0.95 s  Humidification Source: Heated wire  Humidification Temp: 33  Humidification Temp Measured: 33  Mask Type: Full face mask  Mask Size: Medium  Additional Respiratory  Assessments  Pulse: 70  Resp: 17  SpO2: 93 %  Humidification Source: Heated wire  Humidification Temp: 33  Lab Results   Component Value Date    MODE Bi-Level Ventilation 02/11/2021       LABS:-  ABGs:   No results found for: PH, PCO2, PO2, HCO3, O2SAT  No results for input(s): PHART, PO2ART, ZTP4DXY, ZYG6IYL, BEART, Q4PLICLF in the last 72 hours. Lab Results   Component Value Date    POCPH 7.396 02/11/2021    POCPCO2 41.7 02/11/2021    POCPO2 141.1 (H) 02/11/2021    POCHCO3 25.6 02/11/2021    GOGO6HSP 99 (H) 02/11/2021     CBC:   Recent Labs     02/10/21  1902 02/11/21  0633 02/12/21  0729   WBC 4.8 4.9 3.3*   HGB 12.8 12.8 12.2   HCT 42.0 40.8 39.9   MCV 97.2 96.5 96.1    See Reflexed IPF Result 166   LYMPHOPCT 9*  --   --    RBC 4.32 4.23 4.15   MCH 29.6 30.3 29.4   MCHC 30.5 31.4 30.6   RDW 16.5* 16.9* 16.7*     BMP:   Recent Labs     02/11/21  0633 02/11/21 1755 02/12/21  0729    140 140   K 5.8* 4.7 4.3   CL 99 100 103   CO2 25 20 21   BUN 40* 44* 44*   CREATININE 1.59* 1.91* 1.68*   GLUCOSE 151* 179* 144*   PHOS 4.7* 4.4 4.2     Liver Function Test:   Recent Labs     02/11/21 1755 02/12/21  0729   PROT 7.3  --    LABALBU 4.0  4.0 3.6   ALT 36*  --    AST 53*  --    ALKPHOS 330*  --    BILITOT 1.04  --      Amylase/Lipase:  No results for input(s): AMYLASE, LIPASE in the last 72 hours. Coagulation Profile:   Recent Labs     02/10/21  1902 02/11/21  1755   INR 1.3 1.3   PROTIME 13.3* 13.3*   APTT 24.4  --      Cardiac Enzymes:  No results for input(s): CKTOTAL, CKMB, CKMBINDEX, TROPONINI in the last 72 hours.   Lactic Acid:  No results found for: LACTA  BNP:   No results found for: BNP  D-Dimer:  Lab Results   Component Value Date    DDIMER 1.62 02/11/2021     Others:   Lab Results   Component Value Date    TSH 7.15 (H) 02/11/2021     Lab Results   Component Value Date    CRP 14.6 (H) 02/11/2021     No results found for: Nomi Night  Lab Results   Component Value Date    FERRITIN 66 02/11/2021     No results found for: SPEP, UPEP  No results found for: PSA, CEA, stenosis. ASSESSMENT AND PLAN     Assessment:  //Syncope. Vasovagal versus cardiogenic.  //Bradycardia  //Sick sinus syndrome?  //Severe pulmonary hypertension  //Acute on chronic diastolic congestive heart failure  //Cor pulmonale  //Chronic respiratory failure on home oxygen  //Hypothyroidism  //Congestive hepatomegaly    She follows up with Dr. Farzana Gonsalez at [facility]. She has been on chronic therapy with sildenafil for pulmonary hypertension. Notes from August 2020 [facility] reviewed. She has been on outpatient therapy with fluticasone-Vilanterol as an outpatient. From [facility]. Plan:  BiPAP with current settings. Use as much as possible as needed and nightly  Nasal cannula as tolerated. Initially she was on simple nasal cannula at 4 L. Currently she is on 30 L high flow with 65% FiO2 heated nasal cannula. Cautious diuretic therapy with Lasix. Monitor closely for blood pressure she has pulmonary hypertension. Monitor renal function as well  Continue supplemental oxygen to keep oxygen saturation greater than 92%  Continue incentive spirometry, pulmonary toilet, aspiration precautions and bronchodilators  She will benefit from PPM placement. Continue to monitor I/O with a goal of even/negative fluid balance  DVT and stress ulcer prophylaxis  Physical/occupational therapy; increase activity as tolerated. It was my pleasure to evaluate Renetta Cotter today. We will continue to follow. I would like to thank you for allowing me to participate in the care of this patient. Please feel free to call with any further questions or concerns. Didier Lam M.D. Internal Medicine Resident , PGY-3  [address] [facility]  02/12/21 2:23 PM      Please note that this chart was generated using voice recognition Dragon dictation software.   Although every effort was made to ensure the accuracy of this automated transcription, some errors in transcription may have occurred. Attending Physician Statement  I have discussed the care of Yen Sharp, including pertinent history and exam findings with the resident. I have reviewed the key elements of all parts of the encounter with the resident. I have seen and examined the patient with the resident. I agree with the assessment and plan and status of the problem list as documented. I have seen the patient during my round today, chart reviewed, labs and medications reviewed. I have reviewed the cardiology note, chart seen labs medications reviewed and other events noted. I have also reviewed the echocardiogram results from care everywhere from [address] [place] [address] where she usually followed. I have also reviewed the CTA chest which were done in the past and VQ scan. According to review of the chart (no images available) she has history of pulmonary hypertension/severe pulmonary hypertension with last echo on 01/23/2021 when she was recently admitted to hospital with apparently similar complaints of syncope showed severe pulmonary hypertension with estimated RVSP of 78, severe RV dilatation and left atrial dilatation and diastolic flattening of interventricular septum consistent with right ventricular volume overload systolic function was normal.  She had echocardiogram on 08/14/2020 which shows similar finding with RVSP estimated of 80-85 and right atrial and right ventricular dilatation with diastolic flattening. She also had an echocardiogram on 10/22/2019 which shows a similar finding with RVSP of 87 with RV and right atrial dilatation. She had VQ scan done on 10/21/2019 which was low probability for pulmonary embolism. She had venous Dopplers which were negative on 01/01/2019 and she had a CTA chest done on 03/7/18 which was negative for pulmonary embolism and had right pleural effusion with history of right-sided thoracentesis.   She does have CRIS on CKD baseline creatinine is around 1.4-1.7, she is currently on diuretic chest x-ray is consistent with mild congestion but pleural effusion likely secondary to pulmonary hypertension    Acute on chronic right heart failure. Severe pulmonary arterial hypertension. Acute on chronic hypoxic respiratory failure. COPD by history ? CRIS on CKD. Metabolic encephalopathy. So she has history of longstanding severe pulmonary hypertension with severe RV dilatation and right atrial dilatation and apparently had work-up done for pulmonary embolism all in [facility] other than Revatio I am not sure whether she had been on any other dual or triple therapy for pulmonary hypertension. She is having syncope and bradycardia these are poor prognostic sign of severe pulmonary hypertension and with her age of 80 with longstanding pulmonary hypertension doubt that she is a candidate for aggressive therapy like prostaglandin analog at this time currently. She is currently on BiPAP she is somnolent and lethargic does not follow command and on 65% FiO2 with 12/6 BiPAP saturating 92%. Will be difficult to diurese at this time with severe pulmonary hypertension and if diuretics is used with careful monitoring of blood pressure and creatinine. She does not have good response to diuretic at this time without any improvement in hypoxia  Continue with BiPAP therapy follow-up mentation neurological status. For severe bradycardia pacemaker may be considered seen by EP. I would recommend discussion with family about goal of care and palliative care measures for the reason mentioned above. Please note that this chart was generated using voice recognition Dragon dictation software. Although every effort was made to ensure the accuracy of this automated transcription, some errors in transcription may have occurred.      Paul Oscar MD  2/12/2021 7:47 PM

## 2021-02-12 NOTE — CONSULTS
[facility]  In PatientCardiology Consult             Date:   2/11/2021  Patient name: Mayela Cloud  Date of admission:  2/10/2021  6:44 PM  MRN:   [id number]  YOB: 1929      Reason for Admission:  Syncope, bradycardia    CHIEF COMPLAINT:  Passed out in the kitchen    History Obtained From:  Patient and medical record    HISTORY OF PRESENT ILLNESS:      The patient is a 80 y.o woman with h/o COPD, CAD, DM, HTN and diastolic HF admitted for syncope and bradycardia. She had been standing in her kitchen when she felt a generalized weakness and nausea, followed by brief LOC. She was initially hypotensive with SBP in the 80-90 mmHg range, and bradycardic with HR initially in the 40's. Her BP and heart rate gradually improved overnight, with HR now stable between 60 and 70 bpm.  EKG shows chronic bifasicular block ( RBBB/ LAFB) with normal MS interval, with telemetry showing no evidence of paroxysmal heart block. Past Medical History:   has a past medical history of Arthritis, Breast cancer ([address] CAD (coronary artery disease), CHF (congestive heart failure) ([address] CKD (chronic kidney disease) stage 3, GFR 30-59 ml/min, COPD (chronic obstructive pulmonary disease) ([address] Gastroesophageal reflux disease, Hyperlipidemia, Hypertension, Recurrent major depression in remission ([address] Thyroid disease, and Type 2 diabetes mellitus with kidney complication, without long-term current use of insulin ([address] Past Surgical History:   has a past surgical history that includes Thyroid surgery; Mastectomy; Hysterectomy; Cholecystectomy; Colonoscopy; knee surgery; and Cataract removal (Right). Home Medications:    Prior to Admission medications    Medication Sig Start Date End Date Taking?  Authorizing Provider   furosemide (LASIX) 40 MG tablet Take 1 tablet by mouth daily 2/5/21   Dayana Haq DO   sildenafil (REVATIO) 20 MG tablet Take 1 tablet by mouth 3 times daily 2/5/21   Marcin Macadamia N[place] Eaglep, DO   trimethoprim-polymyxin b (POLYTRIM) 14583-7.1 UNIT/ML-% ophthalmic solution Place 1 drop into the left eye every 6 hours for 10 days 2/5/21 2/15/21  Jackelyn Haq DO   glipiZIDE (GLUCOTROL) 5 MG tablet Take 0.5 tablets by mouth daily 8/1/20   Cy Face, MD   aspirin 81 MG chewable tablet Take 81 mg by mouth daily    Historical Provider, MD   budesonide-formoterol (SYMBICORT) 160-4.5 MCG/ACT AERO Inhale 2 puffs into the lungs 2 times daily    Historical Provider, MD   gabapentin (NEURONTIN) 300 MG capsule Take 300 mg by mouth 3 times daily. Historical Provider, MD   travoprost, benzalkonium, (TRAVATAN) 0.004 % ophthalmic solution Place 1 drop into both eyes daily    Historical Provider, MD       Allergies:  Pcn [penicillins] and Avelox [moxifloxacin]    Social History:   reports that she has never smoked. She has never used smokeless tobacco. She reports that she does not drink alcohol or use drugs. Family History:   Positive for early CAD    REVIEW OF SYSTEMS:    · Constitutional: there has been no unanticipated weight loss. There's been No change in energy level, No change in activity level. · Eyes: No visual changes or diplopia. No scleral icterus. · ENT: No Headaches, hearing loss or vertigo. No mouth sores or sore throat. · Cardiovascular: No problem  · Respiratory: No previous reported problems  · Gastrointestinal: No abdominal pain, appetite loss, blood in stools. No change in bowel or bladder habits. · Genitourinary: No dysuria, trouble voiding, or hematuria. · Musculoskeletal:  No gait disturbance, No weakness or joint complaints. · Integumentary: No rash or pruritis. · Neurological: No headache, diplopia, change in muscle strength, numbness or tingling. No change in gait, balance, coordination, mood, affect, memory, mentation, behavior. · Psychiatric: No anxiety, or depression. · Endocrine: No temperature intolerance.  No excessive thirst, fluid intake, or urination. No tremor. · Hematologic/Lymphatic: No abnormal bruising or bleeding, blood clots or swollen lymph nodes. · Allergic/Immunologic: No nasal congestion or hives. PHYSICAL EXAM:    Physical Examination:    BP (!) 131/95   Pulse 63   Temp 97.7 °F (36.5 °C) (Axillary)   Resp 12   Ht 5' 7\" (1.702 m)   Wt 214 lb 1.1 oz (97.1 kg)   LMP  (LMP Unknown)   SpO2 (!) 81%   BMI 33.53 kg/m²    Constitutional and General Appearance: alert, cooperative, no distress and appears stated age  HEENT: PERRL, no cervical lymphadenopathy. No masses palpable. Normal oral mucosa  Respiratory:  · Normal excursion and expansion without use of accessory muscles  · Resp Auscultation: Good respiratory effort. No for increased work of breathing. On auscultation: clear to auscultation bilaterally  Cardiovascular:  · The apical impulse is not displaced  · Heart tones are crisp and normal. regular S1 and S2. Murmurs:  1/6 systolic murmur at LLSB  · Jugular venous pulsation Normal  · The carotid upstroke is normal in amplitude and contour without delay or bruit  · Peripheral pulses are symmetrical and full   Abdomen:  · No masses or tenderness  · Bowel sounds present  Extremities:  ·  No Cyanosis or Clubbing  ·  Lower extremity edema: Trace  ·  Skin: Warm and dry  Neurological:  · Alert and oriented. · Moves all extremities well  · No abnormalities of mood, affect, memory, mentation, or behavior are noted    DATA:    Diagnostics:      EKG:  Sinus bradycardia, 47 bpm; RBBB and left anterior fasicular block; RI interval 134 msec.         Labs:     CBC:   Recent Labs     02/10/21  1902 02/11/21  0633   WBC 4.8 4.9   HGB 12.8 12.8   HCT 42.0 40.8    See Reflexed IPF Result     BMP:   Recent Labs     02/11/21  0633 02/11/21  1755    PENDING   K 5.8* PENDING   CO2 25 PENDING   BUN 40* PENDING   CREATININE 1.59* PENDING   LABGLOM 30* PENDING   GLUCOSE 151* PENDING     BNP: No results for input(s): BNP in the last 72 hours.  PT/INR:   Recent Labs     02/10/21  1902 02/11/21  1755   PROTIME 13.3* 13.3*   INR 1.3 1.3     APTT:  Recent Labs     02/10/21  1902   APTT 24.4     CARDIAC ENZYMES:No results for input(s): CKTOTAL, CKMB, CKMBINDEX, TROPONINI in the last 72 hours. FASTING LIPID PANEL:  Lab Results   Component Value Date    HDL 88 02/11/2021    TRIG 64 02/11/2021     LIVER PROFILE:  Recent Labs     02/10/21  1902 02/11/21  0633 02/11/21  1755   AST 41*  --  PENDING   ALT 28  --  PENDING   LABALBU 3.6 3.7 PENDING  PENDING         IMPRESSION:    1. Probable vasovagal syncope  2. Sinus bradycardia, improved; due to vagal effects and hypothyroidism with likely sinus node dysfunction   3. Chronic bifasicular block ( RBBB /LAD) with normal [place] interval; no evidence of paroxysmal heart block on telemetry. 4. Stable CAD with old septal infarction on EKG  5. 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 Essential HTN with chronic diastolic HF  7.  Chronic kidney disease    Patient Active Problem List   Diagnosis    Syncope and collapse    Hypothyroidism    Right-sided heart failure (HCC)    Pulmonary hypertension due to COPD ([address]    Essential hypertension    Autonomic neuropathy    CKD (chronic kidney disease) stage 3, GFR 30-59 ml/min    Type 2 diabetes mellitus with kidney complication, without long-term current use of insulin ([address]    CRIS (acute kidney injury) ([address]    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    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 angina pectoris    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 (HCC)    Bradycardia    Mixed hyperlipidemia    Laryngopharyngeal reflux    Peripheral venous insufficiency    Pulmonary hypertension (HCC)    Recurrent major depression in remission ([address]    Vocal cord palsy    Chronic renal insufficiency, stage III (moderate)    COPD without exacerbation (HCC)    Chronic diastolic heart failure ([address]    Fall at home, initial encounter    Hyperglycemia    Falls frequently    Symptomatic bradycardia    Fall    Subconjunctival hemorrhage of left eye    Decompensated heart failure (HCC)       RECOMMENDATIONS:  1. Will review 2D echo and recent Holter monitor reports when available  2. Continue off beta blockers  3. Will anticipate tilt table testing with carotid sinus massage tomorrow. 4. Recommend starting low-dose levothyroxine    Discussed with patient and nursing.     Electronically signed by Lester Abdalla MD on 2/11/2021 at 7:36 PM.  [facility] cardiology Consultant

## 2021-02-12 NOTE — PROGRESS NOTES
[facility]   Progress Note                   Date:   2/12/2021  Patient name: Robert Lawson  Date of admission:  2/10/2021  6:44 PM  MRN:   [id number]  YOB: 1929  PCP: Moose Tamez MD    Reason for Admission: Decompensated heart failure ([facility] [address] [I50.9]    Subjective:       Clinical Changes / Abnormalities: Pt seen and examined in the room with family. Discussed with RN. Pt on Bipap due to resp distress overnight. No Bradycardia. Medications:   Scheduled Meds:   carboxymethylcellulose PF  1 drop Left Eye 4x Daily    heparin (porcine)  5,000 Units Subcutaneous 3 times per day    ipratropium-albuterol  1 ampule Inhalation Q4H WA    [Held by provider] furosemide  80 mg Intravenous BID    levothyroxine  50 mcg Oral Daily    aspirin  81 mg Oral Daily    budesonide-formoterol  2 puff Inhalation BID    gabapentin  300 mg Oral TID    latanoprost  1 drop Both Eyes Nightly    sodium chloride flush  10 mL Intravenous 2 times per day    [Held by provider] lisinopril  5 mg Oral Daily    [Held by provider] carvedilol  3.125 mg Oral BID WC    insulin lispro  0-6 Units Subcutaneous TID WC    insulin lispro  0-3 Units Subcutaneous Nightly     Continuous Infusions:   dextrose       CBC:   Recent Labs     02/10/21  1902 02/11/21  0633 02/12/21  0729   WBC 4.8 4.9 3.3*   HGB 12.8 12.8 12.2    See Reflexed IPF Result 166     BMP:    Recent Labs     02/11/21  0633 02/11/21  1755 02/12/21  0729    140 140   K 5.8* 4.7 4.3   CL 99 100 103   CO2 25 20 21   BUN 40* 44* 44*   CREATININE 1.59* 1.91* 1.68*   GLUCOSE 151* 179* 144*     Hepatic:   Recent Labs     02/10/21  1902 02/11/21  1755   AST 41* 53*   ALT 28 36*   BILITOT 1.24* 1.04   ALKPHOS 311* 330*     Troponin:   Recent Labs     02/10/21  2058 02/10/21  2336 02/11/21  0249   TROPHS 38* 34* 30*     BNP: No results for input(s): BNP in the last 72 hours.   Lipids:   Recent Labs     02/11/21  0249   CHOL 154 bradycardia, improved; due to vagal effects and hypothyroidism with likely sinus node dysfunction   3. Chronic bifasicular block ( RBBB /LAD) with normal WI interval; no evidence of paroxysmal heart block on telemetry. 4. Stable CAD with old septal infarction on EKG  5. 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 Essential HTN with chronic diastolic HF  7. Chronic kidney disease    Patient Active Problem List:     Syncope and collapse     Hypothyroidism     Right-sided heart failure (HCC)     Pulmonary hypertension due to COPD [facility])     Essential hypertension     Autonomic neuropathy     CKD (chronic kidney disease) stage 3, GFR 30-59 ml/min     Type 2 diabetes mellitus with kidney complication, without long-term current use of insulin ([facility])     CRIS (acute kidney injury) ([address]     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 angina pectoris     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 ([facility])     Bradycardia     Mixed hyperlipidemia     Laryngopharyngeal reflux     Peripheral venous insufficiency     Pulmonary hypertension (HCC)     Recurrent major depression in remission ([address]     Vocal cord palsy     Chronic renal insufficiency, stage III (moderate)     COPD without exacerbation (HCC)     Chronic diastolic heart failure ([address]     Fall at home, initial encounter     Hyperglycemia     Falls frequently     Symptomatic bradycardia     Fall     Subconjunctival hemorrhage of left eye     Decompensated heart failure (HCC)     Acute on chronic systolic congestive heart failure (HCC)     Acute respiratory failure with hypoxia ([address]      Plan of Treatment:   1. Likely vasovagal syncope- Tilt table with carotid message Cancelled due to Resp distress and Bipap. Will re-evaluate for Monday. ACE on hold. 2. No holter results. No further bradycardia   3. ECHO cancelled.  ECHO from [facility] 1/21 reviewed and in chart   Severe TR, Elevated RVSP with severe PHTN. Add low dose lasix. No BB due to bradycardia. Was on Sildenafil at home.         Electronically signed by DOM Abdalla CNP on 2/12/2021 at 10:45 AM  [address]  [phone]

## 2021-02-12 NOTE — PLAN OF CARE
Problem: Respiratory  Intervention: Respiratory assessment  Note:   PROVIDE ADEQUATE OXYGENATION WITH ACCEPTABLE SP02/ABG'S    [x]  IDENTIFY APPROPRIATE OXYGEN THERAPY  [x]   MONITOR SP02/ABG'S AS NEEDED   [x]   PATIENT EDUCATION AS NEEDED   BRONCHOSPASM/BRONCHOCONSTRICTION     [x]         IMPROVE AERATION/BREATH SOUNDS  [x]   ADMINISTER BRONCHODILATOR THERAPY AS APPROPRIATE  [x]   ASSESS BREATH SOUNDS  []   IMPLEMENT AEROSOL/MDI PROTOCOL  [x]   PATIENT EDUCATION AS NEEDED

## 2021-02-12 NOTE — PROGRESS NOTES
Pharmacy Note     Renal Dose Adjustment    James Vargas is a 80 y.o. female. Pharmacist assessment of renally cleared medications. Recent Labs     02/11/21  1755 02/12/21  0729   BUN 44* 44*       Recent Labs     02/11/21  1755 02/12/21  0729   CREATININE 1.91* 1.68*       Estimated Creatinine Clearance: 26 mL/min (A) (based on SCr of 1.68 mg/dL (H)). Estimated CrCl using Ideal Body Weight: 21.2 mL/min (based on IBW 61.6 kg)    Height:   Ht Readings from Last 1 Encounters:   02/10/21 5' 7\" (1.702 m)     Weight:  Wt Readings from Last 1 Encounters:   02/11/21 214 lb 1.1 oz (97.1 kg)       The following medication dose has been adjusted based upon renal function:             CHRISSY CRANE, Dr Wendy Azul, to recommend adjusting gabapentin dosing from 300 mg TID to 300 mg BID. MD agreeable. Dose adjusted.     Selin Caba, PharmD 2/12/2021 1:54 PM

## 2021-02-13 ENCOUNTER — APPOINTMENT (OUTPATIENT)
Dept: GENERAL RADIOLOGY | Age: 86
DRG: 291 | End: 2021-02-13
Payer: MEDICARE

## 2021-02-13 LAB
ALBUMIN SERPL-MCNC: 3.5 G/DL (ref 3.5–5.2)
ANION GAP SERPL CALCULATED.3IONS-SCNC: 14 MMOL/L (ref 9–17)
BUN BLDV-MCNC: 50 MG/DL (ref 8–23)
BUN/CREAT BLD: ABNORMAL (ref 9–20)
CALCIUM SERPL-MCNC: 9.2 MG/DL (ref 8.6–10.4)
CHLORIDE BLD-SCNC: 98 MMOL/L (ref 98–107)
CO2: 25 MMOL/L (ref 20–31)
CREAT SERPL-MCNC: 1.73 MG/DL (ref 0.5–0.9)
GFR AFRICAN AMERICAN: 33 ML/MIN
GFR NON-AFRICAN AMERICAN: 28 ML/MIN
GFR SERPL CREATININE-BSD FRML MDRD: ABNORMAL ML/MIN/{1.73_M2}
GFR SERPL CREATININE-BSD FRML MDRD: ABNORMAL ML/MIN/{1.73_M2}
GLUCOSE BLD-MCNC: 165 MG/DL (ref 70–99)
GLUCOSE BLD-MCNC: 169 MG/DL (ref 65–105)
GLUCOSE BLD-MCNC: 178 MG/DL (ref 65–105)
GLUCOSE BLD-MCNC: 179 MG/DL (ref 65–105)
HCT VFR BLD CALC: 39.8 % (ref 36.3–47.1)
HEMOGLOBIN: 12.2 G/DL (ref 11.9–15.1)
MAGNESIUM: 2.1 MG/DL (ref 1.6–2.6)
MCH RBC QN AUTO: 29.2 PG (ref 25.2–33.5)
MCHC RBC AUTO-ENTMCNC: 30.7 G/DL (ref 28.4–34.8)
MCV RBC AUTO: 95.2 FL (ref 82.6–102.9)
NRBC AUTOMATED: 0 PER 100 WBC
PDW BLD-RTO: 16.7 % (ref 11.8–14.4)
PHOSPHORUS: 3.8 MG/DL (ref 2.6–4.5)
PLATELET # BLD: 172 K/UL (ref 138–453)
PMV BLD AUTO: 11.5 FL (ref 8.1–13.5)
POTASSIUM SERPL-SCNC: 3.9 MMOL/L (ref 3.7–5.3)
RBC # BLD: 4.18 M/UL (ref 3.95–5.11)
SODIUM BLD-SCNC: 137 MMOL/L (ref 135–144)
WBC # BLD: 4.1 K/UL (ref 3.5–11.3)

## 2021-02-13 PROCEDURE — 99222 1ST HOSP IP/OBS MODERATE 55: CPT | Performed by: NURSE PRACTITIONER

## 2021-02-13 PROCEDURE — 83735 ASSAY OF MAGNESIUM: CPT

## 2021-02-13 PROCEDURE — 82947 ASSAY GLUCOSE BLOOD QUANT: CPT

## 2021-02-13 PROCEDURE — 6360000002 HC RX W HCPCS: Performed by: INTERNAL MEDICINE

## 2021-02-13 PROCEDURE — 2060000000 HC ICU INTERMEDIATE R&B

## 2021-02-13 PROCEDURE — 74018 RADEX ABDOMEN 1 VIEW: CPT

## 2021-02-13 PROCEDURE — 94640 AIRWAY INHALATION TREATMENT: CPT

## 2021-02-13 PROCEDURE — 6370000000 HC RX 637 (ALT 250 FOR IP): Performed by: INTERNAL MEDICINE

## 2021-02-13 PROCEDURE — 6370000000 HC RX 637 (ALT 250 FOR IP): Performed by: NURSE PRACTITIONER

## 2021-02-13 PROCEDURE — 99233 SBSQ HOSP IP/OBS HIGH 50: CPT | Performed by: INTERNAL MEDICINE

## 2021-02-13 PROCEDURE — 94660 CPAP INITIATION&MGMT: CPT

## 2021-02-13 PROCEDURE — 6370000000 HC RX 637 (ALT 250 FOR IP): Performed by: STUDENT IN AN ORGANIZED HEALTH CARE EDUCATION/TRAINING PROGRAM

## 2021-02-13 PROCEDURE — 94761 N-INVAS EAR/PLS OXIMETRY MLT: CPT

## 2021-02-13 PROCEDURE — 80069 RENAL FUNCTION PANEL: CPT

## 2021-02-13 PROCEDURE — 2700000000 HC OXYGEN THERAPY PER DAY

## 2021-02-13 PROCEDURE — 2580000003 HC RX 258: Performed by: NURSE PRACTITIONER

## 2021-02-13 PROCEDURE — 36415 COLL VENOUS BLD VENIPUNCTURE: CPT

## 2021-02-13 PROCEDURE — 99232 SBSQ HOSP IP/OBS MODERATE 35: CPT | Performed by: INTERNAL MEDICINE

## 2021-02-13 PROCEDURE — 85027 COMPLETE CBC AUTOMATED: CPT

## 2021-02-13 RX ORDER — MAGNESIUM CARB/ALUMINUM HYDROX 105-160MG
30 TABLET,CHEWABLE ORAL DAILY
Status: DISCONTINUED | OUTPATIENT
Start: 2021-02-13 | End: 2021-02-17 | Stop reason: HOSPADM

## 2021-02-13 RX ORDER — FUROSEMIDE 10 MG/ML
80 INJECTION INTRAMUSCULAR; INTRAVENOUS DAILY
Status: DISCONTINUED | OUTPATIENT
Start: 2021-02-14 | End: 2021-02-15

## 2021-02-13 RX ORDER — BISACODYL 10 MG
10 SUPPOSITORY, RECTAL RECTAL DAILY
Status: DISCONTINUED | OUTPATIENT
Start: 2021-02-13 | End: 2021-02-17 | Stop reason: HOSPADM

## 2021-02-13 RX ORDER — DOCUSATE SODIUM 100 MG/1
100 CAPSULE, LIQUID FILLED ORAL 2 TIMES DAILY
Status: DISCONTINUED | OUTPATIENT
Start: 2021-02-13 | End: 2021-02-17 | Stop reason: HOSPADM

## 2021-02-13 RX ORDER — POLYETHYLENE GLYCOL 3350 17 G/17G
17 POWDER, FOR SOLUTION ORAL DAILY
Status: DISCONTINUED | OUTPATIENT
Start: 2021-02-13 | End: 2021-02-17 | Stop reason: HOSPADM

## 2021-02-13 RX ADMIN — HEPARIN SODIUM 5000 UNITS: 5000 INJECTION INTRAVENOUS; SUBCUTANEOUS at 21:38

## 2021-02-13 RX ADMIN — CARBOXYMETHYLCELLULOSE SODIUM 1 DROP: 10 GEL OPHTHALMIC at 17:03

## 2021-02-13 RX ADMIN — METOLAZONE 5 MG: 5 TABLET ORAL at 09:11

## 2021-02-13 RX ADMIN — IPRATROPIUM BROMIDE AND ALBUTEROL SULFATE 1 AMPULE: .5; 3 SOLUTION RESPIRATORY (INHALATION) at 07:47

## 2021-02-13 RX ADMIN — SODIUM CHLORIDE, PRESERVATIVE FREE 10 ML: 5 INJECTION INTRAVENOUS at 21:57

## 2021-02-13 RX ADMIN — ASPIRIN 81 MG: 81 TABLET ORAL at 09:13

## 2021-02-13 RX ADMIN — BISACODYL 10 MG: 10 SUPPOSITORY RECTAL at 11:11

## 2021-02-13 RX ADMIN — INSULIN LISPRO 1 UNITS: 100 INJECTION, SOLUTION INTRAVENOUS; SUBCUTANEOUS at 20:36

## 2021-02-13 RX ADMIN — POLYETHYLENE GLYCOL 3350 17 G: 17 POWDER, FOR SOLUTION ORAL at 08:20

## 2021-02-13 RX ADMIN — SODIUM CHLORIDE, PRESERVATIVE FREE 10 ML: 5 INJECTION INTRAVENOUS at 09:14

## 2021-02-13 RX ADMIN — INSULIN LISPRO 1 UNITS: 100 INJECTION, SOLUTION INTRAVENOUS; SUBCUTANEOUS at 11:59

## 2021-02-13 RX ADMIN — FUROSEMIDE 80 MG: 10 INJECTION, SOLUTION INTRAMUSCULAR; INTRAVENOUS at 09:12

## 2021-02-13 RX ADMIN — CARBOXYMETHYLCELLULOSE SODIUM 1 DROP: 10 GEL OPHTHALMIC at 09:10

## 2021-02-13 RX ADMIN — STANDARDIZED SENNA CONCENTRATE 17.2 MG: 8.6 TABLET ORAL at 21:38

## 2021-02-13 RX ADMIN — LEVOTHYROXINE SODIUM 50 MCG: 50 TABLET ORAL at 07:13

## 2021-02-13 RX ADMIN — HEPARIN SODIUM 5000 UNITS: 5000 INJECTION INTRAVENOUS; SUBCUTANEOUS at 06:12

## 2021-02-13 RX ADMIN — LATANOPROST 1 DROP: 50 SOLUTION OPHTHALMIC at 20:34

## 2021-02-13 RX ADMIN — GABAPENTIN 300 MG: 300 CAPSULE ORAL at 09:11

## 2021-02-13 RX ADMIN — IPRATROPIUM BROMIDE AND ALBUTEROL SULFATE 1 AMPULE: .5; 3 SOLUTION RESPIRATORY (INHALATION) at 21:30

## 2021-02-13 RX ADMIN — IPRATROPIUM BROMIDE AND ALBUTEROL SULFATE 1 AMPULE: .5; 3 SOLUTION RESPIRATORY (INHALATION) at 15:47

## 2021-02-13 RX ADMIN — CARBOXYMETHYLCELLULOSE SODIUM 1 DROP: 10 GEL OPHTHALMIC at 13:03

## 2021-02-13 RX ADMIN — DOCUSATE SODIUM 100 MG: 100 CAPSULE, LIQUID FILLED ORAL at 21:38

## 2021-02-13 RX ADMIN — CARBOXYMETHYLCELLULOSE SODIUM 1 DROP: 10 GEL OPHTHALMIC at 20:34

## 2021-02-13 RX ADMIN — IPRATROPIUM BROMIDE AND ALBUTEROL SULFATE 1 AMPULE: .5; 3 SOLUTION RESPIRATORY (INHALATION) at 12:21

## 2021-02-13 ASSESSMENT — PAIN SCALES - GENERAL: PAINLEVEL_OUTOF10: 0

## 2021-02-13 NOTE — PLAN OF CARE
Problem: Respiratory  Intervention: Respiratory assessment  Note: BRONCHOSPASM/BRONCHOCONSTRICTION    IMPROVE  AERATION/BREATHSOUNDS  ADMINISTER BRONCHODILATOR THERAPY AS APPROPRIATE  ASSESS BREATH SOUNDS  PATIENT EDUCATION AS NEEDED     PROVIDE ADEQUATE OXYGENATION WITH ACCEPTABLE SP02/ABG'S    [x]  IDENTIFY APPROPRIATE OXYGEN THERAPY  [x]   MONITOR SP02/ABG'S AS NEEDED   [x]   PATIENT EDUCATION AS NEEDED    NONINVASIVE VENTILATION    PROVIDE OPTIMAL VENTILATION/ACCEPTABLE SPO2   IMPLEMENT NONINVASIVE VENTILATION PROTOCOL   MAINTAIN ACCEPTABLE SPO2   ASSESS SKIN INTEGRITY/BREAKDOWN SCORE   PATIENT EDUCATION AS NEEDED   BIPAP AS NEEDED

## 2021-02-13 NOTE — CARE COORDINATION
Transitional planning-call from Jovany Zhao wanting to know were a referral was faxed.  Attempted to call-phone busy

## 2021-02-13 NOTE — CONSULTS
General Surgery: Consult Note        PATIENT NAME: Quintin Chen   YOB: 1929    ADMISSION DATE: 2/10/2021  6:44 PM     Admitting Provider: Dr. Jorge Osman Physician: Dr. Eneida Magana DATE: 2/13/2021    Consult Regarding:  Constipation/fecostasis    HISTORY OF PRESENT ILLNESS:  The patient is a 80 y.o. female who was admitted on 2/11/2021 for syncope. Patient has a hx of CAD, CHF, HLD, HTN, hypothyroidism, CKD, DM; s/p hysterectomy, cholecystectomy, mastectomy for breast CA. Patient states that she has not had a bowel movement in the last 5 days. States that she is usually constipated and will have a bowel movement every 4 days or so; does take medications at home to help with constipation. Had a colonoscopy recently although she does not know the results of the test. Denies bloody BMs. Tolerating regular food at this time with some questions concerning ability to swallow and denies nausea or vomiting. Denies any abdominal pain. Patient is currently on bipap - will answer questions here and there but appears tired to answer all questions. No family at bedside. KUB with nonobstructive pattern. No heavy stool burden. Electrolytes normal. So far has received dulcolax suppository, mirlrax and soap suds enema today. Senna to be given tonight.        Past Medical History:        Diagnosis Date    Arthritis     Breast cancer ([address]     CAD (coronary artery disease)     CHF (congestive heart failure) (HCC)     CKD (chronic kidney disease) stage 3, GFR 30-59 ml/min 12/2/2019    COPD (chronic obstructive pulmonary disease) ([address]     Gastroesophageal reflux disease 8/1/2020    Hyperlipidemia     Hypertension     Recurrent major depression in remission ([address] 8/1/2020    Thyroid disease     Type 2 diabetes mellitus with kidney complication, without long-term current use of insulin ([address] 12/2/2019       Past Surgical History:        Procedure Laterality Date    CATARACT REMOVAL Right  CHOLECYSTECTOMY      COLONOSCOPY      HYSTERECTOMY      KNEE SURGERY      MASTECTOMY      THYROID SURGERY         Medications Prior to Admission:   Medications Prior to Admission: furosemide (LASIX) 40 MG tablet, Take 1 tablet by mouth daily  sildenafil (REVATIO) 20 MG tablet, Take 1 tablet by mouth 3 times daily  trimethoprim-polymyxin b (POLYTRIM) 07186-4.1 UNIT/ML-% ophthalmic solution, Place 1 drop into the left eye every 6 hours for 10 days  glipiZIDE (GLUCOTROL) 5 MG tablet, Take 0.5 tablets by mouth daily  aspirin 81 MG chewable tablet, Take 81 mg by mouth daily  budesonide-formoterol (SYMBICORT) 160-4.5 MCG/ACT AERO, Inhale 2 puffs into the lungs 2 times daily  gabapentin (NEURONTIN) 300 MG capsule, Take 300 mg by mouth 3 times daily. travoprost, benzalkonium, (TRAVATAN) 0.004 % ophthalmic solution, Place 1 drop into both eyes daily    Allergies:  Pcn [penicillins] and Avelox [moxifloxacin]    Social History:   Social History     Socioeconomic History    Marital status:       Spouse name: Not on file    Number of children: Not on file    Years of education: Not on file    Highest education level: Not on file   Occupational History    Not on file   Social Needs    Financial resource strain: Not on file    Food insecurity     Worry: Not on file     Inability: Not on file    Transportation needs     Medical: Not on file     Non-medical: Not on file   Tobacco Use    Smoking status: Never Smoker    Smokeless tobacco: Never Used   Substance and Sexual Activity    Alcohol use: Never    Drug use: Never    Sexual activity: Never   Lifestyle    Physical activity     Days per week: Not on file     Minutes per session: Not on file    Stress: Not on file   Relationships    Social connections     Talks on phone: Not on file     Gets together: Not on file     Attends Anabaptist service: Not on file     Active member of club or organization: Not on file     Attends meetings of clubs or organizations: Not on file     Relationship status: Not on file    Intimate partner violence     Fear of current or ex partner: Not on file     Emotionally abused: Not on file     Physically abused: Not on file     Forced sexual activity: Not on file   Other Topics Concern    Not on file   Social History Narrative    Not on file       Family History:       Problem Relation Age of Onset    No Known Problems Mother     No Known Problems Father        REVIEW OF SYSTEMS:    CONSTITUTIONAL: Denies recent weight loss, fatigue, fevers, chills. HEENT: Denies rhinorrhea, dysphagia, odynphagia. CARDIOVASCULAR: Denies history of MI, recent chest pain. RESPIRATORY: +SOB  GASTROINTESTINAL: + Chronic constipation; denies nausea/vomiting  GENITOURINARY: Denies increased frequency or dysuria. HEMATOLOGIC/LYMPHATIC: Denies history of anemia or DVTs. ENDOCRINE: Has history of thyroid problems but not specified has treated diabetes. MSK: Denies rashes or lesions  NEURO: +syncope      PHYSICAL EXAM:    VITALS:  /76   Pulse 77   Temp 98.2 °F (36.8 °C) (Axillary)   Resp 16   Ht 5' 7\" (1.702 m)   Wt 214 lb 1.1 oz (97.1 kg)   LMP  (LMP Unknown)   SpO2 95%   BMI 33.53 kg/m²   INTAKE/OUTPUT:     Intake/Output Summary (Last 24 hours) at 2/13/2021 1615  Last data filed at 2/13/2021 1506  Gross per 24 hour   Intake 600 ml   Output 1650 ml   Net -1050 ml       CONSTITUTIONAL:  awake, alert, not distressed and morbidly obese  HEENT: Normocephalic/atraumatic, without obvious abnormality. NECK:  Supple, symmetrical, trachea midline   CARDIOVASCULAR: Regular rate and rhythm   LUNGS: normal effort with symmetric rise and fall of chest wall status post mastectomy  ABDOMEN: soft, obese, nontender to palpation. No guarding or rebound tenderness  MUSCULOSKELETAL: Muscle strength intact in all extremities bilaterally. NEUROLOGIC: CN II- XII intact. Gross motor intact without focal weakness.   SKIN: No cyanosis, rashes, or edema noted.       Hematology:  Recent Labs     02/10/21  1902 02/11/21  0633 02/11/21  1755 02/12/21  0729 02/13/21  0758   WBC 4.8 4.9  --  3.3* 4.1   RBC 4.32 4.23  --  4.15 4.18   HGB 12.8 12.8  --  12.2 12.2   HCT 42.0 40.8  --  39.9 39.8   MCV 97.2 96.5  --  96.1 95.2   MCH 29.6 30.3  --  29.4 29.2   MCHC 30.5 31.4  --  30.6 30.7   RDW 16.5* 16.9*  --  16.7* 16.7*    See Reflexed IPF Result  --  166 172   MPV 10.8 NOT REPORTED  --  10.1 11.5   CRP  --   --  14.6*  --   --    INR 1.3  --  1.3  --   --    DDIMER  --   --  1.62  --   --      Chemistry:  Recent Labs     02/10/21  1902 02/10/21  2058 02/10/21  2336 02/11/21  0249 02/11/21  0249 02/11/21  1755 02/12/21  0729 02/13/21  0758     --   --   --    < > 140 140 137   K 4.3  --   --   --    < > 4.7 4.3 3.9     --   --   --    < > 100 103 98   CO2 23  --   --   --    < > 20 21 25   GLUCOSE 154*  --   --   --    < > 179* 144* 165*   BUN 34*  --   --   --    < > 44* 44* 50*   CREATININE 1.41*  --   --   --    < > 1.91* 1.68* 1.73*   MG  --   --   --  2.4  --   --  2.1 2.1   ANIONGAP 13  --   --   --    < > 20* 16 14   LABGLOM 35*  --   --   --    < > 25* 29* 28*   GFRAA 42*  --   --   --    < > 30* 35* 33*   CALCIUM 8.6  --   --   --    < > 9.5 9.1 9.2   PHOS  --   --   --   --    < > 4.4 4.2 3.8   PROBNP 12,391*  --   --  11,783*  --  15,749*  --   --    TROPHS 40* 38* 34* 30*  --   --   --   --     < > = values in this interval not displayed.      Recent Labs     02/10/21  1902 02/10/21  2336 02/10/21  2336 02/11/21 0249 02/11/21 0249 02/11/21 1752 02/11/21 1755 02/11/21 2007 02/12/21  0705 02/12/21  0729 02/12/21 2007 02/13/21  0714 02/13/21  0758 02/13/21  1106   PROT 6.8  --   --   --   --   --  7.3  --   --   --   --   --   --   --    LABALBU 3.6  --   --   --    < >  --  4.0  4.0  --   --  3.6  --   --  3.5  --    LABA1C  --  7.2*  --   --   --   --   --   --   --   --   --   --   --   --    TSH 12.34*  --   --  7.15*  --   --   -- --   --   --   --   --   --   --    AST 41*  --   --   --   --   --  53*  --   --   --   --   --   --   --    ALT 28  --   --   --   --   --  36*  --   --   --   --   --   --   --    LDH  --   --   --   --   --   --  327*  --   --   --   --   --   --   --    ALKPHOS 311*  --   --   --   --   --  330*  --   --   --   --   --   --   --    BILITOT 1.24*  --   --   --   --   --  1.04  --   --   --   --   --   --   --    BILIDIR  --   --   --   --   --   --  0.54*  --   --   --   --   --   --   --    CHOL  --   --   --  154  --   --   --   --   --   --   --   --   --   --    HDL  --   --   --  88  --   --   --   --   --   --   --   --   --   --    LDLCHOLESTEROL  --   --   --  53  --   --   --   --   --   --   --   --   --   --    CHOLHDLRATIO  --   --   --  1.8  --   --   --   --   --   --   --   --   --   --    TRIG  --   --   --  64  --   --   --   --   --   --   --   --   --   --    VLDL  --   --   --  NOT REPORTED  --   --   --   --   --   --   --   --   --   --    POCGLU  --   --    < >  --    < > 148*  --  147* 152*  --  225* 169*  --  178*    < > = values in this interval not displayed. Pertinent Radiology:       Xr Abdomen (kub) (single Ap View)    Result Date: 2/13/2021  EXAMINATION: ONE SUPINE XRAY VIEW(S) OF THE ABDOMEN 2/13/2021 1:28 pm COMPARISON: None. HISTORY: ORDERING SYSTEM PROVIDED HISTORY: constipation TECHNOLOGIST PROVIDED HISTORY: constipation Reason for Exam: constipation Type of Exam: Initial FINDINGS: Mild to moderate gas and fecal material seen throughout the colon. Nonspecific bowel gas pattern without evidence of obstruction. No abnormal calcifications. No acute osseous abnormality. No evidence of bowel obstruction.  Probable constipation         ASSESSMENT:  Active Hospital Problems    Diagnosis Date Noted    Acute on chronic systolic congestive heart failure ([facility]) [I50.23]     Acute respiratory failure with hypoxia ([facility]) [J96.01]     Decompensated heart failure ([facility] [address] [I50.9] 02/10/2021    Subconjunctival hemorrhage of left eye [H11.32]     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 angina pectoris [I25.10] 08/01/2020    Bradycardia [R00.1] 08/01/2020    CKD (chronic kidney disease) stage 3, GFR 30-59 ml/min [N18.30] 12/02/2019    Type 2 diabetes mellitus with kidney complication, without long-term current use of insulin ([facility] [address] [E11.29] 12/02/2019    Syncope and collapse [R55] 12/01/2019       3 80year old female admitted for syncope; constipation w/o BM for 5 days  2. Hx of constipation likely multifactorial with history of diabetes, poor functional status and chronic kidney disease and probable medication effect    Plan:  1. Continue medical mgmt and supportive care per primary  2. OK to continue diet  3. KUB no obstruction; not a lot of stool burden  4. Milk and molasses enema with mineral oil PO  5. Will continue to monitor; continue bowel regimen with Colace BID, miralax, suppository daily until has regular bowel movements. Discussed with Dr. Sadiq Fajardo    Electronically signed by Sue Diaz DO  on 2/13/2021 at 4:15 PM   I attest that I was present with the resident during the patient's evaluation and agree with the description of findings and plan as dictated above.   Demetrius Hayward MD

## 2021-02-13 NOTE — PROGRESS NOTES
PULMONARY & CRITICAL CARE MEDICINE PROGRESS  NOTE     Patient:  Mayela Cloud  MRN: [id number]  Admit date: 2/10/2021    SUBJECTIVE     I personally interviewed/examined the patient; reviewed interval history, interpreted all available radiographic, laboratory data at the time of service. Chief Compliant/Reason for Initial Consult: Respiratory distress  HISTORY OF PRESENT ILLNESS:  The patient is a 80 y.o. female brought to the emergency department via EMS after she had syncopal episode and collapse. History obtained from chart review. Per chart review she was in the kitchen when she felt weak in her legs had a syncopal episode and passed out. She recalls physically falling, did not hit her head but she does not recall how long she was out. Per chart review from patient report this is never happened to her before. However per previous notes she had similar episode with hypotension and bradycardia. Upon arrival to the ED she was bradycardic in high 40s-50s. Mentation stable. Systolic blood pressure 627J. Work-up in the ED was suggestive of BUN-34, creatinine-1.41, glucose-1 4. LFTs, ALP-311, AST ALT normal, bilirubin-1.24.  H&H stable  Cardiac [id number] 391, high-sensitivity troponin 38. TSH 12.34, T4 1.41. D-dimer 1.62. Fibrinogen 307. She did not undergo CT PE. Chest x-ray cardiomegaly, pulmonary vascular congestion. CT head chronic deep white matter changes. No acute intracranial abnormalities. Baseline CKD with creatinine baseline 1.4-1.7. She was put on Holter monitor for previous episode during the previous admission. Recommended follow-up with Dr. Kyler Araiza at [facility]. Records of follow-up not available. Cardiology evaluated the patient. Electrophysiology also evaluated the patient. She is due for tilt table study with Dr. Erick Ferro.   At the time of current evaluation.-She appears somnolent/drowsy/poorly responsive to commands. On BiPAP with settings 16/8 FiO2-65 RR-12. Appears to be in respiratory distress. Per report from nursing had overnight episodes of desaturation-in 80s. Received Lasix. 550 mL urine output overnight.       Interval history. 2/13/2021. Patient seen. Chart reviewed. No acute events overnight. She was on BiPAP overnight. This morning at the time of evaluation she was on BiPAP. Intermittently she has been on high flow as well. She has tolerated them both well. However she is somnolent and weak. Her high flow settings are 30 L 70%.       Review of Systems:  General: negative for chills, fatigue or fever  ENT: negative for headaches, nasal congestion, sore throat or visual changes  Allergy and Immunology: negative for postnasal drip or seasonal allergies  Hematological and Lymphatic: negative for bleeding problems, swollen lymph nodes  Respiratory: positive for shortness of breath negative for hemoptysis  Cardiovascular: negative for edema or palpitations  Gastrointestinal: negative for abdominal pain, change in bowel habits or nausea/vomiting  Genito-Urinary: negative for dysuria or urinary frequency/urgency  Musculoskeletal: negative for joint pain or joint swelling  Neurological: negative for numbness/tingling, seizures or weakness  Dermatological: negative for pruritus or rash    OBJECTIVE     Respiratory Support:  Vent Information  Skin Assessment: Clean, dry, & intact  FiO2 : 60 %  SpO2: 97 %  SpO2/FiO2 ratio: 161.67  I Time/ I Time %: 0.95 s  Humidification Source: Heated wire  Humidification Temp: 33  Humidification Temp Measured: 33  Mask Type: Full face mask  Mask Size: Medium  Lab Results   Component Value Date    MODE Bi-Level Ventilation 02/11/2021     O2 Flow Rate (L/min): 30 L/min  SpO2: 97 %    Vitals:   /76   Pulse 68   Temp 98.2 °F (36.8 °C) (Axillary)   Resp 18   Ht 5' 7\" (1.702 m)   Wt 214 lb 1.1 oz (97.1 kg)   LMP  (LMP 02/10/21  1902 02/11/21  0633 02/12/21 0729 02/13/21  0758   WBC 4.8 4.9 3.3* 4.1   HGB 12.8 12.8 12.2 12.2   HCT 42.0 40.8 39.9 39.8   MCV 97.2 96.5 96.1 95.2    See Reflexed IPF Result 166 172   LYMPHOPCT 9*  --   --   --    RBC 4.32 4.23 4.15 4.18   MCH 29.6 30.3 29.4 29.2   MCHC 30.5 31.4 30.6 30.7   RDW 16.5* 16.9* 16.7* 16.7*     BMP:   Recent Labs     02/11/21 1755 02/12/21 0729 02/13/21 0758    140 137   K 4.7 4.3 3.9    103 98   CO2 20 21 25   BUN 44* 44* 50*   CREATININE 1.91* 1.68* 1.73*   GLUCOSE 179* 144* 165*   PHOS 4.4 4.2 3.8     Liver Function Test:   Recent Labs     02/11/21 1755 02/11/21 1755 02/13/21 0758   PROT 7.3  --   --    LABALBU 4.0  4.0   < > 3.5   ALT 36*  --   --    AST 53*  --   --    ALKPHOS 330*  --   --    BILITOT 1.04  --   --     < > = values in this interval not displayed. Amylase/Lipase:  No results for input(s): AMYLASE, LIPASE in the last 72 hours. Coagulation Profile:   Recent Labs     02/10/21  1902 02/11/21  1755   INR 1.3 1.3   PROTIME 13.3* 13.3*   APTT 24.4  --      Cardiac Enzymes:  No results for input(s): CKTOTAL, CKMB, CKMBINDEX, TROPONINI in the last 72 hours.   Lactic Acid:  No results found for: LACTA  BNP:   No results found for: BNP  D-Dimer:  Lab Results   Component Value Date    DDIMER 1.62 02/11/2021     Others:   Lab Results   Component Value Date    TSH 7.15 (H) 02/11/2021     Lab Results   Component Value Date    CRP 14.6 (H) 02/11/2021     No results found for: Kathryn Tarsha  Lab Results   Component Value Date    FERRITIN 66 02/11/2021     No results found for: SPEP, UPEP  No results found for: PSA, CEA, , [id number],     Input/Output:    Intake/Output Summary (Last 24 hours) at 2/13/2021 1210  Last data filed at 2/13/2021 2454  Gross per 24 hour   Intake 600 ml   Output 1100 ml   Net -500 ml     Microbiology:  Reviewed as available  Pathology: Reviewed as available     Radiology:     Chest X-ray: 2/11/2021  Impression   1.  Congestive heart failure is most likely given the radiographic findings;   pneumonia is also a consideration in areas of consolidation with pleural   effusion. 2. Cardiomegaly. 3. No pneumothorax evident. 4. No acute osseous abnormality seen.      CT Scans: She has been periodically evaluated by CT PE in the past as well as evidenced from records in Care Everywhere.     She had been evaluated by VQ scan in 2019 which was low probability for pulmonary embolus when she presented with similar complaints of congestive heart failure and COPD.     Echocardiogram: From care everywhere from [address] [place] [address] 2021  Result Narrative     Left Ventricle: Systolic function is normal with an ejection fraction   of 55-60%.   Left Ventricle: There is diastolic flattening of the interventricular   septum consistent with right ventricle volume overload.   Left Ventricle: No segmental wall motion abnormalities.   Left Atrium: Left atrium is normal in size. The left atrial volume   index is 30.7 mL/m2.   Right Ventricle: Right ventricular size is severely dilated. Moderator   band is present.   Right Ventricle: Systolic function is moderately to severely reduced.   Right Atrium: Right atrium is severely dilated. The right atrial area   is 24.4 cm2.   Aortic Valve: The aortic valve is trileaflet. The leaflets are mildly   thickened.   Mitral Valve: There is trace regurgitation. There is no evidence of   mitral valve stenosis.   Tricuspid Valve: There is severe regurgitation. There is no evidence of   tricuspid valve stenosis.   Tricuspid Valve: RVSP calculated at 78 mmHg. RVSP is based on RA   pressure of 15 mmHg.   Pericardium: There is no pericardial effusion.   Pulmonic Valve: There is mild regurgitation. There is no evidence of   pulmonic valve stenosis.        ASSESSMENT AND PLAN     Assessment:  //Syncope.   Vasovagal versus cardiogenic.  //Bradycardia  //Sick sinus syndrome?  //Severe pulmonary hypertension  //Acute on chronic diastolic congestive heart failure  //Cor pulmonale  //Chronic respiratory failure on home oxygen  //Hypothyroidism  //Congestive hepatomegaly     She follows up with Dr. Americo Segovia at [address] [place] [address]. She has been on chronic therapy with sildenafil for pulmonary hypertension. Notes from August 2020 [facility] reviewed. She has been on outpatient therapy with fluticasone-Vilanterol as an outpatient. From [facility]. Plan:  30 L high flow with 70% FiO2 heated nasal cannula. Use bipap prn for distress   She is on Symbicort twice daily and DuoNeb nebulization every 4. Cautious diuretic therapy with Lasix. Monitor closely for blood pressure as she has pulmonary hypertension. She is on 80 Lasix twice daily IV with oral metolazone 5 daily. Continue supplemental oxygen to keep oxygen saturation 88- 92%  Continue incentive spirometry, pulmonary toilet, aspiration precautions and bronchodilators  She will benefit from PPM placement. Continue to monitor I/O with a goal of even/negative fluid balance  DVT and stress ulcer prophylaxis  Physical/occupational therapy; increase activity as tolerated      Emelia Restrepo M.D. Internal Medicine Resident , PGY-3  [address] [place] [address]  02/13/21 12:10 PM      Please note that this chart was generated using voice recognition Dragon dictation software. Although every effort was made to ensure the accuracy of this automated transcription, some errors in transcription may have occurred. Attending Physician Statement  I have discussed the care of Angelic Ross, including pertinent history and exam findings,  with the resident. I have seen and examined the patient and the key elements of all parts of the encounter have been performed by me. I agree with the assessment, plan and orders as documented by the resident with additions .     Severe pulmonary htn with right heart failure , advanced age - therapy options limited . Advice palliative care   Treatment plan Discussed with nursing staff in detail , all questions answered . Electronically signed by Radha Pierre MD on   2/13/21 at 9:16 PM EST    Please note that this chart was generated using voice recognition Dragon dictation software. Although every effort was made to ensure the accuracy of this automated transcription, some errors in transcription may have occurred.

## 2021-02-13 NOTE — PLAN OF CARE
Problem: Musculor/Skeletal Functional Status  Goal: Highest potential functional level  Outcome: Ongoing     Problem: Breathing Pattern - Ineffective:  Goal: Ability to achieve and maintain a regular respiratory rate will improve  Description: Ability to achieve and maintain a regular respiratory rate will improve  Outcome: Ongoing

## 2021-02-13 NOTE — PLAN OF CARE
BRONCHOSPASM/BRONCHOCONSTRICTION     [x]         IMPROVE AERATION/BREATH SOUNDS  [x]   ADMINISTER BRONCHODILATOR THERAPY AS APPROPRIATE  [x]   ASSESS BREATH SOUNDS  []   IMPLEMENT AEROSOL/MDI PROTOCOL  [x]   PATIENT EDUCATION AS NEEDED       NON INVASIVE VENTILATION  PROVIDE OPTIMAL VENTILATION/ACCEPTABLE SP02  IMPLEMENT NON INVASIVE VENTILATION PROTOCOL  ASSESSMENT SKIN INTEGRITY  PATIENT EDUCATION AS NEEDED  BIPAP AS NEEDED       PROVIDE ADEQUATE OXYGENATION WITH ACCEPTABLE SP02/ABG'S    [x]  IDENTIFY APPROPRIATE OXYGEN THERAPY  [x]   MONITOR SP02/ABG'S AS NEEDED   [x]   PATIENT EDUCATION AS NEEDED

## 2021-02-13 NOTE — CONSULTS
Palliative Care Inpatient Consult    NAME:  Renu Geronimo RECORD NUMBER:  [id number]  AGE: 80 y.o. GENDER: female  : 1929  TODAY'S DATE:  2021    Reasons for Consultation:    Symptom and/or pain management  Provision of information regarding PC and/or hospice philosophies  Complex, time-intensive communication and interdisciplinary psychosocial support  Clarification of goals of care and/or assistance with difficult decision-making  Guidance in regards to resources and transition(s)    Members of PC team contributing to this consultation are :  Ligia Srinivasan, Palliative Care CNP  History of Present Illness     The patient is a 80 y.o. Non-/non  female who presents with Fall, Hypotension, and Bradycardia      Referred to Palliative Care by   [x] Physician   [] Nursing  [] Family Request   [] Other:       She was admitted to the primary service for Decompensated heart failure ([facility] [I50.9]. Her hospital course has been associated with Syncope and collapse, acute on chronic CHF, symptomatic bradycardia, subconjunctival hemorrhage of left eye, and fall. The patient has a complicated medical history and has been hospitalized since 2/10/2021  6:44 PM.  I reviewed patient's EMR, spoke to RN, and patient/patient's family to collect history. Patient has a history of breast cancer s/p mastectomy, CAD, HLD, HTN, thyroid disease, COPD on home 02, CKD, and diabetes. Patient was recently discharged on  after being admitted for symptomatic bradycardia and fall from tripping over her oxygen line. She was sent home with Holter monitor, and beta-blocker on hold with instructions to follow-up with cardiology in 1 week. Patient is on sildenafil for chronic pulmonary hypertension. EMS was called to patient's house for left due to a fall. Patient had reported opening a can in her kitchen, \"feeling sick\", and had turned and fell.   Patient is unsure if she hit her head, and reports possibility of LOC. She denies loss of bowel/bladder, or seizure-like activity. Upon EMS arrival they reported systolic blood pressure in 80s, and patient bradycardic in 40s. Patient had complained of a headache, without any other symptoms. Upon arrival to the ER patient's systolic BP was in the 227G. EKG revealed bradycardia 47, and bifascicular block. CT head revealed no acute intracranial findings. Chest x-ray revealed stable cardiomegaly, with mild vascular congestion. Admitting pertinent labs include; BUN 34, creatinine 1.41, alk phos 311, AST 41, total bilirubin 1.24, troponin 40-38, BNP 12,391, and TSH 12.34. Patient was given IV diuretic in the ER, and admitted with cardiology consult. Patient was started on BiPAP, and Lasix 80 mg twice daily. Cardiology to review Holter monitor, consider pacer, and consulted EP. EP reports likely vagal effect, hypothyroidism, and sinus node dysfunction. EP suggested no BB, and to start levothyroxine. Cardiology reports echo from 121 at [facility] revealed severe TR, elevated RVSP, and severe P HTN. Patient started to have hypoxia, and pulmonary was consulted on the 12th. Pulmonary reports syncope and bradycardia poor prognostic signs of severe pulmonary hypertension. He also reported that patient is not likely a candidate for aggressive therapy like prostaglandin analog. Zaroxolyn was added to treatment, and patient required high flow oxygen. Patient is a full code. Palliative care was consulted for review of goals, symptom management, CODE STATUS, and support. 2/13 pertinent labs include; BUN 50, and creatinine 1.73.     Active Hospital Problems    Diagnosis Date Noted    Acute on chronic systolic congestive heart failure (HCC) [I50.23]     Acute respiratory failure with hypoxia (HCC) [J96.01]     Decompensated heart failure (HCC) [I50.9] 02/10/2021    Subconjunctival hemorrhage of left eye [H11.32]     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 angina pectoris [I25.10] 08/01/2020    Bradycardia [R00.1] 08/01/2020    CKD (chronic kidney disease) stage 3, GFR 30-59 ml/min [N18.30] 12/02/2019    Type 2 diabetes mellitus with kidney complication, without long-term current use of insulin ([facility] [E11.29] 12/02/2019    Syncope and collapse [R55] 12/01/2019       PAST MEDICAL HISTORY      Diagnosis Date    Arthritis     Breast cancer ([facility]     CAD (coronary artery disease)     [facility]     CKD (chronic kidney disease) stage 3, GFR 30-59 ml/min 12/2/2019    COPD (chronic obstructive pulmonary disease) ([facility])     Gastroesophageal reflux disease 8/1/2020    Hyperlipidemia     Hypertension     Recurrent major depression in remission ([facility] 8/1/2020    Thyroid disease     Type 2 diabetes mellitus with kidney complication, without long-term current use of insulin ([facility] 12/2/2019       PAST SURGICAL HISTORY  Past Surgical History:   Procedure Laterality Date    CATARACT REMOVAL Right     CHOLECYSTECTOMY      COLONOSCOPY      HYSTERECTOMY      KNEE SURGERY      MASTECTOMY      THYROID SURGERY         SOCIAL HISTORY  Social History     Tobacco Use    Smoking status: Never Smoker    Smokeless tobacco: Never Used   Substance Use Topics    Alcohol use: Never    Drug use: Never       ALLERGIES  Allergies   Allergen Reactions    Pcn [Penicillins]     Avelox [Moxifloxacin] Rash         MEDICATIONS  Current Medications    bisacodyl  10 mg Rectal Daily    polyethylene glycol  17 g Oral Daily    furosemide  80 mg Intravenous BID    metOLazone  5 mg Oral Daily    gabapentin  300 mg Oral BID    senna  2 tablet Oral Nightly    carboxymethylcellulose PF  1 drop Left Eye 4x Daily    heparin (porcine)  5,000 Units Subcutaneous 3 times per day    ipratropium-albuterol  1 ampule Inhalation Q4H WA    levothyroxine  50 mcg Oral Daily    aspirin  81 mg Oral Daily    budesonide-formoterol  2 puff Inhalation BID  latanoprost  1 drop Both Eyes Nightly    sodium chloride flush  10 mL Intravenous 2 times per day    [Held by provider] lisinopril  5 mg Oral Daily    [Held by provider] carvedilol  3.125 mg Oral BID     insulin lispro  0-6 Units Subcutaneous TID     insulin lispro  0-3 Units Subcutaneous Nightly     glucose, dextrose, glucagon (rDNA), dextrose, sodium chloride flush, nicotine, promethazine **OR** ondansetron, polyethylene glycol, acetaminophen **OR** acetaminophen  IV Drips/Infusions   dextrose       Home Medications  No current facility-administered medications on file prior to encounter. Current Outpatient Medications on File Prior to Encounter   Medication Sig Dispense Refill    furosemide (LASIX) 40 MG tablet Take 1 tablet by mouth daily 60 tablet 3    sildenafil (REVATIO) 20 MG tablet Take 1 tablet by mouth 3 times daily 30 tablet 3    trimethoprim-polymyxin b (POLYTRIM) 74483-2.1 UNIT/ML-% ophthalmic solution Place 1 drop into the left eye every 6 hours for 10 days 1 Bottle 0    glipiZIDE (GLUCOTROL) 5 MG tablet Take 0.5 tablets by mouth daily 60 tablet 3    aspirin 81 MG chewable tablet Take 81 mg by mouth daily      budesonide-formoterol (SYMBICORT) 160-4.5 MCG/ACT AERO Inhale 2 puffs into the lungs 2 times daily      gabapentin (NEURONTIN) 300 MG capsule Take 300 mg by mouth 3 times daily.       travoprost, benzalkonium, (TRAVATAN) 0.004 % ophthalmic solution Place 1 drop into both eyes daily         Data         /76   Pulse 68   Temp 98.2 °F (36.8 °C) (Axillary)   Resp 18   Ht 5' 7\" (1.702 m)   Wt 214 lb 1.1 oz (97.1 kg)   LMP  (LMP Unknown)   SpO2 97%   BMI 33.53 kg/m²     Wt Readings from Last 3 Encounters:   02/11/21 214 lb 1.1 oz (97.1 kg)   02/05/21 207 lb 9.6 oz (94.2 kg)   07/31/20 207 lb (93.9 kg)        Code Status: Full Code     ADVANCED CARE PLANNING:  Patient has capacity for medical decisions: no  Health Care Power of : yes - daughter Antonio Plaza reports to be HCPOA and reports copy on file. EMR does not have copy, and per RN chart has only Financial POA document. Living Will: not asked     Personal, Social, and Family History  Marital Status:   Living situation:alone  Importance of sophie/Sikh/spiritual beliefs: [] Very [] Somewhat [] Not   Psychological Distress: mild  Does patient understand diagnosis/treatment? no  Does caregiver understand diagnosis/treatment? yes      Assessment        REVIEW OF SYSTEMS- unable to assess  Patient is groggy, and confused. PHYSICAL ASSESSMENT:  Constitutional: Oriented to person, and place only. Patient is groggy, and disoriented to time. Head: Normocephalic and atraumatic. Eyes: EOM are normal. Pupils are equal, round +left subjunctive hemorrhage   Neck: Normal range of motion. Neck supple. No tracheal deviation present. Cardiovascular: Normal rate and regular rhythm, S1, S2, no murmur   Pulmonary/Chest: Effort normal and breath sounds diminished. No rales or wheezes. High Flow 02 with 70% Fi02. Abdomen: Soft. No tenderness, not distended, no ascites, no organomegaly   Musculoskeletal: Normal range of motion. +2 BLE edema/RUE+3-4 edema  Neurological: Groggy, and confused/not able to follow all commands. Skin: Normal turgor, no bleeding, no bruising     Palliative Performance Scale:  ___60%  Ambulation reduced; Significant disease; Can't do hobbies/housework; intake normal or reduced; occasional assist; LOC full/confusion  ___50%  Mainly sit/lie; Extensive disease; Can't do any work; Considerable assist; intake normal or reduced; LOC full/confusion  ___40%  Mainly in bed; Extensive disease; Mainly assist; intake normal or reduced; LOC full/confusion   x___30%  Bed Bound; Extensive disease; Total care; intake reduced; LOCfull/confusion  ___20%  Bed Bound; Extensive disease; Total care; intake minimal; Drowsy/coma  ___10%  Bed Bound; Extensive disease;  Total care; Mouth care only; Drowsy/coma  ___0 Death      Plan      Palliative Interaction: I visited patient, and she was sitting up in bed on high flow oxygen with eyes closed. She opened her eyes to verbal stimuli, and I introduced myself to her, and the palliative role. Patient is presently groggy, and attempted to check orientation level. Patient was able to state her name, and that she was at [facility].  She was unable to tell me time, president, and/or carry conversation. Patient presently has telesitter, and one-on-one staff in room. I went to speak with Eduardo Luciano RN, and received update. She reports patient SPO2 dropping into the 60s off BiPAP. Patient has been pulling off oxygen, and has a sitter. Patient is confused, and daughters are  on chart. He reports family resistant, and a difficult to establish goals. I informed her that I would reach out to patient's daughter today. I reached out to [place], and received voicemail that was full so I was unable to leave message. I reached out to daughter Destiny Milton, and introduced myself to her and the palliative role. Destiny Milton was resistant to providing writer any information at first.  I informed patient's daughter that palliative care is an extra layer support, and we seek to know patient's goals. Destiny Milton immediately stated, \" she is going to stay there and get better, then come home. \"  I attempted to ask Destiny Milotn family dynamics including number of biological children, and Destiny Milton reports, \" you don't need to know that, I am the patient's decision maker. \"  I explained to Cristyshanice Rockjuanita, that I did not see HC POA on file. She reports providing copy for RN, and I informed her that I will contact RN and review. I discussed patient's chronic history, and current hospitalized problems. I explained how patient is on high flow oxygen at 70% FiO2, and desats to 60% off of these measures per RN.   I explained that patient has a one-on-one, and telesitter due to not wanting to wear BiPAP/high flow and pulling things off. I specifically discussed patient's chronic history including COPD requiring oxygen, CHF, and patient's severe pulmonary hypertension. I informed her of current consults, and how pulmonary has stated that patient with bradycardia, and syncope is poor prognostic signs with her severe pulmonary hypertension. Pulmonary has also reported that patient is likely not a candidate for prostaglandin analog treatment. We discussed patient's frequent hospitalizations, and quality of life. I asked patient's daughter if she would want aggressive treatment at her advanced age, and with her chronic conditions. I explained that there is going to be a time that her end-stage diseases are not going to be able to be managed, and patient is going to be symptomatic and possibly suffering. I discussed patient's full CODE STATUS. Patient's daughter Metro Spatz immediately stated, \"I want you to do everything to keep her alive including all resuscitation measures. \" I explained all 3 code classifications in full detail. I asked case to hear me out, and explained with patient's advanced age, and chronic conditions that CPR could possibly be more burdensome than beneficial to the patient. I explained the differences in DNR CCA to a full code. Metro Spatz reports just wanting to bring patient home. I explained to Metro Spatz that if patient is wanting all aggressive measures, and with previous falls that she may require a rehab if she improves. I explained that the patient's goal is to go home, and be comfortable that there services called hospice care that could possibly accommodate this if patient would rather focus on comfort. Metro Spatz had questions about this, and reports wanting to discuss with family. I explained to patient's daughter Jordan Carlos that she is requiring a lot of oxygen at this time, and some hospice companies are unable to provide this at home.   I explained that we try to use comfort medications, and see if patient can transition off these aggressive measures. I also explained that some hospice companies are able to contract high flow oxygen at home, and if patient wants to focus on comfort then we can look into this route with our . I discussed spiritual concerns, and Annette Chacon reports to be a preacher. She reports patient's previous  was a . I offered spiritual care to round, and Annette Chacon declined at this time. He reports that he will be visiting patient soon, and would like to have a conversation with her if able. I offered patient's daughter much emotional support at this time. I reached out to [facility], and informed her of my conversation above. She reviewed chart, and only has financial power of . Nabor Vazquez RN reports that she will address this with patient's daughter when she arrives, and will need her to provide copy of  POA. If family decides on home hospice care, and can provide 24-hour care, then [address] may be an option for high flow. Patient would have to be able to tolerate BiPAP for ride home. We will continue to follow. Education/support to staff  Education/support to family  Communications with primary service  Providing support for coping/adaptation/distress of family  Managing anticipatory grief  Discussing meaning/purpose   Specific spiritual beliefs/practices  Decision making regarding life prolonging treatment  Decisional capacity assessed  Continue with current plan of care  Clarification of medical condition to patient and family  Code status clarified: Full Code  Code status clarified: [facility]  Code status clarified: [facility]  Palliative care orders introduced  Provided information about hospice  Validating patient/family distress  RN is going to inform daughter Annette Chacon that document on file is for financial, and we need HCPOA document.  Annette Chacon reports to be HCPOA, and does not want her sisters contacted, but if she does not bring in this document, then we will need to establish how many biological children there are, and discuss goals with the majority. Principle Problem/Diagnosis:  Syncope and collapse    Additional Assessments:   Principal Problem:    Syncope and collapse  Active Problems:    CKD (chronic kidney disease) stage 3, GFR 30-59 ml/min    Type 2 diabetes mellitus with kidney complication, without long-term current use of insulin (HCC)    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 angina pectoris    Bradycardia    Subconjunctival hemorrhage of left eye    Decompensated heart failure (HCC)    Acute on chronic systolic congestive heart failure (HCC)    Acute respiratory failure with hypoxia (HCC)  Resolved Problems:    * No resolved hospital problems. *    1- Symptom management/ pain control     Pain Assessment:  The patient is not having any pain. Anxiety:  none                          Dyspnea:  acute dyspnea- on high flow 70% Fi02                          Fatigue:  exercise intolerance    Other: We feel the patient symptoms are being controlled. her current regimen is reviewed by myself and discussed with the staff. 2- Goals of care evaluation   The patient goals of care are support for family/caregiver   Goals of care discussed with:    [] Patient independently    [] Patient and Family    [x] Family or Healthcare DPOA independently    [] Unable to discuss with patient, family/DPOA not present    3- Code Status  Full Code    4- Other recommendations   - We will continue to provide comfort and support to the patient and the family  Please call with any palliative questions or concerns. Palliative Care Team is available via perfect serve or via phone. Palliative Care will continue to follow Ms. Goncalves's care as needed. Thank you for allowing Palliative Care to participate in the care of Ms. Goncalves . This note has been dictated by dragon, typing errors may be a possibility.     The total time I spent in

## 2021-02-13 NOTE — PROGRESS NOTES
Patient's daughter Metro Spatz at bedside has Financial POA paperwork and requests to be MEDICAL POA as well. However patient is disoriented and there are other daughters involved who would to make MEDICAL decisions as well. Attending has been notified.

## 2021-02-13 NOTE — PROGRESS NOTES
[facility]  Office: [address], DO, Mesa Wilbur, DO, Jonh Rowe, DO, Melissa Ng, DO, Pawel Goodwin MD, Lazara Fierro MD, Cece Schafer MD, Aramis Rivas MD, Sharyle Anon, MD, Selina Martínez MD, Eriberto Morgan MD, Glory Richmond MD, Jamarcus Rivera MD, Gina Kunz, DO, Meryle Schuller, MD, Lena Raza MD, Dariusz Hernandez DO, Elaine Parker MD,  Clarisse De Santiago DO, Cely Carlisle MD, Joey Yancey MD, Martin Luna, [facility], [facility], Ben Etinene, CNP, Gabriel Basilio, CNS, Marquis Hernandez, CNP, Hui Montenegro, CNP, Terryann Collet, CNP, Andrade Holder, CNP, Felix Greene, CNP, ZAIN WrightC, Amarilis Banda, [facility], Harriett Betts, CNP, Mauricio Salgado, CNP, Jeffrey Villarreal, CNP, Ashlie Lama, CNP, Mallory Vee, [address] [place] [address]    Progress Note    2/13/2021    3:51 PM    Name:   Gavi Don  MRN:     [id number]     [place]:      [de-identified]   Room:   [address] [place] [address] Day:  3  Admit Date:  2/10/2021  6:44 PM    PCP:   Aleyda Márquez MD  Code Status:  Full Code    Subjective:     C/C:   Chief Complaint   Patient presents with   [place]    Hypotension    Bradycardia     Interval History Status: improved. Seen by pulmonary medicine yesterday, significant pulmonary hypertension likely secondary to COPD. Patient previously on Revatio but now discontinued, patient still not responsive to diuretics but kidney function unchanged. Patient's oxygen requirement still continues to decrease. Attempted to speak with family today. Reported from nursing staff that multiple family members were attempting to become Davie Anes and may pursue legal action. Patient transitioned from BiPAP to high flow nasal cannula, one-time 500 cc boluses given overnight for hypotension    Brief History:     Gavi Don is a 80 y.o.  Non-/non  female who presents with Fall, Hypotension, and Bradycardia   and is admitted to the hospital for the management of syncope and collapse with bradycardia.      Patient was brought into the emergency room via EMS after a syncopized and fall at home. . Patient states that she was standing and opening a can and started to [facility] sick\" and proceeded to turn around and fall to the ground. She is unsure if she hit her head. She states she recalls physically falling, but is unsure how long she was out. She states that her family member was there at the home heard her fall and came to check on her and she can recall watching him walk into check on her. . She is unable to explain to me how she felt \"sick\" prior to the fall but tells me she did not have any convulsions shaking loss of bowel bladder or emesis after the event during. And this is never happened to her before. She denied any chest pain shortness of breath palpitations dizziness or lightheadedness or headache prior to the fall but just states \"I felt sick\". On EMS arrival she was noted to be hypotensive with systolic blood pressure in 80s. With a heart rate in the 40s. Upon arrival to the emergency room patient was mildly bradycardic in the high 40s to low 50s and mentating well. With a systolic blood pressure in the 100s  Her work-up in the emergency room revealed: A metabolic panel essentially normal outside of mild elevation of the BUN at 34 and creatinine of 1.41 and a glucose of 154. Her liver profile shows alk phos of 311 ALT normal at 28, and AST 41 with a bilirubin of 1.24. Her hemogram was unremarkable without acute leukocytosis or anemias. Cardiac markers show a proBNP of 12,391 with a high sensitive troponin of 38. Her TSH elevated at 12.34 and a T4 of 1.41. Her chest x-ray showed Stable cardiomegaly with mild pulmonary vascular congestion.   In the CT brain showed  Mildchronic deep white matter ischemic changes No acute intracranial abnormalities are noted.      However in review of her last admission  laboratories(2/4/2021) her kidney function was with a BUN of 34 and creatinine of 1.38. A proBNP of 6729  -9340 and high sensitive troponin of 36 which was downward trend of 41 on admission. And a TSH of 9.17 with a normal T4 of 1.06.       Patient was just recently admitted and discharged 2/4/2021 -in which she was evaluated for heart failure and bradycardia after a fall with epistaxis after tripping over her oxygen tubing. She was to utilize Holter monitor at discharge-which was placed at discharge. And follow-up with [facility]. Timoteo Odom [facility].        Review of Systems:     Constitutional:  negative for chills, fevers, sweats  Respiratory:  negative for cough, dyspnea on exertion, shortness of breath, wheezing  Cardiovascular:  negative for chest pain, chest pressure/discomfort, lower extremity edema, palpitations  Gastrointestinal:  negative for abdominal pain, constipation, diarrhea, nausea, vomiting  Neurological:  negative for dizziness, headache    Medications: Allergies:     Allergies   Allergen Reactions    Pcn [Penicillins]     Avelox [Moxifloxacin] Rash       Current Meds:   Scheduled Meds:    bisacodyl  10 mg Rectal Daily    polyethylene glycol  17 g Oral Daily    [START ON 2/14/2021] furosemide  80 mg Intravenous Daily    metOLazone  5 mg Oral Daily    senna  2 tablet Oral Nightly    carboxymethylcellulose PF  1 drop Left Eye 4x Daily    heparin (porcine)  5,000 Units Subcutaneous 3 times per day    ipratropium-albuterol  1 ampule Inhalation Q4H WA    levothyroxine  50 mcg Oral Daily    aspirin  81 mg Oral Daily    budesonide-formoterol  2 puff Inhalation BID    latanoprost  1 drop Both Eyes Nightly    sodium chloride flush  10 mL Intravenous 2 times per day    [Held by provider] lisinopril  5 mg Oral Daily    [Held by provider] carvedilol  3.125 mg Oral BID WC    insulin lispro  0-6 Units Subcutaneous TID WC    insulin lispro  0-3 Units Subcutaneous Nightly Continuous Infusions:    dextrose       PRN Meds: glucose, dextrose, glucagon (rDNA), dextrose, sodium chloride flush, nicotine, promethazine **OR** ondansetron, polyethylene glycol, acetaminophen **OR** acetaminophen    Data:     Past Medical History:   has a past medical history of Arthritis, Breast cancer ([facility] CAD (coronary artery disease), CHF (congestive heart failure) ([facility] CKD (chronic kidney disease) stage 3, GFR 30-59 ml/min, COPD (chronic obstructive pulmonary disease) ([facility] Gastroesophageal reflux disease, Hyperlipidemia, Hypertension, Recurrent major depression in remission ([facility] Thyroid disease, and Type 2 diabetes mellitus with kidney complication, without long-term current use of insulin ([facility] Social History:   reports that she has never smoked. She has never used smokeless tobacco. She reports that she does not drink alcohol or use drugs. Family History:   Family History   Problem Relation Age of Onset    No Known Problems Mother     No Known Problems Father        Vitals:  /76   Pulse 77   Temp 98.2 °F (36.8 °C) (Axillary)   Resp 16   Ht 5' 7\" (1.702 m)   Wt 214 lb 1.1 oz (97.1 kg)   LMP  (LMP Unknown)   SpO2 95%   BMI 33.53 kg/m²   Temp (24hrs), Av °F (36.7 °C), Min:97.7 °F (36.5 °C), Max:98.2 °F (36.8 °C)    Recent Labs     21  0705 21  0714 21  1106   POCGLU 152* 225* 169* 178*       I/O (24Hr):     Intake/Output Summary (Last 24 hours) at 2021 1551  Last data filed at 2021 1506  Gross per 24 hour   Intake 600 ml   Output 1650 ml   Net -1050 ml       Labs:  Hematology:  Recent Labs     02/10/21  1902 21  0633 21  1755 21  0729 21  0758   WBC 4.8 4.9  --  3.3* 4.1   RBC 4.32 4.23  --  4.15 4.18   HGB 12.8 12.8  --  12.2 12.2   HCT 42.0 40.8  --  39.9 39.8   MCV 97.2 96.5  --  96.1 95.2   MCH 29.6 30.3  --  29.4 29.2   MCHC 30.5 31.4  --  30.6 30.7   RDW 16.5* 16.9*  --  16.7* 16.7*    See 1.04  --   --   --   --   --   --   --    BILIDIR  --   --   --   --   --   --  0.54*  --   --   --   --   --   --   --    CHOL  --   --   --  154  --   --   --   --   --   --   --   --   --   --    HDL  --   --   --  88  --   --   --   --   --   --   --   --   --   --    LDLCHOLESTEROL  --   --   --  53  --   --   --   --   --   --   --   --   --   --    CHOLHDLRATIO  --   --   --  1.8  --   --   --   --   --   --   --   --   --   --    TRIG  --   --   --  64  --   --   --   --   --   --   --   --   --   --    VLDL  --   --   --  NOT REPORTED  --   --   --   --   --   --   --   --   --   --    POCGLU  --   --    < >  --    < > 148*  --  147* 152*  --  225* 169*  --  178*    < > = values in this interval not displayed. ABG:  Lab Results   Component Value Date    POCPH 7.396 02/11/2021    POCPCO2 41.7 02/11/2021    POCPO2 141.1 02/11/2021    POCHCO3 25.6 02/11/2021    NBEA NOT REPORTED 02/11/2021    PBEA 1 02/11/2021    VLC3ILY 27 02/11/2021    OROC1BFB 99 02/11/2021    FIO2 90.0 02/11/2021     No results found for: SPECIAL  No results found for: CULTURE    Radiology:  Ct Head Wo Contrast    Result Date: 2/10/2021  [ Mild central and cortical cerebral atrophy. Mildchronic deep white matter ischemic changes No acute intracranial abnormalities are noted. Xr Chest Portable    Result Date: 2/10/2021  Stable cardiomegaly with mild pulmonary vascular congestion. Xr Chest Portable    Result Date: 2/4/2021  Moderate cardiomegaly. Trace bilateral pleural effusions with associated basilar airspace disease.        Physical Examination:        General appearance:  alert, cooperative and no distress  Mental Status:  oriented to person, place and time and normal affect  Lungs:  clear to auscultation bilaterally, normal effort  Heart:  regular rate and rhythm, no murmur  Abdomen:  soft, nontender, nondistended, normal bowel sounds, no masses, hepatomegaly, splenomegaly  Extremities:  no edema, redness, tenderness in the calves  Skin:  no gross lesions, rashes, induration    Assessment:        Hospital Problems           Last Modified POA    * (Principal) Syncope and collapse 2/11/2021 Yes    CKD (chronic kidney disease) stage 3, GFR 30-59 ml/min 2/11/2021 Yes    Type 2 diabetes mellitus with kidney complication, without long-term current use of insulin ([address] 2/11/2021 Yes    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 angina pectoris 2/11/2021 Yes    Bradycardia 2/11/2021 Yes    Subconjunctival hemorrhage of left eye 2/11/2021 Yes    Decompensated heart failure ([address] 2/10/2021 Yes    Acute on chronic systolic congestive heart failure ([address] 2/11/2021 Yes    Acute respiratory failure with hypoxia ([address] 2/11/2021 Yes          Plan:        Syncope -spectated to be vasovagal in nature, cardiology recommending carotid massage and tilt table test on Monday. Acute hypoxic respiratory failure -pulmonary and cardiology consulted, has severe pulmonary hypertension previously on Revatio. Discussed with pulmonary medicine at bedside regarding restarting medications. He will discuss with his attending  Symptomatic bradycardia-please secondary to #1, work-up on hold currently  Acute exacerbation CHF-change Lasix to daily from twice daily, continue metolazone. CKD-monitor kidney function  Coronary artery disease-continue aspirin statin  Subconjunctival hemorrhage of left eye -patient seen ophthalmology, secondary to dry eye.   Continue lubricating drops, improving today  Type 2 diabetes-continue insulin sliding scale, point-of-care glucose    Jorgito Mcfadden DO  2/13/2021  3:51 PM

## 2021-02-13 NOTE — PROGRESS NOTES
[facility]   Progress Note                   Date:   2/13/2021  Patient name: Yen Sharp  Date of admission:  2/10/2021  6:44 PM  MRN:   [id number]  YOB: 1929  PCP: Oscar Watts MD    Reason for Admission: Decompensated heart failure ([facility] [I50.9]    Subjective:       Clinical Changes / Abnormalities: Pt seen and examined in the room with family. Discussed with RN. Pt on heated high flow NC. due to resp distress. No Bradycardia.         Medications:   Scheduled Meds:   bisacodyl  10 mg Rectal Daily    polyethylene glycol  17 g Oral Daily    furosemide  80 mg Intravenous BID    metOLazone  5 mg Oral Daily    gabapentin  300 mg Oral BID    senna  2 tablet Oral Nightly    carboxymethylcellulose PF  1 drop Left Eye 4x Daily    heparin (porcine)  5,000 Units Subcutaneous 3 times per day    ipratropium-albuterol  1 ampule Inhalation Q4H WA    levothyroxine  50 mcg Oral Daily    aspirin  81 mg Oral Daily    budesonide-formoterol  2 puff Inhalation BID    latanoprost  1 drop Both Eyes Nightly    sodium chloride flush  10 mL Intravenous 2 times per day    [Held by provider] lisinopril  5 mg Oral Daily    [Held by provider] carvedilol  3.125 mg Oral BID WC    insulin lispro  0-6 Units Subcutaneous TID WC    insulin lispro  0-3 Units Subcutaneous Nightly     Continuous Infusions:   dextrose       CBC:   Recent Labs     02/11/21  0633 02/12/21  0729 02/13/21  0758   WBC 4.9 3.3* 4.1   HGB 12.8 12.2 12.2   PLT See Reflexed IPF Result 166 172     BMP:    Recent Labs     02/11/21  1755 02/12/21  0729 02/13/21  0758    140 137   K 4.7 4.3 3.9    103 98   CO2 20 21 25   BUN 44* 44* 50*   CREATININE 1.91* 1.68* 1.73*   GLUCOSE 179* 144* 165*     Hepatic:   Recent Labs     02/10/21  1902 02/11/21  1755   AST 41* 53*   ALT 28 36*   BILITOT 1.24* 1.04   ALKPHOS 311* 330*     Troponin:   Recent Labs     02/10/21  2058 02/10/21  2336 02/11/21  0249   LUISS 38* 34* 30*     BNP: No results for input(s): BNP in the last 72 hours. Lipids:   Recent Labs     02/11/21  0249   CHOL 154   HDL 88     INR:   Recent Labs     02/10/21  1902 02/11/21  1755   INR 1.3 1.3     ECHO AT [facility] 1/23/21  Result Narrative     Left Ventricle: Systolic function is normal with an ejection fraction   of 55-60%.   Left Ventricle: There is diastolic flattening of the interventricular   septum consistent with right ventricle volume overload.   Left Ventricle: No segmental wall motion abnormalities.   Left Atrium: Left atrium is normal in size. The left atrial volume   index is 30.7 mL/m2.   Right Ventricle: Right ventricular size is severely dilated. Moderator   band is present.   Right Ventricle: Systolic function is moderately to severely reduced.   Right Atrium: Right atrium is severely dilated. The right atrial area   is 24.4 cm2.   Aortic Valve: The aortic valve is trileaflet. The leaflets are mildly   thickened.   Mitral Valve: There is trace regurgitation. There is no evidence of   mitral valve stenosis.   Tricuspid Valve: There is severe regurgitation. There is no evidence of   tricuspid valve stenosis.   Tricuspid Valve: RVSP calculated at 78 mmHg. RVSP is based on RA   pressure of 15 mmHg.   Pericardium: There is no pericardial effusion.   Pulmonic Valve: There is mild regurgitation. There is no evidence of   pulmonic valve stenosis. Objective:   Vitals: /76   Pulse 74   Temp 98.2 °F (36.8 °C) (Axillary)   Resp 18   Ht 5' 7\" (1.702 m)   Wt 214 lb 1.1 oz (97.1 kg)   LMP  (LMP Unknown)   SpO2 91%   BMI 33.53 kg/m²   General appearance: alert and cooperative with exam  HEENT: Head: Normocephalic, no lesions, without obvious abnormality. Neck: no JVD, trachea midline, no adenopathy  Lungs: Diminished  to auscultation on bipap  Heart: Regular rate and rhythm, s1/s2 auscultated, +murmurs.   SR on tele  Abdomen: soft, non-tender, bowel sounds active  Extremities: +1  edema  Neurologic: not done        Assessment / Acute Cardiac Problems:   1. Probable vasovagal syncope  2. Sinus bradycardia, improved; due to vagal effects and hypothyroidism with likely sinus node dysfunction   3. Chronic bifasicular block ( RBBB /LAD) with normal MA interval; no evidence of paroxysmal heart block on telemetry. 4. Stable CAD with old septal infarction on EKG  5. 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 Essential HTN with chronic diastolic HF  7. Chronic kidney disease    Patient Active Problem List:     Syncope and collapse     Hypothyroidism     Right-sided heart failure (HCC)     Pulmonary hypertension due to COPD [facility])     Essential hypertension     Autonomic neuropathy     CKD (chronic kidney disease) stage 3, GFR 30-59 ml/min     Type 2 diabetes mellitus with kidney complication, without long-term current use of insulin ([facility])     CRIS (acute kidney injury) ([address]     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 angina pectoris     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 (HCC)     Bradycardia     Mixed hyperlipidemia     Laryngopharyngeal reflux     Peripheral venous insufficiency     Pulmonary hypertension (HCC)     Recurrent major depression in remission ([address]     Vocal cord palsy     Chronic renal insufficiency, stage III (moderate)     COPD without exacerbation (HCC)     Chronic diastolic heart failure ([address]     Fall at home, initial encounter     Hyperglycemia     Falls frequently     Symptomatic bradycardia     Fall     Subconjunctival hemorrhage of left eye     Decompensated heart failure (HCC)     Acute on chronic systolic congestive heart failure (HCC)     Acute respiratory failure with hypoxia ([address]      Plan of Treatment:   1. Likely vasovagal syncope- Plans per Dr Jessenia Patricia for Tilt table with carotid message Cancelled Friday due to Resp distress and Bipap.   Will re-evaluate for Monday. BB and ACE on hold. On heated high NC.   2. No Holter results. No further bradycardia   SR on tele  3. [facility] 1/21 reviewed and in chart   Severe TR, Elevated RVSP with severe PHTN. Continue lasix. No BB due to bradycardia. Was on Sildenafil at home. 4. Palliative care consult noted. 5. Keep K > 4.0 and Mag > 2.0  6.  Rest of care per Primary Team         Electronically signed by DOM Guzman NP on 2/13/2021 at [address] [facility].  [phone]

## 2021-02-14 LAB
ALBUMIN SERPL-MCNC: 3.3 G/DL (ref 3.5–5.2)
ANION GAP SERPL CALCULATED.3IONS-SCNC: 12 MMOL/L (ref 9–17)
BUN BLDV-MCNC: 50 MG/DL (ref 8–23)
BUN/CREAT BLD: ABNORMAL (ref 9–20)
CALCIUM SERPL-MCNC: 9.3 MG/DL (ref 8.6–10.4)
CHLORIDE BLD-SCNC: 101 MMOL/L (ref 98–107)
CO2: 25 MMOL/L (ref 20–31)
CREAT SERPL-MCNC: 1.74 MG/DL (ref 0.5–0.9)
GFR AFRICAN AMERICAN: 33 ML/MIN
GFR NON-AFRICAN AMERICAN: 27 ML/MIN
GFR SERPL CREATININE-BSD FRML MDRD: ABNORMAL ML/MIN/{1.73_M2}
GFR SERPL CREATININE-BSD FRML MDRD: ABNORMAL ML/MIN/{1.73_M2}
GLUCOSE BLD-MCNC: 142 MG/DL (ref 70–99)
GLUCOSE BLD-MCNC: 175 MG/DL (ref 65–105)
GLUCOSE BLD-MCNC: 222 MG/DL (ref 65–105)
GLUCOSE BLD-MCNC: 232 MG/DL (ref 65–105)
GLUCOSE BLD-MCNC: 71 MG/DL (ref 65–105)
HCT VFR BLD CALC: 40 % (ref 36.3–47.1)
HEMOGLOBIN: 12.2 G/DL (ref 11.9–15.1)
MAGNESIUM: 2.2 MG/DL (ref 1.6–2.6)
MCH RBC QN AUTO: 29.4 PG (ref 25.2–33.5)
MCHC RBC AUTO-ENTMCNC: 30.5 G/DL (ref 28.4–34.8)
MCV RBC AUTO: 96.4 FL (ref 82.6–102.9)
NRBC AUTOMATED: 0.5 PER 100 WBC
PDW BLD-RTO: 16.8 % (ref 11.8–14.4)
PHOSPHORUS: 4 MG/DL (ref 2.6–4.5)
PLATELET # BLD: 178 K/UL (ref 138–453)
PMV BLD AUTO: 11 FL (ref 8.1–13.5)
POTASSIUM SERPL-SCNC: 5.1 MMOL/L (ref 3.7–5.3)
RBC # BLD: 4.15 M/UL (ref 3.95–5.11)
SODIUM BLD-SCNC: 138 MMOL/L (ref 135–144)
WBC # BLD: 4.2 K/UL (ref 3.5–11.3)

## 2021-02-14 PROCEDURE — 82947 ASSAY GLUCOSE BLOOD QUANT: CPT

## 2021-02-14 PROCEDURE — 94660 CPAP INITIATION&MGMT: CPT

## 2021-02-14 PROCEDURE — 83735 ASSAY OF MAGNESIUM: CPT

## 2021-02-14 PROCEDURE — 94761 N-INVAS EAR/PLS OXIMETRY MLT: CPT

## 2021-02-14 PROCEDURE — 80069 RENAL FUNCTION PANEL: CPT

## 2021-02-14 PROCEDURE — 2580000003 HC RX 258: Performed by: NURSE PRACTITIONER

## 2021-02-14 PROCEDURE — 6370000000 HC RX 637 (ALT 250 FOR IP): Performed by: NURSE PRACTITIONER

## 2021-02-14 PROCEDURE — 94640 AIRWAY INHALATION TREATMENT: CPT

## 2021-02-14 PROCEDURE — 6370000000 HC RX 637 (ALT 250 FOR IP): Performed by: STUDENT IN AN ORGANIZED HEALTH CARE EDUCATION/TRAINING PROGRAM

## 2021-02-14 PROCEDURE — 85027 COMPLETE CBC AUTOMATED: CPT

## 2021-02-14 PROCEDURE — 92610 EVALUATE SWALLOWING FUNCTION: CPT

## 2021-02-14 PROCEDURE — 99232 SBSQ HOSP IP/OBS MODERATE 35: CPT | Performed by: INTERNAL MEDICINE

## 2021-02-14 PROCEDURE — 6360000002 HC RX W HCPCS: Performed by: INTERNAL MEDICINE

## 2021-02-14 PROCEDURE — 2060000000 HC ICU INTERMEDIATE R&B

## 2021-02-14 PROCEDURE — 6370000000 HC RX 637 (ALT 250 FOR IP): Performed by: INTERNAL MEDICINE

## 2021-02-14 PROCEDURE — 36415 COLL VENOUS BLD VENIPUNCTURE: CPT

## 2021-02-14 PROCEDURE — 2700000000 HC OXYGEN THERAPY PER DAY

## 2021-02-14 RX ADMIN — HEPARIN SODIUM 5000 UNITS: 5000 INJECTION INTRAVENOUS; SUBCUTANEOUS at 16:04

## 2021-02-14 RX ADMIN — POLYETHYLENE GLYCOL 3350 17 G: 17 POWDER, FOR SOLUTION ORAL at 10:16

## 2021-02-14 RX ADMIN — HEPARIN SODIUM 5000 UNITS: 5000 INJECTION INTRAVENOUS; SUBCUTANEOUS at 21:26

## 2021-02-14 RX ADMIN — DOCUSATE SODIUM 100 MG: 100 CAPSULE, LIQUID FILLED ORAL at 09:01

## 2021-02-14 RX ADMIN — ASPIRIN 81 MG: 81 TABLET ORAL at 09:01

## 2021-02-14 RX ADMIN — CARBOXYMETHYLCELLULOSE SODIUM 1 DROP: 10 GEL OPHTHALMIC at 13:01

## 2021-02-14 RX ADMIN — IPRATROPIUM BROMIDE AND ALBUTEROL SULFATE 1 AMPULE: .5; 3 SOLUTION RESPIRATORY (INHALATION) at 11:31

## 2021-02-14 RX ADMIN — MINERAL OIL 30 ML: 1000 SOLUTION ORAL at 10:07

## 2021-02-14 RX ADMIN — IPRATROPIUM BROMIDE AND ALBUTEROL SULFATE 1 AMPULE: .5; 3 SOLUTION RESPIRATORY (INHALATION) at 21:22

## 2021-02-14 RX ADMIN — FUROSEMIDE 80 MG: 10 INJECTION, SOLUTION INTRAMUSCULAR; INTRAVENOUS at 09:01

## 2021-02-14 RX ADMIN — CARBOXYMETHYLCELLULOSE SODIUM 1 DROP: 10 GEL OPHTHALMIC at 17:07

## 2021-02-14 RX ADMIN — INSULIN LISPRO 1 UNITS: 100 INJECTION, SOLUTION INTRAVENOUS; SUBCUTANEOUS at 13:00

## 2021-02-14 RX ADMIN — METOLAZONE 5 MG: 5 TABLET ORAL at 09:35

## 2021-02-14 RX ADMIN — INSULIN LISPRO 1 UNITS: 100 INJECTION, SOLUTION INTRAVENOUS; SUBCUTANEOUS at 21:26

## 2021-02-14 RX ADMIN — STANDARDIZED SENNA CONCENTRATE 17.2 MG: 8.6 TABLET ORAL at 21:25

## 2021-02-14 RX ADMIN — BISACODYL 10 MG: 10 SUPPOSITORY RECTAL at 09:01

## 2021-02-14 RX ADMIN — POLYETHYLENE GLYCOL 3350 17 G: 17 POWDER, FOR SOLUTION ORAL at 21:26

## 2021-02-14 RX ADMIN — CARBOXYMETHYLCELLULOSE SODIUM 1 DROP: 10 GEL OPHTHALMIC at 21:25

## 2021-02-14 RX ADMIN — LATANOPROST 1 DROP: 50 SOLUTION OPHTHALMIC at 21:27

## 2021-02-14 RX ADMIN — SODIUM CHLORIDE, PRESERVATIVE FREE 10 ML: 5 INJECTION INTRAVENOUS at 09:16

## 2021-02-14 RX ADMIN — IPRATROPIUM BROMIDE AND ALBUTEROL SULFATE 1 AMPULE: .5; 3 SOLUTION RESPIRATORY (INHALATION) at 15:31

## 2021-02-14 RX ADMIN — CARBOXYMETHYLCELLULOSE SODIUM 1 DROP: 10 GEL OPHTHALMIC at 09:00

## 2021-02-14 RX ADMIN — BUDESONIDE AND FORMOTEROL FUMARATE DIHYDRATE 2 PUFF: 160; 4.5 AEROSOL RESPIRATORY (INHALATION) at 08:02

## 2021-02-14 RX ADMIN — LEVOTHYROXINE SODIUM 50 MCG: 50 TABLET ORAL at 09:01

## 2021-02-14 RX ADMIN — DOCUSATE SODIUM 100 MG: 100 CAPSULE, LIQUID FILLED ORAL at 21:26

## 2021-02-14 RX ADMIN — BUDESONIDE AND FORMOTEROL FUMARATE DIHYDRATE 2 PUFF: 160; 4.5 AEROSOL RESPIRATORY (INHALATION) at 21:22

## 2021-02-14 RX ADMIN — INSULIN LISPRO 2 UNITS: 100 INJECTION, SOLUTION INTRAVENOUS; SUBCUTANEOUS at 17:07

## 2021-02-14 RX ADMIN — IPRATROPIUM BROMIDE AND ALBUTEROL SULFATE 1 AMPULE: .5; 3 SOLUTION RESPIRATORY (INHALATION) at 07:39

## 2021-02-14 RX ADMIN — HEPARIN SODIUM 5000 UNITS: 5000 INJECTION INTRAVENOUS; SUBCUTANEOUS at 06:37

## 2021-02-14 ASSESSMENT — PAIN SCALES - GENERAL
PAINLEVEL_OUTOF10: 0

## 2021-02-14 ASSESSMENT — PAIN - FUNCTIONAL ASSESSMENT: PAIN_FUNCTIONAL_ASSESSMENT: 0-10

## 2021-02-14 NOTE — PLAN OF CARE
Problem: Musculor/Skeletal Functional Status  Goal: Highest potential functional level  Outcome: Ongoing     Problem: Breathing Pattern - Ineffective:  Goal: Ability to achieve and maintain a regular respiratory rate will improve  Description: Ability to achieve and maintain a regular respiratory rate will improve  Outcome: Ongoing     Problem: Skin Integrity:  Goal: Will show no infection signs and symptoms  Description: Will show no infection signs and symptoms  Outcome: Ongoing  Goal: Absence of new skin breakdown  Description: Absence of new skin breakdown  Outcome: Ongoing     Problem: Falls - Risk of:  Goal: Will remain free from falls  Description: Will remain free from falls  Outcome: Ongoing  Goal: Absence of physical injury  Description: Absence of physical injury  Outcome: Ongoing

## 2021-02-14 NOTE — PLAN OF CARE
Problem: Musculor/Skeletal Functional Status  Goal: Highest potential functional level  2/14/2021 1524 by Tamia Winters RN  Outcome: Ongoing  2/14/2021 0518 by Marco Figueroa RN  Outcome: Ongoing     Problem: Breathing Pattern - Ineffective:  Goal: Ability to achieve and maintain a regular respiratory rate will improve  Description: Ability to achieve and maintain a regular respiratory rate will improve  2/14/2021 1524 by Tamia Winters RN  Outcome: Ongoing  2/14/2021 0518 by Marco Figueroa RN  Outcome: Ongoing     Problem: Skin Integrity:  Goal: Will show no infection signs and symptoms  Description: Will show no infection signs and symptoms  2/14/2021 1524 by Tamia Winters RN  Outcome: Ongoing  2/14/2021 0518 by Marco Figueroa RN  Outcome: Ongoing  Goal: Absence of new skin breakdown  Description: Absence of new skin breakdown  2/14/2021 1524 by Tamia Winters RN  Outcome: Ongoing  2/14/2021 0518 by Marco Figueroa RN  Outcome: Ongoing     Problem: Falls - Risk of:  Goal: Will remain free from falls  Description: Will remain free from falls  2/14/2021 1524 by Tamia Winters RN  Outcome: Ongoing  2/14/2021 0518 by Marco Figueroa RN  Outcome: Ongoing  Goal: Absence of physical injury  Description: Absence of physical injury  2/14/2021 1524 by Tamia Winters RN  Outcome: Ongoing  2/14/2021 0518 by Marco Figueroa RN  Outcome: Ongoing

## 2021-02-14 NOTE — PROGRESS NOTES
Review:    Impression  No evidence of bowel obstruction.     Probable constipation        ASSESSMENT:  Active Hospital Problems    Diagnosis Date Noted    Acute on chronic systolic congestive heart failure (HCC) [I50.23]     Acute respiratory failure with hypoxia (HCC) [J96.01]     Decompensated heart failure (HCC) [I50.9] 02/10/2021    Subconjunctival hemorrhage of left eye [H11.32]     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 angina pectoris [I25.10] 08/01/2020    Bradycardia [R00.1] 08/01/2020    CKD (chronic kidney disease) stage 3, GFR 30-59 ml/min [N18.30] 12/02/2019    Type 2 diabetes mellitus with kidney complication, without long-term current use of insulin ([facility] [E11.29] 12/02/2019    Syncope and collapse [R55] 12/01/2019       3 80year old female admitted for syncope; constipation w/o BM for 5 days  2. Hx of constipation    Plan:  1. Medical management per primary   2. Continue diet  3. Patient had BM yesterday. Continue bowel regimen with mineral oil, ducolax, colace, miralax and senokot.      Electronically signed by Earl East MD  on 2/14/2021 at 6:27 AM

## 2021-02-14 NOTE — PLAN OF CARE
BRONCHOSPASM/BRONCHOCONSTRICTION     []         IMPROVE AERATION/BREATH SOUNDS  []   ADMINISTER BRONCHODILATOR THERAPY AS APPROPRIATE  []   ASSESS BREATH SOUNDS  []   IMPLEMENT AEROSOL/MDI PROTOCOL  []   PATIENT EDUCATION AS NEEDED       NON INVASIVE VENTILATION  PROVIDE OPTIMAL VENTILATION/ACCEPTABLE SP02  IMPLEMENT NON INVASIVE VENTILATION PROTOCOL  ASSESSMENT SKIN INTEGRITY  PATIENT EDUCATION AS NEEDED  BIPAP AS NEEDED       PROVIDE ADEQUATE OXYGENATION WITH ACCEPTABLE SP02/ABG'S    [x]  IDENTIFY APPROPRIATE OXYGEN THERAPY  [x]   MONITOR SP02/ABG'S AS NEEDED   [x]   PATIENT EDUCATION AS NEEDED

## 2021-02-14 NOTE — PROGRESS NOTES
recalls physically falling, but is unsure how long she was out. She states that her family member was there at the home heard her fall and came to check on her and she can recall watching him walk into check on her. . She is unable to explain to me how she felt \"sick\" prior to the fall but tells me she did not have any convulsions shaking loss of bowel bladder or emesis after the event during. And this is never happened to her before. She denied any chest pain shortness of breath palpitations dizziness or lightheadedness or headache prior to the fall but just states \"I felt sick\". On EMS arrival she was noted to be hypotensive with systolic blood pressure in 80s. With a heart rate in the 40s. Upon arrival to the emergency room patient was mildly bradycardic in the high 40s to low 50s and mentating well. With a systolic blood pressure in the 100s  Her work-up in the emergency room revealed: A metabolic panel essentially normal outside of mild elevation of the BUN at 34 and creatinine of 1.41 and a glucose of 154. Her liver profile shows alk phos of 311 ALT normal at 28, and AST 41 with a bilirubin of 1.24. Her hemogram was unremarkable without acute leukocytosis or anemias. Cardiac markers show a proBNP of 12,391 with a high sensitive troponin of 38. Her TSH elevated at 12.34 and a T4 of 1.41. Her chest x-ray showed Stable cardiomegaly with mild pulmonary vascular congestion. In the CT brain showed  Mildchronic deep white matter ischemic changes No acute intracranial abnormalities are noted.      However in review of her last admission  laboratories(2/4/2021) her kidney function was with a BUN of 34 and creatinine of 1.38. A proBNP of 6729  -9340 and high sensitive troponin of 36 which was downward trend of 41 on admission.   And a TSH of 9.17 with a normal T4 of 1.06.       Patient was just recently admitted and discharged 2/4/2021 -in which she was evaluated for heart failure and bradycardia after a fall with epistaxis after tripping over her oxygen tubing. She was to utilize Holter monitor at discharge-which was placed at discharge. And follow-up with Dr. Finn Don at [facility].        Review of Systems:     Constitutional:  negative for chills, fevers, sweats  Respiratory:  negative for cough, dyspnea on exertion, shortness of breath, wheezing  Cardiovascular:  negative for chest pain, chest pressure/discomfort, lower extremity edema, palpitations  Gastrointestinal:  negative for abdominal pain, constipation, diarrhea, nausea, vomiting  Neurological:  negative for dizziness, headache    Medications: Allergies:     Allergies   Allergen Reactions    Pcn [Penicillins]     Avelox [Moxifloxacin] Rash       Current Meds:   Scheduled Meds:    bisacodyl  10 mg Rectal Daily    polyethylene glycol  17 g Oral Daily    furosemide  80 mg Intravenous Daily    docusate sodium  100 mg Oral BID    mineral oil  30 mL Oral Daily    metOLazone  5 mg Oral Daily    senna  2 tablet Oral Nightly    carboxymethylcellulose PF  1 drop Left Eye 4x Daily    heparin (porcine)  5,000 Units Subcutaneous 3 times per day    ipratropium-albuterol  1 ampule Inhalation Q4H WA    levothyroxine  50 mcg Oral Daily    aspirin  81 mg Oral Daily    budesonide-formoterol  2 puff Inhalation BID    latanoprost  1 drop Both Eyes Nightly    sodium chloride flush  10 mL Intravenous 2 times per day    [Held by provider] lisinopril  5 mg Oral Daily    [Held by provider] carvedilol  3.125 mg Oral BID     insulin lispro  0-6 Units Subcutaneous TID     insulin lispro  0-3 Units Subcutaneous Nightly     Continuous Infusions:    dextrose       PRN Meds: glucose, dextrose, glucagon (rDNA), dextrose, sodium chloride flush, nicotine, promethazine **OR** ondansetron, polyethylene glycol, acetaminophen **OR** acetaminophen    Data:     Past Medical History:   has a past medical history of Arthritis, Breast cancer ([facility] [address] CAD (coronary artery disease), CHF (congestive heart failure) ([facility] CKD (chronic kidney disease) stage 3, GFR 30-59 ml/min, COPD (chronic obstructive pulmonary disease) ([facility] Gastroesophageal reflux disease, Hyperlipidemia, Hypertension, Recurrent major depression in remission ([facility] Thyroid disease, and Type 2 diabetes mellitus with kidney complication, without long-term current use of insulin ([facility] Social History:   reports that she has never smoked. She has never used smokeless tobacco. She reports that she does not drink alcohol or use drugs. Family History:   Family History   Problem Relation Age of Onset    No Known Problems Mother     No Known Problems Father        Vitals:  /66   Pulse 91   Temp 98.4 °F (36.9 °C) (Axillary)   Resp (!) 33   Ht 5' 7\" (1.702 m)   Wt 215 lb (97.5 kg)   LMP  (LMP Unknown)   SpO2 96%   BMI 33.67 kg/m²   Temp (24hrs), Av.2 °F (36.8 °C), Min:97.7 °F (36.5 °C), Max:98.6 °F (37 °C)    Recent Labs     21  1106 21  1945 21  0658 21  1138   POCGLU 178* 179* 71 175*       I/O (24Hr):     Intake/Output Summary (Last 24 hours) at 2021 1217  Last data filed at 2021 2103  Gross per 24 hour   Intake --   Output 1500 ml   Net -1500 ml       Labs:  Hematology:  Recent Labs     21  1755 21  0729 21  0758 21  0622   WBC  --  3.3* 4.1 4.2   RBC  --  4.15 4.18 4.15   HGB  --  12.2 12.2 12.2   HCT  --  39.9 39.8 40.0   MCV  --  96.1 95.2 96.4   MCH  --  29.4 29.2 29.4   MCHC  --  30.6 30.7 30.5   RDW  --  16.7* 16.7* 16.8*   PLT  --  166 172 178   MPV  --  10.1 11.5 11.0   CRP 14.6*  --   --   --    INR 1.3  --   --   --    DDIMER 1.62  --   --   --      Chemistry:  Recent Labs     21  1755 21  0729 21  0758 21  0622    140 137 138   K 4.7 4.3 3.9 5.1    103 98 101   CO2 20 21 25 25   GLUCOSE 179* 144* 165* 142*   BUN 44* 44* 50* 50*   CREATININE 1.91* 1.68* 1.73* 1.74*   MG  --  2.1 2.1 2.2 ANIONGAP 20* 16 14 12   LABGLOM 25* 29* 28* 27*   GFRAA 30* 35* 33* 33*   CALCIUM 9.5 9.1 9.2 9.3   PHOS 4.4 4.2 3.8 4.0   PROBNP 15,749*  --   --   --      Recent Labs     02/11/21  1755 02/11/21  1755 02/12/21  0729 02/12/21 2007 02/13/21  0714 02/13/21  0758 02/13/21  1106 02/13/21  1945 02/14/21  0622 02/14/21  0658 02/14/21  1138   PROT 7.3  --   --   --   --   --   --   --   --   --   --    LABALBU 4.0  4.0  --  3.6  --   --  3.5  --   --  3.3*  --   --    AST 53*  --   --   --   --   --   --   --   --   --   --    ALT 36*  --   --   --   --   --   --   --   --   --   --    *  --   --   --   --   --   --   --   --   --   --    ALKPHOS 330*  --   --   --   --   --   --   --   --   --   --    BILITOT 1.04  --   --   --   --   --   --   --   --   --   --    BILIDIR 0.54*  --   --   --   --   --   --   --   --   --   --    POCGLU  --    < >  --  225* 169*  --  178* 179*  --  71 175*    < > = values in this interval not displayed. ABG:  Lab Results   Component Value Date    POCPH 7.396 02/11/2021    POCPCO2 41.7 02/11/2021    POCPO2 141.1 02/11/2021    POCHCO3 25.6 02/11/2021    NBEA NOT REPORTED 02/11/2021    PBEA 1 02/11/2021    WUA0DCU 27 02/11/2021    CXLY2MJS 99 02/11/2021    FIO2 90.0 02/11/2021     No results found for: SPECIAL  No results found for: CULTURE    Radiology:  Ct Head Wo Contrast    Result Date: 2/10/2021  [ Mild central and cortical cerebral atrophy. Mildchronic deep white matter ischemic changes No acute intracranial abnormalities are noted. Xr Chest Portable    Result Date: 2/10/2021  Stable cardiomegaly with mild pulmonary vascular congestion. Xr Chest Portable    Result Date: 2/4/2021  Moderate cardiomegaly. Trace bilateral pleural effusions with associated basilar airspace disease.        Physical Examination:        General appearance:  alert, cooperative and no distress  Lungs:  clear to auscultation bilaterally, normal effort  Heart:  regular rate and rhythm, no murmur  Abdomen:  soft, nontender, nondistended, normal bowel sounds, no masses, hepatomegaly, splenomegaly  Extremities:  no edema, redness, tenderness in the calves  Skin:  no gross lesions, rashes, induration    Assessment:        Hospital Problems           Last Modified POA    * (Principal) Syncope and collapse 2/11/2021 Yes    CKD (chronic kidney disease) stage 3, GFR 30-59 ml/min 2/11/2021 Yes    Type 2 diabetes mellitus with kidney complication, without long-term current use of insulin ([address] 2/11/2021 Yes    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 angina pectoris 2/11/2021 Yes    Bradycardia 2/11/2021 Yes    Subconjunctival hemorrhage of left eye 2/11/2021 Yes    Decompensated heart failure ([address] 2/10/2021 Yes    Acute on chronic systolic congestive heart failure ([address] 2/11/2021 Yes    Acute respiratory failure with hypoxia ([address] 2/11/2021 Yes          Plan:        Syncope -spectated to be vasovagal in nature, cardiology recommending carotid massage and tilt table test on Monday. Acute hypoxic respiratory failure -pulmonary and cardiology consulted, has severe pulmonary hypertension previously on Revatio. Await further recommendations from pulmonary medicine  Symptomatic bradycardia-please secondary to #1, work-up on hold currently  Acute exacerbation CHF-Lasix 80 mg daily, continue metolazone  CKD-monitor kidney function  Coronary artery disease-continue aspirin statin  Subconjunctival hemorrhage of left eye -patient seen ophthalmology, secondary to dry eye. Continue lubricating drops, improving today  Type 2 diabetes-continue insulin sliding scale, point-of-care glucose  Discharge: Patient still needs an established DPOA, continue diuresis for now. Wean high flow nasal cannula as tolerated.   Palliative care consulted and following, will need discussion with family regarding long-term prognosis    Scarlet Skanee, DO  2/14/2021  12:17 PM

## 2021-02-14 NOTE — PROGRESS NOTES
Speech Language Pathology  Facility/Department: [facility]   CLINICAL BEDSIDE SWALLOW EVALUATION    NAME: Renetta Cotter  : 1929  MRN: [id number]    ADMISSION DATE: 2/10/2021  ADMITTING DIAGNOSIS: has Syncope and collapse; Hypothyroidism; Right-sided heart failure ([address] Pulmonary hypertension due to COPD ([address] Essential hypertension; Autonomic neuropathy; CKD (chronic kidney disease) stage 3, GFR 30-59 ml/min; Type 2 diabetes mellitus with kidney complication, without long-term current use of insulin ([address] CRIS (acute kidney injury) ([address] 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; 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 angina pectoris; 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 ([address] Bradycardia; Mixed hyperlipidemia; Laryngopharyngeal reflux; Peripheral venous insufficiency; Pulmonary hypertension ([address] Recurrent major depression in remission ([address] Vocal cord palsy; Chronic renal insufficiency, stage III (moderate); COPD without exacerbation ([address] Chronic diastolic heart failure ([address] Fall at home, initial encounter; Hyperglycemia; Falls frequently; Symptomatic bradycardia; Fall; Subconjunctival hemorrhage of left eye; Decompensated heart failure ([address]  Acute on chronic systolic congestive heart failure ([address] and Acute respiratory failure with hypoxia (HCC) on their problem list.    Recent Chest Xray: (  21  )    Impression   Cardiomegaly with small left basilar effusion and bibasilar infiltrates   representing atelectasis versus pneumonia.             Date of Eval: 2021  Evaluating Therapist: Mariya Almanzar    Current Diet level:  Current Diet : Regular  Current Liquid Diet : Thin Primary Complaint: Per chart, Mayela Cloud is a 80 y.o. Non-/non  female who presents with Fall, Hypotension, and Bradycardia and is admitted to the hospital for the management of syncope and collapse with bradycardia. Patient was brought into the emergency room via EMS after a syncopized and fall at home. Patient states that she was standing and opening a can and started to [facility] sick\" and proceeded to turn around and fall to the ground. She is unsure if she hit her head. She states she recalls physically falling, but is unsure how long she was out. She states that her family member was there at the home heard her fall and came to check on her and she can recall watching him walk into check on her. She is unable to explain to me how she felt \"sick\" prior to the fall but tells me she did not have any convulsions shaking loss of bowel bladder or emesis after the event during. And this is never happened to her before. She denied any chest pain shortness of breath palpitations dizziness or lightheadedness or headache prior to the fall but just states \"I felt sick\". On EMS arrival she was noted to be hypotensive with systolic blood pressure in 80s. With a heart rate in the 40s. Upon arrival to the emergency room patient was mildly bradycardic in the high 40s to low 50s and mentating well. With a systolic blood pressure in the 100s. Her work-up in the emergency room revealed: A metabolic panel essentially normal outside of mild elevation of the BUN at 34 and creatinine of 1.41 and a glucose of 154. Her liver profile shows alk phos of 311 ALT normal at 28, and AST 41 with a bilirubin of 1.24. Her hemogram was unremarkable without acute leukocytosis or anemias. Cardiac markers show a proBNP of 12,391 with a high sensitive troponin of 38. Her TSH elevated at 12.34 and a T4 of 1.41. Her chest x-ray showed Stable cardiomegaly with mild pulmonary vascular congestion.  In the CT brain showed Mildchronic deep white matter ischemic changes No acute intracranial abnormalities are noted.      ? However in review of her last admission laboratories(2/4/2021) her kidney function was with a BUN of 34 and creatinine of 1.38. A proBNP of 0205-0713 and high sensitive troponin of 36 which was downward trend of 41 on admission. And a TSH of 9.17 with a normal T4 of 1.06.       Patient was just recently admitted and discharged 2/4/2021 -in which she was evaluated for heart failure and bradycardia after a fall with epistaxis after tripping over her oxygen tubing. She was to utilize Holter monitor at discharge-which was placed at discharge. And follow-up with Dr. Nelda Mcfarland at [facility]. Pain:  Pain Assessment  Pain Assessment: 0-10  Pain Level: 0    Reason for Referral  Karely Way was referred for a bedside swallow evaluation to assess the efficiency of her swallow function, identify signs and symptoms of aspiration and make recommendations regarding safe dietary consistencies, effective compensatory strategies, and safe eating environment. Impression  Patient presents with probable safe swallow for Regular diet with thin liquids & medications in puree (crushed in puree as needed) as evidenced by no overt s/s of aspiration noted with consistencies tested. Pt with +min extended mastication of soft & reg solid consistencies. Recommend distant supervision, small sips and bites, only feed when alert and awake and upright at 90 degrees for all PO intake. Recommend close monitoring for overt/clinical s/s of aspiration and D/C PO intake and complete Modified Barium Swallow Study should they occur. Results and recommendations reviewed with pt who verbalized understanding & reported to RN.     Dysphagia Diagnosis: Swallow function appears grossly intact  Dysphagia Outcome Severity Scale: Level 6: Within functional limits/Modified independence   Treatment Plan  Requires SLP Intervention: Yes Duration/Frequency of Treatment: 1-2x per week  D/C Recommendations: No therapy recommended at discharge. Recommended Diet and Intervention  Diet Solids Recommendation: Regular  Liquid Consistency Recommendation: Thin  Recommended Form of Meds: Meds in puree  Recommendations: Dysphagia treatment  Therapeutic Interventions: Diet tolerance monitoring;Patient/Family education    Compensatory Swallowing Strategies  Compensatory Swallowing Strategies: Eat/Feed slowly;Upright as possible for all oral intake;Small bites/sips    Treatment/Goals  Dysphagia Goals: The patient will tolerate recommended diet without observed clinical signs of aspiration; The patient/caregiver will demonstrate understanding of compensatory strategies for improved swallowing safety. General  Chart Reviewed: Yes  Behavior/Cognition: Alert; Cooperative;Pleasant mood  Temperature Spikes Noted: No  Respiratory Status: Room air  O2 Device: None (Room air)  Communication Observation: Functional  Follows Directions: Simple  Dentition: Adequate  Patient Positioning: Upright in bed  Baseline Vocal Quality: Normal  Consistencies Administered: Reg solid; Dysphagia Soft and Bite-Sized (Dysphagia III); Dysphagia Pureed (Dysphagia I); Nectar - cup; Thin - straw    Pain Level: 0    Vision/Hearing  Vision  Vision: Within Functional Limits  Hearing  Hearing: Within functional limits    Oral Motor Deficits  Oral/Motor  Oral Motor: Within functional limits    Oral Phase Dysfunction  Oral Phase  Oral Phase: WFL  Oral Phase  Oral Phase - Comment: Pt with +min extended mastication of soft solid & reg solid consistencies. Indicators of Pharyngeal Phase Dysfunction   Pharyngeal Phase  Pharyngeal Phase: WFL  Pharyngeal Phase   Pharyngeal: No overt s/s of aspiration observed with consistencies tested.     Prognosis  Prognosis  Prognosis for safe diet advancement: good  Individuals consulted  Consulted and agree with results and recommendations: Patient;RN    Education Patient Education: yes  Patient Education Response: Verbalizes understanding       Therapy Time  SLP Individual Minutes  Time In: 4278  Time Out: 6373  Minutes: REJI Alvares.S. [id number] [facility]    2/14/2021 9:46 AM

## 2021-02-15 LAB
ALBUMIN SERPL-MCNC: 3.3 G/DL (ref 3.5–5.2)
ANION GAP SERPL CALCULATED.3IONS-SCNC: 11 MMOL/L (ref 9–17)
BUN BLDV-MCNC: 49 MG/DL (ref 8–23)
BUN/CREAT BLD: ABNORMAL (ref 9–20)
CALCIUM SERPL-MCNC: 9.2 MG/DL (ref 8.6–10.4)
CHLORIDE BLD-SCNC: 97 MMOL/L (ref 98–107)
CO2: 26 MMOL/L (ref 20–31)
CREAT SERPL-MCNC: 1.34 MG/DL (ref 0.5–0.9)
GFR AFRICAN AMERICAN: 45 ML/MIN
GFR NON-AFRICAN AMERICAN: 37 ML/MIN
GFR SERPL CREATININE-BSD FRML MDRD: ABNORMAL ML/MIN/{1.73_M2}
GFR SERPL CREATININE-BSD FRML MDRD: ABNORMAL ML/MIN/{1.73_M2}
GLUCOSE BLD-MCNC: 157 MG/DL (ref 65–105)
GLUCOSE BLD-MCNC: 188 MG/DL (ref 65–105)
GLUCOSE BLD-MCNC: 189 MG/DL (ref 70–99)
GLUCOSE BLD-MCNC: 208 MG/DL (ref 65–105)
GLUCOSE BLD-MCNC: 212 MG/DL (ref 65–105)
GLUCOSE BLD-MCNC: 229 MG/DL (ref 65–105)
HCT VFR BLD CALC: 38.2 % (ref 36.3–47.1)
HEMOGLOBIN: 11.9 G/DL (ref 11.9–15.1)
MAGNESIUM: 2 MG/DL (ref 1.6–2.6)
MCH RBC QN AUTO: 29.6 PG (ref 25.2–33.5)
MCHC RBC AUTO-ENTMCNC: 31.2 G/DL (ref 28.4–34.8)
MCV RBC AUTO: 95 FL (ref 82.6–102.9)
NRBC AUTOMATED: 0 PER 100 WBC
PDW BLD-RTO: 16.4 % (ref 11.8–14.4)
PHOSPHORUS: 3.5 MG/DL (ref 2.6–4.5)
PLATELET # BLD: 180 K/UL (ref 138–453)
PMV BLD AUTO: 10.8 FL (ref 8.1–13.5)
POTASSIUM SERPL-SCNC: 4 MMOL/L (ref 3.7–5.3)
RBC # BLD: 4.02 M/UL (ref 3.95–5.11)
SODIUM BLD-SCNC: 134 MMOL/L (ref 135–144)
WBC # BLD: 4.1 K/UL (ref 3.5–11.3)

## 2021-02-15 PROCEDURE — 6370000000 HC RX 637 (ALT 250 FOR IP): Performed by: NURSE PRACTITIONER

## 2021-02-15 PROCEDURE — 2060000000 HC ICU INTERMEDIATE R&B

## 2021-02-15 PROCEDURE — 85027 COMPLETE CBC AUTOMATED: CPT

## 2021-02-15 PROCEDURE — 83735 ASSAY OF MAGNESIUM: CPT

## 2021-02-15 PROCEDURE — 97535 SELF CARE MNGMENT TRAINING: CPT

## 2021-02-15 PROCEDURE — 6370000000 HC RX 637 (ALT 250 FOR IP): Performed by: STUDENT IN AN ORGANIZED HEALTH CARE EDUCATION/TRAINING PROGRAM

## 2021-02-15 PROCEDURE — 6370000000 HC RX 637 (ALT 250 FOR IP): Performed by: INTERNAL MEDICINE

## 2021-02-15 PROCEDURE — 97116 GAIT TRAINING THERAPY: CPT

## 2021-02-15 PROCEDURE — 94640 AIRWAY INHALATION TREATMENT: CPT

## 2021-02-15 PROCEDURE — 2700000000 HC OXYGEN THERAPY PER DAY

## 2021-02-15 PROCEDURE — 94761 N-INVAS EAR/PLS OXIMETRY MLT: CPT

## 2021-02-15 PROCEDURE — 2580000003 HC RX 258: Performed by: NURSE PRACTITIONER

## 2021-02-15 PROCEDURE — 97530 THERAPEUTIC ACTIVITIES: CPT

## 2021-02-15 PROCEDURE — 99232 SBSQ HOSP IP/OBS MODERATE 35: CPT | Performed by: INTERNAL MEDICINE

## 2021-02-15 PROCEDURE — 99232 SBSQ HOSP IP/OBS MODERATE 35: CPT | Performed by: NURSE PRACTITIONER

## 2021-02-15 PROCEDURE — 97110 THERAPEUTIC EXERCISES: CPT

## 2021-02-15 PROCEDURE — 82947 ASSAY GLUCOSE BLOOD QUANT: CPT

## 2021-02-15 PROCEDURE — 6360000002 HC RX W HCPCS: Performed by: INTERNAL MEDICINE

## 2021-02-15 PROCEDURE — 80069 RENAL FUNCTION PANEL: CPT

## 2021-02-15 PROCEDURE — 99233 SBSQ HOSP IP/OBS HIGH 50: CPT | Performed by: INTERNAL MEDICINE

## 2021-02-15 PROCEDURE — 92526 ORAL FUNCTION THERAPY: CPT

## 2021-02-15 RX ORDER — POLYMYXIN B SULFATE AND TRIMETHOPRIM 1; 10000 MG/ML; [USP'U]/ML
1 SOLUTION OPHTHALMIC EVERY 6 HOURS
Status: DISCONTINUED | OUTPATIENT
Start: 2021-02-15 | End: 2021-02-17 | Stop reason: HOSPADM

## 2021-02-15 RX ORDER — FUROSEMIDE 10 MG/ML
80 INJECTION INTRAMUSCULAR; INTRAVENOUS 2 TIMES DAILY
Status: DISCONTINUED | OUTPATIENT
Start: 2021-02-15 | End: 2021-02-15

## 2021-02-15 RX ORDER — SILDENAFIL CITRATE 20 MG/1
20 TABLET ORAL 3 TIMES DAILY
Status: DISCONTINUED | OUTPATIENT
Start: 2021-02-15 | End: 2021-02-17 | Stop reason: HOSPADM

## 2021-02-15 RX ORDER — SODIUM CHLORIDE 9 MG/ML
INJECTION, SOLUTION INTRAVENOUS ONCE
Status: DISCONTINUED | OUTPATIENT
Start: 2021-02-15 | End: 2021-02-17 | Stop reason: HOSPADM

## 2021-02-15 RX ORDER — FUROSEMIDE 10 MG/ML
80 INJECTION INTRAMUSCULAR; INTRAVENOUS DAILY
Status: DISCONTINUED | OUTPATIENT
Start: 2021-02-16 | End: 2021-02-16

## 2021-02-15 RX ADMIN — BUDESONIDE AND FORMOTEROL FUMARATE DIHYDRATE 2 PUFF: 160; 4.5 AEROSOL RESPIRATORY (INHALATION) at 08:22

## 2021-02-15 RX ADMIN — POLYMYXIN B SULFATE, TRIMETHOPRIM SULFATE 1 DROP: 10000; 1 SOLUTION/ DROPS OPHTHALMIC at 15:05

## 2021-02-15 RX ADMIN — INSULIN LISPRO 1 UNITS: 100 INJECTION, SOLUTION INTRAVENOUS; SUBCUTANEOUS at 22:26

## 2021-02-15 RX ADMIN — DOCUSATE SODIUM 100 MG: 100 CAPSULE, LIQUID FILLED ORAL at 22:11

## 2021-02-15 RX ADMIN — DOCUSATE SODIUM 100 MG: 100 CAPSULE, LIQUID FILLED ORAL at 09:32

## 2021-02-15 RX ADMIN — MINERAL OIL 30 ML: 1000 SOLUTION ORAL at 09:32

## 2021-02-15 RX ADMIN — IPRATROPIUM BROMIDE AND ALBUTEROL SULFATE 1 AMPULE: .5; 3 SOLUTION RESPIRATORY (INHALATION) at 11:32

## 2021-02-15 RX ADMIN — BISACODYL 10 MG: 10 SUPPOSITORY RECTAL at 09:48

## 2021-02-15 RX ADMIN — SODIUM CHLORIDE, PRESERVATIVE FREE 10 ML: 5 INJECTION INTRAVENOUS at 22:12

## 2021-02-15 RX ADMIN — STANDARDIZED SENNA CONCENTRATE 17.2 MG: 8.6 TABLET ORAL at 22:11

## 2021-02-15 RX ADMIN — CARBOXYMETHYLCELLULOSE SODIUM 1 DROP: 10 GEL OPHTHALMIC at 15:05

## 2021-02-15 RX ADMIN — BUDESONIDE AND FORMOTEROL FUMARATE DIHYDRATE 2 PUFF: 160; 4.5 AEROSOL RESPIRATORY (INHALATION) at 20:25

## 2021-02-15 RX ADMIN — INSULIN LISPRO 1 UNITS: 100 INJECTION, SOLUTION INTRAVENOUS; SUBCUTANEOUS at 09:33

## 2021-02-15 RX ADMIN — CARBOXYMETHYLCELLULOSE SODIUM 1 DROP: 10 GEL OPHTHALMIC at 22:12

## 2021-02-15 RX ADMIN — IPRATROPIUM BROMIDE AND ALBUTEROL SULFATE 1 AMPULE: .5; 3 SOLUTION RESPIRATORY (INHALATION) at 20:17

## 2021-02-15 RX ADMIN — SODIUM CHLORIDE, PRESERVATIVE FREE 10 ML: 5 INJECTION INTRAVENOUS at 09:34

## 2021-02-15 RX ADMIN — LATANOPROST 1 DROP: 50 SOLUTION OPHTHALMIC at 22:12

## 2021-02-15 RX ADMIN — LEVOTHYROXINE SODIUM 50 MCG: 50 TABLET ORAL at 09:32

## 2021-02-15 RX ADMIN — SILDENAFIL 20 MG: 20 TABLET ORAL at 15:06

## 2021-02-15 RX ADMIN — HEPARIN SODIUM 5000 UNITS: 5000 INJECTION INTRAVENOUS; SUBCUTANEOUS at 15:05

## 2021-02-15 RX ADMIN — IPRATROPIUM BROMIDE AND ALBUTEROL SULFATE 1 AMPULE: .5; 3 SOLUTION RESPIRATORY (INHALATION) at 15:27

## 2021-02-15 RX ADMIN — INSULIN LISPRO 2 UNITS: 100 INJECTION, SOLUTION INTRAVENOUS; SUBCUTANEOUS at 12:25

## 2021-02-15 RX ADMIN — POLYMYXIN B SULFATE, TRIMETHOPRIM SULFATE 1 DROP: 10000; 1 SOLUTION/ DROPS OPHTHALMIC at 22:12

## 2021-02-15 RX ADMIN — METOLAZONE 5 MG: 5 TABLET ORAL at 09:32

## 2021-02-15 RX ADMIN — INSULIN LISPRO 2 UNITS: 100 INJECTION, SOLUTION INTRAVENOUS; SUBCUTANEOUS at 17:48

## 2021-02-15 RX ADMIN — FUROSEMIDE 80 MG: 10 INJECTION, SOLUTION INTRAMUSCULAR; INTRAVENOUS at 09:33

## 2021-02-15 RX ADMIN — CARBOXYMETHYLCELLULOSE SODIUM 1 DROP: 10 GEL OPHTHALMIC at 17:48

## 2021-02-15 RX ADMIN — SILDENAFIL 20 MG: 20 TABLET ORAL at 22:11

## 2021-02-15 RX ADMIN — CARBOXYMETHYLCELLULOSE SODIUM 1 DROP: 10 GEL OPHTHALMIC at 09:32

## 2021-02-15 RX ADMIN — HEPARIN SODIUM 5000 UNITS: 5000 INJECTION INTRAVENOUS; SUBCUTANEOUS at 22:12

## 2021-02-15 RX ADMIN — IPRATROPIUM BROMIDE AND ALBUTEROL SULFATE 1 AMPULE: .5; 3 SOLUTION RESPIRATORY (INHALATION) at 08:11

## 2021-02-15 RX ADMIN — ASPIRIN 81 MG: 81 TABLET ORAL at 09:32

## 2021-02-15 ASSESSMENT — PAIN SCALES - GENERAL: PAINLEVEL_OUTOF10: 0

## 2021-02-15 NOTE — CARE COORDINATION
TRANSITIONAL CARE PLANNING/ 2 Rehab Adriel Day: 5    Reason for Admission: Decompensated heart failure ([facility] [address] [I50.9]         Readmission Risk              Risk of Unplanned Readmission:        21            Patient goals/Treatment Preferences/Transitional Plan:  recv'd call from [employer] insurance is oon called daughter girma emailed Arelia Serum list & left copy @ bedside await choice  Called girma back to verify if she recv'd list. Carola Alvaver she wanted referral to [facility] rehab    Called daughter Josh Dennison left vm   Called daughter  Evan Bless no ans unable to leave vm  13:00 met with patient and daughter Josh Dennison @ bedside. Gave [employer] snf list will review and call with choice. 14:32 keyanna called from [facility] rehab asking for updated pt notes.  Spoke to maddie with pt   16:06 spoke to PT likely will need snf called kristel Josh Dennison reviewed snf choices referral made rupesh jacobs   16;37 kellie with [facility] called can accept pending bed availability

## 2021-02-15 NOTE — PROCEDURES
[facility]                                 HOLTER MONITOR    PATIENT NAME: Siva Gutierrez                 :        1929  MED REC NO:   [id number]                             ROOM:         ACCOUNT NO:   [de-identified]                           ADMIT DATE: 2021  PROVIDER:     Omid Woo    HOLTER MONITOR 48-HOURS    DATE OF STUDY:  2021    AUTHORIZING PROVIDER:  Colin Will MD  INTERPRETING PHYSICIAN:  Omid Woo MD    INDICATION:  Bradycardia    COMMENTS:  The patient appeared to remain in sinus rhythm throughout recording with  sinus bradycardia and sinus tachycardia noted. Rare premature atrial  contractions with pairs were noted. Nonsustained paroxysmal atrial  tachycardia was noted with the longest/fastest lasting 3 beats in  duration and peaking at 148 bpm.  Rare premature ventricular  contractions were noted. Patient appeared to be asymptomatic throughout  recording. 1.  Sinus rhythm with sinus bradycardia and sinus tachycardia  2. Rare premature atrial contractions with pairs  3. Nonsustained paroxysmal atrial tachycardia  4.   Rare premature ventricular contractions      Marv Martinez    D: 02/15/2021 12:19:57       T: 02/15/2021 12:22:38     AK/ALANA  Job#: [id number]     Doc#: Unknown    CC:    ()

## 2021-02-15 NOTE — PROGRESS NOTES
somnolent/drowsy/poorly responsive to commands. On BiPAP with settings 16/8 FiO2-65 RR-12. Appears to be in respiratory distress. Per report from nursing had overnight episodes of desaturation-in 80s. Received Lasix. 550 mL urine output overnight.       Interval history. 2/15/2021   Patient seen and chart reviewed. No acute events overnight. She is off high flow. Currently she is on nasal cannula 4 L. Overnight she was on BiPAP 1200%. It is now been weaned down to 40%. She also underwent a chronic infection. She is up to the chair working with OT and PT.     Review of Systems:  General: negative for chills, fatigue or fever  ENT: negative for headaches, nasal congestion, sore throat or visual changes  Allergy and Immunology: negative for postnasal drip or seasonal allergies  Hematological and Lymphatic: negative for bleeding problems, swollen lymph nodes  Respiratory: positive for shortness of breath negative for hemoptysis  Cardiovascular: negative for edema or palpitations  Gastrointestinal: negative for abdominal pain, change in bowel habits or nausea/vomiting  Genito-Urinary: negative for dysuria or urinary frequency/urgency  Musculoskeletal: negative for joint pain or joint swelling  Neurological: negative for numbness/tingling, seizures or weakness  Dermatological: negative for pruritus or rash    OBJECTIVE     Respiratory Support:  Vent Information  Skin Assessment: Clean, dry, & intact  FiO2 : (S) 35 %  SpO2: 95 %  SpO2/FiO2 ratio: 262.86  I Time/ I Time %: 0.95 s  Humidification Source: Heated wire  Humidification Temp: 33  Humidification Temp Measured: 33  Mask Type: Full face mask  Mask Size: Medium  Lab Results   Component Value Date    MODE Bi-Level Ventilation 02/11/2021     O2 Flow Rate (L/min): (S) 4 L/min  SpO2: 95 %    Vitals:   /71   Pulse 84   Temp 98.3 °F (36.8 °C) (Oral)   Resp 12   Ht 5' 7\" (1.702 m)   Wt 200 lb 9.9 oz (91 kg)   LMP  (LMP Unknown)   SpO2 95%   BMI 31.42 kg/m²     Systemic Examination:   General appearance - oriented to person, place, and time, overweight and ill-appearing  Mental status - alert, oriented to person, place, and time  Eyes - pupils equal and reactive, extraocular eye movements intact  Mouth - mucous membranes moist, pharynx normal without lesions  Neck - supple, no significant adenopathy  Chest -coarse breath sounds  Heart -S1 normal.  Loud S2. Wide split second heart sound. Abdomen - soft, nontender, nondistended, no masses or organomegaly  Neurological - screening mental status exam normal}  Extremities -positive for pedal edema  Skin -positive for pedal edema  DATA REVIEW     Medications:  Scheduled Meds:   sildenafil  20 mg Oral TID    trimethoprim-polymyxin b  1 drop Left Eye Q6H    furosemide  80 mg Intravenous BID    bisacodyl  10 mg Rectal Daily    polyethylene glycol  17 g Oral Daily    docusate sodium  100 mg Oral BID    mineral oil  30 mL Oral Daily    metOLazone  5 mg Oral Daily    senna  2 tablet Oral Nightly    carboxymethylcellulose PF  1 drop Left Eye 4x Daily    heparin (porcine)  5,000 Units Subcutaneous 3 times per day    ipratropium-albuterol  1 ampule Inhalation Q4H WA    levothyroxine  50 mcg Oral Daily    aspirin  81 mg Oral Daily    budesonide-formoterol  2 puff Inhalation BID    latanoprost  1 drop Both Eyes Nightly    sodium chloride flush  10 mL Intravenous 2 times per day    [Held by provider] lisinopril  5 mg Oral Daily    [Held by provider] carvedilol  3.125 mg Oral BID     insulin lispro  0-6 Units Subcutaneous TID     insulin lispro  0-3 Units Subcutaneous Nightly     Continuous Infusions:   dextrose       LABS:-  ABGs:   No results found for: PH, PCO2, PO2, HCO3, O2SAT  No results for input(s): PHART, PO2ART, IUF5SDG, IJN4AXR, BEART, J5WGXPBJ in the last 72 hours.   Lab Results   Component Value Date    POCPH 7.396 02/11/2021    POCPCO2 41.7 02/11/2021    POCPO2 141.1 (H) 02/11/2021 POCHCO3 25.6 02/11/2021    JKVA2TAW 99 (H) 02/11/2021     CBC:   Recent Labs     02/13/21  0758 02/14/21  0622 02/15/21  0609   WBC 4.1 4.2 4.1   HGB 12.2 12.2 11.9   HCT 39.8 40.0 38.2   MCV 95.2 96.4 95.0    178 180   RBC 4.18 4.15 4.02   MCH 29.2 29.4 29.6   MCHC 30.7 30.5 31.2   RDW 16.7* 16.8* 16.4*     BMP:   Recent Labs     02/13/21  0758 02/14/21  0622 02/15/21  0609    138 134*   K 3.9 5.1 4.0   CL 98 101 97*   CO2 25 25 26   BUN 50* 50* 49*   CREATININE 1.73* 1.74* 1.34*   GLUCOSE 165* 142* 189*   PHOS 3.8 4.0 3.5     Liver Function Test:   Recent Labs     02/15/21  0609   LABALBU 3.3*     Amylase/Lipase:  No results for input(s): AMYLASE, LIPASE in the last 72 hours. Coagulation Profile:   No results for input(s): INR, PROTIME, APTT in the last 72 hours. Cardiac Enzymes:  No results for input(s): CKTOTAL, CKMB, CKMBINDEX, TROPONINI in the last 72 hours. Lactic Acid:  No results found for: LACTA  BNP:   No results found for: BNP  D-Dimer:  Lab Results   Component Value Date    DDIMER 1.62 02/11/2021     Others:   Lab Results   Component Value Date    TSH 7.15 (H) 02/11/2021     Lab Results   Component Value Date    CRP 14.6 (H) 02/11/2021     No results found for: Crispin Franca  Lab Results   Component Value Date    FERRITIN 66 02/11/2021     No results found for: SPEP, UPEP  No results found for: PSA, CEA, , [id number],     Input/Output:    Intake/Output Summary (Last 24 hours) at 2/15/2021 1117  Last data filed at 2/14/2021 2339  Gross per 24 hour   Intake 800 ml   Output 1455 ml   Net -655 ml     Microbiology:  Reviewed as available  Pathology: Reviewed as available     Radiology:     Chest X-ray: 2/11/2021  Impression   1.  Congestive heart failure is most likely given the radiographic findings;   pneumonia is also a consideration in areas of consolidation with pleural   effusion. 2. Cardiomegaly. 3. No pneumothorax evident. 4. No acute osseous abnormality seen.    CT Scans: She has been periodically evaluated by CT PE in the past as well as evidenced from records in [facility].     She had been evaluated by VQ scan in 2019 which was low probability for pulmonary embolus when she presented with similar complaints of congestive heart failure and COPD.     Echocardiogram: From care everywhere from [address] [place] [address] 2021  Result Narrative     Left Ventricle: Systolic function is normal with an ejection fraction   of 55-60%.   Left Ventricle: There is diastolic flattening of the interventricular   septum consistent with right ventricle volume overload.   Left Ventricle: No segmental wall motion abnormalities.   Left Atrium: Left atrium is normal in size. The left atrial volume   index is 30.7 mL/m2.   Right Ventricle: Right ventricular size is severely dilated. Moderator   band is present.   Right Ventricle: Systolic function is moderately to severely reduced.   Right Atrium: Right atrium is severely dilated. The right atrial area   is 24.4 cm2.   Aortic Valve: The aortic valve is trileaflet. The leaflets are mildly   thickened.   Mitral Valve: There is trace regurgitation. There is no evidence of   mitral valve stenosis.   Tricuspid Valve: There is severe regurgitation. There is no evidence of   tricuspid valve stenosis.   Tricuspid Valve: RVSP calculated at 78 mmHg. RVSP is based on RA   pressure of 15 mmHg.   Pericardium: There is no pericardial effusion.   Pulmonic Valve: There is mild regurgitation. There is no evidence of   pulmonic valve stenosis.        ASSESSMENT AND PLAN     Assessment:  //Syncope. Vasovagal versus cardiogenic.  //Bradycardia  //Sick sinus syndrome?  //Severe pulmonary hypertension  //Acute on chronic diastolic congestive heart failure  //Cor pulmonale  //Chronic respiratory failure on home oxygen  //Hypothyroidism  //Congestive hepatomegaly     She follows up with Dr. Jacky Clement at [address] [place] [address].   She has been on chronic therapy with sildenafil for pulmonary hypertension. Notes from August 2020 [facility] reviewed. She has been on outpatient therapy with fluticasone-Vilanterol as an outpatient. From [facility]. Plan:  Wean down BiPAP to 40%. Wean off high flow. Start nasal cannula at 4 L. Encourage out of bed to chair. OT PT.  DVT prophylaxis. Severe pulmonary htn with right heart failure , advanced age - therapy options limited . Advice palliative care     Electronically signed by Coley Spatz, MD on 2/15/2021 at 11:17 AM        Attending Physician Statement  I have discussed the care of James Vargas, including pertinent history and exam findings with the resident. I have reviewed the key elements of all parts of the encounter with the resident. I have seen and examined the patient with the resident. I agree with the assessment and plan and status of the problem list as documented. I have seen the patient during my round today, chart reviewed and other events noted. She is more awake this morning follows command and more oriented does not look like in distress and not much tachypneic. She has been hemodynamically stable and she is maintaining saturation PO2/FiO2 ratio is better. She was weaned on high flow nasal cannula 60% to 50% and 40% gradually since yesterday and this morning placed on 35% and she is on 30 L. She did use BiPAP at night BiPAP setting 16/8/100 percent she is saturating well. Avoid any sedation benzodiazepine on any narcotics. Urine output reported to be 1455 in last 24 hours. Labs shows creatinine 1.34 BUN 49 bicarbonate 26 potassium is 4 and sodium 134. Kwasi k Decrease BiPAP to 12/6/50 percent wean FiO2 while she is on BiPAP. Continue to wean high flow nasal cannula and decrease flow to 25 L and then 30%. Ultimately will transition to nasal cannula. Continue Symbicort and DuoNeb aerosol. Continue with DVT prophylaxis.   Currently she is on Lasix 80 mg once daily monitor intake and output and renal function    Discussed with nursing staff, treatment and plan discussed. Discussed with respiratory therapist.         Please note that this chart was generated using voice recognition Dragon dictation software. Although every effort was made to ensure the accuracy of this automated transcription, some errors in transcription may have occurred.      Guerda Schuler MD  2/15/2021 1:27 PM

## 2021-02-15 NOTE — PROGRESS NOTES
Speech Language Pathology  Speech Language Pathology  [facility]    Dysphagia Treatment Note    Date: 2/15/2021  Patients Name: Nicholas Syed  MRN: [id number]  Diagnosis:   Patient Active Problem List   Diagnosis Code    Syncope and collapse R55    Hypothyroidism E03.9    Right-sided heart failure ([facility] I50.810    Pulmonary hypertension due to COPD ([facility] I27.23, J44.9    Essential hypertension I10    Autonomic neuropathy G90.9    CKD (chronic kidney disease) stage 3, GFR 30-59 ml/min N18.30    Type 2 diabetes mellitus with kidney complication, without long-term current use of insulin ([facility]) E11.29    CRIS (acute kidney injury) ([facility] N17.9    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 Z90.11    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 angina pectoris I25.10    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 ([facility]) E11.43    Bradycardia R00.1    Mixed hyperlipidemia E78.2    Laryngopharyngeal reflux K21.9    Peripheral venous insufficiency I87.2    Pulmonary hypertension ([facility]) I27.20    Recurrent major depression in remission ([facility] F33.40    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 XXXA, Y92.009    Hyperglycemia R73.9    Falls frequently R29.6    Symptomatic bradycardia R00.1    Fall [address]19. [place]    Subconjunctival hemorrhage of left eye H11.32    Decompensated heart failure (HCC) I50.9    Acute on chronic systolic congestive heart failure (HCC) I50.23    Acute respiratory failure with hypoxia ([facility]) J96.01     Pain: 0    Dysphagia Treatment  Treatment time: 10:16-10:25    Subjective: [x] Alert [x] Cooperative     [] Confused     [] Agitated    [] Lethargic    Objective/Assessment: Pt. Seen for diet tolerance monitoring. Per BSSE on 2/14, ST recommended Regular diet with thin liquids with meds in puree as able. This date, pt given multiple trials of thin liquids and regular solid with no clinical s/s of aspiration. Pt w/ mod lingual residual with regular solid, cleared with liquid wash. ST recommends pt continue with Regular diet with thin liquids with meds in puree as able. Pt provided education re: safe swallowing strategies with good return. ST to d/c pt from dysphagia services at this time, available for re-consult as needed. Plan:  [] Continue ST services    [x] Discharge from ST:      Discharge recommendations: No therapy recommended at discharge.     Treatment completed by: Gregorio Rudd M.S. CF-SLP

## 2021-02-15 NOTE — PROGRESS NOTES
[facility]   Progress Note                   Date:   2/15/2021  Patient name: Nilsa Stanford  Date of admission:  2/10/2021  6:44 PM  MRN:   [id number]  YOB: 1929  PCP: Dave Cummings MD    Reason for Admission: Decompensated heart failure ([facility] [I50.9]    Subjective:       Clinical Changes / Abnormalities: Pt seen and examined in the room with family. Discussed with RN. Pt respiratory status improving now on 4 L NC.  SR on tele      Medications:   Scheduled Meds:   sildenafil  20 mg Oral TID    trimethoprim-polymyxin b  1 drop Left Eye Q6H    furosemide  80 mg Intravenous BID    bisacodyl  10 mg Rectal Daily    polyethylene glycol  17 g Oral Daily    docusate sodium  100 mg Oral BID    mineral oil  30 mL Oral Daily    metOLazone  5 mg Oral Daily    senna  2 tablet Oral Nightly    carboxymethylcellulose PF  1 drop Left Eye 4x Daily    heparin (porcine)  5,000 Units Subcutaneous 3 times per day    ipratropium-albuterol  1 ampule Inhalation Q4H WA    levothyroxine  50 mcg Oral Daily    aspirin  81 mg Oral Daily    budesonide-formoterol  2 puff Inhalation BID    latanoprost  1 drop Both Eyes Nightly    sodium chloride flush  10 mL Intravenous 2 times per day    [Held by provider] lisinopril  5 mg Oral Daily    [Held by provider] carvedilol  3.125 mg Oral BID     insulin lispro  0-6 Units Subcutaneous TID WC    insulin lispro  0-3 Units Subcutaneous Nightly     Continuous Infusions:   dextrose       CBC:   Recent Labs     02/13/21  0758 02/14/21  0622 02/15/21  0609   WBC 4.1 4.2 4.1   HGB 12.2 12.2 11.9    178 180     BMP:    Recent Labs     02/13/21  0758 02/14/21  0622 02/15/21  0609    138 134*   K 3.9 5.1 4.0   CL 98 101 97*   CO2 25 25 26   BUN 50* 50* 49*   CREATININE 1.73* 1.74* 1.34*   GLUCOSE 165* 142* 189*     Hepatic:   No results for input(s): AST, ALT, ALB, BILITOT, ALKPHOS in the last 72 hours.   Troponin:   No results for input(s): TROPHS in the last 72 hours. BNP: No results for input(s): BNP in the last 72 hours. Lipids:   No results for input(s): CHOL, HDL in the last 72 hours. Invalid input(s): LDLCALCU  INR:   No results for input(s): INR in the last 72 hours. ECHO AT [facility] 1/23/21  Result Narrative     Left Ventricle: Systolic function is normal with an ejection fraction   of 55-60%.   Left Ventricle: There is diastolic flattening of the interventricular   septum consistent with right ventricle volume overload.   Left Ventricle: No segmental wall motion abnormalities.   Left Atrium: Left atrium is normal in size. The left atrial volume   index is 30.7 mL/m2.   Right Ventricle: Right ventricular size is severely dilated. Moderator   band is present.   Right Ventricle: Systolic function is moderately to severely reduced.   Right Atrium: Right atrium is severely dilated. The right atrial area   is 24.4 cm2.   Aortic Valve: The aortic valve is trileaflet. The leaflets are mildly   thickened.   Mitral Valve: There is trace regurgitation. There is no evidence of   mitral valve stenosis.   Tricuspid Valve: There is severe regurgitation. There is no evidence of   tricuspid valve stenosis.   Tricuspid Valve: RVSP calculated at 78 mmHg. RVSP is based on RA   pressure of 15 mmHg.   Pericardium: There is no pericardial effusion.   Pulmonic Valve: There is mild regurgitation. There is no evidence of   pulmonic valve stenosis. Objective:   Vitals: /71   Pulse 84   Temp 98.3 °F (36.8 °C) (Oral)   Resp 12   Ht 5' 7\" (1.702 m)   Wt 200 lb 9.9 oz (91 kg)   LMP  (LMP Unknown)   SpO2 95%   BMI 31.42 kg/m²   General appearance: alert and cooperative with exam  HEENT: Head: Normocephalic, no lesions, without obvious abnormality. Neck: no JVD, trachea midline, no adenopathy  Lungs: Diminished  to auscultation on 4 L NC and Bipap at night. Heart: Regular rate and rhythm, s1/s2 auscultated, +murmurs. SR on tele  HR 85  Abdomen: soft, non-tender, bowel sounds active  Extremities: +1  edema  Neurologic: not done        Assessment / Acute Cardiac Problems:   1. Probable vasovagal syncope  2. Sinus bradycardia, improved; due to vagal effects and hypothyroidism with likely sinus node dysfunction   3. Chronic bifasicular block ( RBBB /LAD) with normal NE interval; no evidence of paroxysmal heart block on telemetry. 4. Stable CAD with old septal infarction on EKG  5. 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 Essential HTN with chronic diastolic HF  7. Chronic kidney disease    Patient Active Problem List:     Syncope and collapse     Hypothyroidism     Right-sided heart failure (HCC)     Pulmonary hypertension due to COPD [facility])     Essential hypertension     Autonomic neuropathy     CKD (chronic kidney disease) stage 3, GFR 30-59 ml/min     Type 2 diabetes mellitus with kidney complication, without long-term current use of insulin ([facility])     CRIS (acute kidney injury) ([address]     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 angina pectoris     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 (HCC)     Bradycardia     Mixed hyperlipidemia     Laryngopharyngeal reflux     Peripheral venous insufficiency     Pulmonary hypertension (HCC)     Recurrent major depression in remission ([address]     Vocal cord palsy     Chronic renal insufficiency, stage III (moderate)     COPD without exacerbation (HCC)     Chronic diastolic heart failure ([address]     Fall at home, initial encounter     Hyperglycemia     Falls frequently     Symptomatic bradycardia     Fall     Subconjunctival hemorrhage of left eye     Decompensated heart failure (HCC)     Acute on chronic systolic congestive heart failure (HCC)     Acute respiratory failure with hypoxia ([address]      Plan of Treatment:   1.  Likely vasovagal syncope- Plans per Dr Diogo Frazier for Tilt table with carotid message Cancelled Friday due to Resp distress and Bipap. Respiratory status improving and may possible be able to proceed with TTT on tuesday. BB and ACE on hold. On 4 L NC.   2. Awaiting results of recent holter. No further bradycardia   SR on tele  3. ECHO from [facility] 1/21 reviewed and in chart   Severe TR, Elevated RVSP with severe PHTN. Continue lasix, aldactone, and slidenafil. No BB due to bradycardia. 4. Palliative care consult noted. 5. Keep K > 4.0 and Mag > 2.0 Stable  6.  Rest of care per Primary Team         Electronically signed by DOM Orozco NP on 2/15/2021 at 10:51 [address] [place] [address].  [phone]

## 2021-02-15 NOTE — PROGRESS NOTES
..    Palliative Care Progress Note    NAME:  Manish Best RECORD NUMBER:  [id number]  AGE: 80 y.o. GENDER: female  : 1929  TODAY'S DATE:  2/15/2021    Reason for Consult:  symptom management, goals of care, code status, and support. History of Present Illness     The patient is a 80 y.o. Non-/non  female who presents with Fall, Hypotension, and Bradycardia      Referred to Palliative Care by  [x] Physician   [] Nursing  [] Family Request   [] Other:       She was admitted to the primary service for Decompensated heart failure ([facility] [I50.9]. Her hospital course has been associated with Syncope and collapse, acute on chronic CHF, symptomatic bradycardia, subconjunctival hemorrhage of left eye, severe pulmonary HTN, chronic respiratory failure on home 02, constipation, and fall. The patient has a complicated medical history and has been hospitalized since 2/10/2021  6:44 PM. Patient passed  Bedside swallow. She is more alert today, and communicative, but still slightly confused. GS was consulted for constipation, and patient received enema, and had positive results. Patient is now on 4LNC. Patient is planning to go to [facility] for rehab. Patient remains a full code. Palliative care following for review of goals, code status, symptom management, and support. OVERNIGHT EVENTS: No overnight events reported.  reports just speaking to daughter Roel Beebe about Thierry Wise, she wants to establish this here once patient is A/O x4.         /64   Pulse 81   Temp 97.7 °F (36.5 °C) (Oral)   Resp 12   Ht 5' 7\" (1.702 m)   Wt 200 lb 9.9 oz (91 kg)   LMP  (LMP Unknown)   SpO2 100%   BMI 31.42 kg/m²     Assessment        REVIEW OF SYSTEMS    []   UNABLE TO OBTAIN:     Constitutional:  []   Chills   [x]  Fatigue   []  Fevers   []  Malaise   []  Weight loss   [] Other:     Respiratory:   []  Cough    []  Shortness of breath    []  Chest pain    [] Other:     Cardiovascular:   []  Chest pain  []  Dyspnea    []  Exertional chest pressure/discomfort     [] Fatigue      []  Palpitations    []  Syncope   [] Other:     Gastrointestinal:   []  Abdominal pain   []  Constipation    []  Diarrhea    []   Dysphagia   []  Reflux             []  Vomiting   [] Other:     Genitourinary:  []  Dysuria     []  Frequency   []  Hematuria   [] Nocturia   []  Urinary incontinence   [] Other:     Musculoskeletal:   [] Back pain    [x]  Muscle weakness   []  Myalgias    []  Neck pain   []  Stiff joints   []  Other:     Behavioral/Psych:   [] Anxiety    []  Depression     []  Mood swings   [] Other:     PHYSICAL ASSESSMENT:     General: []  Oriented x3      [] well appearing      [] Intubated      [] ill appearing      [x] Other: Patient did not know president. Forgetful with  also, and had to be given ques. Mental Status: [] normal mental status exam      [x] drowsy      [] Confused      [] Other:     Cardiovascular: [x]  Regular rate/rhythm      [] Arrhythmia      [] Other:     Chest: [x] Effort normal      [] lungs clear      [] respiratory distress      [] Tachypnea      [x]  Other: Diminished LS on 4LNC    Abdomen: [x] Soft/non-tender      [x]  Normal appearance      [] Distended      [] Ascites      [] Other:    Neurological: [x] Normal Speech      [x] Normal Sensation      []  Deficits present:      Extremity:  [] normal skin color/temp      [] clubbing/cyanosis      []  No edema      [x] Other: +2 BLE edema    Palliative Performance Scale:  ___60%  Ambulation reduced; Significant disease; Can't do hobbies/housework; intake normal or reduced; occasional assist; LOC full/confusion  ___50%  Mainly sit/lie; Extensive disease; Can't do any work; Considerable assist; intake normal or reduced; LOC full/confusion  _x__40%  Mainly in bed; Extensive disease; Mainly assist; intake normal or reduced; LOC full/confusion   ___30%  Bed Bound; Extensive disease;  Total care; intake reduced; LOCfull/confusion  ___20%  Bed Bound; Extensive disease; Total care; intake minimal; Drowsy/coma  ___10%  Bed Bound; Extensive disease; Total care; Mouth care only; Drowsy/coma  ___0       Death      Plan      Palliative Interaction: Spoke to chaplain Violette Gurrola, and she reports speaking to daughter Jhony Liu. Daughter Jhony Liu is asking for spiritual care to complete [facility]. [facility].  reports that RN has stated that patient is not oriented x4 to be able to complete. I received update from RN. I visited the patient, introduced myself to her, and reminded her I was from palliative care. Patient reports feeling better, and is wanting to go to rehab. I checked patients orientation level, and she had trouble with , and current president. Patient is much more alert today, and communicative. Patient denies any present symptoms. I asked patient if she had HCPOA, and explained what this was. Patient again has stated that kristel Lagos is HCPOA, and agreed for writer to reach out to get copy. I discussed patients condition, and chronic illnesses. I asked patient if she became sick again if she would want to be hospitalized, or if she would want to have comfort care and have her symptoms managed at home. Patient reported wanting to be hospitalized. We also discussed patients advanced age, comorbidities, and if resuscitation measures would be more burdensome or beneficial to her. I explained all three code classifications in detail. Patient reports wanting to be a full code. I discussed how CPR can be traumatic, and unlikely to change the outcome at her age, and end stage disease. I offered patient support, and informed her that I would reach out to her daughter Raven Lagos again today. Patient reports to have 4 biological children- [facility], [facility], and [place]). I informed her that without HCPOA document. We would look to the majority 3 of 4 children for decision making.     I reached out to patients kristel Lagos, and received voicemail which remains full and unable to leave message. Education/support to staff  Education/support to patient  Communications with primary service  Providing support for coping/adaptation/distress of patient  Discussing meaning/purpose   Decisional capacity assessed  Continue with current plan of care  Clarification of medical condition to patient and family  Code status clarified: Full Code  Code status clarified: [facility]  Code status clarified: [facility]  Validating patient/family distress  Continued communication updates  Patient continues to report daughter Angel Leblanc as [place], but we have not been able to reach her. Patients daughter Mann Thao has been calling to establish HCPOA and  discussed with her today that patient has to be oriented x4 to fill out. Patient is wanting to be a full code, and wants to go to rehab. Attempted to call kristel Lemon again, but no answer and voicemail is full. Principle Problem/Diagnosis:  Acute on chronic systolic congestive heart failure (HCC)    Additional Assessments:  Principal Problem:    Acute on chronic systolic congestive heart failure (HCC)  Active Problems:    Syncope and collapse    CKD (chronic kidney disease) stage 3, GFR 30-59 ml/min    Type 2 diabetes mellitus with kidney complication, without long-term current use of insulin (HCC)    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 angina pectoris    Bradycardia    Subconjunctival hemorrhage of left eye    Decompensated heart failure (HCC)    Acute respiratory failure with hypoxia ([address]  Resolved Problems:    * No resolved hospital problems. *    1- Symptom management/ pain control     Pain Assessment:  The patient is not having any pain. Anxiety:  none                          Dyspnea:  acute dyspnea                          Fatigue:  generalized weakness    Other: We feel the patient symptoms are being controlled. her current regimen is reviewed by myself and discussed with the staff. 2- Goals of care evaluation   The patient goals of care are live longer, improve or maintain function/quality of life, remain at home and support for family/caregiver   Goals of care discussed with:    [x] Patient independently    [] Patient and Family    [] Family or Healthcare DPOA independently    [] Unable to discuss with patient, family/DPOA not present    3- Code Status  Full Code    4- Other recommendations  - We will continue to provide comfort and support to the patient and the family    Please call with any palliative questions or concerns. Palliative Care Team is available via perfect serve or via phone. Palliative Care will continue to follow Ms. Goncalves's care as needed. The note has been dictated by dragon, typing errors may be a possibility     Thank you for allowing Palliative Care to participate in the care of Ms. Goncalves . Electronically signed by   DOM Arana CNP  Palliative Care Team  on 2/15/2021 at 2:38 PM    Palliative care can be reached via dooub.

## 2021-02-15 NOTE — PROGRESS NOTES
[facility]  Office: [address], DO, Patricia Arenas, DO, Chantal George, DO, Mauricio Ng, DO, Lucina Miguel MD, Oli Dickens MD, Ingrid Hidalgo MD, José Miguel Romero MD, Riley Vergara MD, Luz Maria Baxter MD, Stephan Kapoor MD, Arnaldo Birch MD, Mbonu Mable Nyhan, MD, Lauren Styles DO, Carlitos Ybarra MD, Latonia Ang MD, Neeraj Douglass DO, Jenn Calderon MD,  Nolan Yee DO, Kevin Oconnell MD, Shayne Sharp MD, Ron Bacon, [facility], [facility], Tasia Maradiaga, CNP, Migdalia Jean, CNS, Amanda Fraire, CNP, Baron Gage, CNP, Nhi Castro, CNP, Mauro Loza, CNP, Miracle Carrillo, CNP, Garret Restrepo PA-C, Bonita Roberts [facility], Delfino Vargas, CNP, Gill Veronica, CNP, Cisco Stockton, CNP, Cristian Zavaal, CNP, Phu Oneill, [address] [place] [address]    Progress Note    2/15/2021    11:46 AM    Name:   Riya Maria  MRN:     [id number]     Ade Frazier:      [de-identified]   Room:   [facility] Day:  5  Admit Date:  2/10/2021  6:44 PM    PCP:   Jin Parnell MD  Code Status:  Full Code    Subjective:     C/C:   Chief Complaint   Patient presents with   Inna Beagle    Hypotension    Bradycardia     Interval History Status: improved. Appears to be diuresing well and much more oriented today. Discussed DPOA issues with patient, no issues overnight. Large bowel movement per general surgery note    Brief History:     Riya Maria is a 80 y.o. Non-/non  female who presents with Fall, Hypotension, and Bradycardia   and is admitted to the hospital for the management of syncope and collapse with bradycardia.      Patient was brought into the emergency room via EMS after a syncopized and fall at home. . Patient states that she was standing and opening a can and [facility]" and proceeded to turn around and fall to the ground. She is unsure if she hit her head.   She states she recalls physically falling, but is unsure how long she was out. She states that her family member was there at the home heard her fall and came to check on her and she can recall watching him walk into check on her. . She is unable to explain to me how she felt \"sick\" prior to the fall but tells me she did not have any convulsions shaking loss of bowel bladder or emesis after the event during. And this is never happened to her before. She denied any chest pain shortness of breath palpitations dizziness or lightheadedness or headache prior to the fall but just states \"I felt sick\". On EMS arrival she was noted to be hypotensive with systolic blood pressure in 80s. With a heart rate in the 40s. Upon arrival to the emergency room patient was mildly bradycardic in the high 40s to low 50s and mentating well. With a systolic blood pressure in the 100s  Her work-up in the emergency room revealed: A metabolic panel essentially normal outside of mild elevation of the BUN at 34 and creatinine of 1.41 and a glucose of 154. Her liver profile shows alk phos of 311 ALT normal at 28, and AST 41 with a bilirubin of 1.24. Her hemogram was unremarkable without acute leukocytosis or anemias. Cardiac markers show a proBNP of 12,391 with a high sensitive troponin of 38. Her TSH elevated at 12.34 and a T4 of 1.41. Her chest x-ray showed Stable cardiomegaly with mild pulmonary vascular congestion. In the CT brain showed  Mildchronic deep white matter ischemic changes No acute intracranial abnormalities are noted.      However in review of her last admission  laboratories(2/4/2021) her kidney function was with a BUN of 34 and creatinine of 1.38. A proBNP of 6729  -9340 and high sensitive troponin of 36 which was downward trend of 41 on admission.   And a TSH of 9.17 with a normal T4 of 1.06.       Patient was just recently admitted and discharged 2/4/2021 -in which she was evaluated for heart failure and bradycardia after a fall with epistaxis after tripping over her oxygen tubing. She was to utilize Holter monitor at discharge-which was placed at discharge. And follow-up with Dr. Liliane Carney at [facility].        Review of Systems:     Constitutional:  negative for chills, fevers, sweats  Respiratory:  negative for cough, dyspnea on exertion, shortness of breath, wheezing  Cardiovascular:  negative for chest pain, chest pressure/discomfort, lower extremity edema, palpitations  Gastrointestinal:  negative for abdominal pain, constipation, diarrhea, nausea, vomiting  Neurological:  negative for dizziness, headache    Medications: Allergies:     Allergies   Allergen Reactions    Pcn [Penicillins]     Avelox [Moxifloxacin] Rash       Current Meds:   Scheduled Meds:    sildenafil  20 mg Oral TID    trimethoprim-polymyxin b  1 drop Left Eye Q6H    furosemide  80 mg Intravenous BID    bisacodyl  10 mg Rectal Daily    polyethylene glycol  17 g Oral Daily    docusate sodium  100 mg Oral BID    mineral oil  30 mL Oral Daily    metOLazone  5 mg Oral Daily    senna  2 tablet Oral Nightly    carboxymethylcellulose PF  1 drop Left Eye 4x Daily    heparin (porcine)  5,000 Units Subcutaneous 3 times per day    ipratropium-albuterol  1 ampule Inhalation Q4H WA    levothyroxine  50 mcg Oral Daily    aspirin  81 mg Oral Daily    budesonide-formoterol  2 puff Inhalation BID    latanoprost  1 drop Both Eyes Nightly    sodium chloride flush  10 mL Intravenous 2 times per day    [Held by provider] lisinopril  5 mg Oral Daily    [Held by provider] carvedilol  3.125 mg Oral BID WC    insulin lispro  0-6 Units Subcutaneous TID WC    insulin lispro  0-3 Units Subcutaneous Nightly     Continuous Infusions:    dextrose       PRN Meds: glucose, dextrose, glucagon (rDNA), dextrose, sodium chloride flush, nicotine, promethazine **OR** ondansetron, polyethylene glycol, acetaminophen **OR** acetaminophen    Data:     Past Medical History:   has a past medical history of Arthritis, Breast cancer ([facility] CAD (coronary artery disease), CHF (congestive heart failure) ([facility] CKD (chronic kidney disease) stage 3, GFR 30-59 ml/min, COPD (chronic obstructive pulmonary disease) ([facility] Gastroesophageal reflux disease, Hyperlipidemia, Hypertension, Recurrent major depression in remission ([facility] Thyroid disease, and Type 2 diabetes mellitus with kidney complication, without long-term current use of insulin ([facility] Social History:   reports that she has never smoked. She has never used smokeless tobacco. She reports that she does not drink alcohol or use drugs. Family History:   Family History   Problem Relation Age of Onset    No Known Problems Mother     No Known Problems Father        Vitals:  /64   Pulse 81   Temp 97.7 °F (36.5 °C) (Oral)   Resp 12   Ht 5' 7\" (1.702 m)   Wt 200 lb 9.9 oz (91 kg)   LMP  (LMP Unknown)   SpO2 100%   BMI 31.42 kg/m²   Temp (24hrs), Av.8 °F (36.6 °C), Min:97.6 °F (36.4 °C), Max:98.3 °F (36.8 °C)    Recent Labs     21  1609 21  2106 02/15/21  0708 02/15/21  1124   POCGLU 232* 222* 188* 212*       I/O (24Hr):     Intake/Output Summary (Last 24 hours) at 2/15/2021 1146  Last data filed at 2021 2339  Gross per 24 hour   Intake 800 ml   Output 1455 ml   Net -655 ml       Labs:  Hematology:  Recent Labs     21  0758 21  0622 02/15/21  0609   WBC 4.1 4.2 4.1   RBC 4.18 4.15 4.02   HGB 12.2 12.2 11.9   HCT 39.8 40.0 38.2   MCV 95.2 96.4 95.0   MCH 29.2 29.4 29.6   MCHC 30.7 30.5 31.2   RDW 16.7* 16.8* 16.4*    178 180   MPV 11.5 11.0 10.8     Chemistry:  Recent Labs     21  0758 21  0622 02/15/21  0609    138 134*   K 3.9 5.1 4.0   CL 98 101 97*   CO2 25 25 26   GLUCOSE 165* 142* 189*   BUN 50* 50* 49*   CREATININE 1.73* 1.74* 1.34*   MG 2.1 2.2 2.0   ANIONGAP 14 12 11   LABGLOM 28* 27* 37*   GFRAA 33* 33* 45*   CALCIUM 9.2 9.3 9.2   PHOS 3.8 4.0 3.5     Recent Labs 02/13/21  0758 02/13/21  0758 02/14/21  0622 02/14/21  0658 02/14/21  1138 02/14/21  1609 02/14/21  2106 02/15/21  0609 02/15/21  0708 02/15/21  1124   LABALBU 3.5  --  3.3*  --   --   --   --  3.3*  --   --    POCGLU  --    < >  --  71 175* 232* 222*  --  188* 212*    < > = values in this interval not displayed. ABG:  Lab Results   Component Value Date    POCPH 7.396 02/11/2021    POCPCO2 41.7 02/11/2021    POCPO2 141.1 02/11/2021    POCHCO3 25.6 02/11/2021    NBEA NOT REPORTED 02/11/2021    PBEA 1 02/11/2021    ANQ5UTG 27 02/11/2021    XQRN1BFT 99 02/11/2021    FIO2 90.0 02/11/2021     No results found for: SPECIAL  No results found for: CULTURE    Radiology:  Ct Head Wo Contrast    Result Date: 2/10/2021  [ Mild central and cortical cerebral atrophy. Mildchronic deep white matter ischemic changes No acute intracranial abnormalities are noted. Xr Chest Portable    Result Date: 2/10/2021  Stable cardiomegaly with mild pulmonary vascular congestion. Xr Chest Portable    Result Date: 2/4/2021  Moderate cardiomegaly. Trace bilateral pleural effusions with associated basilar airspace disease.        Physical Examination:        General appearance:  alert, cooperative and no distress  Lungs:  clear to auscultation bilaterally, normal effort  Heart:  regular rate and rhythm, no murmur  Abdomen:  soft, nontender, nondistended, normal bowel sounds, no masses, hepatomegaly, splenomegaly  Extremities:  no edema, redness, tenderness in the calves  Skin:  no gross lesions, rashes, induration    Assessment:        Hospital Problems           Last Modified POA    * (Principal) Acute on chronic systolic congestive heart failure (HCC) 2/14/2021 Yes    Syncope and collapse 2/14/2021 Yes    CKD (chronic kidney disease) stage 3, GFR 30-59 ml/min 2/11/2021 Yes    Type 2 diabetes mellitus with kidney complication, without long-term current use of insulin ([address] 2/11/2021 Yes    Atherosclerotic heart disease of native coronary

## 2021-02-15 NOTE — FLOWSHEET NOTE
Assessment  Writer received notice from RN that pt's dtr Elver Pacheco was requesting completion of HC-POA to name Elver Pacheco. Checking with the RN indicates that pt is not currently able to make these decisions herself. Intervention  Writer called dtr Elver Pacheco and talked with her, clarifying HC-POA and explaining that the paperwork could not be completed at this time because pt was not alert. Dtr states that she is in the hospital every morning and every night. Dtr spoke of her desire to have pt go to [place], which was relayed to CNP during later conversation. Outcome  Dtr will make request for POA paperwork at a later time when pt is oriented. Chaplains will remain available to offer spiritual and emotional support as needed.        02/15/21 1414   Encounter Summary   Services provided to: Family  (dtr: Elver Pacheco)   Referral/Consult From: Nurse   Support System Children   Continue Visiting   (2/15)   Complexity of Encounter Moderate   Length of Encounter 30 minutes   Advance Care Planning Yes   Routine   Type Initial   Assessment Anxious   Intervention Active listening;Explored feelings, thoughts, concerns;Explored coping resources  (provide info re ACP)   Outcome Expressed gratitude

## 2021-02-15 NOTE — PROGRESS NOTES
Occupational Therapy  Facility/Department: [facility] 3  Daily Treatment Note  NAME: Nicholas Syed  : 1929  MRN: [id number]    Date of Service: 2/15/2021    Discharge Recommendations:  Patient would benefit from continued therapy after discharge   Ed on OT services, EC/WS, DME/AE, ADLs, orientation review, fall prevention tips- good return       Assessment   Performance deficits / Impairments: Decreased functional mobility ; Decreased ADL status; Decreased strength;Decreased endurance;Decreased high-level IADLs  Prognosis: Good  REQUIRES OT FOLLOW UP: Yes  Activity Tolerance  Activity Tolerance: Patient Tolerated treatment well;Patient limited by fatigue  Safety Devices  Safety Devices in place: Yes  Type of devices: All fall risk precautions in place;Call light within reach; Chair alarm in place; Left in chair;Nurse notified         Patient Diagnosis(es): The primary encounter diagnosis was Fall, initial encounter. Diagnoses of Bradycardia and Acute on chronic systolic congestive heart failure ([address] were also pertinent to this visit. has a past medical history of Arthritis, Breast cancer ([address] CAD (coronary artery disease), CHF (congestive heart failure) ([address] CKD (chronic kidney disease) stage 3, GFR 30-59 ml/min, COPD (chronic obstructive pulmonary disease) ([address] Gastroesophageal reflux disease, Hyperlipidemia, Hypertension, Recurrent major depression in remission ([address] Thyroid disease, and Type 2 diabetes mellitus with kidney complication, without long-term current use of insulin ([address] has a past surgical history that includes Thyroid surgery; Mastectomy; Hysterectomy; Cholecystectomy; Colonoscopy; knee surgery; and Cataract removal (Right).     Restrictions  Restrictions/Precautions  Restrictions/Precautions: General Precautions, Fall Risk  Required Braces or Orthoses?: No  Position Activity Restriction  Other position/activity restrictions: up with A, ~4L O2 via NC  Subjective   General Patient assessed for rehabilitation services?: Yes  Family / Caregiver Present: No  Diagnosis: CHF, sinus bradycardia, syncope/fall, DM2  Vital Signs  Patient Currently in Pain: No   Orientation  Orientation  Overall Orientation Status: Within Functional Limits  Objective    Pt seated in recliner upon arrival and awake. Pt declined to stand for backside mgt and max encouragement given but poor return. Pt took rest breaks prn to complete self care while seated in recliner. ADL  Grooming: Setup;Stand by assistance; Increased time to complete  UE Bathing: Setup;Minimal assistance; Increased time to complete  LE Bathing: Setup;Stand by assistance; Increased time to complete  UE Dressing: Setup;Minimal assistance  LE Dressing: Setup;Maximum assistance  Additional Comments: Pt sat in recliner to complete ADLs      Attendance  Participation: Active participation       Plan   Plan  Times per week: 3-5x       Goals  Short term goals  Time Frame for Short term goals: Pt will by discharge  Short term goal 1: demo good safety awareness during func mob around room using RW and SUP  Short term goal 2: demo ADL UB bathing/dressing activity with mod I and increased time  Short term goal 3: demo ADL LB bathing/dressing activity with AD PRN, mod A, and increased time  Short term goal 4: recall 2 EC/WS tech's during func activity with 1 vc  Short term goal 5: demo min Ax1 and RW for all transfers, including from toilet       Therapy Time   Individual Concurrent Group Co-treatment   Time In 1055         Time Out 1125         Minutes 30             Time code min: 30 min    [address], FORDE/L

## 2021-02-15 NOTE — PROGRESS NOTES
Physical Therapy  Daily Treatment Note  NAME: Gavi Don  : 1929  MRN: [id number]    Date of Service: 2/15/2021    Discharge Recommendations:  Patient would benefit from continued therapy after discharge   PT Equipment Recommendations  Other: CTA    Assessment   Body structures, Functions, Activity limitations: Decreased functional mobility ; Decreased endurance;Decreased strength  Assessment: Pt required Josy to sit up at EOB. Sit->stand with CGA. AMB ~60 ft with a RW and CGA. Pt sat in the recliner to rest. After ~60 seconds the pt sat forward, grabbed the RW, stiffened up, and leaned back. PTA yelled for assistance, because the pt was not responding. Pt's RN and another RN immediately came in to assess the pt. PTA reported what happened and assisted with repositioning the pt for assessment. Pt could benefit from further PT, when appropriate. Prognosis: Good  PT Education: Goals;PT Role;Plan of Care;Home Exercise Program;Gait Training;General Safety; Injury Prevention;Pressure Relief; Functional Mobility Training; Adaptive Device Training;Transfer Training  Patient Education: safe use of RW; importance of increased activity and AMB  REQUIRES PT FOLLOW UP: Yes  Activity Tolerance  Activity Tolerance: Patient limited by endurance; Patient limited by fatigue; Other     Patient Diagnosis(es): The primary encounter diagnosis was Fall, initial encounter. Diagnoses of Bradycardia and Acute on chronic systolic congestive heart failure ([facility] [facility] were also pertinent to this visit. has a past medical history of Arthritis, [facility] CAD (coronary artery disease), CHF (congestive heart failure) ([facility] CKD (chronic kidney disease) stage 3, GFR 30-59 ml/min, COPD (chronic obstructive pulmonary disease) ([facility] Gastroesophageal reflux disease, Hyperlipidemia, Hypertension, Recurrent major depression in remission ([facility] Thyroid disease, and Type 2 diabetes mellitus with kidney complication, without long-term current use of insulin ([facility] has a past surgical history that includes Thyroid surgery; Mastectomy; Hysterectomy; Cholecystectomy; Colonoscopy; knee surgery; and Cataract removal (Right). Restrictions  Restrictions/Precautions  Restrictions/Precautions: General Precautions, Fall Risk  Required Braces or Orthoses?: No    Subjective   General  Chart Reviewed: Yes  Response To Previous Treatment: Patient with no complaints from previous session. Family / Caregiver Present: No  Subjective  Subjective: Initially, pt agreed to PT today, then she reported that she just got to bed and does not want to do PT now. PTA educated the pt on the importance of activity and AMB. Pt wanted to get more comfortable in the bed and agreed to get out of bed so a \"waffle\" air mattress could be placed on her bed. Pt agreed to AMB when she gets up.   General Comment  Comments: RN okay with PT session  Pain Screening  Patient Currently in Pain: Denies  Vital Signs  Patient Currently in Pain: Denies       Orientation  Orientation  Overall Orientation Status: Within Normal Limits    Objective   Bed mobility  Supine to Sit: Minimal assistance  Comment: HOB upright  Transfers  Sit to Stand: Contact guard assistance  Stand to sit: Contact guard assistance  Comment: used RW  Ambulation  Ambulation?: Yes  Ambulation 1  Surface: level tile  Device: Rolling Walker  Assistance: Contact guard assistance  Quality of Gait: no LOB  Gait Deviations: Slow Talia  Distance: ~60ft     Balance  Posture: Good Sitting - Static: Good  Sitting - Dynamic: Fair  Standing - Static: Good  Exercises  Hip Flexion: BLE x15  Hip Abduction: BLE x15  Knee Long Arc Quad: BLE x15  Ankle Pumps: BLE x15       Goals  Short term goals  Time Frame for Short term goals: 10 visits  Short term goal 1: transfers with SBA  Short term goal 2: amb 125 ft with a RW x SBA  Short term goal 3: ascend/descend 4 steps with SBA  Short term goal 4: 20 min exercise program x SBA  Patient Goals   Patient goals : Return home    Plan    Plan  Times per week: 5-6x wk  Current Treatment Recommendations: Strengthening, Endurance Training, Gait Training, Safety Education & Training, Stair training, Functional Mobility Training  Safety Devices  Type of devices: Nurse notified, Call light within reach, Left in bed, Left in chair, All fall risk precautions in place, Patient at risk for falls, Gait belt  Restraints  Initially in place: No     Therapy Time   Individual Concurrent Group Co-treatment   Time In 1445         Time Out 1531         Minutes 46         Timed Code Treatment Minutes: [facility], [facility]

## 2021-02-15 NOTE — PROGRESS NOTES
PULMONARY & CRITICAL CARE MEDICINE PROGRESS  NOTE     Patient:  Yesica Henry  MRN: [id number]  Admit date: 2/10/2021    SUBJECTIVE     I personally interviewed/examined the patient; reviewed interval history, interpreted all available radiographic, laboratory data at the time of service. Chief Compliant/Reason for Initial Consult: Respiratory distress  HISTORY OF PRESENT ILLNESS:  The patient is a 80 y.o. female brought to the emergency department via EMS after she had syncopal episode and collapse. History obtained from chart review. Per chart review she was in the kitchen when she felt weak in her legs had a syncopal episode and passed out. She recalls physically falling, did not hit her head but she does not recall how long she was out. Per chart review from patient report this is never happened to her before. However per previous notes she had similar episode with hypotension and bradycardia. Upon arrival to the ED she was bradycardic in high 40s-50s. Mentation stable. Systolic blood pressure 500N. Work-up in the ED was suggestive of BUN-34, creatinine-1.41, glucose-1 4. LFTs, ALP-311, AST ALT normal, bilirubin-1.24.  H&H stable  Cardiac [id number] 391, high-sensitivity troponin 38. TSH 12.34, T4 1.41. D-dimer 1.62. Fibrinogen 307. She did not undergo CT PE. Chest x-ray cardiomegaly, pulmonary vascular congestion. CT head chronic deep white matter changes. No acute intracranial abnormalities. Baseline CKD with creatinine baseline 1.4-1.7. She was put on Holter monitor for previous episode during the previous admission. Recommended follow-up with Dr. Marly Mcnair at [facility]. Records of follow-up not available. Cardiology evaluated the patient. Electrophysiology also evaluated the patient. She is due for tilt table study with Dr. Vero Hairston.   At the time of current evaluation.-She appears somnolent/drowsy/poorly responsive to commands. On BiPAP with settings 16/8 FiO2-65 RR-12. Appears to be in respiratory distress. Per report from nursing had overnight episodes of desaturation-in 80s. Received Lasix. 550 mL urine output overnight.       Interval history.   2/14/2021   On high flow  Review of Systems:  General: negative for chills, fatigue or fever  ENT: negative for headaches, nasal congestion, sore throat or visual changes  Allergy and Immunology: negative for postnasal drip or seasonal allergies  Hematological and Lymphatic: negative for bleeding problems, swollen lymph nodes  Respiratory: positive for shortness of breath negative for hemoptysis  Cardiovascular: negative for edema or palpitations  Gastrointestinal: negative for abdominal pain, change in bowel habits or nausea/vomiting  Genito-Urinary: negative for dysuria or urinary frequency/urgency  Musculoskeletal: negative for joint pain or joint swelling  Neurological: negative for numbness/tingling, seizures or weakness  Dermatological: negative for pruritus or rash    OBJECTIVE     Respiratory Support:  Vent Information  Skin Assessment: Clean, dry, & intact  FiO2 : 50 %  SpO2: 96 %  SpO2/FiO2 ratio: 196  I Time/ I Time %: 0.95 s  Humidification Source: Heated wire  Humidification Temp: 33  Humidification Temp Measured: 33  Mask Type: Full face mask  Mask Size: Medium  Lab Results   Component Value Date    MODE Bi-Level Ventilation 02/11/2021     O2 Flow Rate (L/min): 30 L/min  SpO2: 96 %    Vitals:   /73   Pulse 79   Temp 97.6 °F (36.4 °C) (Oral)   Resp 15   Ht 5' 7\" (1.702 m)   Wt 215 lb (97.5 kg)   LMP  (LMP Unknown)   SpO2 96%   BMI 33.67 kg/m²     Systemic Examination:   General appearance - oriented to person, place, and time, overweight and ill-appearing  Mental status - alert, oriented to person, place, and time  Eyes - pupils equal and reactive, extraocular eye movements intact  Mouth - mucous membranes moist, pharynx normal without lesions  Neck - supple, no significant adenopathy  Chest -coarse breath sounds  Heart -S1 normal.  Loud S2. Wide split second heart sound. Abdomen - soft, nontender, nondistended, no masses or organomegaly  Neurological - screening mental status exam normal}  Extremities -positive for pedal edema  Skin -positive for pedal edema  DATA REVIEW     Medications:  Scheduled Meds:   bisacodyl  10 mg Rectal Daily    polyethylene glycol  17 g Oral Daily    furosemide  80 mg Intravenous Daily    docusate sodium  100 mg Oral BID    mineral oil  30 mL Oral Daily    metOLazone  5 mg Oral Daily    senna  2 tablet Oral Nightly    carboxymethylcellulose PF  1 drop Left Eye 4x Daily    heparin (porcine)  5,000 Units Subcutaneous 3 times per day    ipratropium-albuterol  1 ampule Inhalation Q4H WA    levothyroxine  50 mcg Oral Daily    aspirin  81 mg Oral Daily    budesonide-formoterol  2 puff Inhalation BID    latanoprost  1 drop Both Eyes Nightly    sodium chloride flush  10 mL Intravenous 2 times per day    [Held by provider] lisinopril  5 mg Oral Daily    [Held by provider] carvedilol  3.125 mg Oral BID     insulin lispro  0-6 Units Subcutaneous TID     insulin lispro  0-3 Units Subcutaneous Nightly     Continuous Infusions:   dextrose       LABS:-  ABGs:   No results found for: PH, PCO2, PO2, HCO3, O2SAT  No results for input(s): PHART, PO2ART, TAC7KBR, EIZ5TRO, BEART, M3PVKWAE in the last 72 hours.   Lab Results   Component Value Date    POCPH 7.396 02/11/2021    POCPCO2 41.7 02/11/2021    POCPO2 141.1 (H) 02/11/2021    POCHCO3 25.6 02/11/2021    GGDE0XPN 99 (H) 02/11/2021     CBC:   Recent Labs     02/12/21  0729 02/13/21  0758 02/14/21  0622   WBC 3.3* 4.1 4.2   HGB 12.2 12.2 12.2   HCT 39.9 39.8 40.0   MCV 96.1 95.2 96.4    172 178   RBC 4.15 4.18 4.15   MCH 29.4 29.2 29.4   MCHC 30.6 30.7 30.5   RDW 16.7* 16.7* 16.8*     BMP:   Recent Labs     02/12/21  0729 02/13/21  0758 02/14/21  0622    137 138   K 4.3 3.9 5.1    98 101   CO2 21 25 25   BUN 44* 50* 50*   CREATININE 1.68* 1.73* 1.74*   GLUCOSE 144* 165* 142*   PHOS 4.2 3.8 4.0     Liver Function Test:   Recent Labs     02/14/21  0622   LABALBU 3.3*     Amylase/Lipase:  No results for input(s): AMYLASE, LIPASE in the last 72 hours. Coagulation Profile:   No results for input(s): INR, PROTIME, APTT in the last 72 hours. Cardiac Enzymes:  No results for input(s): CKTOTAL, CKMB, CKMBINDEX, TROPONINI in the last 72 hours. Lactic Acid:  No results found for: LACTA  BNP:   No results found for: BNP  D-Dimer:  Lab Results   Component Value Date    DDIMER 1.62 02/11/2021     Others:   Lab Results   Component Value Date    TSH 7.15 (H) 02/11/2021     Lab Results   Component Value Date    CRP 14.6 (H) 02/11/2021     No results found for: Martin Eric  Lab Results   Component Value Date    FERRITIN 66 02/11/2021     No results found for: SPEP, UPEP  No results found for: PSA, CEA, , CK5314,     Input/Output:    Intake/Output Summary (Last 24 hours) at 2/14/2021 2334  Last data filed at 2/14/2021 1800  Gross per 24 hour   Intake 800 ml   Output 1000 ml   Net -200 ml     Microbiology:  Reviewed as available  Pathology: Reviewed as available     Radiology:     Chest X-ray: 2/11/2021  Impression   1.  Congestive heart failure is most likely given the radiographic findings;   pneumonia is also a consideration in areas of consolidation with pleural   effusion. 2. Cardiomegaly. 3. No pneumothorax evident.    4. No acute osseous abnormality seen.      CT Scans: She has been periodically evaluated by CT PE in the past as well as evidenced from records in Care Everywhere.     She had been evaluated by VQ scan in 2019 which was low probability for pulmonary embolus when she presented with similar complaints of congestive heart failure and COPD.     [facility]  Result Narrative     Left Ventricle: Systolic function is normal with an ejection fraction   of 55-60%.   Left Ventricle: There is diastolic flattening of the interventricular   septum consistent with right ventricle volume overload.   Left Ventricle: No segmental wall motion abnormalities.   Left Atrium: Left atrium is normal in size. The left atrial volume   index is 30.7 mL/m2.   Right Ventricle: Right ventricular size is severely dilated. Moderator   band is present.   Right Ventricle: Systolic function is moderately to severely reduced.   Right Atrium: Right atrium is severely dilated. The right atrial area   is 24.4 cm2.   Aortic Valve: The aortic valve is trileaflet. The leaflets are mildly   thickened.   Mitral Valve: There is trace regurgitation. There is no evidence of   mitral valve stenosis.   Tricuspid Valve: There is severe regurgitation. There is no evidence of   tricuspid valve stenosis.   Tricuspid Valve: RVSP calculated at 78 mmHg. RVSP is based on RA   pressure of 15 mmHg.   Pericardium: There is no pericardial effusion.   Pulmonic Valve: There is mild regurgitation. There is no evidence of   pulmonic valve stenosis.        ASSESSMENT AND PLAN     Assessment:  //Syncope. Vasovagal versus cardiogenic.  //Bradycardia  //Sick sinus syndrome?  //Severe pulmonary hypertension  //Acute on chronic diastolic congestive heart failure  //Cor pulmonale  //Chronic respiratory failure on home oxygen  //Hypothyroidism  //Congestive hepatomegaly     She follows up with [facility] Omer Torres at [facility]. She has been on chronic therapy with sildenafil for pulmonary hypertension. Notes from August 2020 [facility] reviewed. She has been on outpatient therapy with fluticasone-Vilanterol as an outpatient. From [facility]. Plan:    Severe pulmonary htn with right heart failure , advanced age - therapy options limited .   Advice palliative care   Electronically signed by

## 2021-02-15 NOTE — PROGRESS NOTES
General Surgery:  Daily Progress Note                  PATIENT NAME: Tamia Euceda     TODAY'S DATE: 2/15/2021, 5:34 AM  CC:  Syncope    SUBJECTIVE:     Patient seen and examined at bedside. No acute events overnight. Patient was manually disimpacted yesterday. A large amount of formed stool was evacuated. Patient is passing flatus, no spontaneous BM yet. Afebrile. On 30L HiFlo NC. Passed swallow study yesterday with Regular diet. OBJECTIVE:   VITALS:  /73   Pulse 79   Temp 97.6 °F (36.4 °C) (Oral)   Resp 12   Ht 5' 7\" (1.702 m)   Wt 215 lb (97.5 kg)   LMP  (LMP Unknown)   SpO2 96%   BMI 33.67 kg/m²      INTAKE/OUTPUT:      Intake/Output Summary (Last 24 hours) at 2/15/2021 0534  Last data filed at 2/14/2021 2339  Gross per 24 hour   Intake 800 ml   Output 1455 ml   Net -655 ml       PHYSICAL EXAM:  General Appearance:Tired this morning, responds to questions, no distres  HEENT:  Normocephalic, atraumatic, mucus membranes moist   Heart: Heart regular rate and rhythm  Lungs: symmetrical rise and fall of chest. On 30L High flow NC. Abdomen: Softly distended, nontender to palpation. No rebound tenderness  Extremities: No cyanosis, pitting edema, rashes noted. Skin: Skin color, texture, turgor normal. No rashes or lesions.    Lina[place]: in place     Data:  CBC with Differential:    Lab Results   Component Value Date    WBC 4.2 02/14/2021    RBC 4.15 02/14/2021    HGB 12.2 02/14/2021    HCT 40.0 02/14/2021     02/14/2021    MCV 96.4 02/14/2021    MCH 29.4 02/14/2021    MCHC 30.5 02/14/2021    RDW 16.8 02/14/2021    LYMPHOPCT 9 02/10/2021    MONOPCT 9 02/10/2021    BASOPCT 1 02/10/2021    MONOSABS 0.43 02/10/2021    LYMPHSABS 0.43 02/10/2021    EOSABS 0.00 02/10/2021    BASOSABS 0.05 02/10/2021    DIFFTYPE NOT REPORTED 02/10/2021     BMP:    Lab Results   Component Value Date     02/14/2021    K 5.1 02/14/2021     02/14/2021    CO2 25 02/14/2021    BUN 50 02/14/2021    LABALBU 3.3 02/14/2021    CREATININE 1.74 02/14/2021    CALCIUM 9.3 02/14/2021    GFRAA 33 02/14/2021    LABGLOM 27 02/14/2021    GLUCOSE 142 02/14/2021       Radiology Review:    No new imaging to review. ASSESSMENT:  Active Hospital Problems    Diagnosis Date Noted    Acute on chronic systolic congestive heart failure (HCC) [I50.23]     Acute respiratory failure with hypoxia (HCC) [J96.01]     Decompensated heart failure (HCC) [I50.9] 02/10/2021    Subconjunctival hemorrhage of left eye [H11.32]     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 angina pectoris [I25.10] 08/01/2020    Bradycardia [R00.1] 08/01/2020    CKD (chronic kidney disease) stage 3, GFR 30-59 ml/min [N18.30] 12/02/2019    Type 2 diabetes mellitus with kidney complication, without long-term current use of insulin ([facility] [address] [E11.29] 12/02/2019    Syncope and collapse [R55] 12/01/2019       3 80year old female admitted for syncope; constipation w/o BM for 5 days  2. Hx of constipation    Plan:  1. Medical management per primary   2. Continue diet  3. Patient does not show signs of obstruction. Manual disimpaction yesterday evacuated large amount of stool. Continue bowel regimen with mineral oil, ducolax, colace, miralax and senokot.      Electronically signed by Stanislaw Freire MD  on 2/15/2021 at 5:34 AM

## 2021-02-16 LAB
ALBUMIN SERPL-MCNC: 3.4 G/DL (ref 3.5–5.2)
ANION GAP SERPL CALCULATED.3IONS-SCNC: 13 MMOL/L (ref 9–17)
BUN BLDV-MCNC: 49 MG/DL (ref 8–23)
BUN/CREAT BLD: ABNORMAL (ref 9–20)
CALCIUM SERPL-MCNC: 9.2 MG/DL (ref 8.6–10.4)
CHLORIDE BLD-SCNC: 96 MMOL/L (ref 98–107)
CO2: 27 MMOL/L (ref 20–31)
CREAT SERPL-MCNC: 1.43 MG/DL (ref 0.5–0.9)
GFR AFRICAN AMERICAN: 42 ML/MIN
GFR NON-AFRICAN AMERICAN: 34 ML/MIN
GFR SERPL CREATININE-BSD FRML MDRD: ABNORMAL ML/MIN/{1.73_M2}
GFR SERPL CREATININE-BSD FRML MDRD: ABNORMAL ML/MIN/{1.73_M2}
GLUCOSE BLD-MCNC: 179 MG/DL (ref 65–105)
GLUCOSE BLD-MCNC: 193 MG/DL (ref 65–105)
GLUCOSE BLD-MCNC: 194 MG/DL (ref 65–105)
GLUCOSE BLD-MCNC: 241 MG/DL (ref 70–99)
HCT VFR BLD CALC: 39.9 % (ref 36.3–47.1)
HEMOGLOBIN: 12 G/DL (ref 11.9–15.1)
MAGNESIUM: 2 MG/DL (ref 1.6–2.6)
MCH RBC QN AUTO: 29.5 PG (ref 25.2–33.5)
MCHC RBC AUTO-ENTMCNC: 30.1 G/DL (ref 28.4–34.8)
MCV RBC AUTO: 98 FL (ref 82.6–102.9)
NRBC AUTOMATED: 0 PER 100 WBC
PDW BLD-RTO: 16.9 % (ref 11.8–14.4)
PHOSPHORUS: 3.4 MG/DL (ref 2.6–4.5)
PLATELET # BLD: 247 K/UL (ref 138–453)
PMV BLD AUTO: 11.2 FL (ref 8.1–13.5)
POTASSIUM SERPL-SCNC: 3.6 MMOL/L (ref 3.7–5.3)
RBC # BLD: 4.07 M/UL (ref 3.95–5.11)
SODIUM BLD-SCNC: 136 MMOL/L (ref 135–144)
WBC # BLD: 4.3 K/UL (ref 3.5–11.3)

## 2021-02-16 PROCEDURE — 6370000000 HC RX 637 (ALT 250 FOR IP): Performed by: INTERNAL MEDICINE

## 2021-02-16 PROCEDURE — 80069 RENAL FUNCTION PANEL: CPT

## 2021-02-16 PROCEDURE — 85027 COMPLETE CBC AUTOMATED: CPT

## 2021-02-16 PROCEDURE — 6370000000 HC RX 637 (ALT 250 FOR IP): Performed by: NURSE PRACTITIONER

## 2021-02-16 PROCEDURE — 83735 ASSAY OF MAGNESIUM: CPT

## 2021-02-16 PROCEDURE — 6360000002 HC RX W HCPCS: Performed by: INTERNAL MEDICINE

## 2021-02-16 PROCEDURE — 99233 SBSQ HOSP IP/OBS HIGH 50: CPT | Performed by: INTERNAL MEDICINE

## 2021-02-16 PROCEDURE — 6370000000 HC RX 637 (ALT 250 FOR IP): Performed by: STUDENT IN AN ORGANIZED HEALTH CARE EDUCATION/TRAINING PROGRAM

## 2021-02-16 PROCEDURE — 99239 HOSP IP/OBS DSCHRG MGMT >30: CPT | Performed by: NURSE PRACTITIONER

## 2021-02-16 PROCEDURE — 82947 ASSAY GLUCOSE BLOOD QUANT: CPT

## 2021-02-16 PROCEDURE — 99232 SBSQ HOSP IP/OBS MODERATE 35: CPT | Performed by: NURSE PRACTITIONER

## 2021-02-16 PROCEDURE — 2700000000 HC OXYGEN THERAPY PER DAY

## 2021-02-16 PROCEDURE — 2580000003 HC RX 258: Performed by: NURSE PRACTITIONER

## 2021-02-16 PROCEDURE — 2060000000 HC ICU INTERMEDIATE R&B

## 2021-02-16 PROCEDURE — 94640 AIRWAY INHALATION TREATMENT: CPT

## 2021-02-16 PROCEDURE — 36415 COLL VENOUS BLD VENIPUNCTURE: CPT

## 2021-02-16 RX ORDER — FUROSEMIDE 40 MG/1
80 TABLET ORAL DAILY
Status: DISCONTINUED | OUTPATIENT
Start: 2021-02-16 | End: 2021-02-17 | Stop reason: HOSPADM

## 2021-02-16 RX ORDER — BUDESONIDE AND FORMOTEROL FUMARATE DIHYDRATE 160; 4.5 UG/1; UG/1
2 AEROSOL RESPIRATORY (INHALATION) 2 TIMES DAILY
Qty: 1 INHALER | Refills: 3 | DISCHARGE
Start: 2021-02-16

## 2021-02-16 RX ORDER — METOLAZONE 5 MG/1
5 TABLET ORAL DAILY
Qty: 30 TABLET | Refills: 0 | DISCHARGE
Start: 2021-02-17 | End: 2021-03-19

## 2021-02-16 RX ORDER — CARBOXYMETHYLCELLULOSE SODIUM 10 MG/ML
1 GEL OPHTHALMIC 4 TIMES DAILY
Status: CANCELLED | DISCHARGE
Start: 2021-02-16

## 2021-02-16 RX ORDER — CARBOXYMETHYLCELLULOSE SODIUM 10 MG/ML
1 GEL OPHTHALMIC 4 TIMES DAILY
Qty: 1 BOTTLE | Refills: 0 | DISCHARGE
Start: 2021-02-16 | End: 2021-02-26

## 2021-02-16 RX ORDER — POLYMYXIN B SULFATE AND TRIMETHOPRIM 1; 10000 MG/ML; [USP'U]/ML
1 SOLUTION OPHTHALMIC EVERY 6 HOURS
Qty: 1 BOTTLE | Refills: 0 | Status: CANCELLED | DISCHARGE
Start: 2021-02-16 | End: 2021-02-26

## 2021-02-16 RX ORDER — LEVOTHYROXINE SODIUM 0.05 MG/1
50 TABLET ORAL DAILY
Qty: 30 TABLET | Refills: 0 | DISCHARGE
Start: 2021-02-17

## 2021-02-16 RX ORDER — GLIPIZIDE 5 MG/1
2.5 TABLET ORAL DAILY
Qty: 60 TABLET | Refills: 3 | DISCHARGE
Start: 2021-02-16

## 2021-02-16 RX ORDER — ASPIRIN 81 MG/1
81 TABLET, CHEWABLE ORAL DAILY
Qty: 30 TABLET | Refills: 3 | DISCHARGE
Start: 2021-02-16

## 2021-02-16 RX ORDER — IPRATROPIUM BROMIDE AND ALBUTEROL SULFATE 2.5; .5 MG/3ML; MG/3ML
3 SOLUTION RESPIRATORY (INHALATION)
Qty: 360 ML | Status: CANCELLED | DISCHARGE
Start: 2021-02-16

## 2021-02-16 RX ORDER — FUROSEMIDE 80 MG
80 TABLET ORAL DAILY
Qty: 60 TABLET | Refills: 0 | DISCHARGE
Start: 2021-02-17

## 2021-02-16 RX ORDER — METOLAZONE 5 MG/1
5 TABLET ORAL DAILY
Qty: 30 TABLET | Refills: 0 | Status: SHIPPED | OUTPATIENT
Start: 2021-02-17 | End: 2021-02-16

## 2021-02-16 RX ORDER — CARBOXYMETHYLCELLULOSE SODIUM 10 MG/ML
1 GEL OPHTHALMIC 4 TIMES DAILY
Qty: 1 BOTTLE | Refills: 0 | Status: SHIPPED | OUTPATIENT
Start: 2021-02-16 | End: 2021-02-16

## 2021-02-16 RX ORDER — SILDENAFIL CITRATE 20 MG/1
20 TABLET ORAL 3 TIMES DAILY
Qty: 30 TABLET | Refills: 0 | DISCHARGE
Start: 2021-02-16

## 2021-02-16 RX ORDER — FUROSEMIDE 80 MG
80 TABLET ORAL DAILY
Qty: 60 TABLET | Refills: 3 | Status: SHIPPED | OUTPATIENT
Start: 2021-02-17 | End: 2021-02-16

## 2021-02-16 RX ORDER — METOLAZONE 5 MG/1
5 TABLET ORAL DAILY
Status: CANCELLED | DISCHARGE
Start: 2021-02-16

## 2021-02-16 RX ORDER — FUROSEMIDE 10 MG/ML
80 INJECTION INTRAMUSCULAR; INTRAVENOUS DAILY
Status: CANCELLED | DISCHARGE
Start: 2021-02-16

## 2021-02-16 RX ORDER — LEVOTHYROXINE SODIUM 0.05 MG/1
50 TABLET ORAL DAILY
Qty: 30 TABLET | Refills: 3 | Status: CANCELLED | DISCHARGE
Start: 2021-02-16

## 2021-02-16 RX ORDER — LEVOTHYROXINE SODIUM 0.05 MG/1
50 TABLET ORAL DAILY
Qty: 30 TABLET | Refills: 0 | Status: SHIPPED | OUTPATIENT
Start: 2021-02-17 | End: 2021-02-16

## 2021-02-16 RX ADMIN — HEPARIN SODIUM 5000 UNITS: 5000 INJECTION INTRAVENOUS; SUBCUTANEOUS at 14:15

## 2021-02-16 RX ADMIN — STANDARDIZED SENNA CONCENTRATE 17.2 MG: 8.6 TABLET ORAL at 21:08

## 2021-02-16 RX ADMIN — IPRATROPIUM BROMIDE AND ALBUTEROL SULFATE 1 AMPULE: .5; 3 SOLUTION RESPIRATORY (INHALATION) at 20:40

## 2021-02-16 RX ADMIN — POLYMYXIN B SULFATE, TRIMETHOPRIM SULFATE 1 DROP: 10000; 1 SOLUTION/ DROPS OPHTHALMIC at 15:14

## 2021-02-16 RX ADMIN — CARBOXYMETHYLCELLULOSE SODIUM 1 DROP: 10 GEL OPHTHALMIC at 16:25

## 2021-02-16 RX ADMIN — INSULIN LISPRO 1 UNITS: 100 INJECTION, SOLUTION INTRAVENOUS; SUBCUTANEOUS at 21:00

## 2021-02-16 RX ADMIN — IPRATROPIUM BROMIDE AND ALBUTEROL SULFATE 1 AMPULE: .5; 3 SOLUTION RESPIRATORY (INHALATION) at 08:21

## 2021-02-16 RX ADMIN — SODIUM CHLORIDE, PRESERVATIVE FREE 10 ML: 5 INJECTION INTRAVENOUS at 11:59

## 2021-02-16 RX ADMIN — SILDENAFIL 20 MG: 20 TABLET ORAL at 21:09

## 2021-02-16 RX ADMIN — METOLAZONE 5 MG: 5 TABLET ORAL at 11:49

## 2021-02-16 RX ADMIN — SILDENAFIL 20 MG: 20 TABLET ORAL at 14:14

## 2021-02-16 RX ADMIN — DOCUSATE SODIUM 100 MG: 100 CAPSULE, LIQUID FILLED ORAL at 21:08

## 2021-02-16 RX ADMIN — ASPIRIN 81 MG: 81 TABLET ORAL at 11:49

## 2021-02-16 RX ADMIN — LEVOTHYROXINE SODIUM 50 MCG: 50 TABLET ORAL at 09:35

## 2021-02-16 RX ADMIN — HEPARIN SODIUM 5000 UNITS: 5000 INJECTION INTRAVENOUS; SUBCUTANEOUS at 21:08

## 2021-02-16 RX ADMIN — CARBOXYMETHYLCELLULOSE SODIUM 1 DROP: 10 GEL OPHTHALMIC at 11:56

## 2021-02-16 RX ADMIN — CARBOXYMETHYLCELLULOSE SODIUM 1 DROP: 10 GEL OPHTHALMIC at 21:09

## 2021-02-16 RX ADMIN — POLYMYXIN B SULFATE, TRIMETHOPRIM SULFATE 1 DROP: 10000; 1 SOLUTION/ DROPS OPHTHALMIC at 08:37

## 2021-02-16 RX ADMIN — SODIUM CHLORIDE, PRESERVATIVE FREE 10 ML: 5 INJECTION INTRAVENOUS at 21:09

## 2021-02-16 RX ADMIN — INSULIN LISPRO 1 UNITS: 100 INJECTION, SOLUTION INTRAVENOUS; SUBCUTANEOUS at 17:26

## 2021-02-16 RX ADMIN — POLYMYXIN B SULFATE, TRIMETHOPRIM SULFATE 1 DROP: 10000; 1 SOLUTION/ DROPS OPHTHALMIC at 04:00

## 2021-02-16 RX ADMIN — MINERAL OIL 30 ML: 1000 SOLUTION ORAL at 12:38

## 2021-02-16 RX ADMIN — HEPARIN SODIUM 5000 UNITS: 5000 INJECTION INTRAVENOUS; SUBCUTANEOUS at 06:14

## 2021-02-16 RX ADMIN — BUDESONIDE AND FORMOTEROL FUMARATE DIHYDRATE 2 PUFF: 160; 4.5 AEROSOL RESPIRATORY (INHALATION) at 08:21

## 2021-02-16 RX ADMIN — CARBOXYMETHYLCELLULOSE SODIUM 1 DROP: 10 GEL OPHTHALMIC at 08:37

## 2021-02-16 RX ADMIN — IPRATROPIUM BROMIDE AND ALBUTEROL SULFATE 1 AMPULE: .5; 3 SOLUTION RESPIRATORY (INHALATION) at 16:14

## 2021-02-16 RX ADMIN — IPRATROPIUM BROMIDE AND ALBUTEROL SULFATE 1 AMPULE: .5; 3 SOLUTION RESPIRATORY (INHALATION) at 12:09

## 2021-02-16 RX ADMIN — BUDESONIDE AND FORMOTEROL FUMARATE DIHYDRATE 2 PUFF: 160; 4.5 AEROSOL RESPIRATORY (INHALATION) at 20:49

## 2021-02-16 RX ADMIN — FUROSEMIDE 80 MG: 40 TABLET ORAL at 11:47

## 2021-02-16 RX ADMIN — LATANOPROST 1 DROP: 50 SOLUTION OPHTHALMIC at 21:09

## 2021-02-16 RX ADMIN — INSULIN LISPRO 1 UNITS: 100 INJECTION, SOLUTION INTRAVENOUS; SUBCUTANEOUS at 12:09

## 2021-02-16 RX ADMIN — POLYMYXIN B SULFATE, TRIMETHOPRIM SULFATE 1 DROP: 10000; 1 SOLUTION/ DROPS OPHTHALMIC at 21:09

## 2021-02-16 ASSESSMENT — PAIN SCALES - GENERAL: PAINLEVEL_OUTOF10: 0

## 2021-02-16 NOTE — DISCHARGE SUMMARY
[facility]  Office: [address], DO, Chantelle Alert, DO, Arpit Alex, DO, Teresebrina Cruzsten Blood, DO, Rosa Maria Smalls MD, Sunitha Gutierrez MD, Thanh Coleman MD, Gianluca Su MD, Priyank Tineo MD, Ema Knott MD, Donta Pugh MD, Zaid Lagos MD, Mbon Rosio Donis MD, Wendy Escobedo DO, Alvarado Yates MD, Giovani Bronson MD, Marti Frye DO, Lady Sharron MD,  Sandra Lockhart DO, Joshua Taylor MD, Brennon Martinez MD, Brennon Lozoya, [facility], [facility], CNP, Yvonne Campos, CNP, Isael Nunez, CNS, Azalea Fernandes, CNP, Jessika De Los Santos, CNP, Julio Perez, CNP, Kanika Carlson, CNP, Raudel De La Fuente, CNP, Jojo Macias PA-C, Sofie Romero, GOLDIE, José Corona, CNP, Cary Montes De Oca, CNP, Bella Russell, CNP, Trace Jones, CNP, Keesha Ortizs,  [facility]    Discharge Summary     Patient ID: Tamia Euceda  :  1929   MRN: [id number]     ACCOUNT:  [de-identified]   Patient's PCP: Sharon Garcia MD  Admit Date: 2/10/2021   Discharge Date: 2021     Length of Stay: 6  Code Status:  DNR-CCA  Admitting Physician: Marti Frye DO  Discharge Physician: Sofie Romero, APRN - NP     Active Discharge Diagnoses:     Hospital Problem Lists:  Principal Problem:    Acute on chronic systolic congestive heart failure ([facility]  Active Problems:    Syncope and collapse    CKD (chronic kidney disease) stage 3, GFR 30-59 ml/min    Type 2 diabetes mellitus with kidney complication, without long-term current use of insulin ([address]    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  *      Admission Condition:  fair     Discharged Condition: fair    Hospital Stay:     Hospital Course:  Tamia Euceda is a 80 y.o. female who was admitted for the management of   Acute on chronic systolic congestive heart failure [facility]) , presented to ER with Fall, Hypotension, and Bradycardia       Patient was brought into the emergency room via EMS after a syncopized and fall at home. .Adele Cook states that she was standing and opening a can and started to \"feel sick\" and proceeded to turn around and fall to the ground.  She is unsure if she hit her head.  She states she recalls physically falling, but is unsure how long she was out.  She states that her family member was there at the home heard her fall and came to check on her and she can recall watching him walk into check on her. Filiberto Turcios is unable to explain to me how she felt \"sick\" prior to the fall but tells me she did not have any convulsions shaking loss of bowel bladder or emesis after the event during.  And this is never happened to her before. Segundo Lazaro denied any chest pain shortness of breath palpitations dizziness or lightheadedness or headache prior to the fall but just states \"I felt sick\". On EMS arrival she was noted to be hypotensive with systolic blood pressure in 80s.   With a heart rate in the 40s.    Upon arrival to the emergency room patient was mildly bradycardic in the high 40s to low 50s and mentating well.  With a systolic blood pressure in the 100s  Her work-up in the emergency room revealed: A metabolic panel essentially normal outside of mild elevation of the BUN at 34 and creatinine of 1.41 and a glucose of 154.  Her liver profile shows alk phos of 311 ALT normal at 28, and AST 41 with a bilirubin of 1.24.   Her hemogram was unremarkable without acute leukocytosis or anemias.  Cardiac markers show a proBNP of 12,391 with a high sensitive troponin of 38.  Her TSH elevated at 12.34 and a T4 of 1.41.  Her chest x-ray showed Stable cardiomegaly with mild pulmonary vascular congestion.  In the CT brain showed  Mildchronic deep white matter ischemic changes No acute intracranial abnormalities are noted.      However in review of her last admission  laboratories(2/4/2021) her kidney function was with a BUN of 34 and creatinine of 1.38.  A proBNP of 6729  -9340 and high sensitive troponin of 36 which was downward trend of 41 on admission.  And a TSH of 9.17 with a normal T4 of 1.06.       Patient was just recently admitted and discharged 2/4/2021 -in which she was evaluated for heart failure and bradycardia after a fall with epistaxis after tripping over her oxygen tubing.  She was to utilize Holter monitor at discharge-which was placed at discharge.  And follow-up with Dr. Selina De La Fuente clinic.        Significant therapeutic interventions:     Syncope -vasovagal in nature, cardiology recommending carotid massage and tilt table test as outpatient   Acute hypoxic respiratory failure -restarted revatio, continue Lasix 80 mg daily, continue Zaroxolyn  Symptomatic bradycardia-secondary to #1, further workup as outpatient   Acute exacerbation CHF-Lasix 80 mg daily, continue metolazone, kidney function improving  CKD-improving. Continue to monitor  Coronary artery disease-continue aspirin statin  Subconjunctival hemorrhage of left eye -patient seen ophthalmology, secondary to dry eye.  Continue lubricating drops/abx drops, improving today  Type 2 diabetes-continue ISS.   Patient stable  Discharge: [facility] if patient qualifies continue diuretic therapy, restart sildenafil    Significant Diagnostic Studies:   Labs / Micro:  CBC:   Lab Results   Component Value Date    WBC 4.3 02/16/2021    RBC 4.07 02/16/2021    HGB 12.0 02/16/2021    HCT 39.9 02/16/2021    MCV 98.0 02/16/2021    MCH 29.5 02/16/2021    MCHC 30.1 02/16/2021    RDW 16.9 02/16/2021     02/16/2021     BMP:    Lab Results   Component Value Date    GLUCOSE 241 02/16/2021     02/16/2021    K 3.6 02/16/2021    CL 96 02/16/2021    CO2 27 02/16/2021    ANIONGAP 13 02/16/2021    BUN 49 02/16/2021    CREATININE 1.43 02/16/2021    BUNCRER NOT REPORTED 02/16/2021    CALCIUM 9.2 02/16/2021    LABGLOM 34 02/16/2021    GFRAA 42 02/16/2021    GFR      02/16/2021    GFR NOT REPORTED 02/16/2021     HFP:    Lab Results   Component Value Date    PROT 7.3 02/11/2021     CMP:    Lab Results   Component Value Date    GLUCOSE 241 02/16/2021     02/16/2021    K 3.6 02/16/2021    CL 96 02/16/2021    CO2 27 02/16/2021    BUN 49 02/16/2021    CREATININE 1.43 02/16/2021    ANIONGAP 13 02/16/2021    ALKPHOS 330 02/11/2021    ALT 36 02/11/2021    AST 53 02/11/2021    BILITOT 1.04 02/11/2021    LABALBU 3.4 02/16/2021    ALBUMIN 1.2 02/11/2021    LABGLOM 34 02/16/2021    GFRAA 42 02/16/2021    GFR      02/16/2021    GFR NOT REPORTED 02/16/2021    PROT 7.3 02/11/2021    CALCIUM 9.2 02/16/2021     PT/INR:    Lab Results   Component Value Date    PROTIME 13.3 02/11/2021    INR 1.3 02/11/2021     PTT:   Lab Results   Component Value Date    APTT 24.4 02/10/2021     FLP:    Lab Results   Component Value Date    CHOL 154 02/11/2021    TRIG 64 02/11/2021    HDL 88 02/11/2021     U/A:    Lab Results   Component Value Date    COLORU DARK YELLOW 02/11/2021    TURBIDITY CLEAR 02/11/2021    SPECGRAV 1.016 02/11/2021    HGBUR NEGATIVE 02/11/2021    PHUR 5.0 02/11/2021    PROTEINU 2+ 02/11/2021    GLUCOSEU NEGATIVE 02/11/2021    KETUA NEGATIVE 02/11/2021    BILIRUBINUR NEGATIVE  Verified by ictotest. 02/11/2021    UROBILINOGEN ELEVATED 02/11/2021    NITRU NEGATIVE 02/11/2021    LEUKOCYTESUR NEGATIVE 02/11/2021     TSH:    Lab Results   Component Value Date    TSH 7.15 02/11/2021        Radiology:  Xr Chest (single View Frontal)    Result Date: 2/11/2021  1. Congestive heart failure is most likely given the radiographic findings; pneumonia is also a consideration in areas of consolidation with pleural effusion. 2. Cardiomegaly. 3. No pneumothorax evident. 4. No acute osseous abnormality seen.      Xr Abdomen (kub) (single Ap View)    Result Date: 2/13/2021  No evidence of bowel obstruction. Probable constipation     Ct Head Wo Contrast    Result Date: 2/10/2021  [ Mild central and cortical cerebral atrophy. Mildchronic deep white matter ischemic changes No acute intracranial abnormalities are noted. Xr Chest Portable    Result Date: 2/11/2021  Cardiomegaly with small left basilar effusion and bibasilar infiltrates representing atelectasis versus pneumonia. Xr Chest Portable    Result Date: 2/10/2021  Stable cardiomegaly with mild pulmonary vascular congestion. Consultations:    Consults:     Final Specialist Recommendations/Findings:   IP CONSULT TO HOSPITALIST  IP CONSULT TO HEART FAILURE NURSE/COORDINATOR  IP CONSULT TO DIETITIAN  IP CONSULT TO CARDIOLOGY  IP CONSULT TO PULMONOLOGY  IP CONSULT TO PALLIATIVE CARE  IP CONSULT TO SOCIAL WORK  IP CONSULT TO GENERAL SURGERY  IP CONSULT TO IV TEAM  IP CONSULT TO SPIRITUAL SERVICES  IP CONSULT TO HOME CARE NEEDS      The patient was seen and examined on day of discharge and this discharge summary is in conjunction with any daily progress note from day of discharge. Discharge plan:     Disposition: SNF    Physician Follow Up:     [id number]  [address] [facility] [address]  [address], [address]  [address] [facility]    Call         Requiring Further Evaluation/Follow Up POST HOSPITALIZATION/Incidental Findings: tilt table and carotid massage as outpatient     Diet: regular diet and cardiac diet    Activity: As tolerated    Instructions to Patient: see attached     Discharge Medications:      Medication List      START taking these medications    carboxymethylcellulose PF 1 % Gel ophthalmic gel  Commonly known as: THERATEARS  Place 1 drop into the left eye 4 times daily for 10 days     levothyroxine 50 MCG tablet  Commonly known as: SYNTHROID  Take 1 tablet by mouth Daily  Start taking on: February 17, 2021 metOLazone 5 MG tablet  Commonly known as: ZAROXOLYN  Take 1 tablet by mouth daily  Start taking on: February 17, 2021        CHANGE how you take these medications    furosemide 80 MG tablet  Commonly known as: LASIX  Take 1 tablet by mouth daily  Start taking on: February 17, 2021  What changed:   medication strength  how much to take        CONTINUE taking these medications    aspirin 81 MG chewable tablet  Take 1 tablet by mouth daily     budesonide-formoterol 160-4.5 MCG/ACT Aero  Commonly known as: Symbicort  Inhale 2 puffs into the lungs 2 times daily     glipiZIDE 5 MG tablet  Commonly known as: Glucotrol  Take 0.5 tablets by mouth daily     sildenafil 20 MG tablet  Commonly known as: REVATIO  Take 1 tablet by mouth 3 times daily     travoprost (benzalkonium) 0.004 % ophthalmic solution  Commonly known as: TRAVATAN  Place 1 drop into both eyes daily        STOP taking these medications    gabapentin 300 MG capsule  Commonly known as: NEURONTIN     trimethoprim-polymyxin b 18275-9.1 UNIT/ML-% ophthalmic solution  Commonly known as: POLYTRIM           Where to Get Your Medications      Information about where to get these medications is not yet available    Ask your nurse or doctor about these medications  aspirin 81 MG chewable tablet  budesonide-formoterol 160-4.5 MCG/ACT Aero  carboxymethylcellulose PF 1 % Gel ophthalmic gel  furosemide 80 MG tablet  glipiZIDE 5 MG tablet  levothyroxine 50 MCG tablet  metOLazone 5 MG tablet  sildenafil 20 MG tablet  travoprost (benzalkonium) 0.004 % ophthalmic solution         No discharge procedures on file. Time Spent on discharge is  35 mins in patient examination, evaluation, counseling as well as medication reconciliation, prescriptions for required medications, discharge plan and follow up.     Discussed with attending   Electronically signed by   DOM Mauricio NP  2/16/2021  1:30 PM      Thank you Dr. Navin Forman MD for the opportunity to be involved in this patient's care. [email or web address]

## 2021-02-16 NOTE — PROGRESS NOTES
PULMONARY & CRITICAL CARE MEDICINE PROGRESS  NOTE     Patient:  Mayela Cloud  MRN: [id number]  Admit date: 2/10/2021    SUBJECTIVE     I personally interviewed/examined the patient; reviewed interval history, interpreted all available radiographic, laboratory data at the time of service. Chief Compliant/Reason for Initial Consult:   Acute hypoxic respiratory failure. Severe pulmonary hypertension. HISTORY OF PRESENT ILLNESS:  The patient is a 80 y.o. female brought to the emergency department via EMS after she had syncopal episode and collapse. History obtained from chart review. Per chart review she was in the kitchen when she felt weak in her legs had a syncopal episode and passed out. She recalls physically falling, did not hit her head but she does not recall how long she was out. Per chart review from patient report this is never happened to her before. However per previous notes she had similar episode with hypotension and bradycardia. Upon arrival to the ED she was bradycardic in high 40s-50s. Mentation stable. Systolic blood pressure 639J. Work-up in the ED was suggestive of BUN-34, creatinine-1.41, glucose-1 4. LFTs, ALP-311, AST ALT normal, bilirubin-1.24.  H&H stable  Cardiac hcyknig-[id number]-95 391, high-sensitivity troponin 38. TSH 12.34, T4 1.41. D-dimer 1.62. Fibrinogen 307. She did not undergo CT PE. Chest x-ray cardiomegaly, pulmonary vascular congestion. CT head chronic deep white matter changes. No acute intracranial abnormalities. Baseline CKD with creatinine baseline 1.4-1.7. She was put on Holter monitor for previous episode during the previous admission. Recommended follow-up with [facility]. Kyler Araiza at [facility]. Records of follow-up not available. Cardiology evaluated the patient. Electrophysiology also evaluated the patient.   She is due for tilt table study with Dr. Erick Ferro.         Interval history. 2/16/2021   Patient seen and chart reviewed. No acute hypoxic events overnight reported per nursing staff. She is off high flow for about 36 to 48 hours now. Currently she is on nasal cannula 2 L she was weaned from 4 L yesterday to 2 L maintaining saturation 94% and above. Overnight she was not on BiPAP and remained on nasal cannula. Currently she is up to the chair working and help participated in physical therapy yesterday at bedside. Her labs today show BUN of 49 stable creatinine 1.43 slightly increased bicarbonate 27 hemoglobin 12 platelet 47 WBC 4.3.       Review of Systems:  General: negative for chills, fatigue or fever  ENT: negative for headaches, nasal congestion, sore throat or visual changes  Allergy and Immunology: negative for postnasal drip or seasonal allergies  Hematological and Lymphatic: negative for bleeding problems, swollen lymph nodes  Respiratory: positive for shortness of breath negative for hemoptysis  Cardiovascular: negative for edema or palpitations  Gastrointestinal: negative for abdominal pain, change in bowel habits or nausea/vomiting  Genito-Urinary: negative for dysuria or urinary frequency/urgency  Musculoskeletal: negative for joint pain or joint swelling  Neurological: negative for numbness/tingling, seizures or weakness  Dermatological: negative for pruritus or rash    OBJECTIVE     Respiratory Support:  Vent Information  Skin Assessment: Clean, dry, & intact  FiO2 : (S) 35 %  SpO2: 94 %  SpO2/FiO2 ratio: 262.86  I Time/ I Time %: 0.95 s  Humidification Source: Heated wire  Humidification Temp: 33  Humidification Temp Measured: 33  Mask Type: Full face mask  Mask Size: Medium  Lab Results   Component Value Date    MODE Bi-Level Ventilation 02/11/2021     O2 Flow Rate (L/min): 3 L/min  SpO2: 94 %    Vitals:   /80   Pulse 85   Temp 97.7 °F (36.5 °C) (Oral)   Resp 13   Ht 5' 7\" (1.702 m)   Wt 201 lb (91.2 kg)   LMP  (LMP Unknown)   SpO2 94%   BMI 31.48 kg/m²     Systemic Examination:   General appearance - oriented to person, place, and time, overweight and ill-appearing  Mental status - alert, oriented to person, place, and time  Eyes - pupils equal and reactive, extraocular eye movements intact  Mouth - mucous membranes moist, pharynx normal without lesions  Neck - supple, no significant adenopathy  Chest -bilateral breath sounds present distant decreased at bases no expiratory wheezing or rhonchi  Heart -S1 normal.  Loud S2. Wide split second heart sound. Abdomen - soft, nontender, nondistended, no masses or organomegaly  Neurological - screening mental status exam normal  Extremities -positive for mild pedal edema  Skin -no skin rash. DATA REVIEW     Medications:  Scheduled Meds:   furosemide  80 mg Oral Daily    sildenafil  20 mg Oral TID    trimethoprim-polymyxin b  1 drop Left Eye Q6H    bisacodyl  10 mg Rectal Daily    polyethylene glycol  17 g Oral Daily    docusate sodium  100 mg Oral BID    mineral oil  30 mL Oral Daily    metOLazone  5 mg Oral Daily    senna  2 tablet Oral Nightly    carboxymethylcellulose PF  1 drop Left Eye 4x Daily    heparin (porcine)  5,000 Units Subcutaneous 3 times per day    ipratropium-albuterol  1 ampule Inhalation Q4H WA    levothyroxine  50 mcg Oral Daily    aspirin  81 mg Oral Daily    budesonide-formoterol  2 puff Inhalation BID    latanoprost  1 drop Both Eyes Nightly    sodium chloride flush  10 mL Intravenous 2 times per day    [Held by provider] lisinopril  5 mg Oral Daily    [Held by provider] carvedilol  3.125 mg Oral BID     insulin lispro  0-6 Units Subcutaneous TID     insulin lispro  0-3 Units Subcutaneous Nightly     Continuous Infusions:   sodium chloride      dextrose       LABS:-  ABGs:   No results found for: PH, PCO2, PO2, HCO3, O2SAT  No results for input(s): PHART, PO2ART, NPS1WLK, QGO6SFM, BEART, H4NWRDWO in the last 72 hours.   Lab Results   Component Value Date POCPH 7.396 02/11/2021    POCPCO2 41.7 02/11/2021    POCPO2 141.1 (H) 02/11/2021    POCHCO3 25.6 02/11/2021    FDCO8JFN 99 (H) 02/11/2021     CBC:   Recent Labs     02/14/21  0622 02/15/21  0609 02/16/21  0431   WBC 4.2 4.1 4.3   HGB 12.2 11.9 12.0   HCT 40.0 38.2 39.9   MCV 96.4 95.0 98.0    180 247   RBC 4.15 4.02 4.07   MCH 29.4 29.6 29.5   MCHC 30.5 31.2 30.1   RDW 16.8* 16.4* 16.9*     BMP:   Recent Labs     02/14/21  0622 02/15/21  0609 02/16/21  0431    134* 136   K 5.1 4.0 3.6*    97* 96*   CO2 25 26 27   BUN 50* 49* 49*   CREATININE 1.74* 1.34* 1.43*   GLUCOSE 142* 189* 241*   PHOS 4.0 3.5 3.4     Liver Function Test:   Recent Labs     02/16/21 0431   LABALBU 3.4*     Amylase/Lipase:  No results for input(s): AMYLASE, LIPASE in the last 72 hours. Coagulation Profile:   No results for input(s): INR, PROTIME, APTT in the last 72 hours. Cardiac Enzymes:  No results for input(s): CKTOTAL, CKMB, CKMBINDEX, TROPONINI in the last 72 hours. Lactic Acid:  No results found for: LACTA  BNP:   No results found for: BNP  D-Dimer:  Lab Results   Component Value Date    DDIMER 1.62 02/11/2021     Others:   Lab Results   Component Value Date    TSH 7.15 (H) 02/11/2021     Lab Results   Component Value Date    CRP 14.6 (H) 02/11/2021     No results found for: Reji Rose  Lab Results   Component Value Date    FERRITIN 66 02/11/2021     No results found for: SPEP, UPEP  No results found for: PSA, CEA, , LE5789,     Input/Output:    Intake/Output Summary (Last 24 hours) at 2/16/2021 1250  Last data filed at 2/16/2021 0437  Gross per 24 hour   Intake --   Output 800 ml   Net -800 ml     Microbiology:  Reviewed as available  Pathology: Reviewed as available     Radiology:     Chest X-ray: 2/11/2021  Impression   1.  Congestive heart failure is most likely given the radiographic findings;   pneumonia is also a consideration in areas of consolidation with pleural   effusion.    2. Cardiomegaly. 3. No pneumothorax evident. 4. No acute osseous abnormality seen.      CT Scans: She has been periodically evaluated by CT PE in the past as well as evidenced from records in Care Everywhere.     She had been evaluated by VQ scan in 2019 which was low probability for pulmonary embolus when she presented with similar complaints of congestive heart failure and COPD.     Echocardiogram: From care everywhere from [address] [place] [address] 2021  Result Narrative     Left Ventricle: Systolic function is normal with an ejection fraction   of 55-60%.   Left Ventricle: There is diastolic flattening of the interventricular   septum consistent with right ventricle volume overload.   Left Ventricle: No segmental wall motion abnormalities.   Left Atrium: Left atrium is normal in size. The left atrial volume   index is 30.7 mL/m2.   Right Ventricle: Right ventricular size is severely dilated. Moderator   band is present.   Right Ventricle: Systolic function is moderately to severely reduced.   Right Atrium: Right atrium is severely dilated. The right atrial area   is 24.4 cm2.   Aortic Valve: The aortic valve is trileaflet. The leaflets are mildly   thickened.   Mitral Valve: There is trace regurgitation. There is no evidence of   mitral valve stenosis.   Tricuspid Valve: There is severe regurgitation. There is no evidence of   tricuspid valve stenosis.   Tricuspid Valve: RVSP calculated at 78 mmHg. RVSP is based on RA   pressure of 15 mmHg.   Pericardium: There is no pericardial effusion.   Pulmonic Valve: There is mild regurgitation. There is no evidence of   pulmonic valve stenosis.        ASSESSMENT AND PLAN     Assessment:  //Syncope.   Likely secondary to severe pulmonary hypertension/ Vasovagal versus cardiogenic.  //Bradycardia improved  //Sick sinus syndrome?  //Severe pulmonary hypertension  //Acute on chronic diastolic congestive heart failure  //Chronic cor pulmonale  //Chronic respiratory failure on home oxygen  //COPD by history non-smoker but significant smoking exposure  //Hypothyroidism  //Congestive hepatomegaly     She follows up with Dr. Jaren Diaz at [address] [place] [address]. She has been on chronic therapy with sildenafil for pulmonary hypertension. Notes from August 2020 [facility] reviewed. She has been on outpatient therapy with fluticasone-Vilanterol as an [facility] Plan:    Continue with supplemental O2 to keep saturation above 90% and will continue with 2 L nasal cannula (home O2). She has been off BiPAP tolerated well. Severe pulmonary htn with right heart failure , advanced age - therapy options limited   Continue with Revatio  Continue with Lasix follow-up renal function  Encourage out of bed to chair. OT PT.  DVT prophylaxis to continue. Follow-up with palliative care would recommend to follow as an outpatient as she is high risk for recurrent symptoms and admission. I have discussed with daughter today bedside. Patient is chronic pulmonary hypertension and chronic right heart failure for at least 3 years or more and she has been followed up by cardiology and pulmonology daughter is well aware of her severe pulmonary hypertension. I have discussed with her that her recent episodes of syncope twice (she was recently [facility] to [facility]) likely are related to severe pulmonary hypertension and the options are limited and syncope is a bad prognostic sign off severe pulmonary hypertension. Acutely she has fairly well up to this point for last 2- 3 years since she was diagnosed to have pulmonary hypertension severity was that was severe event at the time of diagnosis. Advised supportive care with oxygen. Patient and daughter were present seen by palliative and they are changing the CODE STATUS to DNR CCA no intubation.      Discussed with nursing staff treatment plan discussed    Electronically signed by Gina Cordero MD on 2/16/2021 at 12:50 PM         Please note that this chart was generated using voice recognition Dragon dictation software. Although every effort was made to ensure the accuracy of this automated transcription, some errors in transcription may have occurred.      Guerda Schuler MD  2/16/2021 12:50 PM Toileting

## 2021-02-16 NOTE — PLAN OF CARE
Problem: Musculor/Skeletal Functional Status  Goal: Highest potential functional level  Outcome: Ongoing     Problem: Breathing Pattern - Ineffective:  Goal: Ability to achieve and maintain a regular respiratory rate will improve  Description: Ability to achieve and maintain a regular respiratory rate will improve  2/16/2021 0523 by Jere Raymond RN  Outcome: Ongoing  2/15/2021 2020 by Pieter Pop RCP  Outcome: Ongoing     Problem: Skin Integrity:  Goal: Will show no infection signs and symptoms  Description: Will show no infection signs and symptoms  Outcome: Ongoing  Goal: Absence of new skin breakdown  Description: Absence of new skin breakdown  Outcome: Ongoing     Problem: Falls - Risk of:  Goal: Will remain free from falls  Description: Will remain free from falls  Outcome: Ongoing  Goal: Absence of physical injury  Description: Absence of physical injury  Outcome: Ongoing

## 2021-02-16 NOTE — PROGRESS NOTES
[facility]  Office: [address], DO, Cody Huntington, DO, Dale Nafisash, DO, Renetta Oconnor Blood, DO, Natali Pleitez MD, Joshua Crenshaw MD, Rodney Charles MD, Jannette Osman MD, Shante Jacobo MD, Inocente Luong MD, Yuly Mann MD, Vidhya Jackson MD, Jamarcus Kwok MD, Jeppie Soulier, DO, Fernando Rai MD, Hannah Lunsford MD, Jake Fournier DO, Stephan Varela MD,  Kelsey Pratt, DO, Neelam Remy MD, Rajwinder Reeves MD, Karin George CNP, [facility], Ornakia Shah, CNP, Loulou Crowder, CNS, Bon Mckeon, CNP, Elan Michael, CNP, Mariann Austin, CNP, Carito Puente, CNP, Umesh Hou, CNP, Andres Resendez PA-C, Kanchan Frazier, GOLDIE, Yuki Diaz, CNP, Fan Jackman, CNP, Bre Jamison, CNP, Rakesh Nuñez, CNP, Elvis Severs, [address] [place] [address]    Progress Note    2/16/2021    7:36 AM    Name:   Britton Charles  MRN:     [id number]     [place]:      [de-identified]   Room:   [address] [place] [address] Day:  6  Admit Date:  2/10/2021  6:44 PM    PCP:   Mina Sanchez MD  Code Status:  Full Code    Subjective:     C/C:   Chief Complaint   Patient presents with   Samanta Mihir    Hypotension    Bradycardia     Interval History Status: improved. Patient evaluated in room alert and oriented x3. States she is breathing better. On 2 L low flow nasal cannula tolerating well. States she wants to go home versus skilled nursing facility upon discharge. Discussion with cardiology about further testing. Tilt table and massage to be done as outpatient. Stable for discharge    Brief History:     Brett Self a 80 y.o. Non-/non  female who presents with Fall, Hypotension, and Bradycardia   and is admitted to the hospital for the management of syncope and collapse with bradycardia.      Patient was brought into the emergency room via EMS after a syncopized and fall at home. Sapna Arellano states that she was standing and opening a can and started to [facility] sick\" and proceeded to turn around and fall to the ground.  She is unsure if she hit her head.  She states she recalls physically falling, but is unsure how long she was out.  She states that her family member was there at the home heard her fall and came to check on her and she can recall watching him walk into check on her. Nick Choi is unable to explain to me how she felt \"sick\" prior to the fall but tells me she did not have any convulsions shaking loss of bowel bladder or emesis after the event during.  And this is never happened to her before. Corinthia Goodell denied any chest pain shortness of breath palpitations dizziness or lightheadedness or headache prior to the fall but just states \"I felt sick\". On EMS arrival she was noted to be hypotensive with systolic blood pressure in 80s.   With a heart rate in the 40s.    Upon arrival to the emergency room patient was mildly bradycardic in the high 40s to low 50s and mentating well.  With a systolic blood pressure in the 100s  Her work-up in the emergency room revealed: A metabolic panel essentially normal outside of mild elevation of the BUN at 34 and creatinine of 1.41 and a glucose of 154.  Her liver profile shows alk phos of 311 ALT normal at 28, and AST 41 with a bilirubin of 1.24.   Her hemogram was unremarkable without acute leukocytosis or anemias.  Cardiac markers show a proBNP of 12,391 with a high sensitive troponin of 38.  Her TSH elevated at 12.34 and a T4 of 1.41.  Her chest x-ray showed Stable cardiomegaly with mild pulmonary vascular congestion.  In the CT brain showed  Mildchronic deep white matter ischemic changes No acute intracranial abnormalities are noted.      However in review of her last admission  laboratories(2/4/2021) her kidney function was with a BUN of 34 and creatinine of 1.38.  A proBNP of 6729  -9340 and high sensitive troponin of 36 which was downward trend of 41 on admission.  And a TSH of 9.17 with a normal T4 of 1.06.       Patient was just recently admitted and discharged 2/4/2021 -in which she was evaluated for heart failure and bradycardia after a fall with epistaxis after tripping over her oxygen tubing.  She was to utilize Holter monitor at discharge-which was placed at discharge.  And follow-up with Dr. [facility] [place] [facility].     Review of Systems:     Constitutional:  negative for chills, fevers, sweats  Respiratory:  negative for cough, dyspnea on exertion, +shortness of breath, wheezing  Cardiovascular:  negative for chest pain, chest pressure/discomfort, lower extremity edema, palpitations  Gastrointestinal:  negative for abdominal pain, constipation, diarrhea, nausea, vomiting  Neurological:  negative for dizziness, headache    Medications: Allergies:     Allergies   Allergen Reactions    Pcn [Penicillins]     Avelox [Moxifloxacin] Rash       Current Meds:   Scheduled Meds:    sildenafil  20 mg Oral TID    trimethoprim-polymyxin b  1 drop Left Eye Q6H    furosemide  80 mg Intravenous Daily    bisacodyl  10 mg Rectal Daily    polyethylene glycol  17 g Oral Daily    docusate sodium  100 mg Oral BID    mineral oil  30 mL Oral Daily    metOLazone  5 mg Oral Daily    senna  2 tablet Oral Nightly    carboxymethylcellulose PF  1 drop Left Eye 4x Daily    heparin (porcine)  5,000 Units Subcutaneous 3 times per day    ipratropium-albuterol  1 ampule Inhalation Q4H WA    levothyroxine  50 mcg Oral Daily    aspirin  81 mg Oral Daily    budesonide-formoterol  2 puff Inhalation BID    latanoprost  1 drop Both Eyes Nightly    sodium chloride flush  10 mL Intravenous 2 times per day    [Held by provider] lisinopril  5 mg Oral Daily    [Held by provider] carvedilol  3.125 mg Oral BID     insulin lispro  0-6 Units Subcutaneous TID     insulin lispro  0-3 Units Subcutaneous Nightly     Continuous Infusions:    sodium chloride      dextrose       PRN Meds: glucose, dextrose, glucagon (rDNA), dextrose, sodium chloride flush, nicotine, promethazine **OR** ondansetron, polyethylene glycol, acetaminophen **OR** acetaminophen    Data:     Past Medical History:   has a past medical history of Arthritis, Breast cancer ([facility] CAD (coronary artery disease), CHF (congestive heart failure) ([facility] CKD (chronic kidney disease) stage 3, GFR 30-59 ml/min, COPD (chronic obstructive pulmonary disease) ([facility] Gastroesophageal reflux disease, Hyperlipidemia, Hypertension, Recurrent major depression in remission ([facility] Thyroid disease, and Type 2 diabetes mellitus with kidney complication, without long-term current use of insulin ([facility] Social History:   reports that she has never smoked. She has never used smokeless tobacco. She reports that she does not drink alcohol or use drugs. Family History:   Family History   Problem Relation Age of Onset    No Known Problems Mother     No Known Problems Father        Vitals:  /60   Pulse 81   Temp 97.4 °F (36.3 °C) (Oral)   Resp 15   Ht 5' 7\" (1.702 m)   Wt 201 lb (91.2 kg)   LMP  (LMP Unknown)   SpO2 99%   BMI 31.48 kg/m²   Temp (24hrs), Av.9 °F (36.6 °C), Min:97.4 °F (36.3 °C), Max:98.6 °F (37 °C)    Recent Labs     02/15/21  0708 02/15/21  1124 02/15/21  1624 02/15/21  2023   POCGLU 188* 212* 208* 229*       I/O (24Hr):     Intake/Output Summary (Last 24 hours) at 2021 0736  Last data filed at 2021 0437  Gross per 24 hour   Intake --   Output 800 ml   Net -800 ml       Labs:  Hematology:  Recent Labs     21  0622 02/15/21  0609 21  0431   WBC 4.2 4.1 4.3   RBC 4.15 4.02 4.07   HGB 12.2 11.9 12.0   HCT 40.0 38.2 39.9   MCV 96.4 95.0 98.0   MCH 29.4 29.6 29.5   MCHC 30.5 31.2 30.1   RDW 16.8* 16.4* 16.9*    180 247   MPV 11.0 10.8 11.2     Chemistry:  Recent Labs     21  0622 02/15/21  0609 21  0431    134* 136   K 5.1 4.0 3.6*    97* 96*   CO2 25 26 27   GLUCOSE 142* 189* 241*   BUN 50* 49* 49* CREATININE 1.74* 1.34* 1.43*   MG 2.2 2.0 2.0   ANIONGAP 12 11 13   LABGLOM 27* 37* 34*   GFRAA 33* 45* 42*   CALCIUM 9.3 9.2 9.2   PHOS 4.0 3.5 3.4     Recent Labs     02/14/21  0622 02/14/21  0622 02/14/21  1609 02/14/21  2106 02/15/21  0609 02/15/21  0708 02/15/21  1124 02/15/21  1624 02/15/21  2023 02/16/21  0431   LABALBU 3.3*  --   --   --  3.3*  --   --   --   --  3.4*   POCGLU  --    < > 232* 222*  --  188* 212* 208* 229*  --     < > = values in this interval not displayed. ABG:  Lab Results   Component Value Date    POCPH 7.396 02/11/2021    POCPCO2 41.7 02/11/2021    POCPO2 141.1 02/11/2021    POCHCO3 25.6 02/11/2021    NBEA NOT REPORTED 02/11/2021    PBEA 1 02/11/2021    VEB4SXO 27 02/11/2021    UHPU7JXG 99 02/11/2021    FIO2 90.0 02/11/2021     No results found for: SPECIAL  No results found for: CULTURE    Radiology:  Xr Chest (single View Frontal)    Result Date: 2/11/2021  1. Congestive heart failure is most likely given the radiographic findings; pneumonia is also a consideration in areas of consolidation with pleural effusion. 2. Cardiomegaly. 3. No pneumothorax evident. 4. No acute osseous abnormality seen. Xr Abdomen (kub) (single Ap View)    Result Date: 2/13/2021  No evidence of bowel obstruction. Probable constipation     Ct Head Wo Contrast    Result Date: 2/10/2021  [ Mild central and cortical cerebral atrophy. Mildchronic deep white matter ischemic changes No acute intracranial abnormalities are noted. Xr Chest Portable    Result Date: 2/11/2021  Cardiomegaly with small left basilar effusion and bibasilar infiltrates representing atelectasis versus pneumonia. Xr Chest Portable    Result Date: 2/10/2021  Stable cardiomegaly with mild pulmonary vascular congestion.        Physical Examination:        General appearance:  alert, cooperative and no distress  Mental Status:  oriented to person, place and time and normal affect  Lungs:  clear to auscultation bilaterally, normal effort  Heart:  irregular rate and rhythm, no murmur  Abdomen:  soft, nontender, nondistended, normal bowel sounds, no masses, hepatomegaly, splenomegaly  Extremities: RUE lymphedema, +1 BLE pitting redness, tenderness in the calves  Skin:  no gross lesions, rashes, induration    Assessment:        Hospital Problems           Last Modified POA    * (Principal) Acute on chronic systolic congestive heart failure (HCC) 2/14/2021 Yes    Syncope and collapse 2/14/2021 Yes    CKD (chronic kidney disease) stage 3, GFR 30-59 ml/min 2/11/2021 Yes    Type 2 diabetes mellitus with kidney complication, without long-term current use of insulin ([address] 2/11/2021 Yes    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 angina pectoris 2/11/2021 Yes    Bradycardia 2/11/2021 Yes    Subconjunctival hemorrhage of left eye 2/11/2021 Yes    Decompensated heart failure ([address] 2/10/2021 Yes    Acute respiratory failure with hypoxia ([address] 2/11/2021 Yes          Plan:        Syncope -vasovagal in nature, cardiology recommending carotid massage and tilt table test as outpatient   Acute hypoxic respiratory failure -restarted revatio, continue Lasix 80 mg daily, continue Zaroxolyn  Symptomatic bradycardia-secondary to #1, further workup as outpatient   Acute exacerbation CHF-Lasix 80 mg daily, continue metolazone, kidney function improving  CKD-improving. Continue to monitor  Coronary artery disease-continue aspirin statin  Subconjunctival hemorrhage of left eye -patient seen ophthalmology, secondary to dry eye. Continue lubricating drops/abx drops, improving today  Type 2 diabetes-continue ISS.   Patient stable  Discharge: [facility] if patient qualifies continue diuretic therapy, restart sildenafil    DOM Toney - NP  2/16/2021  7:36 AM

## 2021-02-16 NOTE — ACP (ADVANCE CARE PLANNING)
Advance Care Planning     Advance Care Planning (ACP) Physician/NP/PA Conversation    Date of Conversation: 2/10/2021  Conducted with: Patient with Daniel 153:  1) kristel Ferrara [phone]    Click here to [address] including selection of the Healthcare Decision Maker Relationship (ie \"Primary\")  Today we documented desired Decision Maker(s), who is (are) NOT Legal Next of [place] [id number]. ACP documents are required for decision maker authority. Care Preferences:    Hospitalization: \"If your health worsens and it becomes clear that your chance of recovery is unlikely, what would be your preference regarding hospitalization? \"  The patient would prefer hospitalization. Ventilation: \"If you were unable to breath on your own and your chance of recovery was unlikely, what would be your preference about the use of a ventilator (breathing machine) if it was available to you? \"  The patient would NOT desire the use of a ventilator. Resuscitation: \"In the event your heart stopped as a result of an underlying serious health condition, would you want attempts made to restart your heart, or would you prefer a natural death? \"  No, do NOT attempt to resuscitate. benefit/burden of treatment options, ventilation preferences, hospitalization preferences and resuscitation preferences    Conversation Outcomes / Follow-Up Plan:  ACP complete - no further action today  Reviewed DNR/DNI and patient elects DNR order - completed portable DNR form & placed order   Filled out HCPOA with patient and kristel Kebede at bedside. Primary agent is kristel Kebede. Secondary is Richard Beery, and third is Kaleen Caprice. Patient also reports not wanting CPR or intubation. She has elected to be a DNRCC-A no intubation so this DNR form was also filled out and order placed in the computer.      Length of Voluntary ACP Conversation in minutes:  20 minutes    Madhuri Johnson

## 2021-02-16 NOTE — PROGRESS NOTES
..    Palliative Care Progress Note    NAME:  Radha Cherry RECORD NUMBER:  [id number]  AGE: 80 y.o. GENDER: female  : 1929  TODAY'S DATE:  2021    Reason for Consult:  symptom management, goals of care, code status and comfort. History of Present Illness     The patient is a 80 y.o. Non-/non  female who presents with Fall, Hypotension, and Bradycardia      Referred to Palliative Care by  [x] Physician   [] Nursing  [] Family Request   [] Other:       She was admitted to the primary service for Decompensated heart failure ([facility] [I50.9]. Her hospital course has been associated with Syncope and collapse, acute on chronic CHF, symptomatic bradycardia, subconjunctival hemorrhage of left eye, severe pulmonary HTN, chronic respiratory failure on home 02, constipation, and fall. The patient has a complicated medical history and has been hospitalized since 2/10/2021  6:44 PM. Patients 02 requirements are back to baseline at [facility]. She is deciding rehab vs home with daughter Charlette Cm providing 24 hour support per RN. RN reports patient/family wanting to complete HCPOA, and also reports patient to be A/O. Palliative care following for review of goals, symptom management, code status, and support. OVERNIGHT EVENTS: No overnight events reported by patients or daughter. RN reports murillo to come out today, and patient possible be DC later today.  pertinent labs include; BUN 41. Cr 1.43, albumin 3.4, Kcl 3.6.      BP (!) 140/82   Pulse 83   Temp 97.7 °F (36.5 °C) (Oral)   Resp 16   Ht 5' 7\" (1.702 m)   Wt 201 lb (91.2 kg)   LMP  (LMP Unknown)   SpO2 (!) 89%   BMI 31.48 kg/m²     Assessment        REVIEW OF SYSTEMS    []   UNABLE TO OBTAIN:     Constitutional:  []   Chills   [x]  Fatigue   []  Fevers   []  Malaise   []  Weight loss   [] Other:     Respiratory:   []  Cough    []  Shortness of breath    []  Chest pain    [] Other:     Cardiovascular:   []  Chest pain  []  Dyspnea []  Exertional chest pressure/discomfort     [] Fatigue      []  Palpitations    []  Syncope   [] Other:     Gastrointestinal:   []  Abdominal pain   []  Constipation    []  Diarrhea    []   Dysphagia   []  Reflux             []  Vomiting   [] Other:     Genitourinary:  []  Dysuria     []  Frequency   []  Hematuria   [] Nocturia   []  Urinary incontinence   [x] Other:  Linares    Musculoskeletal:   [] Back pain    [x]  Muscle weakness   []  Myalgias    []  Neck pain   []  Stiff joints   []  Other:     Behavioral/Psych:   [] Anxiety    []  Depression     []  Mood swings   [] Other:     PHYSICAL ASSESSMENT:     General: [x]  Oriented x3      [] well appearing      [] Intubated      [] ill appearing      [] Other:    Mental Status: [x] normal mental status exam      [] drowsy      [] Confused      [] Other:     Cardiovascular: [x]  Regular rate/rhythm      [] Arrhythmia      [] Other:     Chest: [x] Effort normal      [x] lungs clear      [] respiratory distress      [] Tachypnea      [x]  Other: 2LNC    Abdomen: [x] Soft/non-tender      [x]  Normal appearance      [] Distended      [] Ascites      [] Other:    Neurological: [x] Normal Speech      [x] Normal Sensation      []  Deficits present:      Extremity:  [x] normal skin color/temp      [] clubbing/cyanosis      []  No edema      [x] Other: +2 BLE edema    Palliative Performance Scale:  ___60%  Ambulation reduced; Significant disease; Can't do hobbies/housework; intake normal or reduced; occasional assist; LOC full/confusion  _x__50%  Mainly sit/lie; Extensive disease; Can't do any work; Considerable assist; intake normal or reduced; LOC full/confusion  ___40%  Mainly in bed; Extensive disease; Mainly assist; intake normal or reduced; LOC full/confusion   ___30%  Bed Bound; Extensive disease; Total care; intake reduced; LOCfull/confusion  ___20%  Bed Bound; Extensive disease; Total care; intake minimal; Drowsy/coma  ___10%  Bed Bound; Extensive disease;  Total care; Mouth care only; Drowsy/coma  ___0       Death      Plan      Palliative Interaction: I received update on patient per RN, and she reports patient being alert and oriented. She reports Dr. Fabby Lee at bedside, and [facility] to [facility] PO[facility]. I informed RN that patient was more alert yesterday, but still slightly confused. She reports patient possibly being discharged today to rehab versus home. Linares to come out. Patient is on 2 L nasal cannula, as at home. I visited patient, and she was up to recliner with daughter at bedside. I introduced myself to them, and reminded them that I was from palliative care. Patient reports feeling much better, and denies any complaints today. I checked patient's orientation level, and she is alert and oriented x4. I discussed  POA, and patient/daughter report wanting to fill out here. I asked patient about previous [facility]. POA, and her reporting that daughter Eneida Finch was on that. Patient reports being unsure where this document is, and would like daughter Fabby Lee as primary agent. I discussed with patient and daughter what an [place] is, and how patient should inform agents about their wishes, as their duty is to relay these wishes to staff when patient is unable. I completed [facility] POA with patient, and patient's daughter per their request.  Patient's daughter Fabby Lee is primary agent, Nabor Lopez is first alternate and daughter Eneida Finch is second alternate. During this time patient reports not wanting intubation. I discussed all 3 code classifications in full detail. Patient reports not wanting CPR, and/or intubation. Patient declined to be a DNR CC-A no intubation CODE STATUS. The DNR form was filled out with patient, and patient signed. RN informed that primary care needs to [facility] for EMS, and provided patient with copy. I updated Dr. Dago Lemon that came to see patient, and patients RN.  I offered patient support, and patient/family appreciative to visit, and provided information. Education/support to staff  Education/support to family  Discharge planning/helping to coordinate care  Communications with primary service  Providing support for coping/adaptation/distress of family  Providing support for coping/adaptation/distress of patient  Continue with current plan of care  Clarification of medical condition to patient and family  Code status clarified: Full Code  Code status clarified: [facility]  Code status clarified: [facility]  Validating patient/family distress  Continued communication updates  Patient is alert and oriented, and requesting to's complete HC POA. Daughter Destiny Milton at bedside. Patient wants daughter [place] primary agent. I completed HC POA, and please copy on the chart. The original, and copy given to patient/family. Patient reports not wanting CPR, and/or intubation. DNR form filled out, and order placed in computer. Perfect serve Dr. Avis Camara to inform, and ask for him to cosign. Patient likely leaving later today for rehab versus home. Principle Problem/Diagnosis:  Acute on chronic systolic congestive heart failure (HCC)    Additional Assessments:  Principal Problem:    Acute on chronic systolic congestive heart failure (HCC)  Active Problems:    Syncope and collapse    CKD (chronic kidney disease) stage 3, GFR 30-59 ml/min    Type 2 diabetes mellitus with kidney complication, without long-term current use of insulin (HCC)    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 angina pectoris    Bradycardia    Subconjunctival hemorrhage of left eye    Decompensated heart failure (HCC)    Acute respiratory failure with hypoxia ([address]  Resolved Problems:    * No resolved hospital problems. *      1- Symptom management/ pain control     Pain Assessment:  The patient is not having any pain.                Anxiety:  none                          Dyspnea:  chronic dyspnea                          Fatigue:  generalized weakness- presently up in chair    Other: We feel the patient symptoms are being controlled. her current regimen is reviewed by myself and discussed with the staff. 2- Goals of care evaluation   The patient goals of care are live longer, improve or maintain function/quality of life, remain at home and support for family/caregiver   Goals of care discussed with:    [] Patient independently    [x] Patient and Family    [] Family or Healthcare DPOA independently    [] Unable to discuss with patient, family/DPOA not present    3- Code Status  DNR-CCA    4- Other recommendations  - We will continue to provide comfort and support to the patient and the family    Please call with any palliative questions or concerns. Palliative Care Team is available via perfect serve or via phone. Palliative Care will continue to follow Ms. Goncalves's care as needed. The note has been dictated by dragon, typing errors may be a possibility     Thank you for allowing Palliative Care to participate in the care of Ms. Goncalves . Electronically signed by   DOM Bear CNP  Palliative Care Team  on 2/16/2021 at 1:41 PM    Palliative care can be reached via [facility].

## 2021-02-16 NOTE — CARE COORDINATION
Met with pt and daughter at bedside. Pt agreeable to [facility]. Notified Stefan at [place]. She stated they are able to accept tomorrow and need an updated covid swab.  PS Reilly Mckeon for update

## 2021-02-16 NOTE — PROGRESS NOTES
[facility]   Progress Note                   Date:   2/16/2021  Patient name: Kat Christina  Date of admission:  2/10/2021  6:44 PM  MRN:   [id number]  YOB: 1929  PCP: Otf Lyon MD    Reason for Admission: Decompensated heart failure ([facility] [address] [I50.9]    Subjective:       Clinical Changes / Abnormalities: Pt seen and examined in the room. Pt up to chair. HR stable. On 02 per NC. Medications:   Scheduled Meds:   furosemide  80 mg Oral Daily    sildenafil  20 mg Oral TID    trimethoprim-polymyxin b  1 drop Left Eye Q6H    bisacodyl  10 mg Rectal Daily    polyethylene glycol  17 g Oral Daily    docusate sodium  100 mg Oral BID    mineral oil  30 mL Oral Daily    metOLazone  5 mg Oral Daily    senna  2 tablet Oral Nightly    carboxymethylcellulose PF  1 drop Left Eye 4x Daily    heparin (porcine)  5,000 Units Subcutaneous 3 times per day    ipratropium-albuterol  1 ampule Inhalation Q4H WA    levothyroxine  50 mcg Oral Daily    aspirin  81 mg Oral Daily    budesonide-formoterol  2 puff Inhalation BID    latanoprost  1 drop Both Eyes Nightly    sodium chloride flush  10 mL Intravenous 2 times per day    [Held by provider] lisinopril  5 mg Oral Daily    [Held by provider] carvedilol  3.125 mg Oral BID WC    insulin lispro  0-6 Units Subcutaneous TID WC    insulin lispro  0-3 Units Subcutaneous Nightly     Continuous Infusions:   sodium chloride      dextrose       CBC:   Recent Labs     02/14/21  0622 02/15/21  0609 02/16/21  0431   WBC 4.2 4.1 4.3   HGB 12.2 11.9 12.0    180 247     BMP:    Recent Labs     02/14/21  0622 02/15/21  0609 02/16/21  0431    134* 136   K 5.1 4.0 3.6*    97* 96*   CO2 25 26 27   BUN 50* 49* 49*   CREATININE 1.74* 1.34* 1.43*   GLUCOSE 142* 189* 241*     Hepatic:   No results for input(s): AST, ALT, ALB, BILITOT, ALKPHOS in the last 72 hours.   Troponin:   No results for input(s): TROPHS in the last 72 hours.  BNP: No results for input(s): BNP in the last 72 hours. Lipids:   No results for input(s): CHOL, HDL in the last 72 hours. Invalid input(s): LDLCALCU  INR:   No results for input(s): INR in the last 72 hours. ECHO AT [facility] 1/23/21  Result Narrative     Left Ventricle: Systolic function is normal with an ejection fraction   of 55-60%.   Left Ventricle: There is diastolic flattening of the interventricular   septum consistent with right ventricle volume overload.   Left Ventricle: No segmental wall motion abnormalities.   Left Atrium: Left atrium is normal in size. The left atrial volume   index is 30.7 mL/m2.   Right Ventricle: Right ventricular size is severely dilated. Moderator   band is present.   Right Ventricle: Systolic function is moderately to severely reduced.   Right Atrium: Right atrium is severely dilated. The right atrial area   is 24.4 cm2.   Aortic Valve: The aortic valve is trileaflet. The leaflets are mildly   thickened.   Mitral Valve: There is trace regurgitation. There is no evidence of   mitral valve stenosis.   Tricuspid Valve: There is severe regurgitation. There is no evidence of   tricuspid valve stenosis.   Tricuspid Valve: RVSP calculated at 78 mmHg. RVSP is based on RA   pressure of 15 mmHg.   Pericardium: There is no pericardial effusion.   Pulmonic Valve: There is mild regurgitation. There is no evidence of   pulmonic valve stenosis. Holter monitor 2/4/2021  1. Sinus rhythm with sinus bradycardia and sinus tachycardia  2. Rare premature atrial contractions with pairs  3. Nonsustained paroxysmal atrial tachycardia  4.   Rare premature ventricular contractions  Objective:   Vitals: /80   Pulse 85   Temp 97.7 °F (36.5 °C) (Oral)   Resp 18   Ht 5' 7\" (1.702 m)   Wt 201 lb (91.2 kg)   LMP  (LMP Unknown)   SpO2 95%   BMI 31.48 kg/m²   General appearance: alert and cooperative with exam  HEENT: Head: Normocephalic, no lesions, without obvious abnormality. Neck: no JVD, trachea midline, no adenopathy  Lungs: Diminished  to auscultation on NC   Heart: Regular rate and rhythm, s1/s2 auscultated, +murmurs. SR on tele  HR 85  Abdomen: soft, non-tender, bowel sounds active  Extremities: +1  edema  Neurologic: not done        Assessment / Acute Cardiac Problems:   1. Probable vasovagal syncope  2. Sinus bradycardia, improved; due to vagal effects and hypothyroidism with likely sinus node dysfunction   3. Chronic bifasicular block ( RBBB /LAD) with normal DE interval; no evidence of paroxysmal heart block on telemetry. 4. Stable CAD with old septal infarction on EKG  5. 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 Essential HTN with chronic diastolic HF  7.  Chronic kidney disease    Patient Active Problem List:     Syncope and collapse     Hypothyroidism     Right-sided heart failure ([address]     Pulmonary hypertension due to COPD [facility])     Essential hypertension     Autonomic neuropathy     CKD (chronic kidney disease) stage 3, GFR 30-59 ml/min     Type 2 diabetes mellitus with kidney complication, without long-term current use of insulin (HCC)     CRIS (acute kidney injury) ([address]     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 angina pectoris     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 (HCC)     Bradycardia     Mixed hyperlipidemia     Laryngopharyngeal reflux     Peripheral venous insufficiency     Pulmonary hypertension (HCC)     Recurrent major depression in remission ([address]     Vocal cord palsy     Chronic renal insufficiency, stage III (moderate)     COPD without exacerbation (HCC)     Chronic diastolic heart failure ([address]     Fall at home, initial encounter     Hyperglycemia     Falls frequently     Symptomatic bradycardia     Fall     Subconjunctival hemorrhage of left eye     Decompensated heart failure (HCC)     Acute on chronic systolic congestive heart failure ([facility] [address]     Acute respiratory failure with hypoxia ([facility] [address]      Plan of Treatment:   1. Likely vasovagal syncope- Discussed with . Will plan to proceed with Tilt Table test as outpt with carotid message once acute issues resolve. Will repeat Holter monitor on discharge. No  BB and ACE    2. No further bradycardia   SR on tele  3. ECHO from [facility] 1/21 reviewed and in chart   Severe TR, Elevated RVSP with severe PHTN. Continue lasix, aldactone, and slidenafil. No BB due to bradycardia. 4. Palliative care consult noted. 5. Keep K > 4.0 and Mag > 2.0 Stable  6. Thyroid per primary. 9. Ok to d/c from cardiology standpoint and will follow up for further outpt workup once acute issues resolve. Will see Dr. Mita Girard in office in 2 weeks.             Electronically signed by DOM Templeton CNP on 2/16/2021 at 10:23 AM  [address]  [phone]

## 2021-02-17 VITALS
TEMPERATURE: 97.8 F | HEART RATE: 94 BPM | OXYGEN SATURATION: 96 % | DIASTOLIC BLOOD PRESSURE: 70 MMHG | SYSTOLIC BLOOD PRESSURE: 121 MMHG | RESPIRATION RATE: 16 BRPM | HEIGHT: 67 IN | WEIGHT: 201 LBS | BODY MASS INDEX: 31.55 KG/M2

## 2021-02-17 LAB
GLUCOSE BLD-MCNC: 140 MG/DL (ref 65–105)
GLUCOSE BLD-MCNC: 143 MG/DL (ref 65–105)
GLUCOSE BLD-MCNC: 178 MG/DL (ref 65–105)
GLUCOSE BLD-MCNC: 180 MG/DL (ref 65–105)
SARS-COV-2: NORMAL
SARS-COV-2: NOT DETECTED
SOURCE: NORMAL

## 2021-02-17 PROCEDURE — 2700000000 HC OXYGEN THERAPY PER DAY

## 2021-02-17 PROCEDURE — 6370000000 HC RX 637 (ALT 250 FOR IP): Performed by: NURSE PRACTITIONER

## 2021-02-17 PROCEDURE — 99232 SBSQ HOSP IP/OBS MODERATE 35: CPT | Performed by: INTERNAL MEDICINE

## 2021-02-17 PROCEDURE — U0003 INFECTIOUS AGENT DETECTION BY NUCLEIC ACID (DNA OR RNA); SEVERE ACUTE RESPIRATORY SYNDROME CORONAVIRUS 2 (SARS-COV-2) (CORONAVIRUS DISEASE [COVID-19]), AMPLIFIED PROBE TECHNIQUE, MAKING USE OF HIGH THROUGHPUT TECHNOLOGIES AS DESCRIBED BY CMS-2020-01-R: HCPCS

## 2021-02-17 PROCEDURE — 93225 XTRNL ECG REC<48 HRS REC: CPT

## 2021-02-17 PROCEDURE — 94660 CPAP INITIATION&MGMT: CPT

## 2021-02-17 PROCEDURE — 6370000000 HC RX 637 (ALT 250 FOR IP): Performed by: INTERNAL MEDICINE

## 2021-02-17 PROCEDURE — 99231 SBSQ HOSP IP/OBS SF/LOW 25: CPT | Performed by: NURSE PRACTITIONER

## 2021-02-17 PROCEDURE — 94640 AIRWAY INHALATION TREATMENT: CPT

## 2021-02-17 PROCEDURE — 6360000002 HC RX W HCPCS: Performed by: INTERNAL MEDICINE

## 2021-02-17 PROCEDURE — 2580000003 HC RX 258: Performed by: NURSE PRACTITIONER

## 2021-02-17 PROCEDURE — 93226 XTRNL ECG REC<48 HR SCAN A/R: CPT

## 2021-02-17 PROCEDURE — U0005 INFEC AGEN DETEC AMPLI PROBE: HCPCS

## 2021-02-17 PROCEDURE — 6370000000 HC RX 637 (ALT 250 FOR IP): Performed by: STUDENT IN AN ORGANIZED HEALTH CARE EDUCATION/TRAINING PROGRAM

## 2021-02-17 PROCEDURE — 99239 HOSP IP/OBS DSCHRG MGMT >30: CPT | Performed by: INTERNAL MEDICINE

## 2021-02-17 PROCEDURE — 94761 N-INVAS EAR/PLS OXIMETRY MLT: CPT

## 2021-02-17 PROCEDURE — APPSS180 APP SPLIT SHARED TIME > 60 MINUTES: Performed by: NURSE PRACTITIONER

## 2021-02-17 RX ADMIN — POLYMYXIN B SULFATE, TRIMETHOPRIM SULFATE 1 DROP: 10000; 1 SOLUTION/ DROPS OPHTHALMIC at 09:38

## 2021-02-17 RX ADMIN — METOLAZONE 5 MG: 5 TABLET ORAL at 09:35

## 2021-02-17 RX ADMIN — BUDESONIDE AND FORMOTEROL FUMARATE DIHYDRATE 2 PUFF: 160; 4.5 AEROSOL RESPIRATORY (INHALATION) at 08:33

## 2021-02-17 RX ADMIN — IPRATROPIUM BROMIDE AND ALBUTEROL SULFATE 1 AMPULE: .5; 3 SOLUTION RESPIRATORY (INHALATION) at 11:36

## 2021-02-17 RX ADMIN — SODIUM CHLORIDE, PRESERVATIVE FREE 10 ML: 5 INJECTION INTRAVENOUS at 09:36

## 2021-02-17 RX ADMIN — FUROSEMIDE 80 MG: 40 TABLET ORAL at 09:35

## 2021-02-17 RX ADMIN — CARBOXYMETHYLCELLULOSE SODIUM 1 DROP: 10 GEL OPHTHALMIC at 09:36

## 2021-02-17 RX ADMIN — INSULIN LISPRO 1 UNITS: 100 INJECTION, SOLUTION INTRAVENOUS; SUBCUTANEOUS at 13:35

## 2021-02-17 RX ADMIN — POLYMYXIN B SULFATE, TRIMETHOPRIM SULFATE 1 DROP: 10000; 1 SOLUTION/ DROPS OPHTHALMIC at 15:48

## 2021-02-17 RX ADMIN — HEPARIN SODIUM 5000 UNITS: 5000 INJECTION INTRAVENOUS; SUBCUTANEOUS at 13:35

## 2021-02-17 RX ADMIN — IPRATROPIUM BROMIDE AND ALBUTEROL SULFATE 1 AMPULE: .5; 3 SOLUTION RESPIRATORY (INHALATION) at 08:33

## 2021-02-17 RX ADMIN — DOCUSATE SODIUM 100 MG: 100 CAPSULE, LIQUID FILLED ORAL at 09:35

## 2021-02-17 RX ADMIN — INSULIN LISPRO 1 UNITS: 100 INJECTION, SOLUTION INTRAVENOUS; SUBCUTANEOUS at 09:48

## 2021-02-17 RX ADMIN — POLYMYXIN B SULFATE, TRIMETHOPRIM SULFATE 1 DROP: 10000; 1 SOLUTION/ DROPS OPHTHALMIC at 03:40

## 2021-02-17 RX ADMIN — INSULIN LISPRO 1 UNITS: 100 INJECTION, SOLUTION INTRAVENOUS; SUBCUTANEOUS at 17:28

## 2021-02-17 RX ADMIN — ASPIRIN 81 MG: 81 TABLET ORAL at 09:35

## 2021-02-17 RX ADMIN — HEPARIN SODIUM 5000 UNITS: 5000 INJECTION INTRAVENOUS; SUBCUTANEOUS at 06:03

## 2021-02-17 RX ADMIN — LEVOTHYROXINE SODIUM 50 MCG: 50 TABLET ORAL at 09:36

## 2021-02-17 RX ADMIN — CARBOXYMETHYLCELLULOSE SODIUM 1 DROP: 10 GEL OPHTHALMIC at 17:28

## 2021-02-17 RX ADMIN — SILDENAFIL 20 MG: 20 TABLET ORAL at 09:36

## 2021-02-17 RX ADMIN — CARBOXYMETHYLCELLULOSE SODIUM 1 DROP: 10 GEL OPHTHALMIC at 13:40

## 2021-02-17 RX ADMIN — SODIUM CHLORIDE, PRESERVATIVE FREE 10 ML: 5 INJECTION INTRAVENOUS at 09:38

## 2021-02-17 RX ADMIN — SILDENAFIL 20 MG: 20 TABLET ORAL at 13:36

## 2021-02-17 ASSESSMENT — PAIN SCALES - GENERAL
PAINLEVEL_OUTOF10: 0

## 2021-02-17 NOTE — FLOWSHEET NOTE
Holter Monitor was applied as ordered. Monitor is to be worn  for 48 hrs. Written and verbal instructions were given.    [address]

## 2021-02-17 NOTE — DISCHARGE SUMMARY
hospital problems. *      Admission Condition:  fair     Discharged Condition: fair    Hospital Stay:     Hospital Course:  Angelic Amaro is a 80 y.o. female who was admitted for the management of   Acute on chronic systolic congestive heart failure ([facility] [address] , presented to ER with Fall, Hypotension, and Bradycardia    Patient was brought into the emergency room via EMS after a syncopized and fall at home. .Irina Dow states that she was standing and opening a can and started to \"feel sick\" and proceeded to turn around and fall to the ground.  She is unsure if she hit her head.  She states she recalls physically falling, but is unsure how long she was out.  She states that her family member was there at the home heard her fall and came to check on her and she can recall watching him walk into check on her. Jewels Emerson is unable to explain to me how she felt \"sick\" prior to the fall but tells me she did not have any convulsions shaking loss of bowel bladder or emesis after the event during.  And this is never happened to her before. Reema Brown denied any chest pain shortness of breath palpitations dizziness or lightheadedness or headache prior to the fall but just states \"I felt sick\". On EMS arrival she was noted to be hypotensive with systolic blood pressure in 80s.   With a heart rate in the 40s.    Upon arrival to the emergency room patient was mildly bradycardic in the high 40s to low 50s and mentating well.  With a systolic blood pressure in the 100s  Her work-up in the emergency room revealed: A metabolic panel essentially normal outside of mild elevation of the BUN at 34 and creatinine of 1.41 and a glucose of 154.  Her liver profile shows alk phos of 311 ALT normal at 28, and AST 41 with a bilirubin of 1.24.   Her hemogram was unremarkable without acute leukocytosis or anemias.  Cardiac markers show a proBNP of 12,391 with a high sensitive troponin of 38.  Her TSH elevated at 12.34 and a T4 of 1.41.  Her chest x-ray showed Stable cardiomegaly with mild pulmonary vascular congestion.  In the CT brain showed  Mildchronic deep white matter ischemic changes No acute intracranial abnormalities are noted.      However in review of her last admission  laboratories(2/4/2021) her kidney function was with a BUN of 34 and creatinine of 1.38.  A proBNP of 6729  -9340 and high sensitive troponin of 36 which was downward trend of 41 on admission.  And a TSH of 9.17 with a normal T4 of 1.06.       Patient was just recently admitted and discharged 2/4/2021 -in which she was evaluated for heart failure and bradycardia after a fall with epistaxis after tripping over her oxygen tubing.  She was to utilize Holter monitor at discharge-which was placed at discharge.  And follow-up with Dr. Julian [facility] clinic.       Significant therapeutic interventions:     Syncope -vasovagal in nature, cardiology recommending carotid massage and tilt table test as outpatient   Acute hypoxic respiratory failure -restarted revatio, continue Lasix 80 mg daily, continue Zaroxolyn  Symptomatic bradycardia-secondary to #1, further workup as outpatient   Acute exacerbation CHF-Lasix 80 mg daily, continue metolazone, kidney function improving  CKD-improving. Continue to monitor  Coronary artery disease-continue aspirin statin  Subconjunctival hemorrhage of left eye -patient seen ophthalmology, secondary to dry eye.  Continue lubricating drops/abx drops, improving today  Type 2 diabetes-continue ISS.   Patient stable  Discharge: SNF today     Significant Diagnostic Studies:   Labs / Micro:  CBC:   Lab Results   Component Value Date    WBC 4.3 02/16/2021    RBC 4.07 02/16/2021    HGB 12.0 02/16/2021    HCT 39.9 02/16/2021    MCV 98.0 02/16/2021    MCH 29.5 02/16/2021    MCHC 30.1 02/16/2021    RDW 16.9 02/16/2021     02/16/2021     BMP:    Lab Results   Component Value Date    GLUCOSE 241 02/16/2021     02/16/2021    K 3.6 02/16/2021    CL 96 02/16/2021 CO2 27 02/16/2021    ANIONGAP 13 02/16/2021    BUN 49 02/16/2021    CREATININE 1.43 02/16/2021    BUNCRER NOT REPORTED 02/16/2021    CALCIUM 9.2 02/16/2021    LABGLOM 34 02/16/2021    GFRAA 42 02/16/2021    GFR      02/16/2021    GFR NOT REPORTED 02/16/2021     HFP:    Lab Results   Component Value Date    PROT 7.3 02/11/2021     CMP:    Lab Results   Component Value Date    GLUCOSE 241 02/16/2021     02/16/2021    K 3.6 02/16/2021    CL 96 02/16/2021    CO2 27 02/16/2021    BUN 49 02/16/2021    CREATININE 1.43 02/16/2021    ANIONGAP 13 02/16/2021    ALKPHOS 330 02/11/2021    ALT 36 02/11/2021    AST 53 02/11/2021    BILITOT 1.04 02/11/2021    LABALBU 3.4 02/16/2021    ALBUMIN 1.2 02/11/2021    LABGLOM 34 02/16/2021    GFRAA 42 02/16/2021    GFR      02/16/2021    GFR NOT REPORTED 02/16/2021    PROT 7.3 02/11/2021    CALCIUM 9.2 02/16/2021     PT/INR:    Lab Results   Component Value Date    PROTIME 13.3 02/11/2021    INR 1.3 02/11/2021     PTT:   Lab Results   Component Value Date    APTT 24.4 02/10/2021     FLP:    Lab Results   Component Value Date    CHOL 154 02/11/2021    TRIG 64 02/11/2021    HDL 88 02/11/2021     U/A:    Lab Results   Component Value Date    COLORU DARK YELLOW 02/11/2021    TURBIDITY CLEAR 02/11/2021    SPECGRAV 1.016 02/11/2021    HGBUR NEGATIVE 02/11/2021    PHUR 5.0 02/11/2021    PROTEINU 2+ 02/11/2021    GLUCOSEU NEGATIVE 02/11/2021    KETUA NEGATIVE 02/11/2021    BILIRUBINUR NEGATIVE  Verified by ictotest. 02/11/2021    UROBILINOGEN ELEVATED 02/11/2021    NITRU NEGATIVE 02/11/2021    LEUKOCYTESUR NEGATIVE 02/11/2021     TSH:    Lab Results   Component Value Date    TSH 7.15 02/11/2021        Radiology:  Xr Chest (single View Frontal)    Result Date: 2/11/2021  1. Congestive heart failure is most likely given the radiographic findings; pneumonia is also a consideration in areas of consolidation with pleural effusion. 2. Cardiomegaly. 3. No pneumothorax evident.  4. No acute osseous 10 days     levothyroxine 50 MCG tablet  Commonly known as: SYNTHROID  Take 1 tablet by mouth Daily     metOLazone 5 MG tablet  Commonly known as: ZAROXOLYN  Take 1 tablet by mouth daily        CHANGE how you take these medications    furosemide 80 MG tablet  Commonly known as: LASIX  Take 1 tablet by mouth daily  What changed:   medication strength  how much to take        CONTINUE taking these medications    aspirin 81 MG chewable tablet  Take 1 tablet by mouth daily     budesonide-formoterol 160-4.5 MCG/ACT Aero  Commonly known as: Symbicort  Inhale 2 puffs into the lungs 2 times daily     glipiZIDE 5 MG tablet  Commonly known as: Glucotrol  Take 0.5 tablets by mouth daily     sildenafil 20 MG tablet  Commonly known as: REVATIO  Take 1 tablet by mouth 3 times daily     travoprost (benzalkonium) 0.004 % ophthalmic solution  Commonly known as: TRAVATAN  Place 1 drop into both eyes daily        STOP taking these medications    gabapentin 300 MG capsule  Commonly known as: NEURONTIN     trimethoprim-polymyxin b 70257-0.1 UNIT/ML-% ophthalmic solution  Commonly known as: POLYTRIM           Where to Get Your Medications      Information about where to get these medications is not yet available    Ask your nurse or doctor about these medications  aspirin 81 MG chewable tablet  budesonide-formoterol 160-4.5 MCG/ACT Aero  carboxymethylcellulose PF 1 % Gel ophthalmic gel  furosemide 80 MG tablet  glipiZIDE 5 MG tablet  levothyroxine 50 MCG tablet  metOLazone 5 MG tablet  sildenafil 20 MG tablet  travoprost (benzalkonium) 0.004 % ophthalmic solution         No discharge procedures on file. Time Spent on discharge is  35 mins in patient examination, evaluation, counseling as well as medication reconciliation, prescriptions for required medications, discharge plan and follow up.     Discussed with attending   Electronically signed by   DOM Calixto NP  2/17/2021  1:21 PM      Thank you Dr. Pebbles Moses MD for the opportunity to be involved in this patient's care.

## 2021-02-17 NOTE — PROGRESS NOTES
Nutrition Assessment     Type and Reason for Visit: Initial(LOS Day 7)    Nutrition Recommendations/Plan:   -Continue diabetic diet     Nutrition Assessment:   Pt admitted d/t Bradycardia and syncope. Pt stated that she has had an excellent appetite this week consuming 100% of her meals. No wounds noted. Pt stated that her wt usually flux from 200-215 lbs and denies any recent wt loss. Pt deemed to be low-risk. Full nutrition assessment not needed at this time. Will monitor for changes in nutritional status.      Malnutrition Assessment:  Malnutrition Status: No malnutrition    Current Nutrition Therapies:    DIET CARB CONTROL;    Nutrition Diagnosis:   No nutrition diagnosis at this time     Nutrition Interventions:   Food and/or Nutrient Delivery:  Continue Current Diet  Nutrition Education/Counseling:  Education not indicated   Coordination of Nutrition Care:  Continue to monitor while inpatient        Nutrition Monitoring and Evaluation:   Food/Nutrient Intake Outcomes:  Food and Nutrient Intake  Physical Signs/Symptoms Outcomes:  Biochemical Data, Nutrition Focused Physical Findings, Skin, Weight, GI Status, Fluid Status or Edema     Discharge Planning:    Continue current diet     Electronically signed by Lana Em RD, LD on 2/17/21 at 11:38 AM EST    Contact: [phone]

## 2021-02-17 NOTE — PLAN OF CARE
BRONCHOSPASM/BRONCHOCONSTRICTION     [x]         IMPROVE AERATION/BREATH SOUNDS  [x]   ADMINISTER BRONCHODILATOR THERAPY AS APPROPRIATE  [x]   ASSESS BREATH SOUNDS  [x]   IMPLEMENT AEROSOL/MDI PROTOCOL  [x]   PATIENT EDUCATION AS NEEDED   PROVIDE ADEQUATE OXYGENATION WITH ACCEPTABLE SP02/ABG'S    [x]  IDENTIFY APPROPRIATE OXYGEN THERAPY  [x]   MONITOR SP02/ABG'S AS NEEDED   [x]   PATIENT EDUCATION AS NEEDED   NONINVASIVE VENTILATION    PROVIDE OPTIMAL VENTILATION/ACCEPTABLE SPO2   IMPLEMENT NONINVASIVE VENTILATION PROTOCOL   MAINTAIN ACCEPTABLE SPO2   ASSESS SKIN INTEGRITY/BREAKDOWN SCORE   PATIENT EDUCATION AS NEEDED   BIPAP AS NEEDED

## 2021-02-17 NOTE — PROGRESS NOTES
PULMONARY & CRITICAL CARE MEDICINE PROGRESS  NOTE     Patient:  Marcus Sanchez  MRN: [id number]  Admit date: 2/10/2021    SUBJECTIVE     I personally interviewed/examined the patient; reviewed interval history, interpreted all available radiographic, laboratory data at the time of service. Chief Compliant/Reason for Initial Consult:   Acute hypoxic respiratory failure. Severe pulmonary hypertension. HISTORY OF PRESENT ILLNESS:  The patient is a 80 y.o. female brought to the emergency department via EMS after she had syncopal episode and collapse. History obtained from chart review. Per chart review she was in the kitchen when she felt weak in her legs had a syncopal episode and passed out. She recalls physically falling, did not hit her head but she does not recall how long she was out. Per chart review from patient report this is never happened to her before. However per previous notes she had similar episode with hypotension and bradycardia. Upon arrival to the ED she was bradycardic in high 40s-50s. Mentation stable. Systolic blood pressure 720Y. Work-up in the ED was suggestive of BUN-34, creatinine-1.41, glucose-1 4. LFTs, ALP-311, AST ALT normal, bilirubin-1.24.  H&H stable  Cardiac [id number] 391, high-sensitivity troponin 38. TSH 12.34, T4 1.41. D-dimer 1.62. Fibrinogen 307. She did not undergo CT PE. Chest x-ray cardiomegaly, pulmonary vascular congestion. CT head chronic deep white matter changes. No acute intracranial abnormalities. Baseline CKD with creatinine baseline 1.4-1.7. She was put on Holter monitor for previous episode during the previous admission. Recommended follow-up with Dr. Emily Flowers at [facility]. Records of follow-up not available. Cardiology evaluated the patient. Electrophysiology also evaluated the patient.   She is due for tilt table study with Dr. Aranza Melendez.         Interval history. 2/17/2021   Patient seen and chart reviewed. No acute hypoxic events overnight reported per nursing staff. She is off high flow for 3 days now. Currently she is on nasal cannula 2 L since yesterday and maintaining saturation above 92% (she is on home O2). Overnight according to patient she had used BiPAP and slept with BiPAP  Currently she is on chair alert and awake look comfortable not in distress. She does not complain of shortness of breath at rest denies dizziness chest pain lightheadedness and there was no event of bradycardia reported.         Review of Systems:  General: negative for chills, fatigue or fever  ENT: negative for headaches, nasal congestion, sore throat or visual changes  Allergy and Immunology: negative for postnasal drip or seasonal allergies  Hematological and Lymphatic: negative for bleeding problems, swollen lymph nodes  Respiratory: Negative for shortness of breath at rest negative for hemoptysis, chest pain  Cardiovascular: negative for edema or palpitations  Gastrointestinal: negative for abdominal pain, change in bowel habits or nausea/vomiting  Genito-Urinary: negative for dysuria or urinary frequency/urgency  Musculoskeletal: negative for joint pain or joint swelling  Neurological: negative for numbness/tingling, seizures or weakness  Dermatological: negative for pruritus or rash    OBJECTIVE     Respiratory Support:  Vent Information  Skin Assessment: Clean, dry, & intact  FiO2 : 40 %  SpO2: 93 %  SpO2/FiO2 ratio: 262.86  I Time/ I Time %: 0.95 s  Humidification Source: Heated wire  Humidification Temp: 33  Humidification Temp Measured: 33  Mask Type: Full face mask  Mask Size: Medium  Lab Results   Component Value Date    MODE Bi-Level Ventilation 02/11/2021     O2 Flow Rate (L/min): 3 L/min  SpO2: 93 %    Vitals:   /71   Pulse 78   Temp 98 °F (36.7 °C) (Oral)   Resp 19   Ht 5' 7\" (1.702 m)   Wt 201 lb (91.2 kg)   LMP  (LMP Unknown)   SpO2 93%   BMI 31.48 kg/m²     Systemic Examination:   General appearance - oriented to person, place, and time, overweight and ill-appearing  Mental status - alert, oriented to person, place, and time  Eyes - pupils equal and reactive, extraocular eye movements intact  Mouth - mucous membranes moist, pharynx normal without lesions  Neck - supple, no significant adenopathy  Chest -bilateral breath sounds present distant decreased at bases no expiratory wheezing or rhonchi  Heart -S1 normal.  Loud S2. Wide split second heart sound. Abdomen - soft, nontender, nondistended, no masses or organomegaly  Neurological - screening mental status exam normal  Extremities -positive for mild pedal edema  Skin -no skin rash. DATA REVIEW     Medications:  Scheduled Meds:   furosemide  80 mg Oral Daily    sildenafil  20 mg Oral TID    trimethoprim-polymyxin b  1 drop Left Eye Q6H    bisacodyl  10 mg Rectal Daily    polyethylene glycol  17 g Oral Daily    docusate sodium  100 mg Oral BID    mineral oil  30 mL Oral Daily    metOLazone  5 mg Oral Daily    senna  2 tablet Oral Nightly    carboxymethylcellulose PF  1 drop Left Eye 4x Daily    heparin (porcine)  5,000 Units Subcutaneous 3 times per day    ipratropium-albuterol  1 ampule Inhalation Q4H WA    levothyroxine  50 mcg Oral Daily    aspirin  81 mg Oral Daily    budesonide-formoterol  2 puff Inhalation BID    latanoprost  1 drop Both Eyes Nightly    sodium chloride flush  10 mL Intravenous 2 times per day    [Held by provider] lisinopril  5 mg Oral Daily    [Held by provider] carvedilol  3.125 mg Oral BID     insulin lispro  0-6 Units Subcutaneous TID     insulin lispro  0-3 Units Subcutaneous Nightly     Continuous Infusions:   sodium chloride      dextrose       LABS:-  ABGs:   No results found for: PH, PCO2, PO2, HCO3, O2SAT  No results for input(s): PHART, PO2ART, OFA8SZP, TBB9ITF, BEART, C3CQTSPW in the last 72 hours.   Lab Results   Component Value Date POCPH 7.396 02/11/2021    POCPCO2 41.7 02/11/2021    POCPO2 141.1 (H) 02/11/2021    POCHCO3 25.6 02/11/2021    YYBE8QNX 99 (H) 02/11/2021     CBC:   Recent Labs     02/15/21  0609 02/16/21 0431   WBC 4.1 4.3   HGB 11.9 12.0   HCT 38.2 39.9   MCV 95.0 98.0    247   RBC 4.02 4.07   MCH 29.6 29.5   MCHC 31.2 30.1   RDW 16.4* 16.9*     BMP:   Recent Labs     02/15/21  0609 02/16/21 0431   * 136   K 4.0 3.6*   CL 97* 96*   CO2 26 27   BUN 49* 49*   CREATININE 1.34* 1.43*   GLUCOSE 189* 241*   PHOS 3.5 3.4     Liver Function Test:   Recent Labs     02/16/21 0431   LABALBU 3.4*     Amylase/Lipase:  No results for input(s): AMYLASE, LIPASE in the last 72 hours. Coagulation Profile:   No results for input(s): INR, PROTIME, APTT in the last 72 hours. Cardiac Enzymes:  No results for input(s): CKTOTAL, CKMB, CKMBINDEX, TROPONINI in the last 72 hours. Lactic Acid:  No results found for: LACTA  BNP:   No results found for: BNP  D-Dimer:  Lab Results   Component Value Date    DDIMER 1.62 02/11/2021     Others:   Lab Results   Component Value Date    TSH 7.15 (H) 02/11/2021     Lab Results   Component Value Date    CRP 14.6 (H) 02/11/2021     No results found for: Eneida Mejia  Lab Results   Component Value Date    FERRITIN 66 02/11/2021     No results found for: SPEP, UPEP  No results found for: PSA, CEA, , NQ9906,     Input/Output:    Intake/Output Summary (Last 24 hours) at 2/17/2021 1013  Last data filed at 2/16/2021 2314  Gross per 24 hour   Intake 120 ml   Output 1325 ml   Net -1205 ml     Microbiology:  Reviewed as available  Pathology: Reviewed as available     Radiology:     Chest X-ray: 2/11/2021  Impression   1.  Congestive heart failure is most likely given the radiographic findings;   pneumonia is also a consideration in areas of consolidation with pleural   effusion. 2. Cardiomegaly. 3. No pneumothorax evident.    4. No acute osseous abnormality seen.      CT Scans: She has been transcription, some errors in transcription may have occurred.      Gina Cordero MD  2/17/2021 10:13 AM

## 2021-02-17 NOTE — CARE COORDINATION
Transportation arranged for 4pm via [employer]. Autumn Keene at [facility] notified. Pt and ranulfo Sanchez updated. Both are agreeable with plan.  Ana RN and Josh RN notified    1542 AVS and HENS faxed to [facility]    Discharge [address] [facility] Case Management Department  Written by: Herve King RN    Patient Name: Hastings Phil  Attending Provider: Junior Maninder Ng DO  Admit Date: 2/10/2021  6:44 PM  MRN: [id number]  Account: [de-identified]                     : 1929  Discharge Date: 2021      [facility]

## 2021-02-17 NOTE — PROGRESS NOTES
..    Palliative Care Progress Note    NAME:  Elier Segundo RECORD NUMBER:  [id number]  AGE: 80 y.o. GENDER: female  : 1929  TODAY'S DATE:  2021    Reason for Consult:  goals of care and support  History of Present Illness     The patient is a 80 y.o. Non-/non  female who presents with Fall, Hypotension, and Bradycardia      Referred to Palliative Care by  [x] Physician   [] Nursing  [] Family Request   [] Other:       She was admitted to the primary service for Decompensated heart failure ([facility] [address] [I50.9]. Her hospital course has been associated with Syncope and collapse, acute on chronic CHF, symptomatic bradycardia, subconjunctival hemorrhage of left eye, severe pulmonary HTN, chronic respiratory failure on home 02, constipation, and fall. The patient has a complicated medical history and has been hospitalized since 2/10/2021  6:44 PM. Patient code status was changed to [facility] no intubation yesterday per patients wishes, and HCPOA completed. Palliative care following for goals, and support. OVERNIGHT EVENTS: No overnight events reported, and patient is planning for SNF Tx today.         /72   Pulse 94   Temp 97.9 °F (36.6 °C) (Oral)   Resp 16   Ht 5' 7\" (1.702 m)   Wt 201 lb (91.2 kg)   LMP  (LMP Unknown)   SpO2 96%   BMI 31.48 kg/m²     Assessment        REVIEW OF SYSTEMS    []   UNABLE TO OBTAIN:     Constitutional:  []   Chills   []  Fatigue   []  Fevers   []  Malaise   []  Weight loss   [] Other:     Respiratory:   []  Cough    []  Shortness of breath    []  Chest pain    [] Other:     Cardiovascular:   []  Chest pain  []  Dyspnea    []  Exertional chest pressure/discomfort     [] Fatigue      []  Palpitations    []  Syncope   [] Other:     Gastrointestinal:   []  Abdominal pain   []  Constipation    []  Diarrhea    []   Dysphagia   []  Reflux             []  Vomiting   [] Other:     Genitourinary:  []  Dysuria     []  Frequency   []  Hematuria   [] Nocturia   []  Urinary incontinence   [] Other:     Musculoskeletal:   [] Back pain    [x]  Muscle weakness   []  Myalgias    []  Neck pain   []  Stiff joints   []  Other:     Behavioral/Psych:   [] Anxiety    []  Depression     []  Mood swings   [] Other:     PHYSICAL ASSESSMENT:     General: [x]  Oriented x3      [] well appearing      [] Intubated      [] ill appearing      [] Other:    Mental Status: [x] normal mental status exam      [] drowsy      [] Confused      [] Other:     Cardiovascular: [x]  Regular rate/rhythm      [] Arrhythmia      [] Other:     Chest: [x] Effort normal      [] lungs clear      [] respiratory distress      [] Tachypnea      [x]  Other: on NC    Abdomen: [x] Soft/non-tender      [x]  Normal appearance      [] Distended      [] Ascites      [] Other:    Neurological: [x] Normal Speech      [x] Normal Sensation      []  Deficits present:      Extremity:  [x] normal skin color/temp      [] clubbing/cyanosis      []  No edema      [] Other:     Palliative Performance Scale:  ___60%  Ambulation reduced; Significant disease; Can't do hobbies/housework; intake normal or reduced; occasional assist; LOC full/confusion  _x__50%  Mainly sit/lie; Extensive disease; Can't do any work; Considerable assist; intake normal or reduced; LOC full/confusion  ___40%  Mainly in bed; Extensive disease; Mainly assist; intake normal or reduced; LOC full/confusion   ___30%  Bed Bound; Extensive disease; Total care; intake reduced; LOCfull/confusion  ___20%  Bed Bound; Extensive disease; Total care; intake minimal; Drowsy/coma  ___10%  Bed Bound; Extensive disease; Total care; Mouth care only; Drowsy/coma  ___0       Death      Plan      Palliative Interaction: I visited patient, and she was sitting up in recliner. She is on NC, and denies any present complaints. Patient reports being DC today, and is planning to go to [facility]. No family present. I gave patient copy of DNR form.  Patient denied any present questions, from filling out legal documents yesterday. I offered patient support, and she was appreciative of visit. Education/support to staff  Education/support to patient  Communications with primary service  Providing support for coping/adaptation/distress of patient  Continue with current plan of care  Validating patient/family distress  Continued communication updates  Principle Problem/Diagnosis:  Acute on chronic systolic congestive heart failure ([facility] [address]    Additional Assessments:  Principal Problem:    Acute on chronic systolic congestive heart failure ([facility] [address]  Active Problems:    Syncope and collapse    CKD (chronic kidney disease) stage 3, GFR 30-59 ml/min    Type 2 diabetes mellitus with kidney complication, without long-term current use of insulin (HCC)    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 angina pectoris    Bradycardia    Subconjunctival hemorrhage of left eye    Decompensated heart failure (HCC)    Acute respiratory failure with hypoxia ([facility] [address]    Palliative care encounter    Goals of care, counseling/discussion    DNR (do not resuscitate) discussion    Advanced directives, counseling/discussion  Resolved Problems:    * No resolved hospital problems. *    1- Symptom management/ pain control     Pain Assessment:  The patient is not having any pain. Anxiety:  none                          Dyspnea:  chronic dyspnea                          Fatigue:  generalized weakness- has decided to go to rehab    Other: We feel the patient symptoms are being controlled. her current regimen is reviewed by myself and discussed with the staff.      2- Goals of care evaluation   The patient goals of care are live longer, improve or maintain function/quality of life and support for family/caregiver   Goals of care discussed with:    [x] Patient independently    [] Patient and Family    [] Family or Healthcare DPOA independently    [] Unable to discuss with patient, family/DPOA not present    3- Code Status  DNR-CCA    4- Other recommendations  - We will continue to provide comfort and support to the patient and the family    Please call with any palliative questions or concerns. Palliative Care Team is available via perfect serve or via phone. Palliative Care will continue to follow Ms. Goncalves's care as needed. The note has been dictated by dragon, typing errors may be a possibility     Thank you for allowing Palliative Care to participate in the care of Ms. Goncalves . Electronically signed by   DOM [facility]  Palliative Care Team  on 2/17/2021 at 2:45 PM    Palliative care can be reached via perfect Figgu.

## 2021-02-17 NOTE — PROGRESS NOTES
[facility]  Office: [address], DO, Aldo Shamar, DO, Cruz Cortes, DO, Ellen Perrin Blood, DO, Shereen Curiel MD, Vidhya Enciso MD, Dipika Pimentel MD, Citlali Cifuentes MD, Analilia Fraser MD, Pebbles Abdalla MD, Rani Lam MD, Tigist Maldonado MD, Jamarcus Fuentes MD, Lexis Lee, DO, Maurilio Louie MD, Rhea Lyn MD, Hannah Samayoa, DO, Michael Hough MD,  Sally Sadler, DO, Loc Ta MD, Nemesio Jean MD, Wilbur Mendoza, [facility], [facility], Klaudia Cuevas, CNP, Kena Scott, CNS, Jeromy Byrd, CNP, Laura Gleason, CNP, Eduarda Alford, CNP, Remy Suggs, CNP, Layne Davey, CNP, ZAIN MohamudC, Yadira Dominguez, [facility], Mesfin Morrow, CNP, Kimberley Green, CNP, Yaritza Galloway, CNP, Nida Castillo, CNP, Verenice Webb, [address] [place] [address]    Progress Note    2/17/2021    9:14 AM    Name:   Supriya Mccann  MRN:     [id number]     Cesar Ambrose:      [de-identified]   Room:   [address] [place] [address] Day:  7  Admit Date:  2/10/2021  6:44 PM    PCP:   Darlyne Gitelman, MD  Code Status:  DNR-CCA    Subjective:     C/C:   Chief Complaint   Patient presents with   [place]    Hypotension    Bradycardia     Interval History Status: improved. Patient evaluated in room sitting in bed in no distress  No acute events overnight. Awaiting discharge to skilled nursing facility    Brief History:     Zachary Shows a 80 y.o. Non-/non  female who presents with Fall, Hypotension, and Bradycardia   and is admitted to the hospital for the management of syncope and collapse with bradycardia.      Patient was brought into the emergency room via EMS after a syncopized and fall at home. .Hailee Green states that she was standing and opening a can and started to \"feel sick\" and proceeded to turn around and fall to the ground.  She is unsure if she hit her head.  She states she recalls physically falling, but is unsure how long she was out.  She states that her family member was there at the home heard her fall and came to check on her and she can recall watching him walk into check on her. Geremias Bernardo is unable to explain to me how she felt \"sick\" prior to the fall but tells me she did not have any convulsions shaking loss of bowel bladder or emesis after the event during.  And this is never happened to her before. Savanah Bragg denied any chest pain shortness of breath palpitations dizziness or lightheadedness or headache prior to the fall but just states \"I felt sick\". On EMS arrival she was noted to be hypotensive with systolic blood pressure in 80s.   With a heart rate in the 40s.    Upon arrival to the emergency room patient was mildly bradycardic in the high 40s to low 50s and mentating well.  With a systolic blood pressure in the 100s  Her work-up in the emergency room revealed: A metabolic panel essentially normal outside of mild elevation of the BUN at 34 and creatinine of 1.41 and a glucose of 154.  Her liver profile shows alk phos of 311 ALT normal at 28, and AST 41 with a bilirubin of 1.24.   Her hemogram was unremarkable without acute leukocytosis or anemias.  Cardiac markers show a proBNP of 12,391 with a high sensitive troponin of 38.  Her TSH elevated at 12.34 and a T4 of 1.41.  Her chest x-ray showed Stable cardiomegaly with mild pulmonary vascular congestion.  In the CT brain showed  Mildchronic deep white matter ischemic changes No acute intracranial abnormalities are noted.      However in review of her last admission  laboratories(2/4/2021) her kidney function was with a BUN of 34 and creatinine of 1.38.  A proBNP of 6729  -9340 and high sensitive troponin of 36 which was downward trend of 41 on admission.  And a TSH of 9.17 with a normal T4 of 1.06.       Patient was just recently admitted and discharged 2/4/2021 -in which she was evaluated for heart failure and bradycardia after a fall with epistaxis after tripping over her oxygen tubing.  She was to utilize Holter monitor at discharge-which was placed at discharge.  And follow-up with Dr. Austin Lock clinic.     Review of Systems:     Constitutional:  negative for chills, fevers, sweats  Respiratory:  negative for cough, dyspnea on exertion, -shortness of breath, wheezing  Cardiovascular:  negative for chest pain, chest pressure/discomfort, lower extremity edema, palpitations  Gastrointestinal:  negative for abdominal pain, constipation, diarrhea, nausea, vomiting  Neurological:  negative for dizziness, headache    Medications: Allergies:     Allergies   Allergen Reactions    Pcn [Penicillins]     Avelox [Moxifloxacin] Rash       Current Meds:   Scheduled Meds:    furosemide  80 mg Oral Daily    sildenafil  20 mg Oral TID    trimethoprim-polymyxin b  1 drop Left Eye Q6H    bisacodyl  10 mg Rectal Daily    polyethylene glycol  17 g Oral Daily    docusate sodium  100 mg Oral BID    mineral oil  30 mL Oral Daily    metOLazone  5 mg Oral Daily    senna  2 tablet Oral Nightly    carboxymethylcellulose PF  1 drop Left Eye 4x Daily    heparin (porcine)  5,000 Units Subcutaneous 3 times per day    ipratropium-albuterol  1 ampule Inhalation Q4H WA    levothyroxine  50 mcg Oral Daily    aspirin  81 mg Oral Daily    budesonide-formoterol  2 puff Inhalation BID    latanoprost  1 drop Both Eyes Nightly    sodium chloride flush  10 mL Intravenous 2 times per day    [Held by provider] lisinopril  5 mg Oral Daily    [Held by provider] carvedilol  3.125 mg Oral BID     insulin lispro  0-6 Units Subcutaneous TID     insulin lispro  0-3 Units Subcutaneous Nightly     Continuous Infusions:    sodium chloride      dextrose       PRN Meds: glucose, dextrose, glucagon (rDNA), dextrose, sodium chloride flush, nicotine, promethazine **OR** ondansetron, polyethylene glycol, acetaminophen **OR** acetaminophen    Data:     Past Medical History:   has a past medical history of (Principal) Acute on chronic systolic congestive heart failure ([address] 2/14/2021 Yes    Syncope and collapse 2/14/2021 Yes    CKD (chronic kidney disease) stage 3, GFR 30-59 ml/min 2/11/2021 Yes    Type 2 diabetes mellitus with kidney complication, without long-term current use of insulin ([address] 2/11/2021 Yes    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 angina pectoris 2/11/2021 Yes    Bradycardia 2/11/2021 Yes    Subconjunctival hemorrhage of left eye 2/11/2021 Yes    Decompensated heart failure ([address] 2/10/2021 Yes    Acute respiratory failure with hypoxia ([facility] [address] 2/11/2021 Yes    Palliative care encounter 2/16/2021 Yes    Goals of care, counseling/discussion 2/16/2021 Yes    DNR (do not resuscitate) discussion 2/16/2021 Yes    Advanced directives, counseling/discussion 2/16/2021 Yes          Plan:        Syncope -vasovagal in nature, cardiology recommending carotid massage and tilt table test as outpatient   Acute hypoxic respiratory failure -restarted revatio, continue Lasix 80 mg daily, continue Zaroxolyn  Symptomatic bradycardia-secondary to #1, further workup as outpatient   Acute exacerbation CHF-Lasix 80 mg daily, continue metolazone, kidney function improving  CKD-improving. Continue to monitor  Coronary artery disease-continue aspirin statin  Subconjunctival hemorrhage of left eye -patient seen ophthalmology, secondary to dry eye. Continue lubricating drops/abx drops, improving today  Type 2 diabetes-continue ISS.   Patient stable  Discharge: SNF today     DOM Pierce - NP  2/17/2021  9:14 AM
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
## 2021-03-15 ENCOUNTER — HOSPITAL ENCOUNTER (OUTPATIENT)
Dept: CARDIAC CATH/INVASIVE PROCEDURES | Age: 86
Discharge: HOME | End: 2021-03-15
Payer: MEDICARE

## 2021-03-15 PROCEDURE — 93660 TILT TABLE EVALUATION: CPT | Performed by: INTERNAL MEDICINE

## 2021-03-15 NOTE — PROCEDURES
[facility]    Tilt Table Test Report                       Today's Date:  3/15/2021  Patient Name:  Alondra Campos  Date of admission: 3/15/2021 12:30 PM  Patient's age:  80 y.o., 7/6/1929  Admission Dx:  No admission diagnoses are documented for this encounter. Requesting Physician: No admitting provider for patient encounter. Procedures performed: Tilt table testing    Indication of the procedure:  lAondra Campos is a 80 y.o. female who presented with near syncope. Details of procedure: The procedure, the risks, benefits and alternatives of the procedure were explained to the patient. All questions were answered. Patient verbalized understanding and informed consent was obtained. The patient was brought to the electrophysiology lab in a fasting nonsedated state. Peripheral IV access was obtained and the patient was put on the tilt table. Initial vital signs at baseline:    BP: 129/107 mmHg. HR: 78 bpm.    Then the tilt table was raised to 70 degree. Immediately upon raising the table the vitals were:     BP: 141/90 mmHg. HR: 76 bpm.    After 20 minutes, the patient did not have any symptoms vital were:    BP: 154/90 mmHg. HR: 76 bpm.    After 20 minutes, the patient did not have any symptoms vital were:  BP: 160/88 mmHg. HR: 81 bpm.    After 30 minutes, the patient did not experience any symptoms. The Tilt table test was immediately brought back to [place] position and the patient received IV fluid bolus. · Patient did not  experience syncope during the tilt table test.     At the end of procedure:  BP: 155/95 mmHg. HR: 77 bpm.      The patient tolerated the procedure well and there were no complications.     Nitro not used due to patient on sildenafil    Conclusion:    Negative for  neurocardiogenic syncope    Recommendations  · Recommended to avoid dehydration, paying close attention to any prodromal symptoms and how to avert syncope by lying down and elevating legs. · Patient also to have compression stocking and encouraged to use them when working long hours and need to stand for a long time. · Specific techniques to decrease the pooling of blood in legs such as foot exercises, or tensing leg muscles when standing and increasing salt in diet were given. · Medication (e.g. fluorinef or others as instructed) if symptoms recurs despite these measurements, then medication can be used. · Implantation of loop recorder if all measures failed    Discussed with Nurse. Az Vazquez MD  Fellow, [address]                  I interviewed the patient personally, and examined by me during the visit. I have reviewed the H&P/Consult / Progress note as completed, and made appropriate changes to the patient exam and treatment plans.       I have reviewed the case with resident / fellow    Patient treatment plan was explained to patient, correction in notes was made as appropriate, and discussed final arrangement based on  my evaluation and exam.    Additional Recommendations:

## 2023-09-19 ENCOUNTER — APPOINTMENT (OUTPATIENT)
Dept: GENERAL RADIOLOGY | Age: 88
End: 2023-09-19
Payer: MEDICARE

## 2023-09-19 ENCOUNTER — HOSPITAL ENCOUNTER (EMERGENCY)
Age: 88
Discharge: HOME | End: 2023-09-19
Attending: EMERGENCY MEDICINE
Payer: MEDICARE

## 2023-09-19 VITALS
SYSTOLIC BLOOD PRESSURE: 145 MMHG | RESPIRATION RATE: 17 BRPM | DIASTOLIC BLOOD PRESSURE: 79 MMHG | TEMPERATURE: 97.4 F | BODY MASS INDEX: 27 KG/M2 | HEIGHT: 67 IN | WEIGHT: 172 LBS | HEART RATE: 54 BPM | OXYGEN SATURATION: 95 %

## 2023-09-19 DIAGNOSIS — J44.1 COPD EXACERBATION (HCC): Primary | ICD-10-CM

## 2023-09-19 LAB
ANION GAP SERPL CALCULATED.3IONS-SCNC: 13 MMOL/L (ref 9–17)
BASOPHILS # BLD: 0.05 K/UL (ref 0–0.2)
BASOPHILS NFR BLD: 1 % (ref 0–2)
BNP SERPL-MCNC: 5565 PG/ML
BUN SERPL-MCNC: 28 MG/DL (ref 8–23)
CALCIUM SERPL-MCNC: 9.2 MG/DL (ref 8.6–10.4)
CHLORIDE SERPL-SCNC: 104 MMOL/L (ref 98–107)
CO2 SERPL-SCNC: 24 MMOL/L (ref 20–31)
CREAT SERPL-MCNC: 1.1 MG/DL (ref 0.5–0.9)
EOSINOPHIL # BLD: 0.05 K/UL (ref 0–0.44)
EOSINOPHILS RELATIVE PERCENT: 1 % (ref 1–4)
ERYTHROCYTE [DISTWIDTH] IN BLOOD BY AUTOMATED COUNT: 14.2 % (ref 11.8–14.4)
GFR SERPL CREATININE-BSD FRML MDRD: 47 ML/MIN/1.73M2
GLUCOSE SERPL-MCNC: 126 MG/DL (ref 70–99)
HCT VFR BLD AUTO: 43.4 % (ref 36.3–47.1)
HGB BLD-MCNC: 13.6 G/DL (ref 11.9–15.1)
IMM GRANULOCYTES # BLD AUTO: <0.03 K/UL (ref 0–0.3)
IMM GRANULOCYTES NFR BLD: 0 %
LYMPHOCYTES NFR BLD: 0.89 K/UL (ref 1.1–3.7)
LYMPHOCYTES RELATIVE PERCENT: 18 % (ref 24–43)
MCH RBC QN AUTO: 32.8 PG (ref 25.2–33.5)
MCHC RBC AUTO-ENTMCNC: 31.3 G/DL (ref 28.4–34.8)
MCV RBC AUTO: 104.6 FL (ref 82.6–102.9)
MONOCYTES NFR BLD: 0.41 K/UL (ref 0.1–1.2)
MONOCYTES NFR BLD: 8 % (ref 3–12)
NEUTROPHILS NFR BLD: 72 % (ref 36–65)
NEUTS SEG NFR BLD: 3.68 K/UL (ref 1.5–8.1)
NRBC BLD-RTO: 0 PER 100 WBC
PLATELET # BLD AUTO: ABNORMAL K/UL (ref 138–453)
PLATELET, FLUORESCENCE: ABNORMAL K/UL (ref 138–453)
POTASSIUM SERPL-SCNC: 3.8 MMOL/L (ref 3.7–5.3)
RBC # BLD AUTO: 4.15 M/UL (ref 3.95–5.11)
SODIUM SERPL-SCNC: 141 MMOL/L (ref 135–144)
TROPONIN I SERPL HS-MCNC: 26 NG/L (ref 0–14)
TROPONIN I SERPL HS-MCNC: 31 NG/L (ref 0–14)
WBC OTHER # BLD: 5.1 K/UL (ref 3.5–11.3)

## 2023-09-19 PROCEDURE — 80048 BASIC METABOLIC PNL TOTAL CA: CPT

## 2023-09-19 PROCEDURE — 99285 EMERGENCY DEPT VISIT HI MDM: CPT

## 2023-09-19 PROCEDURE — 71046 X-RAY EXAM CHEST 2 VIEWS: CPT

## 2023-09-19 PROCEDURE — 83880 ASSAY OF NATRIURETIC PEPTIDE: CPT

## 2023-09-19 PROCEDURE — 85025 COMPLETE CBC W/AUTO DIFF WBC: CPT

## 2023-09-19 PROCEDURE — 93005 ELECTROCARDIOGRAM TRACING: CPT | Performed by: STUDENT IN AN ORGANIZED HEALTH CARE EDUCATION/TRAINING PROGRAM

## 2023-09-19 PROCEDURE — 6370000000 HC RX 637 (ALT 250 FOR IP): Performed by: STUDENT IN AN ORGANIZED HEALTH CARE EDUCATION/TRAINING PROGRAM

## 2023-09-19 PROCEDURE — 84484 ASSAY OF TROPONIN QUANT: CPT

## 2023-09-19 PROCEDURE — 85055 RETICULATED PLATELET ASSAY: CPT

## 2023-09-19 RX ORDER — BENZONATATE 100 MG/1
100 CAPSULE ORAL 3 TIMES DAILY PRN
Qty: 30 CAPSULE | Refills: 0 | Status: SHIPPED | OUTPATIENT
Start: 2023-09-19 | End: 2023-09-29

## 2023-09-19 RX ORDER — PREDNISONE 50 MG/1
50 TABLET ORAL DAILY
Qty: 5 TABLET | Refills: 0 | Status: SHIPPED | OUTPATIENT
Start: 2023-09-19 | End: 2023-09-24

## 2023-09-19 RX ORDER — PREDNISONE 50 MG/1
50 TABLET ORAL DAILY
Qty: 5 TABLET | Refills: 0 | Status: SHIPPED | OUTPATIENT
Start: 2023-09-19 | End: 2023-09-19 | Stop reason: SDUPTHER

## 2023-09-19 RX ORDER — PREDNISONE 20 MG/1
60 TABLET ORAL ONCE
Status: COMPLETED | OUTPATIENT
Start: 2023-09-19 | End: 2023-09-19

## 2023-09-19 RX ORDER — BENZONATATE 100 MG/1
100 CAPSULE ORAL 3 TIMES DAILY PRN
Qty: 30 CAPSULE | Refills: 0 | Status: SHIPPED | OUTPATIENT
Start: 2023-09-19 | End: 2023-09-19 | Stop reason: SDUPTHER

## 2023-09-19 RX ADMIN — PREDNISONE 60 MG: 20 TABLET ORAL at 13:59

## 2023-09-19 ASSESSMENT — ENCOUNTER SYMPTOMS
SHORTNESS OF BREATH: 1
COUGH: 1
ABDOMINAL PAIN: 0

## 2023-09-19 NOTE — ED TRIAGE NOTES
Pt arrived to ED room 15 via LS11 on stretcher c/o difficulty breathing. Pt reports dry cough that began this morning. Pt denies any nausea or vomiting. Pt denies any chest pain. Pt is Aox4 and ambulatory. Baseline of 2L NC at home. Hx of COPD and CHF  Dr. Emerson Lopez and Dr. Rachid Arias at bedside upon arrival.  Pt placed on full cardiac monitor, EKG completed, IV access established. Labs drawn.

## 2023-09-19 NOTE — DISCHARGE INSTRUCTIONS
We evaluated you for your shortness of breath. Your exam appeared consistent with a COPD exacerbation. Please continue taking your medications as prescribed. Please take the steroids as prescribed for the next few days. Please follow with your primary care provider. Please return to the emergency department you develop any worsening or concerning symptoms.

## 2023-09-19 NOTE — ED NOTES
Pt back to room 15 via stretcher from [facility].      Sukhdeep Lovelace RN  09/19/23 7797

## 2023-09-19 NOTE — ED NOTES
The following labs were labeled with appropriate pt sticker and tubed to lab:     [] Blue     [] Lavender   [] on ice  [x] Green/yellow  [] Green/black [] on ice  [] Greta Marino  [] on ice  [] Yellow  [] Red  [] Pink  [] Type/ Screen  [] ABG  [] VBG    [] COVID-19 swab    [] Rapid  [] PCR  [] Flu swab  [] Peds Viral Panel     [] Urine Sample  [] Fecal Sample  [] Pelvic Cultures  [] Blood Cultures  [] X 2  [] STREP Cultures     Albert Fenton RN  09/19/23 2038

## 2023-09-19 NOTE — ED PROVIDER NOTES
[facility] ED  Emergency Department Encounter  Emergency Medicine Resident     Pt Name:Luisana Reardon  MRN: [id number]  [address] [facility] 7/6/1929  Date of evaluation: 9/19/23  PCP:  Mitchell Gonzales MD  Note Started: 1:01 PM EDT      CHIEF COMPLAINT       Chief Complaint   Patient presents with    Shortness of Breath       HISTORY OF PRESENT ILLNESS  (Location/Symptom, Timing/Onset, Context/Setting, Quality, Duration, Modifying Factors, Severity.)      Suresh Seth is a 80 y.o. female who presents with shortness of breath. History of COPD as well as CHF. Does wear 2 L oxygen at baseline. Patient states she was feeling fine today, had a coughing spell and then felt short of breath. Called EMS, EMS provided patient with 1 albuterol treatment and patient had complete resolution of her symptoms. No associated chest pain, nausea, vomiting, abdominal pain. PAST MEDICAL / SURGICAL / SOCIAL / FAMILY HISTORY      has a past medical history of Arthritis, Breast cancer ([address]), CAD (coronary artery disease), CHF (congestive heart failure) ([address]), CKD (chronic kidney disease) stage 3, GFR 30-59 ml/min (HCC), COPD (chronic obstructive pulmonary disease) ([address]), Gastroesophageal reflux disease, Hyperlipidemia, Hypertension, Recurrent major depression in remission ([address]), Thyroid disease, and Type 2 diabetes mellitus with kidney complication, without long-term current use of insulin ([address] has a past surgical history that includes Thyroid surgery; Mastectomy; Hysterectomy; Cholecystectomy; Colonoscopy; knee surgery; and Cataract removal (Right). Social History     Socioeconomic History    Marital status:       Spouse name: Not on file    Number of children: Not on file    Years of education: Not on file    Highest education level: Not on file   Occupational History    Not on file   Tobacco Use    Smoking status: Never    Smokeless tobacco: Never   Substance and Sexual Activity    Alcohol

## 2023-09-20 LAB
EKG ATRIAL RATE: 79 BPM
EKG P AXIS: 70 DEGREES
EKG P-R INTERVAL: 178 MS
EKG Q-T INTERVAL: 422 MS
EKG QRS DURATION: 172 MS
EKG QTC CALCULATION (BAZETT): 483 MS
EKG R AXIS: 94 DEGREES
EKG T AXIS: -17 DEGREES
EKG VENTRICULAR RATE: 79 BPM

## 2023-09-20 PROCEDURE — 93010 ELECTROCARDIOGRAM REPORT: CPT | Performed by: INTERNAL MEDICINE

## 2023-09-29 ENCOUNTER — HOSPITAL ENCOUNTER (INPATIENT)
Age: 88
LOS: 1 days | Discharge: HOME HEALTH | DRG: 292 | End: 2023-09-30
Attending: EMERGENCY MEDICINE | Admitting: FAMILY MEDICINE
Payer: MEDICARE

## 2023-09-29 ENCOUNTER — APPOINTMENT (OUTPATIENT)
Dept: GENERAL RADIOLOGY | Age: 88
DRG: 292 | End: 2023-09-29
Payer: MEDICARE

## 2023-09-29 DIAGNOSIS — I50.9 ACUTE ON CHRONIC CONGESTIVE HEART FAILURE, UNSPECIFIED HEART FAILURE TYPE (HCC): Primary | ICD-10-CM

## 2023-09-29 PROCEDURE — 80053 COMPREHEN METABOLIC PANEL: CPT

## 2023-09-29 PROCEDURE — 96361 HYDRATE IV INFUSION ADD-ON: CPT

## 2023-09-29 PROCEDURE — 93005 ELECTROCARDIOGRAM TRACING: CPT

## 2023-09-29 PROCEDURE — 85379 FIBRIN DEGRADATION QUANT: CPT

## 2023-09-29 PROCEDURE — 71046 X-RAY EXAM CHEST 2 VIEWS: CPT

## 2023-09-29 PROCEDURE — 96374 THER/PROPH/DIAG INJ IV PUSH: CPT

## 2023-09-29 PROCEDURE — 85025 COMPLETE CBC W/AUTO DIFF WBC: CPT

## 2023-09-29 PROCEDURE — 99285 EMERGENCY DEPT VISIT HI MDM: CPT

## 2023-09-29 PROCEDURE — 83880 ASSAY OF NATRIURETIC PEPTIDE: CPT

## 2023-09-29 PROCEDURE — 84484 ASSAY OF TROPONIN QUANT: CPT

## 2023-09-30 ENCOUNTER — APPOINTMENT (OUTPATIENT)
Dept: CT IMAGING | Age: 88
DRG: 292 | End: 2023-09-30
Payer: MEDICARE

## 2023-09-30 VITALS
OXYGEN SATURATION: 97 % | SYSTOLIC BLOOD PRESSURE: 119 MMHG | HEIGHT: 67 IN | DIASTOLIC BLOOD PRESSURE: 79 MMHG | RESPIRATION RATE: 16 BRPM | WEIGHT: 172 LBS | BODY MASS INDEX: 27 KG/M2 | TEMPERATURE: 98.3 F | HEART RATE: 71 BPM

## 2023-09-30 PROBLEM — R07.89 LEFT-SIDED CHEST WALL PAIN: Status: ACTIVE | Noted: 2023-09-30

## 2023-09-30 PROBLEM — I50.9 ACUTE ON CHRONIC HEART FAILURE, UNSPECIFIED HEART FAILURE TYPE (HCC): Status: ACTIVE | Noted: 2023-09-30

## 2023-09-30 PROBLEM — I50.32 CHRONIC DIASTOLIC (CONGESTIVE) HEART FAILURE (HCC): Status: ACTIVE | Noted: 2023-09-30

## 2023-09-30 LAB
ALBUMIN SERPL-MCNC: 3.4 G/DL (ref 3.5–5.2)
ALBUMIN/GLOB SERPL: 1.1 {RATIO} (ref 1–2.5)
ALP SERPL-CCNC: 88 U/L (ref 35–104)
ALT SERPL-CCNC: 17 U/L (ref 5–33)
ANION GAP SERPL CALCULATED.3IONS-SCNC: 11 MMOL/L (ref 9–17)
ANION GAP SERPL CALCULATED.3IONS-SCNC: 14 MMOL/L (ref 9–17)
ANION GAP SERPL CALCULATED.3IONS-SCNC: 9 MMOL/L (ref 9–17)
ANION GAP SERPL CALCULATED.3IONS-SCNC: NORMAL MMOL/L (ref 9–17)
AST SERPL-CCNC: 25 U/L
BASOPHILS # BLD: <0.03 K/UL (ref 0–0.2)
BASOPHILS NFR BLD: 0 % (ref 0–2)
BILIRUB SERPL-MCNC: 0.8 MG/DL (ref 0.3–1.2)
BNP SERPL-MCNC: 8343 PG/ML
BUN SERPL-MCNC: 28 MG/DL (ref 8–23)
BUN SERPL-MCNC: 28 MG/DL (ref 8–23)
BUN SERPL-MCNC: 29 MG/DL (ref 8–23)
BUN SERPL-MCNC: NORMAL MG/DL (ref 8–23)
CALCIUM SERPL-MCNC: 8.1 MG/DL (ref 8.6–10.4)
CALCIUM SERPL-MCNC: 8.3 MG/DL (ref 8.6–10.4)
CALCIUM SERPL-MCNC: 8.4 MG/DL (ref 8.6–10.4)
CALCIUM SERPL-MCNC: NORMAL MG/DL (ref 8.6–10.4)
CHLORIDE SERPL-SCNC: 100 MMOL/L (ref 98–107)
CHLORIDE SERPL-SCNC: 97 MMOL/L (ref 98–107)
CHLORIDE SERPL-SCNC: 98 MMOL/L (ref 98–107)
CHLORIDE SERPL-SCNC: NORMAL MMOL/L (ref 98–107)
CO2 SERPL-SCNC: 24 MMOL/L (ref 20–31)
CO2 SERPL-SCNC: 27 MMOL/L (ref 20–31)
CO2 SERPL-SCNC: 28 MMOL/L (ref 20–31)
CO2 SERPL-SCNC: NORMAL MMOL/L (ref 20–31)
CREAT SERPL-MCNC: 1.2 MG/DL (ref 0.5–0.9)
CREAT SERPL-MCNC: 1.3 MG/DL (ref 0.5–0.9)
CREAT SERPL-MCNC: 1.5 MG/DL (ref 0.5–0.9)
CREAT SERPL-MCNC: NORMAL MG/DL (ref 0.5–0.9)
D DIMER PPP FEU-MCNC: 0.7 UG/ML FEU (ref 0–0.57)
EOSINOPHIL # BLD: <0.03 K/UL (ref 0–0.44)
EOSINOPHILS RELATIVE PERCENT: 0 % (ref 1–4)
ERYTHROCYTE [DISTWIDTH] IN BLOOD BY AUTOMATED COUNT: 13.3 % (ref 11.8–14.4)
GFR SERPL CREATININE-BSD FRML MDRD: 32 ML/MIN/1.73M2
GFR SERPL CREATININE-BSD FRML MDRD: 38 ML/MIN/1.73M2
GFR SERPL CREATININE-BSD FRML MDRD: 42 ML/MIN/1.73M2
GFR SERPL CREATININE-BSD FRML MDRD: NORMAL ML/MIN/1.73M2
GLUCOSE BLD-MCNC: 139 MG/DL (ref 65–105)
GLUCOSE BLD-MCNC: 158 MG/DL (ref 65–105)
GLUCOSE SERPL-MCNC: 132 MG/DL (ref 70–99)
GLUCOSE SERPL-MCNC: 187 MG/DL (ref 70–99)
GLUCOSE SERPL-MCNC: 197 MG/DL (ref 70–99)
GLUCOSE SERPL-MCNC: NORMAL MG/DL (ref 70–99)
HCT VFR BLD AUTO: 40.9 % (ref 36.3–47.1)
HGB BLD-MCNC: 13.2 G/DL (ref 11.9–15.1)
IMM GRANULOCYTES # BLD AUTO: 0.04 K/UL (ref 0–0.3)
IMM GRANULOCYTES NFR BLD: 1 %
LYMPHOCYTES NFR BLD: 0.72 K/UL (ref 1.1–3.7)
LYMPHOCYTES RELATIVE PERCENT: 14 % (ref 24–43)
MAGNESIUM SERPL-MCNC: NORMAL MG/DL (ref 1.6–2.6)
MCH RBC QN AUTO: 32 PG (ref 25.2–33.5)
MCHC RBC AUTO-ENTMCNC: 32.3 G/DL (ref 28.4–34.8)
MCV RBC AUTO: 99.3 FL (ref 82.6–102.9)
MONOCYTES NFR BLD: 0.57 K/UL (ref 0.1–1.2)
MONOCYTES NFR BLD: 11 % (ref 3–12)
NEUTROPHILS NFR BLD: 73 % (ref 36–65)
NEUTS SEG NFR BLD: 3.74 K/UL (ref 1.5–8.1)
NRBC BLD-RTO: 0 PER 100 WBC
PLATELET # BLD AUTO: 189 K/UL (ref 138–453)
PMV BLD AUTO: 9.7 FL (ref 8.1–13.5)
POTASSIUM SERPL-SCNC: 3.4 MMOL/L (ref 3.7–5.3)
POTASSIUM SERPL-SCNC: 4.2 MMOL/L (ref 3.7–5.3)
POTASSIUM SERPL-SCNC: 4.3 MMOL/L (ref 3.7–5.3)
POTASSIUM SERPL-SCNC: NORMAL MMOL/L (ref 3.7–5.3)
PROT SERPL-MCNC: 6.5 G/DL (ref 6.4–8.3)
RBC # BLD AUTO: 4.12 M/UL (ref 3.95–5.11)
SODIUM SERPL-SCNC: 135 MMOL/L (ref 135–144)
SODIUM SERPL-SCNC: 136 MMOL/L (ref 135–144)
SODIUM SERPL-SCNC: 137 MMOL/L (ref 135–144)
SODIUM SERPL-SCNC: NORMAL MMOL/L (ref 135–144)
TROPONIN I SERPL HS-MCNC: 31 NG/L (ref 0–14)
TROPONIN I SERPL HS-MCNC: 35 NG/L (ref 0–14)
TROPONIN I SERPL HS-MCNC: 37 NG/L (ref 0–14)
WBC OTHER # BLD: 5.1 K/UL (ref 3.5–11.3)

## 2023-09-30 PROCEDURE — 71275 CT ANGIOGRAPHY CHEST: CPT

## 2023-09-30 PROCEDURE — 6370000000 HC RX 637 (ALT 250 FOR IP): Performed by: NURSE PRACTITIONER

## 2023-09-30 PROCEDURE — 2700000000 HC OXYGEN THERAPY PER DAY

## 2023-09-30 PROCEDURE — 2580000003 HC RX 258: Performed by: NURSE PRACTITIONER

## 2023-09-30 PROCEDURE — 99223 1ST HOSP IP/OBS HIGH 75: CPT | Performed by: FAMILY MEDICINE

## 2023-09-30 PROCEDURE — 6360000002 HC RX W HCPCS: Performed by: EMERGENCY MEDICINE

## 2023-09-30 PROCEDURE — 94760 N-INVAS EAR/PLS OXIMETRY 1: CPT

## 2023-09-30 PROCEDURE — 82947 ASSAY GLUCOSE BLOOD QUANT: CPT

## 2023-09-30 PROCEDURE — 84484 ASSAY OF TROPONIN QUANT: CPT

## 2023-09-30 PROCEDURE — 1200000000 HC SEMI PRIVATE

## 2023-09-30 PROCEDURE — 6360000002 HC RX W HCPCS: Performed by: FAMILY MEDICINE

## 2023-09-30 PROCEDURE — 2580000003 HC RX 258: Performed by: EMERGENCY MEDICINE

## 2023-09-30 PROCEDURE — 6370000000 HC RX 637 (ALT 250 FOR IP): Performed by: EMERGENCY MEDICINE

## 2023-09-30 PROCEDURE — 6360000004 HC RX CONTRAST MEDICATION: Performed by: EMERGENCY MEDICINE

## 2023-09-30 PROCEDURE — 80048 BASIC METABOLIC PNL TOTAL CA: CPT

## 2023-09-30 PROCEDURE — 6360000002 HC RX W HCPCS: Performed by: NURSE PRACTITIONER

## 2023-09-30 PROCEDURE — 36415 COLL VENOUS BLD VENIPUNCTURE: CPT

## 2023-09-30 PROCEDURE — 83735 ASSAY OF MAGNESIUM: CPT

## 2023-09-30 RX ORDER — MAGNESIUM SULFATE IN WATER 40 MG/ML
2000 INJECTION, SOLUTION INTRAVENOUS ONCE
Status: COMPLETED | OUTPATIENT
Start: 2023-09-30 | End: 2023-09-30

## 2023-09-30 RX ORDER — SILDENAFIL CITRATE 20 MG/1
20 TABLET ORAL DAILY
COMMUNITY
Start: 2023-09-30

## 2023-09-30 RX ORDER — DEXTROSE MONOHYDRATE 100 MG/ML
INJECTION, SOLUTION INTRAVENOUS CONTINUOUS PRN
Status: DISCONTINUED | OUTPATIENT
Start: 2023-09-30 | End: 2023-09-30 | Stop reason: HOSPADM

## 2023-09-30 RX ORDER — ATORVASTATIN CALCIUM 40 MG/1
40 TABLET, FILM COATED ORAL NIGHTLY
Status: DISCONTINUED | OUTPATIENT
Start: 2023-09-30 | End: 2023-09-30 | Stop reason: HOSPADM

## 2023-09-30 RX ORDER — BUDESONIDE AND FORMOTEROL FUMARATE DIHYDRATE 160; 4.5 UG/1; UG/1
2 AEROSOL RESPIRATORY (INHALATION) 2 TIMES DAILY
Status: DISCONTINUED | OUTPATIENT
Start: 2023-09-30 | End: 2023-09-30 | Stop reason: HOSPADM

## 2023-09-30 RX ORDER — LEVOTHYROXINE SODIUM 112 UG/1
50 TABLET ORAL DAILY
Qty: 30 TABLET | Refills: 0 | Status: SHIPPED | OUTPATIENT
Start: 2023-09-30

## 2023-09-30 RX ORDER — NITROGLYCERIN 0.4 MG/1
0.4 TABLET SUBLINGUAL EVERY 5 MIN PRN
Status: DISCONTINUED | OUTPATIENT
Start: 2023-09-30 | End: 2023-09-30 | Stop reason: HOSPADM

## 2023-09-30 RX ORDER — INSULIN LISPRO 100 [IU]/ML
0-4 INJECTION, SOLUTION INTRAVENOUS; SUBCUTANEOUS
Status: DISCONTINUED | OUTPATIENT
Start: 2023-09-30 | End: 2023-09-30 | Stop reason: HOSPADM

## 2023-09-30 RX ORDER — ACETAMINOPHEN 325 MG/1
650 TABLET ORAL EVERY 6 HOURS PRN
Status: DISCONTINUED | OUTPATIENT
Start: 2023-09-30 | End: 2023-09-30 | Stop reason: HOSPADM

## 2023-09-30 RX ORDER — ONDANSETRON 2 MG/ML
4 INJECTION INTRAMUSCULAR; INTRAVENOUS EVERY 6 HOURS PRN
Status: DISCONTINUED | OUTPATIENT
Start: 2023-09-30 | End: 2023-09-30 | Stop reason: HOSPADM

## 2023-09-30 RX ORDER — ACETAMINOPHEN 650 MG/1
650 SUPPOSITORY RECTAL EVERY 6 HOURS PRN
Status: DISCONTINUED | OUTPATIENT
Start: 2023-09-30 | End: 2023-09-30 | Stop reason: HOSPADM

## 2023-09-30 RX ORDER — FUROSEMIDE 10 MG/ML
20 INJECTION INTRAMUSCULAR; INTRAVENOUS DAILY
Status: DISCONTINUED | OUTPATIENT
Start: 2023-09-30 | End: 2023-09-30 | Stop reason: HOSPADM

## 2023-09-30 RX ORDER — SODIUM CHLORIDE, SODIUM LACTATE, POTASSIUM CHLORIDE, AND CALCIUM CHLORIDE .6; .31; .03; .02 G/100ML; G/100ML; G/100ML; G/100ML
500 INJECTION, SOLUTION INTRAVENOUS ONCE
Status: COMPLETED | OUTPATIENT
Start: 2023-09-30 | End: 2023-09-30

## 2023-09-30 RX ORDER — POLYETHYLENE GLYCOL 3350 17 G/17G
17 POWDER, FOR SOLUTION ORAL DAILY PRN
Status: DISCONTINUED | OUTPATIENT
Start: 2023-09-30 | End: 2023-09-30 | Stop reason: HOSPADM

## 2023-09-30 RX ORDER — ENOXAPARIN SODIUM 100 MG/ML
30 INJECTION SUBCUTANEOUS DAILY
Status: DISCONTINUED | OUTPATIENT
Start: 2023-09-30 | End: 2023-09-30 | Stop reason: HOSPADM

## 2023-09-30 RX ORDER — ONDANSETRON 4 MG/1
4 TABLET, ORALLY DISINTEGRATING ORAL EVERY 8 HOURS PRN
Status: DISCONTINUED | OUTPATIENT
Start: 2023-09-30 | End: 2023-09-30 | Stop reason: HOSPADM

## 2023-09-30 RX ORDER — GLIPIZIDE 5 MG/1
5 TABLET ORAL DAILY
COMMUNITY
Start: 2023-09-30

## 2023-09-30 RX ORDER — FUROSEMIDE 20 MG/1
20 TABLET ORAL DAILY
COMMUNITY
Start: 2023-09-30

## 2023-09-30 RX ORDER — LEVOTHYROXINE SODIUM 112 UG/1
50 TABLET ORAL DAILY
Qty: 30 TABLET | Refills: 0 | Status: SHIPPED | OUTPATIENT
Start: 2023-09-30 | End: 2023-09-30 | Stop reason: SDUPTHER

## 2023-09-30 RX ORDER — SODIUM CHLORIDE 0.9 % (FLUSH) 0.9 %
5-40 SYRINGE (ML) INJECTION EVERY 12 HOURS SCHEDULED
Status: DISCONTINUED | OUTPATIENT
Start: 2023-09-30 | End: 2023-09-30 | Stop reason: HOSPADM

## 2023-09-30 RX ORDER — LEVOTHYROXINE SODIUM 0.05 MG/1
50 TABLET ORAL DAILY
Status: DISCONTINUED | OUTPATIENT
Start: 2023-09-30 | End: 2023-09-30 | Stop reason: HOSPADM

## 2023-09-30 RX ORDER — ASPIRIN 81 MG/1
81 TABLET, CHEWABLE ORAL DAILY
Status: DISCONTINUED | OUTPATIENT
Start: 2023-09-30 | End: 2023-09-30 | Stop reason: HOSPADM

## 2023-09-30 RX ORDER — SODIUM CHLORIDE 0.9 % (FLUSH) 0.9 %
10 SYRINGE (ML) INJECTION PRN
Status: DISCONTINUED | OUTPATIENT
Start: 2023-09-30 | End: 2023-09-30 | Stop reason: HOSPADM

## 2023-09-30 RX ORDER — INSULIN LISPRO 100 [IU]/ML
0-4 INJECTION, SOLUTION INTRAVENOUS; SUBCUTANEOUS NIGHTLY
Status: DISCONTINUED | OUTPATIENT
Start: 2023-09-30 | End: 2023-09-30 | Stop reason: HOSPADM

## 2023-09-30 RX ORDER — LATANOPROST 50 UG/ML
1 SOLUTION/ DROPS OPHTHALMIC NIGHTLY
Status: DISCONTINUED | OUTPATIENT
Start: 2023-09-30 | End: 2023-09-30 | Stop reason: HOSPADM

## 2023-09-30 RX ORDER — SODIUM CHLORIDE 9 MG/ML
INJECTION, SOLUTION INTRAVENOUS PRN
Status: DISCONTINUED | OUTPATIENT
Start: 2023-09-30 | End: 2023-09-30 | Stop reason: HOSPADM

## 2023-09-30 RX ORDER — FUROSEMIDE 10 MG/ML
40 INJECTION INTRAMUSCULAR; INTRAVENOUS 2 TIMES DAILY
Status: DISCONTINUED | OUTPATIENT
Start: 2023-09-30 | End: 2023-09-30

## 2023-09-30 RX ADMIN — MAGNESIUM SULFATE HEPTAHYDRATE 2000 MG: 40 INJECTION, SOLUTION INTRAVENOUS at 03:02

## 2023-09-30 RX ADMIN — SODIUM CHLORIDE, PRESERVATIVE FREE 10 ML: 5 INJECTION INTRAVENOUS at 10:00

## 2023-09-30 RX ADMIN — ENOXAPARIN SODIUM 30 MG: 100 INJECTION SUBCUTANEOUS at 10:00

## 2023-09-30 RX ADMIN — ASPIRIN 81 MG: 81 TABLET, CHEWABLE ORAL at 10:11

## 2023-09-30 RX ADMIN — IOPAMIDOL 100 ML: 755 INJECTION, SOLUTION INTRAVENOUS at 00:44

## 2023-09-30 RX ADMIN — FUROSEMIDE 20 MG: 10 INJECTION, SOLUTION INTRAMUSCULAR; INTRAVENOUS at 10:11

## 2023-09-30 RX ADMIN — POTASSIUM BICARBONATE 40 MEQ: 782 TABLET, EFFERVESCENT ORAL at 03:49

## 2023-09-30 RX ADMIN — SODIUM CHLORIDE, POTASSIUM CHLORIDE, SODIUM LACTATE AND CALCIUM CHLORIDE 500 ML: 600; 310; 30; 20 INJECTION, SOLUTION INTRAVENOUS at 01:05

## 2023-09-30 ASSESSMENT — ENCOUNTER SYMPTOMS
STRIDOR: 0
SHORTNESS OF BREATH: 0
ABDOMINAL PAIN: 0
BACK PAIN: 1
COUGH: 1
SHORTNESS OF BREATH: 1
COUGH: 0
VOMITING: 0
NAUSEA: 0
WHEEZING: 0
DIARRHEA: 0
CONSTIPATION: 0
BLOOD IN STOOL: 0

## 2023-09-30 ASSESSMENT — HEART SCORE: ECG: 2

## 2023-09-30 ASSESSMENT — PAIN SCALES - GENERAL: PAINLEVEL_OUTOF10: 0

## 2023-09-30 NOTE — ED PROVIDER NOTES
previous  -CTA obtained given chest pain with radiation to her back. No evidence of dissection, appears to have CHF  -Discussed with patient and given her age, risk factors, symptoms, and patient lives alone we have decided to admit patient to the medicine service for further evaluation. Amount and/or Complexity of Data Reviewed  Labs: ordered. Decision-making details documented in ED Course. Radiology: ordered. Decision-making details documented in ED Course. ECG/medicine tests: ordered. Risk  Prescription drug management. Decision regarding hospitalization. EKG  EKG Interpretation    Interpreted by emergency department physician    Rhythm: normal sinus   Rate: normal  Axis: right  Ectopy: none  Conduction: right bundle branch block (complete)  ST Segments: depression in  lateral leads  T Waves: inversion in  lateral leads  Q Waves: III    Clinical Impression: non-specific EKG and right bundle branch block    Adama Hernández DO      All EKG's are interpreted by the Emergency Department Physician who either signs or Co-signs this chart in the absence of a cardiologist.    EMERGENCY DEPARTMENT COURSE:  History: 1  EC  Patient Age: 2  *Risk factors for Atherosclerotic disease: Diabetes Mellitus; Hypercholesterolemia; Hypertension  Risk Factors: 2  Troponin: 1  Heart Score Total: 8        ED Course as of 23 0409   Sat Sep 30, 2023   0026 WBC: 5.1 [SK]   0026 Hemoglobin Quant: 13.2 [SK]   0026 Potassium(!): 3.4 [SK]   0026 Chloride(!): 97 [SK]   0026 Creatinine(!): 1.5  Mild elevation  [SK]   0026 D-Dimer, Quant(!): 0.70  Will obtain CTA of chest  [SK]   0059 Pro-BNP(!): 8,343 [SK]   0229 CTA CHEST W WO CONTRAST  No aortic dissection. The pulmonary artery is enlarged, commonly seen in pulmonary hypertension,  similar to prior CT. Reflux of contrast into the IVC/hepatic veins, nonspecific but commonly seen  with heart failure.     [SK]      ED Course User Index  [SK] Adama Hernández DO PROCEDURES:      CONSULTS:  IP CONSULT TO HOSPITALIST  IP CONSULT TO HEART FAILURE NURSE/COORDINATOR  IP CONSULT TO DIETITIAN    CRITICAL CARE:  There was significant risk of life threatening deterioration of patient's condition requiring my direct management. Critical care time 0 minutes, excluding any documented procedures. FINAL IMPRESSION      1. Acute on chronic congestive heart failure, unspecified heart failure type ([address])          DISPOSITION / PLAN     DISPOSITION Admitted 09/30/2023 03:54:32 AM      PATIENT REFERRED TO:  No follow-up provider specified.     DISCHARGE MEDICATIONS:  New Prescriptions    No medications on file       Geovanna Murphy DO  Emergency Medicine Resident    (Please note that portions of this note were completed with a voice recognition program.  Efforts were made to edit the dictations but occasionally words are mis-transcribed.)        Geovanna Murphy DO  Resident  09/30/23 9208

## 2023-09-30 NOTE — ED TRIAGE NOTES
Pt moved to ED 34 via stretcher by EMS. Pt is a/o x4. Pt stated that she started to have CP 2 hours PTA. Pt took 4 ASA when it started and told EMS she was no longer having CP upon their arrival.   Pt stated the pain was mid-sternal and non-radiating. Pt wears O2 at home, 2L/NC. EMS stated that NC tubing was very long and kinked in several places. Pt was seen here last week and put on ATB for the \"lung infection\". Pt stated her breathing is better since the ATB and she is not coughing up green mucus anymore. Pt placed on full cardiac monitor. IV established. EKG and labs obtained. Warm blankets and call light provided. Care ongoing.

## 2023-09-30 NOTE — CARE COORDINATION
Case Management Assessment  Initial Evaluation    Date/Time of Evaluation: 9/30/2023 9:48 AM  Assessment Completed by: Isaias Morrow RN    If patient is discharged prior to next notation, then this note serves as note for discharge by case management. Patient Name: Farhat Codding                   YOB: 1929  Diagnosis: Acute on chronic heart failure, unspecified heart failure type [facility]) [I50.9]  Acute on chronic congestive heart failure, unspecified heart failure type [facility]) [I50.9]                   Date / Time: 9/29/2023 11:17 PM    Patient Admission Status: Inpatient   Readmission Risk (Low < 19, Mod (19-27), High > 27): Readmission Risk Score: 12.5    Current PCP: Vannesa Lozano MD  PCP verified by CM? Yes (seen last friday)    Chart Reviewed: Yes      History Provided by: Patient  Patient Orientation: Alert and Oriented    Patient Cognition: Alert    Hospitalization in the last 30 days (Readmission):  No    If yes, Readmission Assessment in  Navigator will be completed. Advance Directives:      Code Status: DNR-CCA   Patient's Primary Decision Maker is:  (has ACP paperwork on chart)    Primary Decision MakerJoerlinda Mitchell  Andreea - [phone]    Discharge Planning:    Patient lives with: (P) Alone Type of Home: (P) House  Primary Care Giver: Self  Patient Support Systems include: Children, Family Members, Friends/Neighbors   Current Financial resources: Medicare  Current community resources:    Current services prior to admission: (P) Oxygen Therapy, Durable Medical Equipment (O2 2L with concentrator and just received a nebulizer)            Current DME: (P) Hospital Bed, Bedside Commode, Shower Chair            Type of Home Care services:  (P) OT, PT, Nursing Services    ADLS  Prior functional level: Independent in ADLs/IADLs  Current functional level: Independent in ADLs/IADLs    PT AM-PAC:   /24  OT AM-PAC:   /24    Family can provide assistance at DC:  Yes  Would you

## 2023-09-30 NOTE — DISCHARGE INSTR - COC
Continuity of Care Form    Patient Name: Cynthia Patterson   :  1929  MRN:  [id number]    Admit date:  2023  Discharge date:  2023    Code Status Order: DNR-CCA   Advance Directives:     Admitting Physician:  Cherise Macario MD  PCP: Elder Garces MD    Discharging Nurse: [facility] Unit/Room#: 8677/1324-25  Discharging Unit Phone Number: [phone]    Emergency Contact:   Extended Emergency Contact Information  Primary Emergency Contact: [facility] Phone: [phone]  Mobile Phone: [phone]  Relation: Child  Secondary Emergency Contact: Zoila Goncalves  Mobile Phone: [phone]  Relation: Child    Past Surgical History:  Past Surgical History:   Procedure Laterality Date    CATARACT REMOVAL Right     CHOLECYSTECTOMY      COLONOSCOPY      HYSTERECTOMY (CERVIX STATUS UNKNOWN)      KNEE SURGERY      MASTECTOMY      THYROID SURGERY         Immunization History: There is no immunization history on file for this patient.     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 angina pectoris I25.10    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 ([facility]) E11.43    Bradycardia R00.1    Mixed hyperlipidemia E78.2    Laryngopharyngeal reflux K21.9    Peripheral venous insufficiency I87.2    Pulmonary hypertension (HCC) I27.20    Recurrent major depression in remission ([address]) F33.40    Vocal cord palsy None    Impairments/Disabilities:      None    Nutrition Therapy:  Current Nutrition Therapy:   - Oral Diet:  Carb Control 4 carbs/meal (1800kcals/day) and Low Sodium (3-4gm)    Routes of Feeding: Oral  Liquids: No Restrictions  Daily Fluid Restriction: no  Last Modified Barium Swallow with Video (Video Swallowing Test): not done    Treatments at the Time of Hospital Discharge:   Respiratory Treatments: NA  Oxygen Therapy:  is on oxygen at 2 L/min per nasal cannula. Ventilator:    - No ventilator support    Rehab Therapies: Physical Therapy and Occupational Therapy  Weight Bearing Status/Restrictions: No weight bearing restrictions  Other Medical Equipment (for information only, NOT a DME order):  cane and walker  Other Treatments: NA    Patient's personal belongings (please select all that are sent with patient):  Glasses    RN SIGNATURE:  Electronically signed by Ansley Galvin RN on 9/30/23 at 4:48 PM EDT    CASE MANAGEMENT/SOCIAL WORK SECTION    Inpatient Status Date: 9/30/2023    Readmission Risk Assessment Score:  Readmission Risk              Risk of Unplanned Readmission:  17           Discharging to Facility/ Agency   Name:   Address:  Phone: [phone]  Fax:    Dialysis Facility (if applicable)   Name:  Address:  Dialysis Schedule:  Phone:  Fax:    / signature: Electronically signed by Evan Avila RN on 9/30/23 at 4:49 PM EDT    PHYSICIAN SECTION    Prognosis: Fair    Condition at Discharge: Stable    Rehab Potential (if transferring to Rehab): Fair    Recommended Labs or Other Treatments After Discharge:   - PT/OT    Physician Certification: I certify the above information and transfer of Teo Seth  is necessary for the continuing treatment of the diagnosis listed and that she requires Home Care for greater 30 days.      Update Admission H&P: No change in H&P    PHYSICIAN SIGNATURE:  Electronically signed by Mabel Stanley MD on 9/30/23 at 4:34 PM EDT

## 2023-09-30 NOTE — DISCHARGE SUMMARY
[facility]  Office: [phone]  Nathan Goldman DO, Danny Ng DO, Sina Forbes MD, Sahil Rushing MD, Yadira Rice MD, Selena Lo MD,  Irais Campbell MD, Tejinder Be MD, Neli Ramos DO, Ceci Wilson MD,  Jason García DO, Ofe Edwards MD, Radha Hernandez MD, Dori Arce DO, Alan Estrada MD,  Javed Lorenzana DO, Samuel Hunt MD, Anaid Faulkner MD, Valdo Crowell MD, Silvia Muhammad MD,  Robert Gore MD, Irwin William MD, El Pugh MD, Keiko Cortez MD, Corrie Fox DO, Juanis Resendiz MD,  Corby Mcdonald MD, Eduarda Rodriguez MD, Barbara Smallwood, [facility],  Lucio Ravi, CNP,, Polly Webb, CNP,  Estrada Lo, [facility], Juanita Morales, CNP, Charlette Rodriguez, [facility], Ovidio Muniz, CNP, Priya Armijo, CNP, Gwen Loza, CNP, Norman Mazariegos, CNP, Wendy Simmons, CNS, Heriberto La, [facility], Sahil Cruzer, [address] [place] [address] [facility]    Discharge Summary     Patient ID: Marylene Chiles  :  1929   MRN: [id number]     ACCOUNT:  [de-identified]   Patient's PCP: Elza Moncada MD  Admit Date: 2023   Discharge Date: 2023     Length of Stay: 0  Code Status:  DNR-CCA  Admitting Physician: Selena Lo MD  Discharge Physician: Selena Lo MD     Active Discharge Diagnoses:     Hospital Problem Lists:  Principal Problem:    Chronic diastolic (congestive) heart failure [facility])  Active Problems:    Hypothyroidism    Pulmonary hypertension due to COPD [facility])    Essential hypertension    CKD (chronic kidney disease) stage 3, GFR 30-59 ml/min ([facility])    Type 2 diabetes mellitus with kidney complication, without long-term current use of insulin ([address])    Left-sided chest wall pain  Resolved Problems:    * No resolved hospital problems. *      Admission Condition:  fair    Discharged Condition: fair    Hospital Stay:     HPI:    mauro Cooper is a 80 y. o. Non- / non  female who presents with Chest Pain (X2 hours, took 4 ASA PTA, no longer has CP) and Shortness of Breath (Seen last week, took last ATB today)   and is admitted to the hospital for the management of Chronic diastolic (congestive) heart failure ([address]). Patient is a pleasant 60-year-old female with significant past medical history as detailed below including coronary artery disease, CHF, COPD, diabetes, hypertension, hyperlipidemia, hypothyroidism who is functioning and independent lives in her own there is coming in complaining of left-sided chest pain, patient had been diagnosed with pneumonia and prescribed antibiotic last week and she is reporting significant cough with phlegm production since then, she finished her antibiotic course and overall feeling /breathing better and coughing less but was concerned from her chest pain episode which prompted her to come to the ER, work-up in the ER revealed elevated proBNP, slightly elevated creatinine at 1.5 and slightly elevated D-dimer which prompted CT chest PE to be done in the ER that revealed clear chest cardiomegaly and picture of pulmonary hypertension. Patient was admitted for further management. During my evaluation patient was coming back from the restroom, her chest pain is musculoskeletal in nature and likely secondary to her cough, seems a bit tired and winded, she did receive fluid yesterday in the ER. Patient potassium and magnesium were replaced  Her chest pain was reproducible on my exam.  Despite elevated proBNP patient clinical exam and imaging is not supporting CHF exacerbation. Significant therapeutic interventions:       Chronic diastolic heart failure : Continue chronic medications, patient received a bolus yesterday in the ER, will give only 20 mg IV Lasix, no need for Lasix 40 mg IV twice daily as the patient clinically does not have exacerbation.       Left-sided chest wall pain: Pain is reproducible throughout

## 2023-09-30 NOTE — CONSULTS
Nutrition Note      Consulted for CHF diet education. Cardiac diet education not appropriate d/t advanced age and medical condition.        Electronically signed by Kiley Montez, MS, RD, LD on 9/30/23 at 11:54 AM EDT    Contact:   Floor Mobile: [phone]  Desk Phone: [phone]  RD Weekend Mobile: [phone]

## 2023-09-30 NOTE — ED NOTES
Writer called Charge RN on 3C to inform them of pt coming to the unit.      Kaycee Leavitt RN  09/30/23 9894

## 2023-09-30 NOTE — ACP (ADVANCE CARE PLANNING)
Advance Care Planning     Advance Care Planning Activator (Inpatient)  Conversation Note      Date of ACP Conversation: 9/30/2023     Conversation Conducted with: Patient with Decision Making Capacity    ACP Activator: Leta Plascencia, RN    Pt has ACP paperwork on chart  Pt relates Daughter Aleksandr Woodall is her POA  She is a [facility] verified, RN notified    Health Care Decision Maker:     Current Designated Health Care Decision Maker:     Primary Decision Maker: Dewey Walton - Child - [phone]  Click here to complete Healthcare Decision Makers including section of the Healthcare Decision Maker Relationship (ie \"Primary\")  Today we documented Decision Maker(s) consistent with Legal Next of Kin hierarchy. Care Preferences    Ventilation: \"If you were in your present state of health and suddenly became very ill and were unable to breathe on your own, what would your preference be about the use of a ventilator (breathing machine) if it were available to you? \"      Would the patient desire the use of ventilator (breathing machine)?: no    \"If your health worsens and it becomes clear that your chance of recovery is unlikely, what would your preference be about the use of a ventilator (breathing machine) if it were available to you? \"     Would the patient desire the use of ventilator (breathing machine)?: No      Resuscitation  \"CPR works best to restart the heart when there is a sudden event, like a heart attack, in someone who is otherwise healthy. Unfortunately, CPR does not typically restart the heart for people who have serious health conditions or who are very sick. \"    \"In the event your heart stopped as a result of an underlying serious health condition, would you want attempts to be made to restart your heart (answer \"yes\" for attempt to resuscitate) or would you prefer a natural death (answer \"no\" for do not attempt to resuscitate)? \" no       [] Yes   [] No   Educated Patient / Decision Maker regarding

## 2023-09-30 NOTE — H&P
[facility]  Office: [phone]  Mallory Cintron DO, Eri Stanford DO, Darylene Given Blood, DO, Arnaldo Donohue MD, Kalli Qureshi MD, Porsha Monae MD, Marnie Drummond MD,  Jessika Martell MD, Alonzo Dhillon MD, Isabela Barton DO, Huy Hubbard MD,  Duane Rene MD, Saulo Lopez MD, Ava Angel, DO, Awilda Riojas MD,  Mai Xie MD, Duc Maurer MD, Ella Sewell MD, Chuy Jarvis MD,  Alysha Whitt MD, Neeta Macias MD, Melanie Morales MD, Donnie Ojeda MD, Mariel Guzman DO, Marquis Bolaños MD,  Eleanor Murray MD, Starr Salas MD, Karrie Hanley, CNP,  Quoc Zavala, CNP,, Neva Flynn, CNP,  Sarah Dougherty, DNP, Deyanira Khoury, CNP, [place], CNP, Tosha Delacruz, CNP, Shelia Choudhary, CNP, Edmar Vicente, CNP, Manuel Pope, CNP, Dapnhe Winter, CNS, Telma Lora, CNP, Susan Gunn, [address] [place] [address]    HISTORY AND PHYSICAL EXAMINATION            Date:   9/30/2023  Patient name:  Sohail Hadley  Date of admission:  9/29/2023 11:17 PM  MRN:   [id number]  Account:  [de-identified]  YOB: 1929  PCP:    Dion Shukla MD  Room:   8414/1230-69  Code Status:    DNR-CCA    Chief Complaint:     Chief Complaint   Patient presents with    Chest Pain     X2 hours, took 4 ASA PTA, no longer has CP    Shortness of Breath     Seen last week, took last ATB today       History Obtained From:     patient, electronic medical record    History of Present Illness:     Sohail Hadley is a 80 y.o. Non- / non  female who presents with Chest Pain (X2 hours, took 4 ASA PTA, no longer has CP) and Shortness of Breath (Seen last week, took last ATB today)   and is admitted to the hospital for the management of Chronic diastolic (congestive) heart failure ([address]).     Patient is a pleasant 51-year-old female with significant past medical

## 2023-10-03 LAB
EKG ATRIAL RATE: 97 BPM
EKG P AXIS: 74 DEGREES
EKG P-R INTERVAL: 122 MS
EKG Q-T INTERVAL: 410 MS
EKG QRS DURATION: 168 MS
EKG QTC CALCULATION (BAZETT): 520 MS
EKG R AXIS: 162 DEGREES
EKG T AXIS: 11 DEGREES
EKG VENTRICULAR RATE: 97 BPM

## 2025-01-13 ENCOUNTER — HOSPITAL ENCOUNTER (EMERGENCY)
Age: 89
Discharge: HOME | End: 2025-01-13
Attending: EMERGENCY MEDICINE
Payer: MEDICARE

## 2025-01-13 VITALS
DIASTOLIC BLOOD PRESSURE: 67 MMHG | RESPIRATION RATE: 16 BRPM | BODY MASS INDEX: 21.93 KG/M2 | OXYGEN SATURATION: 97 % | WEIGHT: 140 LBS | SYSTOLIC BLOOD PRESSURE: 123 MMHG | HEART RATE: 60 BPM | TEMPERATURE: 97.5 F

## 2025-01-13 DIAGNOSIS — M10.9 ACUTE GOUT OF RIGHT HAND, UNSPECIFIED CAUSE: Primary | ICD-10-CM

## 2025-01-13 PROCEDURE — 96372 THER/PROPH/DIAG INJ SC/IM: CPT

## 2025-01-13 PROCEDURE — 99284 EMERGENCY DEPT VISIT MOD MDM: CPT

## 2025-01-13 PROCEDURE — 6360000002 HC RX W HCPCS: Performed by: PHYSICIAN ASSISTANT

## 2025-01-13 RX ORDER — DEXAMETHASONE SODIUM PHOSPHATE 10 MG/ML
8 INJECTION INTRAMUSCULAR; INTRAVENOUS ONCE
Status: COMPLETED | OUTPATIENT
Start: 2025-01-13 | End: 2025-01-13

## 2025-01-13 RX ORDER — PREDNISONE 20 MG/1
20 TABLET ORAL 2 TIMES DAILY
Qty: 10 TABLET | Refills: 0 | Status: SHIPPED | OUTPATIENT
Start: 2025-01-13 | End: 2025-01-18

## 2025-01-13 RX ORDER — TRAMADOL HYDROCHLORIDE 50 MG/1
50 TABLET ORAL EVERY 8 HOURS PRN
Qty: 15 TABLET | Refills: 0 | Status: SHIPPED | OUTPATIENT
Start: 2025-01-13 | End: 2025-01-18

## 2025-01-13 RX ADMIN — DEXAMETHASONE SODIUM PHOSPHATE 8 MG: 10 INJECTION INTRAMUSCULAR; INTRAVENOUS at 15:23

## 2025-01-13 ASSESSMENT — PAIN SCALES - GENERAL: PAINLEVEL_OUTOF10: 9

## 2025-01-13 ASSESSMENT — PAIN - FUNCTIONAL ASSESSMENT: PAIN_FUNCTIONAL_ASSESSMENT: 0-10

## 2025-01-13 NOTE — DISCHARGE INSTRUCTIONS
Take meds as prescribed.  Follow up with doctor  in 3 -4 days.  Return to ER immediately if symptoms worsen or persist.    Do not drive, operate machinery, climb or engage in any dangerous activity while taking narcotics or tramadol.

## 2025-01-13 NOTE — ED PROVIDER NOTES
EMERGENCY DEPARTMENT ENCOUNTER   ATTENDING ATTESTATION     Pt Name: Luisana Goncalves  MRN: [id number]  Birthdate 7/6/1929  Date of evaluation: 1/13/25   Luisana Goncalves is a 89 y.o. female with CC: Hand Pain (Right, pointer and middle finger pain. Gout flare up. Pt family member reports that she has had same issue and they have given her IV meds, unknown what kind, and her swelling went down, she was prescribed prednisone and went home. She requests same treatment for her mom.)    MDM:   I performed a substantive part of the MDM during the patient's E/M visit. I personally evaluated and examined the patient. I personally made or approved the documented management plan and acknowledge its risk of complications.    Independent Interpretation: My (EKG/X-Ray/US/CT) interpretation     Discussion: Management/test interpretation discussed with        CRITICAL CARE:       EKG: All EKG's are interpreted by the Emergency Department Physician who either signs or Co-signs this chart in the absence of a cardiologist.      RADIOLOGY:All plain film, CT, MRI, and formal ultrasound images (except ED bedside ultrasound) are read by the radiologist, see reports below, unless otherwise noted in MDM or here.  No orders to display     LABS: All lab results were reviewed by myself, and all abnormals are listed below.  Labs Reviewed - No data to display  CONSULTS:  None  FINAL IMPRESSION      1. Acute gout of right hand, unspecified cause            PASTMEDICAL HISTORY     Past Medical History:   Diagnosis Date    Arthritis     Breast cancer (HCC)     CAD (coronary artery disease)     CHF (congestive heart failure) (Formerly [facility])     CKD (chronic kidney disease) stage 3, GFR 30-59 ml/min ([facility]) 12/2/2019    COPD (chronic obstructive pulmonary disease) (Formerly [facility])     Gastroesophageal reflux disease 8/1/2020    Hyperlipidemia     Hypertension     Recurrent major depression in remission ([facility]) 8/1/2020    Thyroid disease     Type 2 diabetes mellitus with

## 2025-01-13 NOTE — ED NOTES
Patient presents with c/o right second and third finger swelling and pain, thinks it may be related to gout, but patient has not been diagnosed with gout. Patient has not sustained any injuries or trauma to the hand or fingers recently.

## 2025-01-14 NOTE — ED PROVIDER NOTES
[facility] EMERGENCY DEPARTMENT  [facility]      Pt Name: Luisana Goncalves  MRN: [id number]  Birthdate 7/6/1929  Date ofevaluation: 1/13/2025  Provider: Maco Lebron PA-C    CHIEF COMPLAINT       Chief Complaint   Patient presents with    Hand Pain     Right, pointer and middle finger pain. Gout flare up. Pt family member reports that she has had same issue and they have given her IV meds, unknown what kind, and her swelling went down, she was prescribed prednisone and went home. She requests same treatment for her mom.         HISTORY OF PRESENT ILLNESS  (Location/Symptom, Timing/Onset, Context/Setting, Quality, Duration, Modifying Factors, Severity.)   Luisana Goncalves is a 89 y.o. female who presents to the emergency department with right second and third digit pain.  This has been a reoccurring issue.  Patient was seen for cellulitis concerns several times early 2023 but ultimately it was found that it was gout but not anything infectious and responded well to prednisone.  Patient requesting prednisone to tell me her symptoms feel the same as it was when she found out she had gout.      Nursing Notes were reviewed.    ALLERGIES     Pcn [penicillins] and Avelox [moxifloxacin]    CURRENT MEDICATIONS       Discharge Medication List as of 1/13/2025  3:36 PM        CONTINUE these medications which have NOT CHANGED    Details   furosemide (LASIX) 20 MG tablet Take 1 tablet by mouth dailyHistorical Med      sildenafil (REVATIO) 20 MG tablet Take 1 tablet by mouth dailyHistorical Med      glipiZIDE (GLUCOTROL) 5 MG tablet Take 1 tablet by mouth dailyHistorical Med      diclofenac sodium (VOLTAREN) 1 % GEL Apply 4 g topically 2 times daily, Topical, 2 TIMES DAILY Starting Sat 9/30/2023, Disp-50 g, R-0, Normal      levothyroxine (SYNTHROID) 112 MCG tablet Take 0.5 tablets by mouth Daily, Disp-30 tablet, R-0Normal      aspirin 81 MG chewable tablet Take 1 tablet by mouth daily,

## 2025-01-30 ENCOUNTER — APPOINTMENT (OUTPATIENT)
Dept: GENERAL RADIOLOGY | Age: 89
DRG: 242 | End: 2025-01-30
Payer: MEDICARE

## 2025-01-30 ENCOUNTER — HOSPITAL ENCOUNTER (INPATIENT)
Age: 89
LOS: 4 days | Discharge: HOME HEALTH | DRG: 242 | End: 2025-02-04
Attending: STUDENT IN AN ORGANIZED HEALTH CARE EDUCATION/TRAINING PROGRAM | Admitting: INTERNAL MEDICINE
Payer: MEDICARE

## 2025-01-30 DIAGNOSIS — R07.89 LEFT-SIDED CHEST WALL PAIN: ICD-10-CM

## 2025-01-30 DIAGNOSIS — I49.5 SINUS NODE DYSFUNCTION (HCC): ICD-10-CM

## 2025-01-30 DIAGNOSIS — R55 SYNCOPE, UNSPECIFIED SYNCOPE TYPE: ICD-10-CM

## 2025-01-30 DIAGNOSIS — R00.1 SYMPTOMATIC BRADYCARDIA: Primary | ICD-10-CM

## 2025-01-30 LAB
ALBUMIN SERPL-MCNC: 3.6 G/DL (ref 3.5–5.2)
ALBUMIN/GLOB SERPL: 1.6 {RATIO} (ref 1–2.5)
ALP SERPL-CCNC: 77 U/L (ref 35–104)
ALT SERPL-CCNC: 15 U/L (ref 10–35)
ANION GAP SERPL CALCULATED.3IONS-SCNC: 11 MMOL/L (ref 9–16)
AST SERPL-CCNC: 29 U/L (ref 10–35)
BACTERIA URNS QL MICRO: NORMAL
BASOPHILS # BLD: 0.04 K/UL (ref 0–0.2)
BASOPHILS NFR BLD: 1 % (ref 0–2)
BILIRUB SERPL-MCNC: 0.5 MG/DL (ref 0–1.2)
BILIRUB UR QL STRIP: NEGATIVE
BNP SERPL-MCNC: 6118 PG/ML (ref 0–450)
BUN SERPL-MCNC: 75 MG/DL (ref 8–23)
CALCIUM SERPL-MCNC: 8.6 MG/DL (ref 8.6–10.4)
CASTS #/AREA URNS LPF: NORMAL /LPF (ref 0–8)
CHLORIDE SERPL-SCNC: 99 MMOL/L (ref 98–107)
CLARITY UR: CLEAR
CO2 SERPL-SCNC: 29 MMOL/L (ref 20–31)
COLOR UR: YELLOW
CREAT SERPL-MCNC: 2.1 MG/DL (ref 0.6–0.9)
EOSINOPHIL # BLD: 0.04 K/UL (ref 0–0.44)
EOSINOPHILS RELATIVE PERCENT: 1 % (ref 1–4)
EPI CELLS #/AREA URNS HPF: NORMAL /HPF (ref 0–5)
ERYTHROCYTE [DISTWIDTH] IN BLOOD BY AUTOMATED COUNT: 13.9 % (ref 11.8–14.4)
EST. AVERAGE GLUCOSE BLD GHB EST-MCNC: 169 MG/DL
GFR, ESTIMATED: 21 ML/MIN/1.73M2
GLUCOSE BLD-MCNC: 143 MG/DL (ref 65–105)
GLUCOSE SERPL-MCNC: 128 MG/DL (ref 74–99)
GLUCOSE UR STRIP-MCNC: NEGATIVE MG/DL
HBA1C MFR BLD: 7.5 % (ref 4–6)
HCT VFR BLD AUTO: 39.3 % (ref 36.3–47.1)
HGB BLD-MCNC: 12.8 G/DL (ref 11.9–15.1)
HGB UR QL STRIP.AUTO: ABNORMAL
IMM GRANULOCYTES # BLD AUTO: <0.03 K/UL (ref 0–0.3)
IMM GRANULOCYTES NFR BLD: 0 %
KETONES UR STRIP-MCNC: NEGATIVE MG/DL
LEUKOCYTE ESTERASE UR QL STRIP: NEGATIVE
LYMPHOCYTES NFR BLD: 1.29 K/UL (ref 1.1–3.7)
LYMPHOCYTES RELATIVE PERCENT: 21 % (ref 24–43)
MCH RBC QN AUTO: 31 PG (ref 25.2–33.5)
MCHC RBC AUTO-ENTMCNC: 32.6 G/DL (ref 28.4–34.8)
MCV RBC AUTO: 95.2 FL (ref 82.6–102.9)
MONOCYTES NFR BLD: 0.58 K/UL (ref 0.1–1.2)
MONOCYTES NFR BLD: 10 % (ref 3–12)
NEUTROPHILS NFR BLD: 67 % (ref 36–65)
NEUTS SEG NFR BLD: 4.08 K/UL (ref 1.5–8.1)
NITRITE UR QL STRIP: NEGATIVE
NRBC BLD-RTO: 0 PER 100 WBC
PH UR STRIP: 5.5 [PH] (ref 5–8)
PLATELET # BLD AUTO: 145 K/UL (ref 138–453)
PMV BLD AUTO: 10.4 FL (ref 8.1–13.5)
POTASSIUM SERPL-SCNC: 3.2 MMOL/L (ref 3.7–5.3)
PROT SERPL-MCNC: 5.8 G/DL (ref 6.6–8.7)
PROT UR STRIP-MCNC: NEGATIVE MG/DL
RBC # BLD AUTO: 4.13 M/UL (ref 3.95–5.11)
RBC #/AREA URNS HPF: NORMAL /HPF (ref 0–4)
SODIUM SERPL-SCNC: 139 MMOL/L (ref 136–145)
SP GR UR STRIP: 1.01 (ref 1–1.03)
TROPONIN I SERPL HS-MCNC: 54 NG/L (ref 0–14)
TROPONIN I SERPL HS-MCNC: 55 NG/L (ref 0–14)
TROPONIN I SERPL HS-MCNC: 59 NG/L (ref 0–14)
TROPONIN I SERPL HS-MCNC: 67 NG/L (ref 0–14)
TSH SERPL DL<=0.05 MIU/L-ACNC: 1.46 UIU/ML (ref 0.27–4.2)
UROBILINOGEN UR STRIP-ACNC: NORMAL EU/DL (ref 0–1)
WBC #/AREA URNS HPF: NORMAL /HPF (ref 0–5)
WBC OTHER # BLD: 6 K/UL (ref 3.5–11.3)

## 2025-01-30 PROCEDURE — 71045 X-RAY EXAM CHEST 1 VIEW: CPT

## 2025-01-30 PROCEDURE — 2580000003 HC RX 258: Performed by: NURSE PRACTITIONER

## 2025-01-30 PROCEDURE — 2580000003 HC RX 258: Performed by: STUDENT IN AN ORGANIZED HEALTH CARE EDUCATION/TRAINING PROGRAM

## 2025-01-30 PROCEDURE — 83880 ASSAY OF NATRIURETIC PEPTIDE: CPT

## 2025-01-30 PROCEDURE — 6370000000 HC RX 637 (ALT 250 FOR IP)

## 2025-01-30 PROCEDURE — 99222 1ST HOSP IP/OBS MODERATE 55: CPT | Performed by: NURSE PRACTITIONER

## 2025-01-30 PROCEDURE — 93005 ELECTROCARDIOGRAM TRACING: CPT | Performed by: INTERNAL MEDICINE

## 2025-01-30 PROCEDURE — 99285 EMERGENCY DEPT VISIT HI MDM: CPT

## 2025-01-30 PROCEDURE — 94640 AIRWAY INHALATION TREATMENT: CPT

## 2025-01-30 PROCEDURE — 6360000002 HC RX W HCPCS: Performed by: NURSE PRACTITIONER

## 2025-01-30 PROCEDURE — 2700000000 HC OXYGEN THERAPY PER DAY

## 2025-01-30 PROCEDURE — 6360000002 HC RX W HCPCS

## 2025-01-30 PROCEDURE — 82947 ASSAY GLUCOSE BLOOD QUANT: CPT

## 2025-01-30 PROCEDURE — 96372 THER/PROPH/DIAG INJ SC/IM: CPT

## 2025-01-30 PROCEDURE — 80053 COMPREHEN METABOLIC PANEL: CPT

## 2025-01-30 PROCEDURE — G0378 HOSPITAL OBSERVATION PER HR: HCPCS

## 2025-01-30 PROCEDURE — 96365 THER/PROPH/DIAG IV INF INIT: CPT

## 2025-01-30 PROCEDURE — 6370000000 HC RX 637 (ALT 250 FOR IP): Performed by: NURSE PRACTITIONER

## 2025-01-30 PROCEDURE — 96376 TX/PRO/DX INJ SAME DRUG ADON: CPT

## 2025-01-30 PROCEDURE — 2580000003 HC RX 258

## 2025-01-30 PROCEDURE — 94664 DEMO&/EVAL PT USE INHALER: CPT

## 2025-01-30 PROCEDURE — 96366 THER/PROPH/DIAG IV INF ADDON: CPT

## 2025-01-30 PROCEDURE — 96375 TX/PRO/DX INJ NEW DRUG ADDON: CPT

## 2025-01-30 PROCEDURE — 83036 HEMOGLOBIN GLYCOSYLATED A1C: CPT

## 2025-01-30 PROCEDURE — 85025 COMPLETE CBC W/AUTO DIFF WBC: CPT

## 2025-01-30 PROCEDURE — 84484 ASSAY OF TROPONIN QUANT: CPT

## 2025-01-30 PROCEDURE — 96374 THER/PROPH/DIAG INJ IV PUSH: CPT

## 2025-01-30 PROCEDURE — 2500000003 HC RX 250 WO HCPCS: Performed by: NURSE PRACTITIONER

## 2025-01-30 PROCEDURE — 93005 ELECTROCARDIOGRAM TRACING: CPT

## 2025-01-30 PROCEDURE — 81001 URINALYSIS AUTO W/SCOPE: CPT

## 2025-01-30 PROCEDURE — 94761 N-INVAS EAR/PLS OXIMETRY MLT: CPT

## 2025-01-30 PROCEDURE — 84443 ASSAY THYROID STIM HORMONE: CPT

## 2025-01-30 PROCEDURE — 96367 TX/PROPH/DG ADDL SEQ IV INF: CPT

## 2025-01-30 RX ORDER — ASPIRIN 81 MG/1
324 TABLET, CHEWABLE ORAL ONCE
Status: COMPLETED | OUTPATIENT
Start: 2025-01-30 | End: 2025-01-30

## 2025-01-30 RX ORDER — 0.9 % SODIUM CHLORIDE 0.9 %
500 INTRAVENOUS SOLUTION INTRAVENOUS ONCE
Status: COMPLETED | OUTPATIENT
Start: 2025-01-30 | End: 2025-01-30

## 2025-01-30 RX ORDER — BUDESONIDE AND FORMOTEROL FUMARATE DIHYDRATE 160; 4.5 UG/1; UG/1
2 AEROSOL RESPIRATORY (INHALATION) 2 TIMES DAILY
Status: DISCONTINUED | OUTPATIENT
Start: 2025-01-30 | End: 2025-02-04 | Stop reason: HOSPADM

## 2025-01-30 RX ORDER — ACETAMINOPHEN 650 MG/1
650 SUPPOSITORY RECTAL EVERY 6 HOURS PRN
Status: DISCONTINUED | OUTPATIENT
Start: 2025-01-30 | End: 2025-02-02

## 2025-01-30 RX ORDER — ATROPINE SULFATE 0.1 MG/ML
1 INJECTION INTRAVENOUS ONCE
Status: COMPLETED | OUTPATIENT
Start: 2025-01-30 | End: 2025-01-30

## 2025-01-30 RX ORDER — ASPIRIN 81 MG/1
81 TABLET, CHEWABLE ORAL DAILY
Status: DISCONTINUED | OUTPATIENT
Start: 2025-01-31 | End: 2025-02-04 | Stop reason: HOSPADM

## 2025-01-30 RX ORDER — SODIUM CHLORIDE 0.9 % (FLUSH) 0.9 %
10 SYRINGE (ML) INJECTION PRN
Status: DISCONTINUED | OUTPATIENT
Start: 2025-01-30 | End: 2025-02-04 | Stop reason: HOSPADM

## 2025-01-30 RX ORDER — LEVOTHYROXINE SODIUM 50 UG/1
50 TABLET ORAL DAILY
Status: DISCONTINUED | OUTPATIENT
Start: 2025-01-31 | End: 2025-01-31

## 2025-01-30 RX ORDER — METOLAZONE 5 MG/1
5 TABLET ORAL DAILY
Status: DISCONTINUED | OUTPATIENT
Start: 2025-01-30 | End: 2025-02-04 | Stop reason: HOSPADM

## 2025-01-30 RX ORDER — 0.9 % SODIUM CHLORIDE 0.9 %
250 INTRAVENOUS SOLUTION INTRAVENOUS ONCE
Status: COMPLETED | OUTPATIENT
Start: 2025-01-30 | End: 2025-01-30

## 2025-01-30 RX ORDER — LATANOPROST 50 UG/ML
1 SOLUTION/ DROPS OPHTHALMIC NIGHTLY
Status: DISCONTINUED | OUTPATIENT
Start: 2025-01-30 | End: 2025-02-04 | Stop reason: HOSPADM

## 2025-01-30 RX ORDER — SILDENAFIL CITRATE 20 MG/1
20 TABLET ORAL DAILY
Status: DISCONTINUED | OUTPATIENT
Start: 2025-01-30 | End: 2025-02-04 | Stop reason: HOSPADM

## 2025-01-30 RX ORDER — ONDANSETRON 2 MG/ML
4 INJECTION INTRAMUSCULAR; INTRAVENOUS EVERY 6 HOURS PRN
Status: DISCONTINUED | OUTPATIENT
Start: 2025-01-30 | End: 2025-02-04 | Stop reason: HOSPADM

## 2025-01-30 RX ORDER — MIDODRINE HYDROCHLORIDE 5 MG/1
5 TABLET ORAL ONCE
Status: COMPLETED | OUTPATIENT
Start: 2025-01-30 | End: 2025-01-30

## 2025-01-30 RX ORDER — SODIUM CHLORIDE 0.9 % (FLUSH) 0.9 %
5-40 SYRINGE (ML) INJECTION EVERY 12 HOURS SCHEDULED
Status: DISCONTINUED | OUTPATIENT
Start: 2025-01-30 | End: 2025-02-04 | Stop reason: HOSPADM

## 2025-01-30 RX ORDER — ACETAMINOPHEN 325 MG/1
650 TABLET ORAL EVERY 6 HOURS PRN
Status: DISCONTINUED | OUTPATIENT
Start: 2025-01-30 | End: 2025-02-02

## 2025-01-30 RX ORDER — MAGNESIUM SULFATE 1 G/100ML
1000 INJECTION INTRAVENOUS PRN
Status: DISCONTINUED | OUTPATIENT
Start: 2025-01-30 | End: 2025-01-30

## 2025-01-30 RX ORDER — ONDANSETRON 4 MG/1
4 TABLET, ORALLY DISINTEGRATING ORAL EVERY 8 HOURS PRN
Status: DISCONTINUED | OUTPATIENT
Start: 2025-01-30 | End: 2025-02-04 | Stop reason: HOSPADM

## 2025-01-30 RX ORDER — POTASSIUM CHLORIDE 7.45 MG/ML
10 INJECTION INTRAVENOUS
Status: DISPENSED | OUTPATIENT
Start: 2025-01-30 | End: 2025-01-30

## 2025-01-30 RX ORDER — MAGNESIUM SULFATE IN WATER 40 MG/ML
2000 INJECTION, SOLUTION INTRAVENOUS ONCE
Status: COMPLETED | OUTPATIENT
Start: 2025-01-30 | End: 2025-01-30

## 2025-01-30 RX ORDER — SODIUM CHLORIDE, SODIUM LACTATE, POTASSIUM CHLORIDE, AND CALCIUM CHLORIDE .6; .31; .03; .02 G/100ML; G/100ML; G/100ML; G/100ML
250 INJECTION, SOLUTION INTRAVENOUS ONCE
Status: COMPLETED | OUTPATIENT
Start: 2025-01-31 | End: 2025-01-31

## 2025-01-30 RX ORDER — POTASSIUM CHLORIDE 1500 MG/1
40 TABLET, EXTENDED RELEASE ORAL PRN
Status: DISCONTINUED | OUTPATIENT
Start: 2025-01-30 | End: 2025-01-30

## 2025-01-30 RX ORDER — POTASSIUM CHLORIDE 7.45 MG/ML
10 INJECTION INTRAVENOUS PRN
Status: DISCONTINUED | OUTPATIENT
Start: 2025-01-30 | End: 2025-01-30

## 2025-01-30 RX ORDER — SODIUM CHLORIDE 9 MG/ML
INJECTION, SOLUTION INTRAVENOUS PRN
Status: DISCONTINUED | OUTPATIENT
Start: 2025-01-30 | End: 2025-02-04 | Stop reason: HOSPADM

## 2025-01-30 RX ORDER — 0.9 % SODIUM CHLORIDE 0.9 %
1000 INTRAVENOUS SOLUTION INTRAVENOUS ONCE
Status: COMPLETED | OUTPATIENT
Start: 2025-01-30 | End: 2025-01-30

## 2025-01-30 RX ORDER — GLIPIZIDE 5 MG/1
5 TABLET ORAL DAILY
Status: DISCONTINUED | OUTPATIENT
Start: 2025-01-30 | End: 2025-01-30

## 2025-01-30 RX ORDER — POTASSIUM CHLORIDE 1500 MG/1
40 TABLET, EXTENDED RELEASE ORAL ONCE
Status: COMPLETED | OUTPATIENT
Start: 2025-01-30 | End: 2025-01-30

## 2025-01-30 RX ORDER — POLYETHYLENE GLYCOL 3350 17 G/17G
17 POWDER, FOR SOLUTION ORAL DAILY PRN
Status: DISCONTINUED | OUTPATIENT
Start: 2025-01-30 | End: 2025-02-04 | Stop reason: HOSPADM

## 2025-01-30 RX ORDER — HEPARIN SODIUM 5000 [USP'U]/ML
5000 INJECTION, SOLUTION INTRAVENOUS; SUBCUTANEOUS EVERY 8 HOURS SCHEDULED
Status: COMPLETED | OUTPATIENT
Start: 2025-01-30 | End: 2025-02-02

## 2025-01-30 RX ORDER — FUROSEMIDE 40 MG/1
20 TABLET ORAL DAILY
Status: DISCONTINUED | OUTPATIENT
Start: 2025-01-30 | End: 2025-02-04 | Stop reason: HOSPADM

## 2025-01-30 RX ADMIN — POTASSIUM CHLORIDE 10 MEQ: 7.45 INJECTION INTRAVENOUS at 18:11

## 2025-01-30 RX ADMIN — ATROPINE SULFATE 1 MG: 0.1 INJECTION INTRAVENOUS at 13:37

## 2025-01-30 RX ADMIN — SODIUM CHLORIDE 500 ML: 0.9 INJECTION, SOLUTION INTRAVENOUS at 13:38

## 2025-01-30 RX ADMIN — SODIUM CHLORIDE 250 ML: 9 INJECTION, SOLUTION INTRAVENOUS at 23:07

## 2025-01-30 RX ADMIN — ASPIRIN 324 MG: 81 TABLET, CHEWABLE ORAL at 14:39

## 2025-01-30 RX ADMIN — LATANOPROST 1 DROP: 50 SOLUTION OPHTHALMIC at 23:16

## 2025-01-30 RX ADMIN — SODIUM CHLORIDE 1000 ML: 9 INJECTION, SOLUTION INTRAVENOUS at 14:39

## 2025-01-30 RX ADMIN — ACETAMINOPHEN 650 MG: 325 TABLET ORAL at 21:43

## 2025-01-30 RX ADMIN — POTASSIUM CHLORIDE 40 MEQ: 1500 TABLET, EXTENDED RELEASE ORAL at 14:39

## 2025-01-30 RX ADMIN — HEPARIN SODIUM 5000 UNITS: 5000 INJECTION INTRAVENOUS; SUBCUTANEOUS at 23:17

## 2025-01-30 RX ADMIN — POTASSIUM CHLORIDE 10 MEQ: 7.45 INJECTION INTRAVENOUS at 15:03

## 2025-01-30 RX ADMIN — SODIUM CHLORIDE, PRESERVATIVE FREE 10 ML: 5 INJECTION INTRAVENOUS at 23:09

## 2025-01-30 RX ADMIN — GLIPIZIDE 5 MG: 5 TABLET ORAL at 21:44

## 2025-01-30 RX ADMIN — MAGNESIUM SULFATE HEPTAHYDRATE 2000 MG: 40 INJECTION, SOLUTION INTRAVENOUS at 14:39

## 2025-01-30 RX ADMIN — POTASSIUM CHLORIDE 10 MEQ: 7.45 INJECTION INTRAVENOUS at 16:08

## 2025-01-30 RX ADMIN — BUDESONIDE AND FORMOTEROL FUMARATE DIHYDRATE 2 PUFF: 160; 4.5 AEROSOL RESPIRATORY (INHALATION) at 21:11

## 2025-01-30 RX ADMIN — MIDODRINE HYDROCHLORIDE 5 MG: 5 TABLET ORAL at 23:16

## 2025-01-30 RX ADMIN — SODIUM CHLORIDE, POTASSIUM CHLORIDE, SODIUM LACTATE AND CALCIUM CHLORIDE 250 ML: 600; 310; 30; 20 INJECTION, SOLUTION INTRAVENOUS at 23:51

## 2025-01-30 ASSESSMENT — PAIN SCALES - GENERAL
PAINLEVEL_OUTOF10: 0
PAINLEVEL_OUTOF10: 0
PAINLEVEL_OUTOF10: 9

## 2025-01-30 ASSESSMENT — PAIN DESCRIPTION - ORIENTATION: ORIENTATION: RIGHT

## 2025-01-30 ASSESSMENT — LIFESTYLE VARIABLES
HOW MANY STANDARD DRINKS CONTAINING ALCOHOL DO YOU HAVE ON A TYPICAL DAY: PATIENT DECLINED
HOW OFTEN DO YOU HAVE A DRINK CONTAINING ALCOHOL: PATIENT DECLINED

## 2025-01-30 ASSESSMENT — PAIN - FUNCTIONAL ASSESSMENT: PAIN_FUNCTIONAL_ASSESSMENT: 0-10

## 2025-01-30 ASSESSMENT — PAIN DESCRIPTION - LOCATION: LOCATION: RIB CAGE

## 2025-01-30 NOTE — ED PROVIDER NOTES
[facility]     Emergency Department     Faculty Attestation    I performed a history and physical examination of the patient and discussed management with the resident. I have reviewed and agree with the resident’s findings including all diagnostic interpretations, and treatment plans as written at the time of my review. Any areas of disagreement are noted on the chart. I was personally present for the key portions of any procedures. I have documented in the chart those procedures where I was not present during the key portions. For Physician Assistant/ Nurse Practitioner cases/documentation I have personally evaluated this patient and have completed at least one if not all key elements of the E/M (history, physical exam, and MDM). Additional findings are as noted.    PtName: Luisana Goncalves  MRN: [id number]  Birthdate 7/6/1929  Date of evaluation: 1/30/25  Note Started: 1:24 PM EST    Primary Care Physician: Nic Salazar MD    Brief HPI:  89-year-old female presents emergency department with syncopal episode.  According to the family member at bedside the patient was talking on the phone today then had sudden loss of consciousness.  The patient was unresponsive, no chest compressions were initiated, however the family member called 911.  According to EMS the patient was noted to be hypotensive and bradycardic upon their arrival.  Upon arrival to this emergency department the patient reports she feels very lightheaded and near syncopal.  She denies any chest pain or abdominal pain.    Pertinent Physical Exam Findings:  Vitals:    01/30/25 1357   BP: 104/69   Pulse: 72   Resp: 18   Temp:    SpO2: 100%   Appears ill, frail, bradycardic rate, regular rhythm, lungs are clear to auscultation.    Medical Decision Making: Patient is a 89 y.o. female presenting to the emergency department with syncope. The chart was reviewed for pertinent history relating to the 
[facility]  FACULTY HANDOFF     5:09 PM EST  Handoff taken on the following patient from prior Attending Physician:  Pt Name: Luisana Goncalves  PCP:  Nic Salazar MD    Attestation  I was available and discussed any additional care issues that arose and coordinated the management plans with the resident(s) caring for the patient during my duty period. Any areas of disagreement with resident's documentation of care or procedures are noted on the chart. I was personally present for the key portions of any/all procedures during my duty period. I have documented in the chart those procedures where I was not present during the key portions.         CHIEF COMPLAINT       Chief Complaint   Patient presents with    Dizziness         CURRENT MEDICATIONS     Previous Medications  Previous Medications    ASPIRIN 81 MG CHEWABLE TABLET    Take 1 tablet by mouth daily    BUDESONIDE-FORMOTEROL (SYMBICORT) 160-4.5 MCG/ACT AERO    Inhale 2 puffs into the lungs 2 times daily    DICLOFENAC SODIUM (VOLTAREN) 1 % GEL    Apply 4 g topically 2 times daily    FUROSEMIDE (LASIX) 20 MG TABLET    Take 1 tablet by mouth daily    GLIPIZIDE (GLUCOTROL) 5 MG TABLET    Take 1 tablet by mouth daily    LEVOTHYROXINE (SYNTHROID) 112 MCG TABLET    Take 0.5 tablets by mouth Daily    METOLAZONE (ZAROXOLYN) 5 MG TABLET    Take 1 tablet by mouth daily    SILDENAFIL (REVATIO) 20 MG TABLET    Take 1 tablet by mouth daily    TRAVOPROST, BENZALKONIUM, (TRAVATAN) 0.004 % OPHTHALMIC SOLUTION    Place 1 drop into both eyes daily       Encounter Medications  Orders Placed This Encounter   Medications    atropine injection 1 mg    sodium chloride 0.9 % bolus 500 mL    aspirin chewable tablet 324 mg    potassium chloride (KLOR-CON M) extended release tablet 40 mEq    potassium chloride 10 mEq/100 mL IVPB (Peripheral Line)    sodium chloride 0.9 % bolus 1,000 mL    magnesium sulfate 2000 mg in 50 mL IVPB premix       ALLERGIES     is 
daily 9/30/23   Rayne Rod MD   glipiZIDE (GLUCOTROL) 5 MG tablet Take 1 tablet by mouth daily 9/30/23   Rayne Rod MD   diclofenac sodium (VOLTAREN) 1 % GEL Apply 4 g topically 2 times daily 9/30/23   Rayne Rod MD   levothyroxine (SYNTHROID) 112 MCG tablet Take 0.5 tablets by mouth Daily 9/30/23   Rayne Rod MD   aspirin 81 MG chewable tablet Take 1 tablet by mouth daily 2/16/21   Melissa Crenshaw APRN - NP   budesonide-formoterol (SYMBICORT) 160-4.5 MCG/ACT AERO Inhale 2 puffs into the lungs 2 times daily 2/16/21   Melissa Crenshaw APRN - NP   metOLazone (ZAROXOLYN) 5 MG tablet Take 1 tablet by mouth daily 2/17/21 3/19/21  Melissa Crenshaw APRN - NP   travoprost, benzalkonium, (TRAVATAN) 0.004 % ophthalmic solution Place 1 drop into both eyes daily 2/16/21   Melissa Crenshaw APRN - NP         REVIEW OF SYSTEMS       Review of Systems   Constitutional:  Negative for chills and fever.   Respiratory:  Negative for cough and shortness of breath.    Cardiovascular:  Negative for chest pain and leg swelling.   Gastrointestinal:  Positive for vomiting. Negative for abdominal pain, diarrhea and nausea.   Genitourinary:  Negative for dysuria and frequency.   Musculoskeletal:  Negative for back pain and neck pain.   Skin:  Negative for rash.   Neurological:  Positive for dizziness, syncope and light-headedness. Negative for numbness and headaches.   All other systems reviewed and are negative.      PHYSICAL EXAM      INITIAL VITALS:   /68   Pulse 59   Temp 98.8 °F (37.1 °C)   Resp 13   LMP  (LMP Unknown)   SpO2 91%     Physical Exam  Vitals and nursing note reviewed.   Constitutional:       General: She is not in acute distress.     Appearance: She is well-developed. She is not diaphoretic.   HENT:      Head: Normocephalic and atraumatic.      Nose: Nose normal.   Eyes:      Pupils: Pupils are equal, round, and reactive to light.   Cardiovascular:      Rate and Rhythm: Bradycardia present. Rhythm

## 2025-01-31 ENCOUNTER — APPOINTMENT (OUTPATIENT)
Age: 89
DRG: 242 | End: 2025-01-31
Payer: MEDICARE

## 2025-01-31 LAB
ANION GAP SERPL CALCULATED.3IONS-SCNC: 11 MMOL/L (ref 9–16)
BUN SERPL-MCNC: 67 MG/DL (ref 8–23)
CALCIUM SERPL-MCNC: 8.4 MG/DL (ref 8.6–10.4)
CHLORIDE SERPL-SCNC: 106 MMOL/L (ref 98–107)
CO2 SERPL-SCNC: 24 MMOL/L (ref 20–31)
CREAT SERPL-MCNC: 1.7 MG/DL (ref 0.6–0.9)
ECHO AO ROOT DIAM: 2.7 CM
ECHO AO ROOT INDEX: 1.6 CM/M2
ECHO AV AREA PEAK VELOCITY: 1.8 CM2
ECHO AV AREA VTI: 1.6 CM2
ECHO AV AREA/BSA PEAK VELOCITY: 1.1 CM2/M2
ECHO AV AREA/BSA VTI: 0.9 CM2/M2
ECHO AV MEAN GRADIENT: 4 MMHG
ECHO AV MEAN VELOCITY: 0.9 M/S
ECHO AV PEAK GRADIENT: 9 MMHG
ECHO AV PEAK VELOCITY: 1.5 M/S
ECHO AV VELOCITY RATIO: 0.8
ECHO AV VTI: 27.9 CM
ECHO BSA: 1.68 M2
ECHO EST RA PRESSURE: 8 MMHG
ECHO LA AREA 2C: 16.9 CM2
ECHO LA AREA 4C: 19.2 CM2
ECHO LA DIAMETER INDEX: 1.95 CM/M2
ECHO LA DIAMETER: 3.3 CM
ECHO LA MAJOR AXIS: 5.7 CM
ECHO LA MINOR AXIS: 5.9 CM
ECHO LA TO AORTIC ROOT RATIO: 1.22
ECHO LA VOL BP: 43 ML (ref 22–52)
ECHO LA VOL MOD A2C: 37 ML (ref 22–52)
ECHO LA VOL MOD A4C: 51 ML (ref 22–52)
ECHO LA VOL/BSA BIPLANE: 25 ML/M2 (ref 16–34)
ECHO LA VOLUME INDEX MOD A2C: 22 ML/M2 (ref 16–34)
ECHO LA VOLUME INDEX MOD A4C: 30 ML/M2 (ref 16–34)
ECHO LV E' LATERAL VELOCITY: 3.48 CM/S
ECHO LV E' SEPTAL VELOCITY: 3.59 CM/S
ECHO LV EDV A2C: 35 ML
ECHO LV EDV A4C: 48 ML
ECHO LV EDV INDEX A4C: 28 ML/M2
ECHO LV EDV NDEX A2C: 21 ML/M2
ECHO LV EJECTION FRACTION A2C: 48 %
ECHO LV EJECTION FRACTION A4C: 64 %
ECHO LV EJECTION FRACTION BIPLANE: 58 % (ref 55–100)
ECHO LV ESV A2C: 18 ML
ECHO LV ESV A4C: 17 ML
ECHO LV ESV INDEX A2C: 11 ML/M2
ECHO LV ESV INDEX A4C: 10 ML/M2
ECHO LV FRACTIONAL SHORTENING: 28 % (ref 28–44)
ECHO LV INTERNAL DIMENSION DIASTOLE INDEX: 2.13 CM/M2
ECHO LV INTERNAL DIMENSION DIASTOLIC: 3.6 CM (ref 3.9–5.3)
ECHO LV INTERNAL DIMENSION SYSTOLIC INDEX: 1.54 CM/M2
ECHO LV INTERNAL DIMENSION SYSTOLIC: 2.6 CM
ECHO LV IVSD: 1 CM (ref 0.6–0.9)
ECHO LV MASS 2D: 107.9 G (ref 67–162)
ECHO LV MASS INDEX 2D: 63.8 G/M2 (ref 43–95)
ECHO LV POSTERIOR WALL DIASTOLIC: 1 CM (ref 0.6–0.9)
ECHO LV RELATIVE WALL THICKNESS RATIO: 0.56
ECHO LVOT AREA: 2.3 CM2
ECHO LVOT AV VTI INDEX: 0.7
ECHO LVOT DIAM: 1.7 CM
ECHO LVOT MEAN GRADIENT: 2 MMHG
ECHO LVOT PEAK GRADIENT: 6 MMHG
ECHO LVOT PEAK VELOCITY: 1.2 M/S
ECHO LVOT STROKE VOLUME INDEX: 26.2 ML/M2
ECHO LVOT SV: 44.2 ML
ECHO LVOT VTI: 19.5 CM
ECHO MV A VELOCITY: 1.23 M/S
ECHO MV AREA VTI: 1.4 CM2
ECHO MV E DECELERATION TIME (DT): 359 MS
ECHO MV E VELOCITY: 0.57 M/S
ECHO MV E/A RATIO: 0.46
ECHO MV E/E' LATERAL: 16.38
ECHO MV E/E' RATIO (AVERAGED): 16.13
ECHO MV E/E' SEPTAL: 15.88
ECHO MV LVOT VTI INDEX: 1.62
ECHO MV MAX VELOCITY: 1.3 M/S
ECHO MV MEAN GRADIENT: 1 MMHG
ECHO MV MEAN VELOCITY: 0.5 M/S
ECHO MV PEAK GRADIENT: 7 MMHG
ECHO MV VTI: 31.5 CM
ECHO PV MAX VELOCITY: 1 M/S
ECHO PV PEAK GRADIENT: 4 MMHG
ECHO RIGHT VENTRICULAR SYSTOLIC PRESSURE (RVSP): 92 MMHG
ECHO RV BASAL DIMENSION: 6.8 CM
ECHO RV FREE WALL PEAK S': 13.5 CM/S
ECHO RV TAPSE: 2 CM (ref 1.7–?)
ECHO TV REGURGITANT MAX VELOCITY: 4.57 M/S
ECHO TV REGURGITANT PEAK GRADIENT: 84 MMHG
EKG ATRIAL RATE: 48 BPM
EKG ATRIAL RATE: 56 BPM
EKG ATRIAL RATE: 76 BPM
EKG P AXIS: 77 DEGREES
EKG P AXIS: 78 DEGREES
EKG P AXIS: 79 DEGREES
EKG P-R INTERVAL: 142 MS
EKG P-R INTERVAL: 148 MS
EKG P-R INTERVAL: 150 MS
EKG Q-T INTERVAL: 492 MS
EKG Q-T INTERVAL: 500 MS
EKG Q-T INTERVAL: 538 MS
EKG QRS DURATION: 172 MS
EKG QRS DURATION: 182 MS
EKG QRS DURATION: 186 MS
EKG QTC CALCULATION (BAZETT): 439 MS
EKG QTC CALCULATION (BAZETT): 519 MS
EKG QTC CALCULATION (BAZETT): 562 MS
EKG R AXIS: -176 DEGREES
EKG R AXIS: 133 DEGREES
EKG R AXIS: 157 DEGREES
EKG T AXIS: -38 DEGREES
EKG T AXIS: -52 DEGREES
EKG T AXIS: 16 DEGREES
EKG VENTRICULAR RATE: 48 BPM
EKG VENTRICULAR RATE: 56 BPM
EKG VENTRICULAR RATE: 76 BPM
ERYTHROCYTE [DISTWIDTH] IN BLOOD BY AUTOMATED COUNT: 14 % (ref 11.8–14.4)
GFR, ESTIMATED: 27 ML/MIN/1.73M2
GLUCOSE SERPL-MCNC: 187 MG/DL (ref 74–99)
HCT VFR BLD AUTO: 38.9 % (ref 36.3–47.1)
HGB BLD-MCNC: 12.1 G/DL (ref 11.9–15.1)
INR PPP: 1.1
LACTIC ACID, WHOLE BLOOD: 2.9 MMOL/L (ref 0.7–2.1)
MCH RBC QN AUTO: 30.5 PG (ref 25.2–33.5)
MCHC RBC AUTO-ENTMCNC: 31.1 G/DL (ref 28.4–34.8)
MCV RBC AUTO: 98 FL (ref 82.6–102.9)
MRSA, DNA, NASAL: NEGATIVE
NRBC BLD-RTO: 0 PER 100 WBC
PLATELET # BLD AUTO: 145 K/UL (ref 138–453)
PMV BLD AUTO: 10.1 FL (ref 8.1–13.5)
POTASSIUM SERPL-SCNC: 4.4 MMOL/L (ref 3.7–5.3)
PROTHROMBIN TIME: 13.9 SEC (ref 11.7–14.9)
RBC # BLD AUTO: 3.97 M/UL (ref 3.95–5.11)
SODIUM SERPL-SCNC: 141 MMOL/L (ref 136–145)
SPECIMEN DESCRIPTION: NORMAL
WBC OTHER # BLD: 7.6 K/UL (ref 3.5–11.3)

## 2025-01-31 PROCEDURE — 87641 MR-STAPH DNA AMP PROBE: CPT

## 2025-01-31 PROCEDURE — 2500000003 HC RX 250 WO HCPCS

## 2025-01-31 PROCEDURE — 94761 N-INVAS EAR/PLS OXIMETRY MLT: CPT

## 2025-01-31 PROCEDURE — 6370000000 HC RX 637 (ALT 250 FOR IP)

## 2025-01-31 PROCEDURE — 93005 ELECTROCARDIOGRAM TRACING: CPT | Performed by: INTERNAL MEDICINE

## 2025-01-31 PROCEDURE — 85027 COMPLETE CBC AUTOMATED: CPT

## 2025-01-31 PROCEDURE — 83605 ASSAY OF LACTIC ACID: CPT

## 2025-01-31 PROCEDURE — 80048 BASIC METABOLIC PNL TOTAL CA: CPT

## 2025-01-31 PROCEDURE — 94640 AIRWAY INHALATION TREATMENT: CPT

## 2025-01-31 PROCEDURE — 6370000000 HC RX 637 (ALT 250 FOR IP): Performed by: NURSE PRACTITIONER

## 2025-01-31 PROCEDURE — 2500000003 HC RX 250 WO HCPCS: Performed by: NURSE PRACTITIONER

## 2025-01-31 PROCEDURE — 93306 TTE W/DOPPLER COMPLETE: CPT | Performed by: INTERNAL MEDICINE

## 2025-01-31 PROCEDURE — 93306 TTE W/DOPPLER COMPLETE: CPT

## 2025-01-31 PROCEDURE — 36415 COLL VENOUS BLD VENIPUNCTURE: CPT

## 2025-01-31 PROCEDURE — 6360000002 HC RX W HCPCS: Performed by: NURSE PRACTITIONER

## 2025-01-31 PROCEDURE — 97530 THERAPEUTIC ACTIVITIES: CPT

## 2025-01-31 PROCEDURE — 85610 PROTHROMBIN TIME: CPT

## 2025-01-31 PROCEDURE — 2700000000 HC OXYGEN THERAPY PER DAY

## 2025-01-31 PROCEDURE — 2000000000 HC ICU R&B

## 2025-01-31 PROCEDURE — 99223 1ST HOSP IP/OBS HIGH 75: CPT | Performed by: INTERNAL MEDICINE

## 2025-01-31 PROCEDURE — 97535 SELF CARE MNGMENT TRAINING: CPT

## 2025-01-31 PROCEDURE — 99291 CRITICAL CARE FIRST HOUR: CPT | Performed by: INTERNAL MEDICINE

## 2025-01-31 PROCEDURE — 97166 OT EVAL MOD COMPLEX 45 MIN: CPT

## 2025-01-31 RX ORDER — LIDOCAINE 4 G/G
1 PATCH TOPICAL DAILY
Status: DISCONTINUED | OUTPATIENT
Start: 2025-01-31 | End: 2025-02-04 | Stop reason: HOSPADM

## 2025-01-31 RX ORDER — ATROPINE SULFATE 0.1 MG/ML
1 INJECTION INTRAVENOUS EVERY 5 MIN PRN
Status: DISCONTINUED | OUTPATIENT
Start: 2025-01-31 | End: 2025-02-04 | Stop reason: HOSPADM

## 2025-01-31 RX ORDER — NOREPINEPHRINE BITARTRATE 0.06 MG/ML
1-100 INJECTION, SOLUTION INTRAVENOUS CONTINUOUS
Status: DISCONTINUED | OUTPATIENT
Start: 2025-01-31 | End: 2025-02-01

## 2025-01-31 RX ORDER — NOREPINEPHRINE BITARTRATE 0.06 MG/ML
1-100 INJECTION, SOLUTION INTRAVENOUS CONTINUOUS
Status: DISCONTINUED | OUTPATIENT
Start: 2025-01-31 | End: 2025-01-31

## 2025-01-31 RX ORDER — LEVOTHYROXINE SODIUM 125 UG/1
125 TABLET ORAL DAILY
Status: DISCONTINUED | OUTPATIENT
Start: 2025-02-01 | End: 2025-02-04 | Stop reason: HOSPADM

## 2025-01-31 RX ORDER — MIDODRINE HYDROCHLORIDE 5 MG/1
5 TABLET ORAL ONCE
Status: COMPLETED | OUTPATIENT
Start: 2025-01-31 | End: 2025-01-31

## 2025-01-31 RX ORDER — MIDODRINE HYDROCHLORIDE 5 MG/1
10 TABLET ORAL
Status: DISCONTINUED | OUTPATIENT
Start: 2025-01-31 | End: 2025-02-04 | Stop reason: HOSPADM

## 2025-01-31 RX ADMIN — MIDODRINE HYDROCHLORIDE 5 MG: 5 TABLET ORAL at 00:49

## 2025-01-31 RX ADMIN — MIDODRINE HYDROCHLORIDE 10 MG: 5 TABLET ORAL at 17:04

## 2025-01-31 RX ADMIN — ACETAMINOPHEN 650 MG: 325 TABLET ORAL at 13:24

## 2025-01-31 RX ADMIN — LEVOTHYROXINE SODIUM 50 MCG: 0.05 TABLET ORAL at 06:18

## 2025-01-31 RX ADMIN — ACETAMINOPHEN 650 MG: 325 TABLET ORAL at 19:53

## 2025-01-31 RX ADMIN — LATANOPROST 1 DROP: 50 SOLUTION OPHTHALMIC at 22:29

## 2025-01-31 RX ADMIN — HEPARIN SODIUM 5000 UNITS: 5000 INJECTION INTRAVENOUS; SUBCUTANEOUS at 13:56

## 2025-01-31 RX ADMIN — ASPIRIN 81 MG: 81 TABLET, CHEWABLE ORAL at 10:38

## 2025-01-31 RX ADMIN — HEPARIN SODIUM 5000 UNITS: 5000 INJECTION INTRAVENOUS; SUBCUTANEOUS at 06:18

## 2025-01-31 RX ADMIN — BUDESONIDE AND FORMOTEROL FUMARATE DIHYDRATE 2 PUFF: 160; 4.5 AEROSOL RESPIRATORY (INHALATION) at 07:43

## 2025-01-31 RX ADMIN — Medication 5 MCG/MIN: at 04:58

## 2025-01-31 RX ADMIN — MIDODRINE HYDROCHLORIDE 10 MG: 5 TABLET ORAL at 11:37

## 2025-01-31 RX ADMIN — HEPARIN SODIUM 5000 UNITS: 5000 INJECTION INTRAVENOUS; SUBCUTANEOUS at 22:28

## 2025-01-31 RX ADMIN — BUDESONIDE AND FORMOTEROL FUMARATE DIHYDRATE 2 PUFF: 160; 4.5 AEROSOL RESPIRATORY (INHALATION) at 20:05

## 2025-01-31 RX ADMIN — SODIUM CHLORIDE, PRESERVATIVE FREE 10 ML: 5 INJECTION INTRAVENOUS at 19:57

## 2025-01-31 RX ADMIN — SODIUM CHLORIDE, PRESERVATIVE FREE 10 ML: 5 INJECTION INTRAVENOUS at 08:56

## 2025-01-31 ASSESSMENT — PAIN DESCRIPTION - LOCATION
LOCATION: RIB CAGE

## 2025-01-31 ASSESSMENT — PAIN DESCRIPTION - PAIN TYPE: TYPE: ACUTE PAIN

## 2025-01-31 ASSESSMENT — PAIN SCALES - GENERAL
PAINLEVEL_OUTOF10: 2
PAINLEVEL_OUTOF10: 2
PAINLEVEL_OUTOF10: 5
PAINLEVEL_OUTOF10: 2
PAINLEVEL_OUTOF10: 3
PAINLEVEL_OUTOF10: 2
PAINLEVEL_OUTOF10: 3

## 2025-01-31 ASSESSMENT — PAIN DESCRIPTION - ORIENTATION
ORIENTATION: RIGHT
ORIENTATION: RIGHT;UPPER;LOWER
ORIENTATION: RIGHT;LOWER;UPPER
ORIENTATION: RIGHT
ORIENTATION: RIGHT

## 2025-01-31 ASSESSMENT — PAIN DESCRIPTION - DESCRIPTORS
DESCRIPTORS: ACHING;SORE
DESCRIPTORS: ACHING;STABBING;TENDER
DESCRIPTORS: SORE
DESCRIPTORS: STABBING;TENDER
DESCRIPTORS: ACHING;STABBING;TENDER

## 2025-01-31 NOTE — SIGNIFICANT EVENT
Overnight patient became hypotensive with a very low MAP in the low 60s--55.  Evaluated patient at bedside she was fully awake alert and oriented x 3.  Denies any dizziness or lightheadedness.  Patient was given 250 cc bolus and also midodrine.  Patient responded for some time but then again became hypotensive again.  ICU was called.  Due to low MAP and patient cannot receive any further fluid due to elevated proBNP and right ventricular dysfunction.  Transferring patient to ICU for further care and pressor support.

## 2025-01-31 NOTE — ED NOTES
Blood sugar of 143mg/dl  
CC:  Elizabeth Palumbo is here today for follow up on Diarrhea .    Medications: medications verified and updated  Refills needed today?  NO  denies known Latex allergy or symptoms of Latex sensitivity.  Patient would like communication of their results via:    Cell Phone:   Telephone Information:   Mobile [phone]     Okay to leave a message containing results? Yes  
Crash cart in room  
Patient fell inside the bathroom earlier with the daughter also with her. States that she tripped and landed on her right rib. Denies hitting head. Patient complaint on that area. Tylenol PO was given. No bruising on the area per assessment. Verenice DUTTA was inform.  
Pt arrived via m6 for c/o of dizziness  Pt states she was in chair when her eyes rolled backwards and she nearly passed out  Witness by daughter  Pt   
Pt connected to [employer]  Dr mc present  Atropine given  
dopamine overnight. Hold off on heparin for now.  [KM]   1545 Troponin(!!):    Troponin, High Sensitivity 55(!!) [KM]   1545 TSH reflex to FT4:    TSH, 3rd Generation 1.46 [KM]      ED Course User Index  [KM] Ever Mckeon MD          Skin Assessment:        Pain Score:  Pain Assessment  Pain Assessment: 0-10  Pain Level: 0      SOCIAL HISTORY       Social History     Socioeconomic History    Marital status:    Tobacco Use    Smoking status: Never    Smokeless tobacco: Never   Substance and Sexual Activity    Alcohol use: Never    Drug use: Never    Sexual activity: Never     Social Determinants of Health     Food Insecurity: No Food Insecurity (3/16/2023)    [facility] [employer] [employer]    Hunger Screening     Within the past 12 months we worried whether our food would run out before we got money to buy more.: Never True     Within the past 12 months the food we bought just didn't last and we didn't have money to get more.: Never True    Received from [facility]    [place] Safety & Environment   Housing Stability: Low Risk  (3/16/2023)    Received from [employer] [employer]    Housing Instability     Are you worried or concerned that in the next two months you may not have stable housing that you own, rent or stay in as a part of a household?: No       FAMILY HISTORY       Family History   Problem Relation Age of Onset    No Known Problems Mother     No Known Problems Father        ALLERGIES     Pcn [penicillins] and Avelox [moxifloxacin]    CURRENT MEDICATIONS       Previous Medications    ASPIRIN 81 MG CHEWABLE TABLET    Take 1 tablet by mouth daily    BUDESONIDE-FORMOTEROL (SYMBICORT) 160-4.5 MCG/ACT AERO    Inhale 2 puffs into the lungs 2 times daily    DICLOFENAC SODIUM (VOLTAREN) 1 % GEL    Apply 4 g topically 2 times daily    FUROSEMIDE (LASIX) 20 MG TABLET    Take 1 tablet by mouth daily    GLIPIZIDE (GLUCOTROL) 5 MG

## 2025-01-31 NOTE — H&P
Critical Care - History and Physical Examination    Patient's name:  Luisana Goncalves  Medical Record Number: [id number]  Patient's account/billing number: [id number]  Patient's YOB: 1929  Age: 89 y.o.  Date of Admission: 1/30/2025  1:14 PM  Reason of ICU admission:   Date of History and Physical Examination: 1/31/2025      Primary Care Physician: Nic Salazar MD  Attending Physician:    Code Status: Full Code    Chief complaint: Dizziness    Reason for ICU admission: Hypotension      History Of Present Illness:   History was obtained from chart review and the patient.      Luisana Goncalves is a 89 y.o. who presented to the emergency department via EMS for syncopal event at home.  Patient states she felt very dizzy at home today and then passed out.  When EMS arrived she was bradycardic to 47.  In the emergency department she received 1 mg of atropine with improvement in her heart rate.  She also received fluid resuscitation due to acute kidney injury.  Patient was admitted to [facility] for cardiology evaluation and symptomatic bradycardia.  During her stay under [facility] she had episodes of hypotension that did not respond well to midodrine or fluids.  Due to this she requires ICU admission.  Patient's past medical history of severe right ventricular heart failure, CKD, CAD, COPD on oxygen at home, diabetes.  Upon my evaluation on the floor patient is awake, alert, does not have any complaints, states she feels much better and is not dizzy however her map is approximately 55.          Past Medical History:        Diagnosis Date    Arthritis     Breast cancer ([facility])     CAD (coronary artery disease)     CHF (congestive heart failure) ([facility])     CKD (chronic kidney disease) stage 3, GFR 30-59 ml/min ([facility]) 12/2/2019    COPD (chronic obstructive pulmonary disease) ([facility])     Gastroesophageal reflux disease 8/1/2020    Hyperlipidemia     Hypertension     Recurrent major depression in remission 
pain, palpitations  GASTROINTESTINAL:  negative for nausea, vomiting, diarrhea, constipation, change in bowel habits, abdominal pain   GENITOURINARY:  negative for difficulty of urination, burning with urination, +frequency   INTEGUMENT:  negative for rash, skin lesions, easy bruising   HEMATOLOGIC/LYMPHATIC:  negative for swelling/edema   ALLERGIC/IMMUNOLOGIC:  negative for urticaria , itching  ENDOCRINE:  negative increase in drinking, increase in urination, hot or cold intolerance  MUSCULOSKELETAL:  negative joint pains, muscle aches, swelling of joints  NEUROLOGICAL:  negative for headaches, dizziness, lightheadedness, numbness, pain, tingling extremities  BEHAVIOR/PSYCH:  negative for depression, anxiety    Physical Exam:   /68   Pulse 59   Temp 98.8 °F (37.1 °C)   Resp 13   LMP  (LMP Unknown)   SpO2 91%   Temp (24hrs), Av.8 °F (37.1 °C), Min:98.8 °F (37.1 °C), Max:98.8 °F (37.1 °C)    No results for input(s): \"POCGLU\" in the last 72 hours.  No intake or output data in the 24 hours ending 25 1656    General Appearance: alert, well appearing, and in no acute distress  Mental status: oriented to person, place, and time  Head: normocephalic, atraumatic  Eye: no icterus, redness, pupils equal and reactive, extraocular eye movements intact, conjunctiva clear  Ear: normal external ear, no discharge, hearing intact  Nose: no drainage noted  Mouth: mucous membranes moist  Neck: supple, no carotid bruits, thyroid not palpable  Lungs: Bilateral equal air entry, clear to ausculation, no wheezing, rales or rhonchi, normal effort  Cardiovascular: normal rate, regular rhythm, no murmur, gallop, rub  Abdomen: Soft, nontender, nondistended, normal bowel sounds, no hepatomegaly or splenomegaly  Neurologic: There are no new focal motor or sensory deficits, normal muscle tone and bulk, no abnormal sensation, normal speech, cranial nerves II through XII grossly intact  Skin: No gross lesions, rashes, bruising

## 2025-01-31 NOTE — CARE COORDINATION
I was asked to evaluate this patient from EP standpoint to consult with Dr. Boyce for recommendations regarding permanent pacemaker placement.  I reviewed the chart but then realized that the primary team has contacted patient's primary cardiologist on patient/family's request who has arranged electrophysiology consult for the patient with his partner (Dr. Horan). Considering that there is already a plan for patient to be seen by another electrophysiologist, we will not consult on this patient.

## 2025-01-31 NOTE — PROGRESS NOTES
Writer refer patient to NP Waterhouse, Shirley because of blood pressure 71/42 (50) multiple takings, HR 70 new order placed and given.     Bolus of  given and midodrine 5mg given.     Seen by Dr. Quintero and said she will order another 250 after the bolus and to inform if the BP drops again.    @0019 Writer informed NP waterhouse, shirley, latest BP 85/51(61) , LR still ongoing.     @0030 Blood pressure drops more 76/43 (54) then rechecked 75/45 (55) , writer informed NP again.      0042 Critical care team came to bedside.     Another dose of midodrine ordered. To inform them again if blood pressure MAP decrease to 65.     0123 Blood pressure still drops 89/49(60) , 86/50 (61), writer informed critical care team and on call NP Kavita Kramer.     Crit care ordered admission in ICU.

## 2025-01-31 NOTE — PROGRESS NOTES
PHARMACY NOTE:    The electrolyte replacement protocol for potassium/magnesium has been discontinued per P&T guidelines because the patient has reduced renal function (CrCl < 30 mL/min).      The patient's most recent potassium & magnesium levels are:  Recent Labs     01/30/25  1343   K 3.2*     CrCl cannot be calculated (Unknown ideal weight.).    For patients with decreased renal function (below 30ml/min) needing potassium/magnesium supplementation, please order individual bolus doses with appropriate monitoring.      Please contact the inpatient pharmacy with any concerns.  Thank you.

## 2025-01-31 NOTE — PROGRESS NOTES
Nutrition Assessment     Type and Reason for Visit: Initial, Positive nutrition screen    Nutrition Recommendations/Plan:   Continue current diet with Glucerna ONS x 2 per day.  Encourage/monitor intakes as tolerated.  Monitor plan of care.     Malnutrition Assessment:  Malnutrition Status: No malnutrition    Nutrition Assessment:  Pt admitted for syncopal event.  PMH: CAD, CHF, CJD, COPD, DM, HTN, h/o breast cancer, thyroid diease.  Reports of weight loss and poor intakes/appetite.  Pt with a good appetite.  Ate nearly 100% of breakfast today.  Pt reports not drinking enough fluids recently.  H/o weight loss per chart review but stable weight x past month noted.  Labs reviewed: Glucose 187 mg/dL, BUN 67 mg/dL.  Meds reviewed.    Estimated Daily Nutrient Needs:  Energy (kcal):  2792-4343 kcals/day Weight Used for Energy Requirements: Admission     Protein (g):  60-80 gm pro/day Weight Used for Protein Requirements: Admission        Fluid (ml/day):  per MD Method Used for Fluid Requirements: Defer to provider    Nutrition Related Findings:   No edema. Wound Type: None    Current Nutrition Therapies:    ADULT DIET; Regular; 4 carb choices (60 gm/meal)  ADULT ORAL NUTRITION SUPPLEMENT; Breakfast, Lunch, Dinner; Standard High Calorie/High Protein Oral Supplement    Anthropometric Measures:  Height: 170.2 cm (5' 7.01\")  Current Body Wt: 59.9 kg (132 lb 0.9 oz)   BMI: 20.7        Nutrition Diagnosis:   No nutrition diagnosis at this time     Nutrition Interventions:   Food and/or Nutrient Delivery: Continue Current Diet, Continue Oral Nutrition Supplement  Nutrition Education/Counseling: No recommendation at this time  Coordination of Nutrition Care: Continue to monitor while inpatient  Plan of Care discussed with: Patient/Family    Nutrition Monitoring and Evaluation:   Behavioral-Environmental Outcomes: None Identified  Food/Nutrient Intake Outcomes: Food and Nutrient Intake, Supplement Intake  Physical Signs/Symptoms

## 2025-01-31 NOTE — PROGRESS NOTES
Occupational Therapy    Occupational Therapy Initial Evaluation  Facility/Department: [facility] 3- [facility]   Patient Name: Luisana Goncalves        MRN: [id number]    : 1929    Date of Service: 2025    Chief Complaint   Patient presents with    Dizziness     Past Medical History:  has a past medical history of Arthritis, Breast cancer (HCC), CAD (coronary artery disease), CHF (congestive heart failure) (HCC), CKD (chronic kidney disease) stage 3, GFR 30-59 ml/min (HCC), COPD (chronic obstructive pulmonary disease) (HCC), Gastroesophageal reflux disease, Hyperlipidemia, Hypertension, Recurrent major depression in remission (HCC), Thyroid disease, and Type 2 diabetes mellitus with kidney complication, without long-term current use of insulin (HCC).  Past Surgical History:  has a past surgical history that includes Thyroid surgery; Mastectomy; Hysterectomy; Cholecystectomy; Colonoscopy; knee surgery; and Cataract removal (Right).    Discharge Recommendations  Discharge Recommendations: Continue to assess pending progress, Patient would benefit from continued therapy after discharge       Assessment  Assessment: Patient is normaly independent with functional mobility, personal ADLs and household tasks. At  this time patient requires up to contact guard assist with functional mobility and up to min assist with ADLs. Patient would benefit from continued therapy during acute stay. Patient is safe to return to prior living arrangement with 24/7 assistance to safely perform personal ADLs and mobility.  Prognosis: Good  Decision Making: Medium Complexity  REQUIRES OT FOLLOW-UP: Yes  Activity Tolerance  Activity Tolerance: Patient Tolerated treatment well  Activity Tolerance Comments: P and daughter are very particular requiring extended time for simple tasks  Safety Devices  Type of Devices: Call light within reach;Chair alarm in place;Gait belt;Patient at risk for falls;Left in chair;Nurse

## 2025-01-31 NOTE — CONSULTS
[facility]    Consult / H&P               Today's Date: 1/31/2025  Patient Name: Luisana Goncalves  Date of admission: 1/30/2025  1:14 PM  Patient's age: 89 y.o., 7/6/1929  Admission Dx: Bradycardia [R00.1]  Symptomatic bradycardia [R00.1]    Requesting Physician: Dominic Bingham MD    Cardiac Evaluation Reason: Symptomatic bradycardia    History Obtained From: patient and chart review     History of Present Illness:    This patient 89 y.o. years old with past medical history given below.  Presented for evaluation of syncopal episode at home.Per family member patient was talking on the phone today then suddenly her eyes rolled to the back and she nearly passed out, family called EMS she was bradycardic to 47, received 1 dose of atropine with improvement in her heart rate.  Patient denies any loss of consciousness.  Daughter witnessed the whole event.  Patient reported feeling dizzy and lightheaded in the ER.  She did have some CIRS received fluids.  She was admitted under medicine service however overnight patient had episodes of hypotension that did not not respond to fluid and required vasopressors requiring transfer to ICU.    On bedside evaluation patient denies any loss of consciousness with dizzy spells and feeling lightheadedness with bradycardia.  She does have previous history of syncope with bradycardia on chart review.  Does not take beta-blockers at home.  Currently she is on low-dose Levophed.  Reports improvement in her symptoms    Past Medical History:   has a past medical history of Arthritis, Breast cancer ([facility]), CAD (coronary artery disease), CHF (congestive heart failure) ([facility]), CKD (chronic kidney disease) stage 3, GFR 30-59 ml/min ([facility]), COPD (chronic obstructive pulmonary disease) ([facility]), Gastroesophageal reflux disease, Hyperlipidemia, Hypertension, Recurrent major depression in remission ([facility]), Thyroid disease, and Type 2 diabetes mellitus with kidney complication, without

## 2025-01-31 NOTE — CARE COORDINATION
Case Management Assessment  Initial Evaluation    Date/Time of Evaluation: 1/31/2025 3:45 PM  Assessment Completed by: PAMELA THOMPSON RN    If patient is discharged prior to next notation, then this note serves as note for discharge by case management.    Patient Name: Luisana Goncalves                   YOB: 1929  Diagnosis: Bradycardia [R00.1]  Symptomatic bradycardia [R00.1]                   Date / Time: 1/30/2025  1:14 PM    Patient Admission Status: Inpatient   Readmission Risk (Low < 19, Mod (19-27), High > 27): Readmission Risk Score: 14.7    Current PCP: Nic Salazar MD  PCP verified by CM? Yes (Nic Salazar MD)    Chart Reviewed: Yes      History Provided by: Patient  Patient Orientation: Alert and Oriented    Patient Cognition: Alert    Hospitalization in the last 30 days (Readmission):  No    If yes, Readmission Assessment in CM Navigator will be completed.    Advance Directives:      Code Status: Full Code   Patient's Primary Decision Maker is: Legal Next of Kin    Primary Decision Maker: Kristy Goncalves - [place] - [phone]    Discharge Planning:    Patient lives with: Alone Type of Home: House  Primary Care Giver: Self  Patient Support Systems include: Children   Current Financial resources: Medicare ([employer] Medicare)  Current community resources: ECF/Home Care  Current services prior to admission: Durable Medical Equipment, Home Care, Oxygen Therapy            Current DME: Cane, Walker, Oxygen Therapy (Comment) (Has Home O2 2L [place] 24/7 DME supplier is [employer], has portable tank that daughter will bring for transport home.)            Type of Home Care services:  PT, OT, Skilled Therapy, Nursing Services    ADLS  Prior functional level: Independent in ADLs/IADLs  Current functional level: Assistance with the following:, Mobility    PT AM-PAC:   /24  OT AM-PAC:   /24    Family can provide assistance at DC: Yes  Would you like Case Management to discuss the discharge plan

## 2025-01-31 NOTE — DISCHARGE INSTR - COC
Continuity of Care Form    Patient Name: Luisana Goncalves   :  1929  MRN:  [id number]    Admit date:  2025  Discharge date:  2025    Code Status Order: Full Code   Advance Directives:   Advance Care Flowsheet Documentation             Admitting Physician:  Dominic Bingham MD  PCP: Nic Salazar MD    Discharging Nurse: Arleen CAMACHO  Discharging Hospital Unit/Room#: 3016/3016-01  Discharging Unit Phone Number: [phone]    Emergency Contact:   Extended Emergency Contact Information  Primary Emergency Contact: Kristy Goncalves  Home Phone: [phone]  Mobile Phone: [phone]  Relation: Child   needed? No  Secondary Emergency Contact: Verenice Goncalves  Mobile Phone: [phone]  Relation: Child    Past Surgical History:  Past Surgical History:   Procedure Laterality Date    CATARACT REMOVAL Right     CHOLECYSTECTOMY      COLONOSCOPY      HYSTERECTOMY (CERVIX STATUS UNKNOWN)      KNEE SURGERY      MASTECTOMY      THYROID SURGERY         Immunization History:     There is no immunization history on file for this patient.    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

## 2025-02-01 LAB
ANION GAP SERPL CALCULATED.3IONS-SCNC: 9 MMOL/L (ref 9–16)
BUN SERPL-MCNC: 61 MG/DL (ref 8–23)
CALCIUM SERPL-MCNC: 8.5 MG/DL (ref 8.6–10.4)
CHLORIDE SERPL-SCNC: 107 MMOL/L (ref 98–107)
CO2 SERPL-SCNC: 28 MMOL/L (ref 20–31)
CREAT SERPL-MCNC: 1.5 MG/DL (ref 0.6–0.9)
ERYTHROCYTE [DISTWIDTH] IN BLOOD BY AUTOMATED COUNT: 14 % (ref 11.8–14.4)
GFR, ESTIMATED: 32 ML/MIN/1.73M2
GLUCOSE SERPL-MCNC: 124 MG/DL (ref 74–99)
HCT VFR BLD AUTO: 36.1 % (ref 36.3–47.1)
HGB BLD-MCNC: 11.5 G/DL (ref 11.9–15.1)
MCH RBC QN AUTO: 30.7 PG (ref 25.2–33.5)
MCHC RBC AUTO-ENTMCNC: 31.9 G/DL (ref 28.4–34.8)
MCV RBC AUTO: 96.3 FL (ref 82.6–102.9)
NRBC BLD-RTO: 0 PER 100 WBC
PLATELET # BLD AUTO: 128 K/UL (ref 138–453)
PMV BLD AUTO: 10.4 FL (ref 8.1–13.5)
POTASSIUM SERPL-SCNC: 4.1 MMOL/L (ref 3.7–5.3)
RBC # BLD AUTO: 3.75 M/UL (ref 3.95–5.11)
SODIUM SERPL-SCNC: 144 MMOL/L (ref 136–145)
WBC OTHER # BLD: 6.4 K/UL (ref 3.5–11.3)

## 2025-02-01 PROCEDURE — 6370000000 HC RX 637 (ALT 250 FOR IP): Performed by: NURSE PRACTITIONER

## 2025-02-01 PROCEDURE — 6360000002 HC RX W HCPCS: Performed by: NURSE PRACTITIONER

## 2025-02-01 PROCEDURE — 1200000000 HC SEMI PRIVATE

## 2025-02-01 PROCEDURE — 99291 CRITICAL CARE FIRST HOUR: CPT | Performed by: INTERNAL MEDICINE

## 2025-02-01 PROCEDURE — 6370000000 HC RX 637 (ALT 250 FOR IP)

## 2025-02-01 PROCEDURE — 85027 COMPLETE CBC AUTOMATED: CPT

## 2025-02-01 PROCEDURE — 2500000003 HC RX 250 WO HCPCS: Performed by: NURSE PRACTITIONER

## 2025-02-01 PROCEDURE — 94640 AIRWAY INHALATION TREATMENT: CPT

## 2025-02-01 PROCEDURE — 94761 N-INVAS EAR/PLS OXIMETRY MLT: CPT

## 2025-02-01 PROCEDURE — 80048 BASIC METABOLIC PNL TOTAL CA: CPT

## 2025-02-01 PROCEDURE — 36415 COLL VENOUS BLD VENIPUNCTURE: CPT

## 2025-02-01 RX ORDER — DOCUSATE SODIUM 100 MG/1
100 CAPSULE, LIQUID FILLED ORAL 2 TIMES DAILY
Status: DISCONTINUED | OUTPATIENT
Start: 2025-02-01 | End: 2025-02-04 | Stop reason: HOSPADM

## 2025-02-01 RX ADMIN — BUDESONIDE AND FORMOTEROL FUMARATE DIHYDRATE 2 PUFF: 160; 4.5 AEROSOL RESPIRATORY (INHALATION) at 20:13

## 2025-02-01 RX ADMIN — LATANOPROST 1 DROP: 50 SOLUTION OPHTHALMIC at 21:42

## 2025-02-01 RX ADMIN — SODIUM CHLORIDE, PRESERVATIVE FREE 10 ML: 5 INJECTION INTRAVENOUS at 08:47

## 2025-02-01 RX ADMIN — BUDESONIDE AND FORMOTEROL FUMARATE DIHYDRATE 2 PUFF: 160; 4.5 AEROSOL RESPIRATORY (INHALATION) at 08:17

## 2025-02-01 RX ADMIN — MIDODRINE HYDROCHLORIDE 10 MG: 5 TABLET ORAL at 11:50

## 2025-02-01 RX ADMIN — MIDODRINE HYDROCHLORIDE 10 MG: 5 TABLET ORAL at 16:51

## 2025-02-01 RX ADMIN — DOCUSATE SODIUM 100 MG: 100 CAPSULE, LIQUID FILLED ORAL at 13:59

## 2025-02-01 RX ADMIN — ACETAMINOPHEN 650 MG: 325 TABLET ORAL at 07:44

## 2025-02-01 RX ADMIN — HEPARIN SODIUM 5000 UNITS: 5000 INJECTION INTRAVENOUS; SUBCUTANEOUS at 05:31

## 2025-02-01 RX ADMIN — SODIUM CHLORIDE, PRESERVATIVE FREE 10 ML: 5 INJECTION INTRAVENOUS at 20:00

## 2025-02-01 RX ADMIN — HEPARIN SODIUM 5000 UNITS: 5000 INJECTION INTRAVENOUS; SUBCUTANEOUS at 21:42

## 2025-02-01 RX ADMIN — DOCUSATE SODIUM 100 MG: 100 CAPSULE, LIQUID FILLED ORAL at 21:42

## 2025-02-01 RX ADMIN — MIDODRINE HYDROCHLORIDE 10 MG: 5 TABLET ORAL at 08:47

## 2025-02-01 RX ADMIN — LEVOTHYROXINE SODIUM 125 MCG: 0.12 TABLET ORAL at 05:31

## 2025-02-01 RX ADMIN — HEPARIN SODIUM 5000 UNITS: 5000 INJECTION INTRAVENOUS; SUBCUTANEOUS at 13:59

## 2025-02-01 RX ADMIN — ASPIRIN 81 MG: 81 TABLET, CHEWABLE ORAL at 08:47

## 2025-02-01 ASSESSMENT — PAIN DESCRIPTION - ORIENTATION
ORIENTATION: RIGHT
ORIENTATION: RIGHT
ORIENTATION: RIGHT;LOWER;UPPER
ORIENTATION: RIGHT;LOWER;UPPER

## 2025-02-01 ASSESSMENT — PAIN SCALES - GENERAL
PAINLEVEL_OUTOF10: 2
PAINLEVEL_OUTOF10: 5
PAINLEVEL_OUTOF10: 4

## 2025-02-01 ASSESSMENT — PAIN DESCRIPTION - LOCATION
LOCATION: RIB CAGE

## 2025-02-01 ASSESSMENT — PAIN DESCRIPTION - DESCRIPTORS
DESCRIPTORS: ACHING;TENDER;STABBING
DESCRIPTORS: ACHING;TENDER;STABBING

## 2025-02-01 NOTE — CONSULTS
ELECTROPHYSIOLOGY CONSULT NOTE    Reason for Consult:  Bradycardia  Requesting Physician: Dominic Bingham MD    CHIEF COMPLAINT:  \"I passed out\"    History Obtained From:  patient, electronic medical record    HISTORY OF PRESENT ILLNESS:      The patient is a 89 y.o. female with significant past medical history of CKD, diabetes, pulmonary hypertension.  Admitted with episode of severe dizziness.  Found to be hypotensive with significant sinus bradycardia.  Initially on levophed discontinued around 10:00 am 1/31/2025.  Patient says she lives alone, still drives and shops on occasion. Denies recent change in baseline symptoms prior to event.     Telemetry shows predominant sinus bradycardia with significant heart rate variability, predominant down 30-50s bpm with with elevation into 70 bpm.  No significant pauses, no heart block    Past Medical History:    Past Medical History:   Diagnosis Date    Arthritis     Breast cancer ([facility])     CAD (coronary artery disease)     CHF (congestive heart failure) ([facility])     CKD (chronic kidney disease) stage 3, GFR 30-59 ml/min ([facility]) 12/2/2019    COPD (chronic obstructive pulmonary disease) ([facility])     Gastroesophageal reflux disease 8/1/2020    Hyperlipidemia     Hypertension     Recurrent major depression in remission ([facility]) 8/1/2020    Thyroid disease     Type 2 diabetes mellitus with kidney complication, without long-term current use of insulin ([facility]) 12/2/2019     Past Surgical History:    Past Surgical History:   Procedure Laterality Date    CATARACT REMOVAL Right     CHOLECYSTECTOMY      COLONOSCOPY      HYSTERECTOMY (CERVIX STATUS UNKNOWN)      KNEE SURGERY      MASTECTOMY      THYROID SURGERY       Home Medications:  Prior to Admission medications    Medication Sig Start Date End Date Taking? Authorizing Provider   furosemide (LASIX) 20 MG tablet Take 1 tablet by mouth daily 9/30/23   Rayne Rod MD   sildenafil (REVATIO) 20 MG tablet Take 1 tablet by mouth daily 9/30/23

## 2025-02-01 NOTE — H&P (VIEW-ONLY)
ELECTROPHYSIOLOGY CONSULT NOTE    Reason for Consult:  Bradycardia  Requesting Physician: Dominic Bingham MD    CHIEF COMPLAINT:  \"I passed out\"    History Obtained From:  patient, electronic medical record    HISTORY OF PRESENT ILLNESS:      The patient is a 89 y.o. female with significant past medical history of CKD, diabetes, pulmonary hypertension.  Admitted with episode of severe dizziness.  Found to be hypotensive with significant sinus bradycardia.  Initially on levophed discontinued around 10:00 am 1/31/2025.  Patient says she lives alone, still drives and shops on occasion. Denies recent change in baseline symptoms prior to event.     Telemetry shows predominant sinus bradycardia with significant heart rate variability, predominant down 30-50s bpm with with elevation into 70 bpm.  No significant pauses, no heart block    Past Medical History:    Past Medical History:   Diagnosis Date    Arthritis     Breast cancer ([facility])     CAD (coronary artery disease)     CHF (congestive heart failure) ([facility])     CKD (chronic kidney disease) stage 3, GFR 30-59 ml/min ([facility]) 12/2/2019    COPD (chronic obstructive pulmonary disease) ([facility])     Gastroesophageal reflux disease 8/1/2020    Hyperlipidemia     Hypertension     Recurrent major depression in remission ([facility]) 8/1/2020    Thyroid disease     Type 2 diabetes mellitus with kidney complication, without long-term current use of insulin ([facility]) 12/2/2019     Past Surgical History:    Past Surgical History:   Procedure Laterality Date    CATARACT REMOVAL Right     CHOLECYSTECTOMY      COLONOSCOPY      HYSTERECTOMY (CERVIX STATUS UNKNOWN)      KNEE SURGERY      MASTECTOMY      THYROID SURGERY       Home Medications:  Prior to Admission medications    Medication Sig Start Date End Date Taking? Authorizing Provider   furosemide (LASIX) 20 MG tablet Take 1 tablet by mouth daily 9/30/23   Rayne Rod MD   sildenafil (REVATIO) 20 MG tablet Take 1 tablet by mouth daily 9/30/23

## 2025-02-01 NOTE — TRANSITION OF CARE
Critical care team - Resident sign-out to medicine service      Date and time: 2/2/2025 11:47 PM  Patient's name:  Luisana Goncalves  Medical Record Number: [id number]  Patient's account/billing number: [id number]  Patient's YOB: 1929  Age: 89 y.o.  Date of Admission: 1/30/2025  1:14 PM  Length of stay during current admission: 2    Primary Care Physician: Nic Salazar MD    Code Status: Full Code    Mode of physician to physician communication:        [x] Via telephone   [] In person     Date and time of sign-out: 2/2/2025 11:47 PM    Accepting Internal Medicine: Pam Beard NP    Accepting Medicine team: IM Team Intermed    Accepting team's attending: Dr. Felton    Patient's current ICU Bed:  3016     Patient's assigned bed on floor:  161        [] Med-Surg Monitored [x] Step-down       [] Psychiatry ICU       [] Psych floor     Reason for ICU admission:     -Syncope  -Symptomatic bradycardia  -Right chest pain (eighth rib fracture)    ICU course summary:     Patient admitted to ICU on 1/31 for symptomatic bradycardia.  Patient initial heart rate prior to arrival 47.  Patient also had symptomatic hypotension which required pressor support.  Patient was weaned off pressor support on 1/31.  Patient continued to have heart rate in the low 40s with occasional increases to 49-70.  Patient evaluated by her electrophysiology cardiologist that is planning for pacemaker placement on Monday.  Given patient's stability and heart rate no significant hypotension, appropriate mentation, and adequate oral intake, patient will be transferred to floor.    Procedures during patient's ICU stay:     None    Current Vitals:     /63   Pulse 61   Temp 98.7 °F (37.1 °C) (Oral)   Resp 16   Ht 1.702 m (5' 7.01\")   Wt 58.5 kg (128 lb 15.5 oz)   LMP  (LMP Unknown)   SpO2 100%   BMI 20.19 kg/m²       Cultures:     Blood cultures:                 [x] None drawn      [] Negative             []  Positive

## 2025-02-01 NOTE — PROGRESS NOTES
INTENSIVE CARE UNIT  Resident Physician Progress Note    Patient - Luisana Goncalves  Date of Admission -  2025  1:14 PM  Date of Evaluation -  2025  Room and Bed Number -  3016/3016-01   Hospital Day - 1    HPI:     Luisana Goncalves is a 89 y.o. who presented to the emergency department via EMS for syncopal event at home.  Patient states she felt very dizzy at home today and then passed out.  When EMS arrived she was bradycardic to 47.  In the emergency department she received 1 mg of atropine with improvement in her heart rate.  She also received fluid resuscitation due to acute kidney injury.  Patient was admitted to [facility] for cardiology evaluation and symptomatic bradycardia.  During her stay under [facility] she had episodes of hypotension that did not respond well to midodrine or fluids.  Due to this she requires ICU admission.  Patient's past medical history of severe right ventricular heart failure, CKD, CAD, COPD on oxygen at home, diabetes.  Upon my evaluation on the floor patient is awake, alert, does not have any complaints, states she feels much better and is not dizzy however her map is approximately 55.       SUBJECTIVE:     OVERNIGHT EVENTS:   No acute events overnight, patient slept well.    TODAY: Alert awake and oriented, heart rate 39-41 during the night, heart rate 70-80 this a.m. sitting up in chair.  Complains of subtle discomfort to right chest, patient notified of eighth rib fracture.      OBJECTIVE:     VITAL SIGNS:  BP (!) 102/55   Pulse 70   Temp 98.2 °F (36.8 °C) (Oral)   Resp 16   Ht 1.702 m (5' 7.01\")   Wt 58.5 kg (128 lb 15.5 oz)   LMP  (LMP Unknown)   SpO2 94%   BMI 20.19 kg/m²   Tmax over 24 hours:  Temp (24hrs), Av.2 °F (36.8 °C), Min:97.6 °F (36.4 °C), Max:98.7 °F (37.1 °C)      Patient Vitals for the past 8 hrs:   BP Temp Temp src Pulse Resp SpO2 Weight   25 0700 -- -- -- 70 16 94 % --   25 0600 (!) 102/55 -- -- 64 11 91 % --   25 0530

## 2025-02-02 LAB
ANION GAP SERPL CALCULATED.3IONS-SCNC: 10 MMOL/L (ref 9–16)
BUN SERPL-MCNC: 45 MG/DL (ref 8–23)
CALCIUM SERPL-MCNC: 9.3 MG/DL (ref 8.6–10.4)
CHLORIDE SERPL-SCNC: 104 MMOL/L (ref 98–107)
CO2 SERPL-SCNC: 25 MMOL/L (ref 20–31)
CREAT SERPL-MCNC: 1.1 MG/DL (ref 0.6–0.9)
ERYTHROCYTE [DISTWIDTH] IN BLOOD BY AUTOMATED COUNT: 14.3 % (ref 11.8–14.4)
GFR, ESTIMATED: 46 ML/MIN/1.73M2
GLUCOSE SERPL-MCNC: 104 MG/DL (ref 74–99)
HCT VFR BLD AUTO: 38.5 % (ref 36.3–47.1)
HGB BLD-MCNC: 12.1 G/DL (ref 11.9–15.1)
MCH RBC QN AUTO: 30.3 PG (ref 25.2–33.5)
MCHC RBC AUTO-ENTMCNC: 31.4 G/DL (ref 28.4–34.8)
MCV RBC AUTO: 96.3 FL (ref 82.6–102.9)
NRBC BLD-RTO: 0 PER 100 WBC
PLATELET # BLD AUTO: NORMAL K/UL (ref 138–453)
PLATELET, FLUORESCENCE: 146 K/UL (ref 138–453)
PLATELETS.RETICULATED NFR BLD AUTO: 2.1 % (ref 1.1–10.3)
POTASSIUM SERPL-SCNC: 4.1 MMOL/L (ref 3.7–5.3)
RBC # BLD AUTO: 4 M/UL (ref 3.95–5.11)
SODIUM SERPL-SCNC: 139 MMOL/L (ref 136–145)
WBC OTHER # BLD: 6.4 K/UL (ref 3.5–11.3)

## 2025-02-02 PROCEDURE — 85027 COMPLETE CBC AUTOMATED: CPT

## 2025-02-02 PROCEDURE — 6370000000 HC RX 637 (ALT 250 FOR IP): Performed by: NURSE PRACTITIONER

## 2025-02-02 PROCEDURE — 2700000000 HC OXYGEN THERAPY PER DAY

## 2025-02-02 PROCEDURE — 2500000003 HC RX 250 WO HCPCS: Performed by: NURSE PRACTITIONER

## 2025-02-02 PROCEDURE — 1200000000 HC SEMI PRIVATE

## 2025-02-02 PROCEDURE — 99291 CRITICAL CARE FIRST HOUR: CPT | Performed by: INTERNAL MEDICINE

## 2025-02-02 PROCEDURE — 94761 N-INVAS EAR/PLS OXIMETRY MLT: CPT

## 2025-02-02 PROCEDURE — 80048 BASIC METABOLIC PNL TOTAL CA: CPT

## 2025-02-02 PROCEDURE — 6370000000 HC RX 637 (ALT 250 FOR IP)

## 2025-02-02 PROCEDURE — 36415 COLL VENOUS BLD VENIPUNCTURE: CPT

## 2025-02-02 PROCEDURE — 6360000002 HC RX W HCPCS: Performed by: INTERNAL MEDICINE

## 2025-02-02 PROCEDURE — 85055 RETICULATED PLATELET ASSAY: CPT

## 2025-02-02 PROCEDURE — 94640 AIRWAY INHALATION TREATMENT: CPT

## 2025-02-02 RX ORDER — ACETAMINOPHEN 650 MG/1
650 SUPPOSITORY RECTAL EVERY 6 HOURS PRN
Status: DISCONTINUED | OUTPATIENT
Start: 2025-02-02 | End: 2025-02-04 | Stop reason: HOSPADM

## 2025-02-02 RX ORDER — GABAPENTIN 300 MG/1
300 CAPSULE ORAL 2 TIMES DAILY
Status: DISCONTINUED | OUTPATIENT
Start: 2025-02-02 | End: 2025-02-04 | Stop reason: HOSPADM

## 2025-02-02 RX ORDER — ACETAMINOPHEN 325 MG/1
650 TABLET ORAL EVERY 6 HOURS PRN
Status: DISCONTINUED | OUTPATIENT
Start: 2025-02-02 | End: 2025-02-04 | Stop reason: HOSPADM

## 2025-02-02 RX ADMIN — ACETAMINOPHEN 650 MG: 325 TABLET ORAL at 17:40

## 2025-02-02 RX ADMIN — LATANOPROST 1 DROP: 50 SOLUTION OPHTHALMIC at 20:24

## 2025-02-02 RX ADMIN — GABAPENTIN 300 MG: 300 CAPSULE ORAL at 09:09

## 2025-02-02 RX ADMIN — HEPARIN SODIUM 5000 UNITS: 5000 INJECTION INTRAVENOUS; SUBCUTANEOUS at 06:46

## 2025-02-02 RX ADMIN — DOCUSATE SODIUM 100 MG: 100 CAPSULE, LIQUID FILLED ORAL at 20:24

## 2025-02-02 RX ADMIN — BUDESONIDE AND FORMOTEROL FUMARATE DIHYDRATE 2 PUFF: 160; 4.5 AEROSOL RESPIRATORY (INHALATION) at 20:59

## 2025-02-02 RX ADMIN — BUDESONIDE AND FORMOTEROL FUMARATE DIHYDRATE 2 PUFF: 160; 4.5 AEROSOL RESPIRATORY (INHALATION) at 08:55

## 2025-02-02 RX ADMIN — SODIUM CHLORIDE, PRESERVATIVE FREE 10 ML: 5 INJECTION INTRAVENOUS at 08:17

## 2025-02-02 RX ADMIN — HEPARIN SODIUM 5000 UNITS: 5000 INJECTION INTRAVENOUS; SUBCUTANEOUS at 22:09

## 2025-02-02 RX ADMIN — HEPARIN SODIUM 5000 UNITS: 5000 INJECTION INTRAVENOUS; SUBCUTANEOUS at 13:47

## 2025-02-02 RX ADMIN — ACETAMINOPHEN 650 MG: 325 TABLET ORAL at 10:42

## 2025-02-02 RX ADMIN — ACETAMINOPHEN 650 MG: 325 TABLET ORAL at 04:27

## 2025-02-02 RX ADMIN — DOCUSATE SODIUM 100 MG: 100 CAPSULE, LIQUID FILLED ORAL at 08:17

## 2025-02-02 RX ADMIN — SODIUM CHLORIDE, PRESERVATIVE FREE 10 ML: 5 INJECTION INTRAVENOUS at 20:24

## 2025-02-02 RX ADMIN — ASPIRIN 81 MG: 81 TABLET, CHEWABLE ORAL at 08:17

## 2025-02-02 RX ADMIN — LEVOTHYROXINE SODIUM 125 MCG: 0.12 TABLET ORAL at 08:17

## 2025-02-02 RX ADMIN — GABAPENTIN 300 MG: 300 CAPSULE ORAL at 20:24

## 2025-02-02 ASSESSMENT — PAIN DESCRIPTION - LOCATION
LOCATION: BACK;FINGER (COMMENT WHICH ONE)
LOCATION: FINGER (COMMENT WHICH ONE);RIB CAGE
LOCATION: RIB CAGE;FINGER (COMMENT WHICH ONE)
LOCATION: RIB CAGE
LOCATION: RIB CAGE;OTHER (COMMENT)

## 2025-02-02 ASSESSMENT — PAIN DESCRIPTION - ORIENTATION
ORIENTATION: RIGHT

## 2025-02-02 ASSESSMENT — PAIN DESCRIPTION - DESCRIPTORS: DESCRIPTORS: ACHING;CRAMPING

## 2025-02-02 ASSESSMENT — PAIN SCALES - GENERAL
PAINLEVEL_OUTOF10: 7
PAINLEVEL_OUTOF10: 6
PAINLEVEL_OUTOF10: 5
PAINLEVEL_OUTOF10: 10
PAINLEVEL_OUTOF10: 7
PAINLEVEL_OUTOF10: 6
PAINLEVEL_OUTOF10: 5

## 2025-02-02 ASSESSMENT — PAIN DESCRIPTION - PAIN TYPE: TYPE: ACUTE PAIN

## 2025-02-02 NOTE — PROGRESS NOTES
INTENSIVE CARE UNIT  Resident Physician Progress Note    Patient - Luisana Goncalves  Date of Admission -  2025  1:14 PM  Date of Evaluation -  2025  Room and Bed Number -  3016/3016-01   Hospital Day - 2    HPI:     Luisana Goncalves is a 89 y.o. who presented to the emergency department via EMS for syncopal event at home.  Patient states she felt very dizzy at home today and then passed out.  When EMS arrived she was bradycardic to 47.  In the emergency department she received 1 mg of atropine with improvement in her heart rate.  She also received fluid resuscitation due to acute kidney injury.  Patient was admitted to [facility] for cardiology evaluation and symptomatic bradycardia.  During her stay under [facility] she had episodes of hypotension that did not respond well to midodrine or fluids.  Due to this she requires ICU admission.  Patient's past medical history of severe right ventricular heart failure, CKD, CAD, COPD on oxygen at home, diabetes.  Upon my evaluation on the floor patient is awake, alert, does not have any complaints, states she feels much better and is not dizzy however her map is approximately 55.       SUBJECTIVE:     OVERNIGHT EVENTS:   No acute events overnight, patient slept well.  Heart rate 39-86 overnight    TODAY: Alert awake and oriented, heart rate in 60s-70s this morning, heart rate 70-80 Sitting up in chair having breakfast, watching TV.  Complains of subtle discomfort to right chest, patient notified of eighth rib fracture.  Will start home dose of gabapentin.      OBJECTIVE:     VITAL SIGNS:  BP (!) 144/64   Pulse 73   Temp 98.2 °F (36.8 °C) (Axillary)   Resp 16   Ht 1.702 m (5' 7.01\")   Wt 58.5 kg (128 lb 15.5 oz)   LMP  (LMP Unknown)   SpO2 100%   BMI 20.19 kg/m²   Tmax over 24 hours:  Temp (24hrs), Av.1 °F (36.7 °C), Min:97.5 °F (36.4 °C), Max:98.4 °F (36.9 °C)      Patient Vitals for the past 8 hrs:   BP Temp Temp src Pulse Resp SpO2   25 0855 --

## 2025-02-03 PROBLEM — I49.5 SINUS NODE DYSFUNCTION (HCC): Status: ACTIVE | Noted: 2025-01-30

## 2025-02-03 LAB — ECHO BSA: 1.68 M2

## 2025-02-03 PROCEDURE — 6360000004 HC RX CONTRAST MEDICATION: Performed by: INTERNAL MEDICINE

## 2025-02-03 PROCEDURE — 2700000000 HC OXYGEN THERAPY PER DAY

## 2025-02-03 PROCEDURE — 2580000003 HC RX 258: Performed by: INTERNAL MEDICINE

## 2025-02-03 PROCEDURE — 0JH606Z INSERTION OF PACEMAKER, DUAL CHAMBER INTO CHEST SUBCUTANEOUS TISSUE AND FASCIA, OPEN APPROACH: ICD-10-PCS | Performed by: INTERNAL MEDICINE

## 2025-02-03 PROCEDURE — 6370000000 HC RX 637 (ALT 250 FOR IP)

## 2025-02-03 PROCEDURE — 94761 N-INVAS EAR/PLS OXIMETRY MLT: CPT

## 2025-02-03 PROCEDURE — 6360000002 HC RX W HCPCS: Performed by: INTERNAL MEDICINE

## 2025-02-03 PROCEDURE — 33208 INSRT HEART PM ATRIAL & VENT: CPT | Performed by: INTERNAL MEDICINE

## 2025-02-03 PROCEDURE — 6370000000 HC RX 637 (ALT 250 FOR IP): Performed by: NURSE PRACTITIONER

## 2025-02-03 PROCEDURE — 02HK3JZ INSERTION OF PACEMAKER LEAD INTO RIGHT VENTRICLE, PERCUTANEOUS APPROACH: ICD-10-PCS | Performed by: INTERNAL MEDICINE

## 2025-02-03 PROCEDURE — C1785 PMKR, DUAL, RATE-RESP: HCPCS | Performed by: INTERNAL MEDICINE

## 2025-02-03 PROCEDURE — C1892 INTRO/SHEATH,FIXED,PEEL-AWAY: HCPCS | Performed by: INTERNAL MEDICINE

## 2025-02-03 PROCEDURE — 2500000003 HC RX 250 WO HCPCS: Performed by: NURSE PRACTITIONER

## 2025-02-03 PROCEDURE — 2709999900 HC NON-CHARGEABLE SUPPLY: Performed by: INTERNAL MEDICINE

## 2025-02-03 PROCEDURE — 1200000000 HC SEMI PRIVATE

## 2025-02-03 PROCEDURE — 99152 MOD SED SAME PHYS/QHP 5/>YRS: CPT | Performed by: INTERNAL MEDICINE

## 2025-02-03 PROCEDURE — 94640 AIRWAY INHALATION TREATMENT: CPT

## 2025-02-03 PROCEDURE — 99153 MOD SED SAME PHYS/QHP EA: CPT | Performed by: INTERNAL MEDICINE

## 2025-02-03 PROCEDURE — C1898 LEAD, PMKR, OTHER THAN TRANS: HCPCS | Performed by: INTERNAL MEDICINE

## 2025-02-03 PROCEDURE — 02H63JZ INSERTION OF PACEMAKER LEAD INTO RIGHT ATRIUM, PERCUTANEOUS APPROACH: ICD-10-PCS | Performed by: INTERNAL MEDICINE

## 2025-02-03 DEVICE — PACE/SENSE LEAD
Type: IMPLANTABLE DEVICE | Site: HEART | Status: FUNCTIONAL
Brand: INGEVITY™+

## 2025-02-03 DEVICE — PACEMAKER
Type: IMPLANTABLE DEVICE | Site: CHEST | Status: FUNCTIONAL
Brand: ACCOLADE™ MRI DR

## 2025-02-03 RX ORDER — FENTANYL CITRATE 50 UG/ML
INJECTION, SOLUTION INTRAMUSCULAR; INTRAVENOUS PRN
Status: DISCONTINUED | OUTPATIENT
Start: 2025-02-03 | End: 2025-02-03 | Stop reason: HOSPADM

## 2025-02-03 RX ORDER — OXYCODONE AND ACETAMINOPHEN 5; 325 MG/1; MG/1
0.5 TABLET ORAL EVERY 6 HOURS PRN
Status: DISCONTINUED | OUTPATIENT
Start: 2025-02-03 | End: 2025-02-04

## 2025-02-03 RX ORDER — ACETAMINOPHEN 325 MG/1
650 TABLET ORAL EVERY 6 HOURS PRN
Status: DISCONTINUED | OUTPATIENT
Start: 2025-02-03 | End: 2025-02-03

## 2025-02-03 RX ORDER — SODIUM CHLORIDE 0.9 % (FLUSH) 0.9 %
5-40 SYRINGE (ML) INJECTION PRN
Status: DISCONTINUED | OUTPATIENT
Start: 2025-02-03 | End: 2025-02-04 | Stop reason: HOSPADM

## 2025-02-03 RX ORDER — ACETAMINOPHEN 325 MG/1
325 TABLET ORAL EVERY 6 HOURS PRN
Status: DISCONTINUED | OUTPATIENT
Start: 2025-02-03 | End: 2025-02-04

## 2025-02-03 RX ORDER — SODIUM CHLORIDE 9 MG/ML
INJECTION, SOLUTION INTRAVENOUS PRN
Status: DISCONTINUED | OUTPATIENT
Start: 2025-02-03 | End: 2025-02-04 | Stop reason: HOSPADM

## 2025-02-03 RX ORDER — BUPIVACAINE HYDROCHLORIDE 5 MG/ML
INJECTION, SOLUTION EPIDURAL; INTRACAUDAL PRN
Status: DISCONTINUED | OUTPATIENT
Start: 2025-02-03 | End: 2025-02-03 | Stop reason: HOSPADM

## 2025-02-03 RX ORDER — IOPAMIDOL 755 MG/ML
INJECTION, SOLUTION INTRAVASCULAR PRN
Status: DISCONTINUED | OUTPATIENT
Start: 2025-02-03 | End: 2025-02-03 | Stop reason: HOSPADM

## 2025-02-03 RX ORDER — SODIUM CHLORIDE 0.9 % (FLUSH) 0.9 %
5-40 SYRINGE (ML) INJECTION EVERY 12 HOURS SCHEDULED
Status: DISCONTINUED | OUTPATIENT
Start: 2025-02-03 | End: 2025-02-04 | Stop reason: HOSPADM

## 2025-02-03 RX ORDER — MIDAZOLAM HYDROCHLORIDE 1 MG/ML
INJECTION, SOLUTION INTRAMUSCULAR; INTRAVENOUS PRN
Status: DISCONTINUED | OUTPATIENT
Start: 2025-02-03 | End: 2025-02-03 | Stop reason: HOSPADM

## 2025-02-03 RX ORDER — OXYCODONE AND ACETAMINOPHEN 5; 325 MG/1; MG/1
0.5 TABLET ORAL EVERY 6 HOURS PRN
Status: DISCONTINUED | OUTPATIENT
Start: 2025-02-03 | End: 2025-02-03

## 2025-02-03 RX ORDER — OXYCODONE AND ACETAMINOPHEN 5; 325 MG/1; MG/1
0.5 TABLET ORAL EVERY 6 HOURS PRN
Status: DISCONTINUED | OUTPATIENT
Start: 2025-02-03 | End: 2025-02-03 | Stop reason: SDUPTHER

## 2025-02-03 RX ADMIN — SODIUM CHLORIDE, PRESERVATIVE FREE 10 ML: 5 INJECTION INTRAVENOUS at 09:14

## 2025-02-03 RX ADMIN — SODIUM CHLORIDE, PRESERVATIVE FREE 10 ML: 5 INJECTION INTRAVENOUS at 21:15

## 2025-02-03 RX ADMIN — LEVOTHYROXINE SODIUM 125 MCG: 0.12 TABLET ORAL at 06:16

## 2025-02-03 RX ADMIN — BUDESONIDE AND FORMOTEROL FUMARATE DIHYDRATE 2 PUFF: 160; 4.5 AEROSOL RESPIRATORY (INHALATION) at 19:59

## 2025-02-03 RX ADMIN — OXYCODONE HYDROCHLORIDE AND ACETAMINOPHEN 0.5 TABLET: 5; 325 TABLET ORAL at 21:15

## 2025-02-03 RX ADMIN — ACETAMINOPHEN 325 MG: 325 TABLET ORAL at 21:15

## 2025-02-03 RX ADMIN — DOCUSATE SODIUM 100 MG: 100 CAPSULE, LIQUID FILLED ORAL at 21:15

## 2025-02-03 RX ADMIN — DOCUSATE SODIUM 100 MG: 100 CAPSULE, LIQUID FILLED ORAL at 09:14

## 2025-02-03 RX ADMIN — GABAPENTIN 300 MG: 300 CAPSULE ORAL at 09:14

## 2025-02-03 RX ADMIN — ACETAMINOPHEN 650 MG: 325 TABLET ORAL at 06:16

## 2025-02-03 RX ADMIN — ACETAMINOPHEN 650 MG: 325 TABLET ORAL at 00:09

## 2025-02-03 RX ADMIN — ASPIRIN 81 MG: 81 TABLET, CHEWABLE ORAL at 09:43

## 2025-02-03 RX ADMIN — GABAPENTIN 300 MG: 300 CAPSULE ORAL at 21:15

## 2025-02-03 RX ADMIN — BUDESONIDE AND FORMOTEROL FUMARATE DIHYDRATE 2 PUFF: 160; 4.5 AEROSOL RESPIRATORY (INHALATION) at 08:17

## 2025-02-03 RX ADMIN — LATANOPROST 1 DROP: 50 SOLUTION OPHTHALMIC at 21:15

## 2025-02-03 ASSESSMENT — PULMONARY FUNCTION TESTS
PIF_VALUE: 0

## 2025-02-03 ASSESSMENT — PAIN DESCRIPTION - LOCATION
LOCATION: BACK
LOCATION: CHEST

## 2025-02-03 ASSESSMENT — PAIN DESCRIPTION - DESCRIPTORS
DESCRIPTORS: SORE;TENDER;THROBBING
DESCRIPTORS: SORE;ACHING;DULL

## 2025-02-03 ASSESSMENT — PAIN SCALES - GENERAL
PAINLEVEL_OUTOF10: 8
PAINLEVEL_OUTOF10: 5
PAINLEVEL_OUTOF10: 7
PAINLEVEL_OUTOF10: 7
PAINLEVEL_OUTOF10: 5

## 2025-02-03 ASSESSMENT — PAIN DESCRIPTION - ORIENTATION
ORIENTATION: LEFT;UPPER
ORIENTATION: RIGHT

## 2025-02-03 NOTE — CARE COORDINATION
Transition planning    Spoke with patient, she states her plan is to return home with [facility] (current). She sates her daughter Kristy checks on her daily. She uses a cane or walker as needed at home. Has home O2 via [facility] [employer] [facility]. Plan is for pacemaker insertion today. Will need PT/OT evals post procedure.

## 2025-02-03 NOTE — INTERVAL H&P NOTE
Update History & Physical    The patient's History and Physical  was reviewed with the patient and I examined the patient. There was no change. The surgical site was confirmed by the patient and me.     Plan: The risks, benefits, expected outcome, and alternative to the recommended procedure have been discussed with the patient. Patient understands and wants to proceed with the procedure.     Electronically signed by Javed Horan MD on 2/3/2025 at 3:22 PM

## 2025-02-03 NOTE — PROGRESS NOTES
Patient transferred to 1D room 161 with all belongings.   Kristy (child) notified of room # and given units phone #.

## 2025-02-03 NOTE — PROCEDURES
device was implanted.    Avoid activities that involve heavy lifting or rough contact that could result in blows to your implant site and to allow your incision time to heal. No shower or getting device incision wet for 7 days post-operatively.    Keep wound exposed to air unless otherwise instructed, the surgical glue is the bandage.    No creams, lotions, or powders to incision.    Please avoid allowing bra strap or suspenders to lay over incision until completely healed.    Avoid hot tubs or pools until incision completely healed.    No driving for 24 hours post-operatively after device implant.    Call your doctor if you have any swelling, redness or discharge around your incision, notice anything unusual or unexpected, or you develop a fever that does not go away in two or three days.    Call your doctor if you hear any beeping sounds / vibratory alerts from your device as this indicates your device needs to be checked immediately.    Carry your Medical Device ID Card with you at all times.    Please call our office ([phone]) with any questions about the device or the wounds.      Javed Horan MD  Clinical Cardiac Electrophysiologist  [facility]

## 2025-02-03 NOTE — PRE SEDATION
Sedation Plan  ASA: class 4 - patient with severe systemic disease that is a constant threat to life     Mallampati class: I - soft palate, uvula, fauces, pillars visible.    Sedation plan: moderate (conscious sedation)    Risks, benefits, and alternatives discussed with patient.  Use of blood products discussed with patient who consented to blood products.

## 2025-02-03 NOTE — PROGRESS NOTES
Occupational Therapy    [facility]  Occupational Therapy Not Seen Note    DATE: 2/3/2025    NAME: Luisana Goncalves  MRN: [id number]   : 1929      Patient not seen this date for Occupational Therapy due to:    Surgery/Procedure: Pt going for PPM this afternoon.    Will continue to follow as able.    Electronically signed by SHIRAZ Garcia on 2/3/2025 at 11:55 AM

## 2025-02-04 ENCOUNTER — APPOINTMENT (OUTPATIENT)
Dept: GENERAL RADIOLOGY | Age: 89
DRG: 242 | End: 2025-02-04
Payer: MEDICARE

## 2025-02-04 VITALS
RESPIRATION RATE: 16 BRPM | OXYGEN SATURATION: 99 % | BODY MASS INDEX: 21.14 KG/M2 | WEIGHT: 134.7 LBS | SYSTOLIC BLOOD PRESSURE: 111 MMHG | HEIGHT: 67 IN | HEART RATE: 74 BPM | DIASTOLIC BLOOD PRESSURE: 72 MMHG | TEMPERATURE: 98.3 F

## 2025-02-04 PROCEDURE — 97166 OT EVAL MOD COMPLEX 45 MIN: CPT

## 2025-02-04 PROCEDURE — 6370000000 HC RX 637 (ALT 250 FOR IP)

## 2025-02-04 PROCEDURE — 71046 X-RAY EXAM CHEST 2 VIEWS: CPT

## 2025-02-04 PROCEDURE — 2700000000 HC OXYGEN THERAPY PER DAY

## 2025-02-04 PROCEDURE — 97530 THERAPEUTIC ACTIVITIES: CPT

## 2025-02-04 PROCEDURE — 97162 PT EVAL MOD COMPLEX 30 MIN: CPT

## 2025-02-04 PROCEDURE — 6370000000 HC RX 637 (ALT 250 FOR IP): Performed by: INTERNAL MEDICINE

## 2025-02-04 PROCEDURE — 6360000002 HC RX W HCPCS: Performed by: INTERNAL MEDICINE

## 2025-02-04 PROCEDURE — 2500000003 HC RX 250 WO HCPCS: Performed by: NURSE PRACTITIONER

## 2025-02-04 PROCEDURE — 97535 SELF CARE MNGMENT TRAINING: CPT

## 2025-02-04 PROCEDURE — 99233 SBSQ HOSP IP/OBS HIGH 50: CPT | Performed by: INTERNAL MEDICINE

## 2025-02-04 PROCEDURE — NBSRV NON-BILLABLE SERVICE: Performed by: INTERNAL MEDICINE

## 2025-02-04 PROCEDURE — 6370000000 HC RX 637 (ALT 250 FOR IP): Performed by: NURSE PRACTITIONER

## 2025-02-04 PROCEDURE — 97168 OT RE-EVAL EST PLAN CARE: CPT

## 2025-02-04 PROCEDURE — 94640 AIRWAY INHALATION TREATMENT: CPT

## 2025-02-04 RX ORDER — OXYCODONE AND ACETAMINOPHEN 5; 325 MG/1; MG/1
1 TABLET ORAL EVERY 4 HOURS PRN
Status: DISCONTINUED | OUTPATIENT
Start: 2025-02-04 | End: 2025-02-04 | Stop reason: HOSPADM

## 2025-02-04 RX ORDER — KETOROLAC TROMETHAMINE 15 MG/ML
15 INJECTION, SOLUTION INTRAMUSCULAR; INTRAVENOUS EVERY 6 HOURS PRN
Status: DISCONTINUED | OUTPATIENT
Start: 2025-02-04 | End: 2025-02-04 | Stop reason: HOSPADM

## 2025-02-04 RX ORDER — OXYCODONE AND ACETAMINOPHEN 5; 325 MG/1; MG/1
1 TABLET ORAL EVERY 4 HOURS PRN
Qty: 30 TABLET | Refills: 0 | Status: SHIPPED | OUTPATIENT
Start: 2025-02-04 | End: 2025-02-19

## 2025-02-04 RX ORDER — MIDODRINE HYDROCHLORIDE 10 MG/1
10 TABLET ORAL
Qty: 90 TABLET | Refills: 3 | Status: SHIPPED | OUTPATIENT
Start: 2025-02-04

## 2025-02-04 RX ORDER — PSEUDOEPHEDRINE HCL 30 MG
100 TABLET ORAL 2 TIMES DAILY
Qty: 30 CAPSULE | Refills: 0 | Status: SHIPPED | OUTPATIENT
Start: 2025-02-04

## 2025-02-04 RX ORDER — LIDOCAINE 4 G/G
1 PATCH TOPICAL DAILY
Qty: 30 EACH | Refills: 0 | Status: SHIPPED | OUTPATIENT
Start: 2025-02-05

## 2025-02-04 RX ORDER — OXYCODONE AND ACETAMINOPHEN 5; 325 MG/1; MG/1
1 TABLET ORAL ONCE
Status: COMPLETED | OUTPATIENT
Start: 2025-02-04 | End: 2025-02-04

## 2025-02-04 RX ORDER — GABAPENTIN 300 MG/1
300 CAPSULE ORAL 2 TIMES DAILY
Qty: 90 CAPSULE | Refills: 3 | Status: SHIPPED | OUTPATIENT
Start: 2025-02-04 | End: 2026-02-01

## 2025-02-04 RX ADMIN — KETOROLAC TROMETHAMINE 15 MG: 15 INJECTION, SOLUTION INTRAMUSCULAR; INTRAVENOUS at 11:06

## 2025-02-04 RX ADMIN — OXYCODONE HYDROCHLORIDE AND ACETAMINOPHEN 1 TABLET: 5; 325 TABLET ORAL at 09:12

## 2025-02-04 RX ADMIN — BUDESONIDE AND FORMOTEROL FUMARATE DIHYDRATE 2 PUFF: 160; 4.5 AEROSOL RESPIRATORY (INHALATION) at 08:01

## 2025-02-04 RX ADMIN — GABAPENTIN 300 MG: 300 CAPSULE ORAL at 08:55

## 2025-02-04 RX ADMIN — LEVOTHYROXINE SODIUM 125 MCG: 0.12 TABLET ORAL at 05:59

## 2025-02-04 RX ADMIN — SODIUM CHLORIDE, PRESERVATIVE FREE 10 ML: 5 INJECTION INTRAVENOUS at 08:55

## 2025-02-04 RX ADMIN — ASPIRIN 81 MG: 81 TABLET, CHEWABLE ORAL at 08:55

## 2025-02-04 RX ADMIN — OXYCODONE HYDROCHLORIDE AND ACETAMINOPHEN 1 TABLET: 5; 325 TABLET ORAL at 06:44

## 2025-02-04 RX ADMIN — DOCUSATE SODIUM 100 MG: 100 CAPSULE, LIQUID FILLED ORAL at 08:55

## 2025-02-04 RX ADMIN — OXYCODONE HYDROCHLORIDE AND ACETAMINOPHEN 1 TABLET: 5; 325 TABLET ORAL at 02:44

## 2025-02-04 ASSESSMENT — PAIN DESCRIPTION - DESCRIPTORS
DESCRIPTORS: ACHING;SORE;THROBBING
DESCRIPTORS: ACHING
DESCRIPTORS: SORE
DESCRIPTORS: ACHING

## 2025-02-04 ASSESSMENT — PAIN SCALES - GENERAL
PAINLEVEL_OUTOF10: 7
PAINLEVEL_OUTOF10: 10
PAINLEVEL_OUTOF10: 8
PAINLEVEL_OUTOF10: 6
PAINLEVEL_OUTOF10: 6
PAINLEVEL_OUTOF10: 3
PAINLEVEL_OUTOF10: 7
PAINLEVEL_OUTOF10: 5

## 2025-02-04 ASSESSMENT — PAIN DESCRIPTION - ORIENTATION
ORIENTATION: LEFT;UPPER
ORIENTATION: LEFT
ORIENTATION: LEFT
ORIENTATION: LEFT;UPPER

## 2025-02-04 ASSESSMENT — PAIN DESCRIPTION - LOCATION
LOCATION: CHEST
LOCATION: SHOULDER
LOCATION: SHOULDER
LOCATION: CHEST

## 2025-02-04 ASSESSMENT — PAIN - FUNCTIONAL ASSESSMENT
PAIN_FUNCTIONAL_ASSESSMENT: PREVENTS OR INTERFERES SOME ACTIVE ACTIVITIES AND ADLS
PAIN_FUNCTIONAL_ASSESSMENT: PREVENTS OR INTERFERES SOME ACTIVE ACTIVITIES AND ADLS

## 2025-02-04 NOTE — SIGNIFICANT EVENT
ELECTROPHYSIOLOGY BRIEF NOTE    Pacemaker pocket site well-approximated, steri-strips in place, no bleeding or hematoma.     Device interrogation shows stable lead position    IMPRESSION:  Symptomatic sinus bradycardia s/p successful dual-chamber pacemaker implant 2/3/2025    Patient needs PA/Lateral chest x-ray prior to discharge    EP/Cardiology will sign off    1.  Follow-up with [facility] in 2-3 weeks for wound check  2.  Follow-up in 6-8 weeks for device check    [facility]  [address], [place] [address]  [phone]        Post device:    Please do not lift more than 10 pounds or abduct the shoulder joint / or raise the arm above the shoulder for 4 weeks after device was implanted.    Avoid activities that involve heavy lifting or rough contact that could result in blows to your implant site and to allow your incision time to heal.    No shower or getting device incision wet for 1 week post-operatively.    If SteriStrips are present over your incision, please allow strips to fall off naturally.  Do not pick or remove strips.    Keep wound exposed to air unless otherwise instructed.    No creams, lotions, or powders to incision.    Please avoid allowing bra strap or suspenders to lay over incision until completely healed.    Avoid hot tubs or pools until incision completely healed.    No driving for 48 hours post-operatively after device implant.    You may take acetaminophen (over-the-counter) for pain or discomfort.    Call your doctor if you have any swelling, redness or discharge around your incision, notice anything unusual or unexpected, or you develop a fever that does not go away in two or three days.    Call your doctor if you hear any beeping sounds / vibratory alerts from your device as this indicates your device needs to be checked immediately.    Carry your Medical Device ID Card with you at all times.    Please call our office ([phone]) with any questions

## 2025-02-04 NOTE — PROGRESS NOTES
Physical Therapy  Facility/Department: [address] [facility]   Physical Therapy Initial Evaluation    Patient Name: Luisana Goncalves        MRN: [id number]    : 1929    Date of Service: 2025    Chief Complaint   Patient presents with    Dizziness     Past Medical History:  has a past medical history of Arthritis, Breast cancer (HCC), CAD (coronary artery disease), CHF (congestive heart failure) (HCC), CKD (chronic kidney disease) stage 3, GFR 30-59 ml/min (HCC), COPD (chronic obstructive pulmonary disease) (HCC), Gastroesophageal reflux disease, Hyperlipidemia, Hypertension, Recurrent major depression in remission (HCC), Thyroid disease, and Type 2 diabetes mellitus with kidney complication, without long-term current use of insulin (HCC).  Past Surgical History:  has a past surgical history that includes Thyroid surgery; Mastectomy; Hysterectomy; Cholecystectomy; Colonoscopy; knee surgery; Cataract removal (Right); and ep device procedure (Left, 2/3/2025).    Discharge Recommendations: Further therapy recommended at discharge.     PT Equipment Recommendations  Equipment Needed: No (pt owns walker, cane, wheelchair)    Assessment  Body Structures, Functions, Activity Limitations Requiring Skilled Therapeutic Intervention: Decreased functional mobility , Decreased endurance, Decreased balance, Increased pain  Assessment: The pt performed bed mobility with SBA, transfers with CGA and ambulated 50ft with RW and CGA. Recommend continued therapy to address deficits and progress toward prior level of independence as the pt is able to ambulate short household distances without AD.  Therapy Prognosis: Good  Decision Making: Medium Complexity  Requires PT Follow-Up: Yes  Activity Tolerance  Activity Tolerance: Patient tolerated evaluation without incident, Patient limited by endurance  Safety Devices  Type of Devices: Call light within reach, Chair alarm in place, Gait belt, Left in chair, Nurse notified (chair alarm in

## 2025-02-04 NOTE — PLAN OF CARE
Problem: Chronic Conditions and Co-morbidities  Goal: Patient's chronic conditions and co-morbidity symptoms are monitored and maintained or improved  2/1/2025 0423 by Missy Becker RN  Outcome: Progressing     Problem: Discharge Planning  Goal: Discharge to home or other facility with appropriate resources  2/1/2025 0423 by Missy Becker RN  Outcome: Progressing     Problem: Pain  Goal: Verbalizes/displays adequate comfort level or baseline comfort level  2/1/2025 0423 by Missy Becker RN  Outcome: Progressing     Problem: Skin/Tissue Integrity  Goal: Skin integrity remains intact  Description: 1.  Monitor for areas of redness and/or skin breakdown  2.  Assess vascular access sites hourly  3.  Every 4-6 hours minimum:  Change oxygen saturation probe site  4.  Every 4-6 hours:  If on nasal continuous positive airway pressure, respiratory therapy assess nares and determine need for appliance change or resting period  2/1/2025 0423 by Missy Becker RN  Outcome: Progressing     Problem: ABCDS Injury Assessment  Goal: Absence of physical injury  2/1/2025 0423 by Missy Becker RN  Outcome: Progressing     Problem: Safety - Adult  Goal: Free from fall injury  2/1/2025 0423 by Missy Becker RN  Outcome: Progressing     Problem: Respiratory - Adult  Goal: Achieves optimal ventilation and oxygenation  2/1/2025 0423 by Missy Becker RN  Outcome: Progressing     Problem: Neurosensory - Adult  Goal: Achieves stable or improved neurological status  Outcome: Progressing     Problem: Neurosensory - Adult  Goal: Achieves maximal functionality and self care  Outcome: Progressing     Problem: Cardiovascular - Adult  Goal: Maintains optimal cardiac output and hemodynamic stability  Outcome: Progressing     
  Problem: Chronic Conditions and Co-morbidities  Goal: Patient's chronic conditions and co-morbidity symptoms are monitored and maintained or improved  2/1/2025 1702 by Oscar Luna, RN  Outcome: Progressing     Problem: Pain  Goal: Verbalizes/displays adequate comfort level or baseline comfort level  2/1/2025 1702 by Oscar Luna, RN  Outcome: Progressing     Problem: Skin/Tissue Integrity  Goal: Skin integrity remains intact  Description: 1.  Monitor for areas of redness and/or skin breakdown  2.  Assess vascular access sites hourly  3.  Every 4-6 hours minimum:  Change oxygen saturation probe site  4.  Every 4-6 hours:  If on nasal continuous positive airway pressure, respiratory therapy assess nares and determine need for appliance change or resting period  2/1/2025 1702 by Oscar Luna, RN  Outcome: Progressing  Flowsheets (Taken 2/1/2025 1700)  Skin Integrity Remains Intact: Monitor for areas of redness and/or skin breakdown     Problem: Respiratory - Adult  Goal: Achieves optimal ventilation and oxygenation  2/1/2025 1702 by Oscar Luna, RN  Outcome: Progressing     Problem: Cardiovascular - Adult  Goal: Maintains optimal cardiac output and hemodynamic stability  2/1/2025 1702 by Oscar Luna, RN  Outcome: Progressing     Problem: Cardiovascular - Adult  Goal: Absence of cardiac dysrhythmias or at baseline  2/1/2025 1702 by Oscar Luna, RN  Outcome: Progressing     Problem: Skin/Tissue Integrity - Adult  Goal: Skin integrity remains intact  Description: 1.  Monitor for areas of redness and/or skin breakdown  2.  Assess vascular access sites hourly  3.  Every 4-6 hours minimum:  Change oxygen saturation probe site  4.  Every 4-6 hours:  If on nasal continuous positive airway pressure, respiratory therapy assess nares and determine need for appliance change or resting period  2/1/2025 1702 by Oscar Luna, RN  Outcome: Progressing  Flowsheets (Taken 
  Problem: Chronic Conditions and Co-morbidities  Goal: Patient's chronic conditions and co-morbidity symptoms are monitored and maintained or improved  2/2/2025 0146 by Manju Nuñez RN  Outcome: Progressing  2/1/2025 1702 by Oscar Luna RN  Outcome: Progressing     Problem: Discharge Planning  Goal: Discharge to home or other facility with appropriate resources  Outcome: Progressing     Problem: Pain  Goal: Verbalizes/displays adequate comfort level or baseline comfort level  2/2/2025 0146 by Manju Nuñez RN  Outcome: Progressing  2/1/2025 1702 by Oscar Luna RN  Outcome: Progressing     Problem: Skin/Tissue Integrity  Goal: Skin integrity remains intact  Description: 1.  Monitor for areas of redness and/or skin breakdown  2.  Assess vascular access sites hourly  3.  Every 4-6 hours minimum:  Change oxygen saturation probe site  4.  Every 4-6 hours:  If on nasal continuous positive airway pressure, respiratory therapy assess nares and determine need for appliance change or resting period  2/2/2025 0146 by Manju Nuñez RN  Outcome: Progressing  2/1/2025 1702 by Oscar Luna, RN  Outcome: Progressing  Flowsheets (Taken 2/1/2025 1700)  Skin Integrity Remains Intact: Monitor for areas of redness and/or skin breakdown     Problem: ABCDS Injury Assessment  Goal: Absence of physical injury  Outcome: Progressing  Flowsheets (Taken 2/1/2025 1700 by Oscar Luna, RN)  Absence of Physical Injury: Implement safety measures based on patient assessment     Problem: Safety - Adult  Goal: Free from fall injury  Outcome: Progressing  Flowsheets (Taken 2/1/2025 1700 by Oscar Luna, RN)  Free From Fall Injury: Instruct family/caregiver on patient safety     Problem: Respiratory - Adult  Goal: Achieves optimal ventilation and oxygenation  2/2/2025 0146 by Manju Nuñez RN  Outcome: Progressing  2/1/2025 2014 by Tabby Barker RCP  Outcome: Progressing  2/1/2025 1702 by 
  Problem: Chronic Conditions and Co-morbidities  Goal: Patient's chronic conditions and co-morbidity symptoms are monitored and maintained or improved  2/4/2025 1811 by Arleen Betancourt RN  Outcome: Completed  2/4/2025 0605 by Radha Gonzalez RN  Outcome: Progressing     Problem: Discharge Planning  Goal: Discharge to home or other facility with appropriate resources  2/4/2025 1811 by Arleen Betancourt RN  Outcome: Completed  2/4/2025 0605 by Radha Gonzalez RN  Outcome: Progressing     Problem: Pain  Goal: Verbalizes/displays adequate comfort level or baseline comfort level  2/4/2025 1811 by Arleen Betancourt RN  Outcome: Completed  2/4/2025 0605 by Radha Gonzalez RN  Outcome: Progressing     Problem: Skin/Tissue Integrity  Goal: Skin integrity remains intact  Description: 1.  Monitor for areas of redness and/or skin breakdown  2.  Assess vascular access sites hourly  3.  Every 4-6 hours minimum:  Change oxygen saturation probe site  4.  Every 4-6 hours:  If on nasal continuous positive airway pressure, respiratory therapy assess nares and determine need for appliance change or resting period  2/4/2025 1811 by Arleen Betancourt RN  Outcome: Completed  2/4/2025 0605 by Radha Gonzalez RN  Outcome: Progressing     Problem: ABCDS Injury Assessment  Goal: Absence of physical injury  2/4/2025 1811 by Arleen Betancourt RN  Outcome: Completed  2/4/2025 0605 by Radha Gonzalez RN  Outcome: Progressing     Problem: Safety - Adult  Goal: Free from fall injury  2/4/2025 1811 by Arleen Betancourt RN  Outcome: Completed  2/4/2025 0605 by Radha Gonzalez RN  Outcome: Progressing     Problem: Respiratory - Adult  Goal: Achieves optimal ventilation and oxygenation  2/4/2025 1811 by Arlene Betancourt RN  Outcome: Completed  2/4/2025 0605 by Radha Gonzalez RN  Outcome: Progressing     Problem: Neurosensory - Adult  Goal: Achieves stable or improved neurological status  2/4/2025 1811 by Arleen Betancourt RN  Outcome: Completed  2/4/2025 
  Problem: Chronic Conditions and Co-morbidities  Goal: Patient's chronic conditions and co-morbidity symptoms are monitored and maintained or improved  Outcome: Progressing     Problem: Discharge Planning  Goal: Discharge to home or other facility with appropriate resources  Outcome: Progressing     Problem: Pain  Goal: Verbalizes/displays adequate comfort level or baseline comfort level  Outcome: Progressing     Problem: Skin/Tissue Integrity  Goal: Skin integrity remains intact  Description: 1.  Monitor for areas of redness and/or skin breakdown  2.  Assess vascular access sites hourly  3.  Every 4-6 hours minimum:  Change oxygen saturation probe site  4.  Every 4-6 hours:  If on nasal continuous positive airway pressure, respiratory therapy assess nares and determine need for appliance change or resting period  Outcome: Progressing     Problem: ABCDS Injury Assessment  Goal: Absence of physical injury  Outcome: Progressing     Problem: Safety - Adult  Goal: Free from fall injury  Outcome: Progressing     
  Problem: Chronic Conditions and Co-morbidities  Goal: Patient's chronic conditions and co-morbidity symptoms are monitored and maintained or improved  Outcome: Progressing     Problem: Discharge Planning  Goal: Discharge to home or other facility with appropriate resources  Outcome: Progressing     Problem: Pain  Goal: Verbalizes/displays adequate comfort level or baseline comfort level  Outcome: Progressing     Problem: Skin/Tissue Integrity  Goal: Skin integrity remains intact  Description: 1.  Monitor for areas of redness and/or skin breakdown  2.  Assess vascular access sites hourly  3.  Every 4-6 hours minimum:  Change oxygen saturation probe site  4.  Every 4-6 hours:  If on nasal continuous positive airway pressure, respiratory therapy assess nares and determine need for appliance change or resting period  Outcome: Progressing     Problem: ABCDS Injury Assessment  Goal: Absence of physical injury  Outcome: Progressing     Problem: Safety - Adult  Goal: Free from fall injury  Outcome: Progressing     
  Problem: Chronic Conditions and Co-morbidities  Goal: Patient's chronic conditions and co-morbidity symptoms are monitored and maintained or improved  Outcome: Progressing     Problem: Discharge Planning  Goal: Discharge to home or other facility with appropriate resources  Outcome: Progressing     Problem: Pain  Goal: Verbalizes/displays adequate comfort level or baseline comfort level  Outcome: Progressing     Problem: Skin/Tissue Integrity  Goal: Skin integrity remains intact  Description: 1.  Monitor for areas of redness and/or skin breakdown  2.  Assess vascular access sites hourly  3.  Every 4-6 hours minimum:  Change oxygen saturation probe site  4.  Every 4-6 hours:  If on nasal continuous positive airway pressure, respiratory therapy assess nares and determine need for appliance change or resting period  Outcome: Progressing  Flowsheets (Taken 2/2/2025 2000)  Skin Integrity Remains Intact: Monitor for areas of redness and/or skin breakdown     Problem: ABCDS Injury Assessment  Goal: Absence of physical injury  Outcome: Progressing  Flowsheets (Taken 2/2/2025 2045)  Absence of Physical Injury: Implement safety measures based on patient assessment     Problem: Safety - Adult  Goal: Free from fall injury  Outcome: Progressing     Problem: Neurosensory - Adult  Goal: Achieves stable or improved neurological status  Outcome: Progressing     Problem: Cardiovascular - Adult  Goal: Maintains optimal cardiac output and hemodynamic stability  Outcome: Progressing  Goal: Absence of cardiac dysrhythmias or at baseline  Outcome: Progressing     Problem: Skin/Tissue Integrity - Adult  Goal: Skin integrity remains intact  Description: 1.  Monitor for areas of redness and/or skin breakdown  2.  Assess vascular access sites hourly  3.  Every 4-6 hours minimum:  Change oxygen saturation probe site  4.  Every 4-6 hours:  If on nasal continuous positive airway pressure, respiratory therapy assess nares and determine need for 
  Problem: Respiratory - Adult  Goal: Achieves optimal ventilation and oxygenation  2/1/2025 2014 by Tabby Barker RCP  Outcome: Progressing   BRONCHOSPASM/BRONCHOCONSTRICTION     [x]         IMPROVE AERATION/BREATH SOUNDS  [x]   ADMINISTER BRONCHODILATOR THERAPY AS APPROPRIATE  [x]   ASSESS BREATH SOUNDS  []   IMPLEMENT AEROSOL/MDI PROTOCOL  [x]   PATIENT EDUCATION AS NEEDED    
  Problem: Respiratory - Adult  Goal: Achieves optimal ventilation and oxygenation  Flowsheets (Taken 1/31/2025 2009)  Achieves optimal ventilation and oxygenation:   Respiratory therapy support as indicated   Assess for changes in respiratory status   Assess for changes in mentation and behavior   Oxygen supplementation based on oxygen saturation or arterial blood gases   Assess and instruct to report shortness of breath or any respiratory difficulty   Encourage broncho-pulmonary hygiene including cough, deep breathe, incentive spirometry     
Patient follows up with Dr. David Parker (Cardiology) at [facility]. Patient is accompanied by her daughter in the room. They requested that they want to see their regular cardiologist and would like to be evaluated by them. Per request I reached out to Dr. Parker and updated him via [employer]. Dr. Parker requested patient to be seen by [facility] today and stated Dr. Horan (EP) will evaluate the patient tomorrow. I relayed the same information to patient. During my conversation with patient's daughter she requested material to read about Pacemakers, she was provide UpToDate patient education material. During the conversation patient's daughter stated that they want the patient to be seen by more experienced doctors, and that patient was only evaluated only cardiology fellow. I explained that attending cardiologist will round later. They asked me in the presence of RN how much experience do the   On my evaluation patient is Aox4, speaking in full sentences, telemetry is showing normal sinus rhythm with HR in 40s-60s, and normotensive. Patient denies any symptoms, she is on 2 L oxygen which is her baseline.  
Progressing  2/3/2025 0609 by Radha Gonzalez RN  Outcome: Progressing  Goal: Achieves maximal functionality and self care  2/3/2025 1821 by Arleen Betancourt RN  Outcome: Progressing  2/3/2025 0609 by Radha Gonzalez RN  Outcome: Progressing     Problem: Cardiovascular - Adult  Goal: Maintains optimal cardiac output and hemodynamic stability  2/3/2025 1821 by Arleen Betancourt RN  Outcome: Progressing  2/3/2025 0609 by Radha Gonzalez RN  Outcome: Progressing  Goal: Absence of cardiac dysrhythmias or at baseline  2/3/2025 1821 by Arleen Betancourt RN  Outcome: Progressing  2/3/2025 0609 by Radha Gonzalez RN  Outcome: Progressing     Problem: Skin/Tissue Integrity - Adult  Goal: Skin integrity remains intact  Description: 1.  Monitor for areas of redness and/or skin breakdown  2.  Assess vascular access sites hourly  3.  Every 4-6 hours minimum:  Change oxygen saturation probe site  4.  Every 4-6 hours:  If on nasal continuous positive airway pressure, respiratory therapy assess nares and determine need for appliance change or resting period  2/3/2025 1821 by Arleen Betancourt RN  Outcome: Progressing  2/3/2025 0609 by Radha Gonzalez RN  Outcome: Progressing  Goal: Oral mucous membranes remain intact  2/3/2025 1821 by Arleen Betancourt RN  Outcome: Progressing  2/3/2025 0609 by Radha Gonzalez RN  Outcome: Progressing  Flowsheets (Taken 2/2/2025 2000 by Rae Lopez, RN)  Oral Mucous Membranes Remain Intact: Assess oral mucosa and hygiene practices     Problem: Musculoskeletal - Adult  Goal: Return mobility to safest level of function  2/3/2025 1821 by Arleen Betancourt RN  Outcome: Progressing  2/3/2025 0609 by Radha Gonzalez RN  Outcome: Progressing  Goal: Return ADL status to a safe level of function  2/3/2025 1821 by Arleen Betancourt RN  Outcome: Progressing  2/3/2025 0609 by Radha Gonzalez RN  Outcome: Progressing     Problem: Gastrointestinal - Adult  Goal: Minimal or absence of nausea and 
Adult  Goal: Absence of urinary retention  Outcome: Progressing     Problem: Infection - Adult  Goal: Absence of infection at discharge  Outcome: Progressing  Goal: Absence of infection during hospitalization  Outcome: Progressing     Problem: Metabolic/Fluid and Electrolytes - Adult  Goal: Electrolytes maintained within normal limits  Outcome: Progressing  Goal: Hemodynamic stability and optimal renal function maintained  Outcome: Progressing  Goal: Glucose maintained within prescribed range  Outcome: Progressing     Problem: Hematologic - Adult  Goal: Maintains hematologic stability  Outcome: Progressing     
nutritional intake  2/4/2025 0605 by Radha Gonzalez RN  Outcome: Progressing  2/3/2025 1821 by Arleen Betancourt RN  Outcome: Progressing     Problem: Genitourinary - Adult  Goal: Absence of urinary retention  2/4/2025 0605 by Radha Gonzalez RN  Outcome: Progressing  2/3/2025 1821 by Arleen Betancourt RN  Outcome: Progressing     Problem: Infection - Adult  Goal: Absence of infection at discharge  2/4/2025 0605 by Radha Gonzalez RN  Outcome: Progressing  2/3/2025 1821 by Arleen Betancourt RN  Outcome: Progressing  Goal: Absence of infection during hospitalization  2/4/2025 0605 by Radha Gonzalez RN  Outcome: Progressing  2/3/2025 1821 by Arleen Betancourt RN  Outcome: Progressing     Problem: Metabolic/Fluid and Electrolytes - Adult  Goal: Electrolytes maintained within normal limits  2/4/2025 0605 by Radha Gonzalez RN  Outcome: Progressing  2/3/2025 1821 by Arleen Betancourt RN  Outcome: Progressing  Goal: Hemodynamic stability and optimal renal function maintained  2/4/2025 0605 by Radha Gonzalez RN  Outcome: Progressing  2/3/2025 1821 by Arleen Betancourt RN  Outcome: Progressing  Goal: Glucose maintained within prescribed range  2/4/2025 0605 by Radha Gonzalez RN  Outcome: Progressing  2/3/2025 1821 by Arleen Betancourt RN  Outcome: Progressing     Problem: Hematologic - Adult  Goal: Maintains hematologic stability  2/4/2025 0605 by Radha Gonzalez RN  Outcome: Progressing  2/3/2025 1821 by Arleen Betancourt RN  Outcome: Progressing

## 2025-02-04 NOTE — DISCHARGE INSTRUCTIONS
1.  Follow-up with [facility] in 2-3 weeks for wound check  2.  Follow-up in 6-8 weeks for device check     [facility]  [address], [place] [address]  [phone]           Post device:     Please do not lift more than 10 pounds or abduct the shoulder joint / or raise the arm above the shoulder for 4 weeks after device was implanted.     Avoid activities that involve heavy lifting or rough contact that could result in blows to your implant site and to allow your incision time to heal.     No shower or getting device incision wet for 1 week post-operatively.     If SteriStrips are present over your incision, please allow strips to fall off naturally.  Do not pick or remove strips.     Keep wound exposed to air unless otherwise instructed.     No creams, lotions, or powders to incision.     Please avoid allowing bra strap or suspenders to lay over incision until completely healed.     Avoid hot tubs or pools until incision completely healed.     No driving for 48 hours post-operatively after device implant.     You may take acetaminophen (over-the-counter) for pain or discomfort.     Call your doctor if you have any swelling, redness or discharge around your incision, notice anything unusual or unexpected, or you develop a fever that does not go away in two or three days.     Call your doctor if you hear any beeping sounds / vibratory alerts from your device as this indicates your device needs to be checked immediately.     Carry your Medical Device ID Card with you at all times.

## 2025-02-04 NOTE — CARE COORDINATION
Transition planning    Called and notified Nanette at [place] of patient discharge today, she states they will obtain necessary information from Epic.

## 2025-02-04 NOTE — DISCHARGE SUMMARY
tablet Refills: 3    budesonide-formoterol (SYMBICORT) 160-4.5 MCG/ACT AERO  Inhale 2 puffs into the lungs 2 times daily  Qty: 1 Inhaler Refills: 3    travoprost, benzalkonium, (TRAVATAN) 0.004 % ophthalmic solution  Place 1 drop into both eyes daily  Qty: 1 Bottle Refills: 3          Current Discharge Medication List    STOP taking these medications    furosemide (LASIX) 20 MG tablet  Comments:  Reason for Stopping:    metOLazone (ZAROXOLYN) 5 MG tablet  Comments:  Reason for Stopping:          Time Spent on Discharge:  40 minutes were spent in patient examination, evaluation, counseling as well as medication reconciliation, prescriptions for required medications, discharge plan, and follow up.    Electronically signed by Michelle Galicia MD on 2/4/25 at 12:44 PM EST

## 2025-02-04 NOTE — PROGRESS NOTES
[facility]  Office: [phone]  Jaylen Llanos DO, Carrington Ash DO, Emiliano Haq DO, Chad Ng DO, Mega Black MD, Albania Aguila MD, Katiuska Monroy MD, Rayne Rod MD,  Sammy Conroy MD, Baldev Felton MD, Martin Laguna MD,  Dayne Reina DO, Liz Mejia MD, Sulaiman Burgess MD, Kurt Llaons DO, Radha Lake MD,  Denver Mccauley DO, Lani Back MD, Michelle Galicia MD, Rita Beal MD, Christiano Johnson MD,  Arcadio Díaz MD, Edy Quintero MD, Kev Bethea MD, Galina Levi MD, Binh Lou MD, Dave Huddleston MD, Quique Lou DO, Roosevelt Tsai MD, Dayne Obrien MD, Mohsin Reza, MD, Shirley Waterhouse, CNP,  Iraida Burks CNP, Quique Loredo, CNP,  Melissa Crenshaw, DNP, Radha Covarrubias, CNP, Flaquita Connor, CNP, Sade Wolff, CNP, Amanda Patton CNP, Leida Eubanks, PA-C, Xiao Kelley, CNP, Samara Pelayo, CNP,  Kavita Kramer, CNP, Vannesa Sanchez, CNP, Elizabeth Navarro, CNP,  Whitney Purcell, CNS, Belle Lopes CNP, aPm Beard CNP,   Milena Martinez, CNP         [facility]   IN-PATIENT SERVICE   [facility]    Progress Note    2/4/2025    9:03 AM    Name:   Luisana Goncalves  MRN:     [id number]     Acct:      [id number]   Room:   0161/0161-01   Day:  4  Admit Date:  1/30/2025  1:14 PM    PCP:   Nic Salazar MD  Code Status:  Full Code    Subjective:     C/C:   Chief Complaint   Patient presents with    Dizziness     Interval History Status: improved.     s/p successful dual-chamber pacemaker implant 2/3/2025     Brief History:     Luisana Goncalves is a 89 y.o. Non- / non  female who presents with Dizziness   and is admitted to the hospital for the management of bradycardia     89-year-old patient presents to the emergency department via EMS for evaluation of a syncopal event at home.  Patient was found to be bradycardic upon EMS arrival as well as upon presentation to the emergency department with

## 2025-02-04 NOTE — PROGRESS NOTES
Occupational Therapy  Occupational Therapy Re-Evaluation  Facility/Department: [address] BURN UNIT   Patient Name: Luisana Goncalves        MRN: [id number]    : 1929    Date of Service: 2025    Chief Complaint   Patient presents with    Dizziness     Past Medical History:  has a past medical history of Arthritis, Breast cancer (HCC), CAD (coronary artery disease), CHF (congestive heart failure) (HCC), CKD (chronic kidney disease) stage 3, GFR 30-59 ml/min (HCC), COPD (chronic obstructive pulmonary disease) (HCC), Gastroesophageal reflux disease, Hyperlipidemia, Hypertension, Recurrent major depression in remission (HCC), Thyroid disease, and Type 2 diabetes mellitus with kidney complication, without long-term current use of insulin (HCC).  Past Surgical History:  has a past surgical history that includes Thyroid surgery; Mastectomy; Hysterectomy; Cholecystectomy; Colonoscopy; knee surgery; Cataract removal (Right); and ep device procedure (Left, 2/3/2025).    Discharge Recommendations  Discharge Recommendations: Patient would benefit from continued therapy after discharge  OT Equipment Recommendations  Equipment Needed: No    Assessment  Performance deficits / Impairments: Decreased functional mobility ;Decreased ADL status;Decreased endurance;Decreased balance;Decreased cognition;Decreased high-level IADLs  Assessment: Pt agreed to occupational therapy evaluation with education provided on purpose and role of occupational therapy services in the acute care setting. OT facilitated assessing functional mobility and ADL performance to pt's tolerance this visit. Pt exhibited requiring SBA for bed mobility, CGA for functional sit<>stand transfers and CGA for functional moiblity. Transfers/mobility completed utilizing RW. Education on PPM precautions w/ good return requiring minimal reminders throughout session to ensure adherence. OT facilitated toileting w/ Min A and grooming tasks sinkside w/ Mod IND. Greatest

## 2025-02-04 NOTE — PROGRESS NOTES
RN went over discharge paperwork with patient and daughter Kristy at bedside in full, all questions answered. IV removed by writer without complications. Patient discharged with all belongings, meds to beds and pacemaker instructions/equipment. Patient taken to main lobby entrance in wheelchair by writer for discharge.

## 2025-05-08 ENCOUNTER — APPOINTMENT (OUTPATIENT)
Dept: GENERAL RADIOLOGY | Age: 89
DRG: 176 | End: 2025-05-08
Payer: MEDICARE

## 2025-05-08 ENCOUNTER — HOSPITAL ENCOUNTER (INPATIENT)
Age: 89
LOS: 3 days | Discharge: HOME | DRG: 176 | End: 2025-05-12
Attending: EMERGENCY MEDICINE | Admitting: STUDENT IN AN ORGANIZED HEALTH CARE EDUCATION/TRAINING PROGRAM
Payer: MEDICARE

## 2025-05-08 DIAGNOSIS — J02.9 ACUTE PHARYNGITIS, UNSPECIFIED ETIOLOGY: ICD-10-CM

## 2025-05-08 DIAGNOSIS — M79.605 PAIN IN BOTH LOWER EXTREMITIES: ICD-10-CM

## 2025-05-08 DIAGNOSIS — M79.604 PAIN IN BOTH LOWER EXTREMITIES: ICD-10-CM

## 2025-05-08 DIAGNOSIS — R60.0 LEG EDEMA: Primary | ICD-10-CM

## 2025-05-08 DIAGNOSIS — R60.0 PEDAL EDEMA: ICD-10-CM

## 2025-05-08 DIAGNOSIS — N18.9 CHRONIC KIDNEY DISEASE, UNSPECIFIED CKD STAGE: ICD-10-CM

## 2025-05-08 DIAGNOSIS — R07.9 CHEST PAIN, UNSPECIFIED TYPE: ICD-10-CM

## 2025-05-08 DIAGNOSIS — I50.9 ACUTE ON CHRONIC CONGESTIVE HEART FAILURE, UNSPECIFIED HEART FAILURE TYPE (HCC): ICD-10-CM

## 2025-05-08 LAB
ALBUMIN SERPL-MCNC: 4.1 G/DL (ref 3.5–5.2)
ALBUMIN/GLOB SERPL: 1.6 {RATIO} (ref 1–2.5)
ALP SERPL-CCNC: 155 U/L (ref 35–104)
ALT SERPL-CCNC: 23 U/L (ref 10–35)
ANION GAP SERPL CALCULATED.3IONS-SCNC: 12 MMOL/L (ref 9–16)
AST SERPL-CCNC: 33 U/L (ref 10–35)
BASOPHILS # BLD: 0 K/UL (ref 0–0.2)
BASOPHILS NFR BLD: 0 % (ref 0–2)
BILIRUB DIRECT SERPL-MCNC: 0.4 MG/DL (ref 0–0.2)
BILIRUB INDIRECT SERPL-MCNC: 0.5 MG/DL (ref 0–1)
BILIRUB SERPL-MCNC: 0.9 MG/DL (ref 0–1.2)
BNP SERPL-MCNC: ABNORMAL PG/ML (ref 0–450)
BUN SERPL-MCNC: 34 MG/DL (ref 8–23)
CALCIUM SERPL-MCNC: 9.3 MG/DL (ref 8.6–10.4)
CHLORIDE SERPL-SCNC: 108 MMOL/L (ref 98–107)
CO2 SERPL-SCNC: 23 MMOL/L (ref 20–31)
CREAT SERPL-MCNC: 1.4 MG/DL (ref 0.6–0.9)
D DIMER PPP FEU-MCNC: 1.3 UG/ML FEU (ref 0–0.57)
EOSINOPHIL # BLD: 0.08 K/UL (ref 0–0.4)
EOSINOPHILS RELATIVE PERCENT: 1 % (ref 1–4)
ERYTHROCYTE [DISTWIDTH] IN BLOOD BY AUTOMATED COUNT: 14.7 % (ref 11.8–14.4)
GFR, ESTIMATED: 35 ML/MIN/1.73M2
GLUCOSE SERPL-MCNC: 148 MG/DL (ref 74–99)
HCT VFR BLD AUTO: 36.9 % (ref 36.3–47.1)
HGB BLD-MCNC: 11.7 G/DL (ref 11.9–15.1)
IMM GRANULOCYTES # BLD AUTO: 0 K/UL (ref 0–0.3)
IMM GRANULOCYTES NFR BLD: 0 %
LYMPHOCYTES NFR BLD: 0.25 K/UL (ref 1–4.8)
LYMPHOCYTES RELATIVE PERCENT: 3 % (ref 24–44)
MCH RBC QN AUTO: 31.5 PG (ref 25.2–33.5)
MCHC RBC AUTO-ENTMCNC: 31.7 G/DL (ref 28.4–34.8)
MCV RBC AUTO: 99.2 FL (ref 82.6–102.9)
MONOCYTES NFR BLD: 1.01 K/UL (ref 0.1–0.8)
MONOCYTES NFR BLD: 12 % (ref 1–7)
MORPHOLOGY: ABNORMAL
NEUTROPHILS NFR BLD: 84 % (ref 36–66)
NEUTS SEG NFR BLD: 7.06 K/UL (ref 1.8–7.7)
NRBC BLD-RTO: 0 PER 100 WBC
PLATELET # BLD AUTO: 188 K/UL (ref 138–453)
PMV BLD AUTO: 10.4 FL (ref 8.1–13.5)
POTASSIUM SERPL-SCNC: 4.3 MMOL/L (ref 3.7–5.3)
PROT SERPL-MCNC: 6.7 G/DL (ref 6.6–8.7)
RBC # BLD AUTO: 3.72 M/UL (ref 3.95–5.11)
SODIUM SERPL-SCNC: 143 MMOL/L (ref 136–145)
TROPONIN I SERPL HS-MCNC: 36 NG/L (ref 0–14)
TROPONIN I SERPL HS-MCNC: 38 NG/L (ref 0–14)
WBC OTHER # BLD: 8.4 K/UL (ref 3.5–11.3)

## 2025-05-08 PROCEDURE — 84484 ASSAY OF TROPONIN QUANT: CPT

## 2025-05-08 PROCEDURE — 85379 FIBRIN DEGRADATION QUANT: CPT

## 2025-05-08 PROCEDURE — 71046 X-RAY EXAM CHEST 2 VIEWS: CPT

## 2025-05-08 PROCEDURE — 6370000000 HC RX 637 (ALT 250 FOR IP): Performed by: STUDENT IN AN ORGANIZED HEALTH CARE EDUCATION/TRAINING PROGRAM

## 2025-05-08 PROCEDURE — 99285 EMERGENCY DEPT VISIT HI MDM: CPT

## 2025-05-08 PROCEDURE — 80053 COMPREHEN METABOLIC PANEL: CPT

## 2025-05-08 PROCEDURE — 93005 ELECTROCARDIOGRAM TRACING: CPT | Performed by: INTERNAL MEDICINE

## 2025-05-08 PROCEDURE — 94640 AIRWAY INHALATION TREATMENT: CPT

## 2025-05-08 PROCEDURE — 83880 ASSAY OF NATRIURETIC PEPTIDE: CPT

## 2025-05-08 PROCEDURE — 2700000000 HC OXYGEN THERAPY PER DAY

## 2025-05-08 PROCEDURE — 82248 BILIRUBIN DIRECT: CPT

## 2025-05-08 PROCEDURE — 85025 COMPLETE CBC W/AUTO DIFF WBC: CPT

## 2025-05-08 PROCEDURE — 94761 N-INVAS EAR/PLS OXIMETRY MLT: CPT

## 2025-05-08 RX ORDER — ALBUTEROL SULFATE 0.83 MG/ML
2.5 SOLUTION RESPIRATORY (INHALATION)
Status: DISCONTINUED | OUTPATIENT
Start: 2025-05-08 | End: 2025-05-12 | Stop reason: HOSPADM

## 2025-05-08 RX ORDER — ALBUTEROL SULFATE 5 MG/ML
15 SOLUTION RESPIRATORY (INHALATION)
Status: DISCONTINUED | OUTPATIENT
Start: 2025-05-08 | End: 2025-05-12 | Stop reason: HOSPADM

## 2025-05-08 RX ORDER — IPRATROPIUM BROMIDE AND ALBUTEROL SULFATE 2.5; .5 MG/3ML; MG/3ML
1 SOLUTION RESPIRATORY (INHALATION)
Status: DISCONTINUED | OUTPATIENT
Start: 2025-05-08 | End: 2025-05-12 | Stop reason: HOSPADM

## 2025-05-08 RX ADMIN — IPRATROPIUM BROMIDE AND ALBUTEROL SULFATE 1 DOSE: 2.5; .5 SOLUTION RESPIRATORY (INHALATION) at 22:36

## 2025-05-08 ASSESSMENT — PAIN - FUNCTIONAL ASSESSMENT: PAIN_FUNCTIONAL_ASSESSMENT: 0-10

## 2025-05-08 ASSESSMENT — PAIN SCALES - GENERAL: PAINLEVEL_OUTOF10: 7

## 2025-05-08 ASSESSMENT — LIFESTYLE VARIABLES
HOW MANY STANDARD DRINKS CONTAINING ALCOHOL DO YOU HAVE ON A TYPICAL DAY: PATIENT DOES NOT DRINK
HOW OFTEN DO YOU HAVE A DRINK CONTAINING ALCOHOL: NEVER

## 2025-05-08 ASSESSMENT — PAIN DESCRIPTION - LOCATION: LOCATION: CHEST

## 2025-05-09 ENCOUNTER — APPOINTMENT (OUTPATIENT)
Dept: CT IMAGING | Age: 89
DRG: 176 | End: 2025-05-09
Payer: MEDICARE

## 2025-05-09 ENCOUNTER — APPOINTMENT (OUTPATIENT)
Dept: GENERAL RADIOLOGY | Age: 89
DRG: 176 | End: 2025-05-09
Payer: MEDICARE

## 2025-05-09 ENCOUNTER — APPOINTMENT (OUTPATIENT)
Dept: VASCULAR LAB | Age: 89
DRG: 176 | End: 2025-05-09
Payer: MEDICARE

## 2025-05-09 PROBLEM — I26.99 PULMONARY EMBOLISM ON RIGHT (HCC): Status: ACTIVE | Noted: 2025-05-09

## 2025-05-09 LAB
ANTI-XA UNFRAC HEPARIN: 0.99 IU/L
ANTI-XA UNFRAC HEPARIN: <0.1 IU/L
BACTERIA URNS QL MICRO: ABNORMAL
BILIRUB UR QL STRIP: NEGATIVE
CASTS #/AREA URNS LPF: ABNORMAL /LPF (ref 0–8)
CLARITY UR: CLEAR
COLOR UR: ABNORMAL
ECHO BSA: 1.85 M2
EPI CELLS #/AREA URNS HPF: ABNORMAL /HPF (ref 0–5)
FERRITIN SERPL-MCNC: 103 NG/ML
GLUCOSE BLD-MCNC: 116 MG/DL (ref 65–105)
GLUCOSE BLD-MCNC: 119 MG/DL (ref 65–105)
GLUCOSE BLD-MCNC: 127 MG/DL (ref 65–105)
GLUCOSE BLD-MCNC: 87 MG/DL (ref 65–105)
GLUCOSE UR STRIP-MCNC: NEGATIVE MG/DL
HGB UR QL STRIP.AUTO: NEGATIVE
IRON SATN MFR SERPL: 9 % (ref 20–55)
IRON SERPL-MCNC: 24 UG/DL (ref 37–145)
KETONES UR STRIP-MCNC: NEGATIVE MG/DL
LEUKOCYTE ESTERASE UR QL STRIP: NEGATIVE
NITRITE UR QL STRIP: NEGATIVE
PARTIAL THROMBOPLASTIN TIME: 29.1 SEC (ref 23–36.5)
PH UR STRIP: 5.5 [PH] (ref 5–8)
PROT UR STRIP-MCNC: ABNORMAL MG/DL
RBC #/AREA URNS HPF: ABNORMAL /HPF (ref 0–4)
SODIUM UR-SCNC: 77 MMOL/L
SP GR UR STRIP: 1.04 (ref 1–1.03)
SPECIMEN SOURCE: NORMAL
STREP A, MOLECULAR: NEGATIVE
TIBC SERPL-MCNC: 270 UG/DL (ref 250–450)
TOTAL PROTEIN, URINE: 90 MG/DL
TROPONIN I SERPL HS-MCNC: 32 NG/L (ref 0–14)
TSH SERPL DL<=0.05 MIU/L-ACNC: 3.21 UIU/ML (ref 0.27–4.2)
UNSATURATED IRON BINDING CAPACITY: 246 UG/DL (ref 112–347)
UROBILINOGEN UR STRIP-ACNC: ABNORMAL EU/DL (ref 0–1)
WBC #/AREA URNS HPF: ABNORMAL /HPF (ref 0–5)

## 2025-05-09 PROCEDURE — APPSS30 APP SPLIT SHARED TIME 16-30 MINUTES

## 2025-05-09 PROCEDURE — 6370000000 HC RX 637 (ALT 250 FOR IP): Performed by: INTERNAL MEDICINE

## 2025-05-09 PROCEDURE — 93970 EXTREMITY STUDY: CPT

## 2025-05-09 PROCEDURE — 84300 ASSAY OF URINE SODIUM: CPT

## 2025-05-09 PROCEDURE — 6370000000 HC RX 637 (ALT 250 FOR IP)

## 2025-05-09 PROCEDURE — 93970 EXTREMITY STUDY: CPT | Performed by: SURGERY

## 2025-05-09 PROCEDURE — 73562 X-RAY EXAM OF KNEE 3: CPT

## 2025-05-09 PROCEDURE — 82728 ASSAY OF FERRITIN: CPT

## 2025-05-09 PROCEDURE — 6360000004 HC RX CONTRAST MEDICATION: Performed by: STUDENT IN AN ORGANIZED HEALTH CARE EDUCATION/TRAINING PROGRAM

## 2025-05-09 PROCEDURE — 83540 ASSAY OF IRON: CPT

## 2025-05-09 PROCEDURE — 82947 ASSAY GLUCOSE BLOOD QUANT: CPT

## 2025-05-09 PROCEDURE — 94640 AIRWAY INHALATION TREATMENT: CPT

## 2025-05-09 PROCEDURE — 85520 HEPARIN ASSAY: CPT

## 2025-05-09 PROCEDURE — 71260 CT THORAX DX C+: CPT

## 2025-05-09 PROCEDURE — 87651 STREP A DNA AMP PROBE: CPT

## 2025-05-09 PROCEDURE — 84484 ASSAY OF TROPONIN QUANT: CPT

## 2025-05-09 PROCEDURE — 99223 1ST HOSP IP/OBS HIGH 75: CPT | Performed by: INTERNAL MEDICINE

## 2025-05-09 PROCEDURE — 94761 N-INVAS EAR/PLS OXIMETRY MLT: CPT

## 2025-05-09 PROCEDURE — 81001 URINALYSIS AUTO W/SCOPE: CPT

## 2025-05-09 PROCEDURE — 6360000002 HC RX W HCPCS: Performed by: STUDENT IN AN ORGANIZED HEALTH CARE EDUCATION/TRAINING PROGRAM

## 2025-05-09 PROCEDURE — 6370000000 HC RX 637 (ALT 250 FOR IP): Performed by: NURSE PRACTITIONER

## 2025-05-09 PROCEDURE — 85730 THROMBOPLASTIN TIME PARTIAL: CPT

## 2025-05-09 PROCEDURE — 84156 ASSAY OF PROTEIN URINE: CPT

## 2025-05-09 PROCEDURE — 84443 ASSAY THYROID STIM HORMONE: CPT

## 2025-05-09 PROCEDURE — 6370000000 HC RX 637 (ALT 250 FOR IP): Performed by: STUDENT IN AN ORGANIZED HEALTH CARE EDUCATION/TRAINING PROGRAM

## 2025-05-09 PROCEDURE — 83550 IRON BINDING TEST: CPT

## 2025-05-09 PROCEDURE — 2500000003 HC RX 250 WO HCPCS: Performed by: NURSE PRACTITIONER

## 2025-05-09 PROCEDURE — 36415 COLL VENOUS BLD VENIPUNCTURE: CPT

## 2025-05-09 PROCEDURE — 2060000000 HC ICU INTERMEDIATE R&B

## 2025-05-09 RX ORDER — ATORVASTATIN CALCIUM 20 MG/1
20 TABLET, FILM COATED ORAL NIGHTLY
Status: DISCONTINUED | OUTPATIENT
Start: 2025-05-09 | End: 2025-05-12 | Stop reason: HOSPADM

## 2025-05-09 RX ORDER — ACETAMINOPHEN 500 MG
1000 TABLET ORAL ONCE
Status: COMPLETED | OUTPATIENT
Start: 2025-05-09 | End: 2025-05-09

## 2025-05-09 RX ORDER — DEXTROSE MONOHYDRATE 100 MG/ML
INJECTION, SOLUTION INTRAVENOUS CONTINUOUS PRN
Status: DISCONTINUED | OUTPATIENT
Start: 2025-05-09 | End: 2025-05-12 | Stop reason: HOSPADM

## 2025-05-09 RX ORDER — FUROSEMIDE 40 MG/1
40 TABLET ORAL DAILY
Status: DISCONTINUED | OUTPATIENT
Start: 2025-05-09 | End: 2025-05-09

## 2025-05-09 RX ORDER — MIDODRINE HYDROCHLORIDE 5 MG/1
10 TABLET ORAL
Status: DISCONTINUED | OUTPATIENT
Start: 2025-05-09 | End: 2025-05-12 | Stop reason: HOSPADM

## 2025-05-09 RX ORDER — FUROSEMIDE 10 MG/ML
40 INJECTION INTRAMUSCULAR; INTRAVENOUS DAILY
Status: DISCONTINUED | OUTPATIENT
Start: 2025-05-09 | End: 2025-05-12

## 2025-05-09 RX ORDER — ONDANSETRON 2 MG/ML
4 INJECTION INTRAMUSCULAR; INTRAVENOUS EVERY 6 HOURS PRN
Status: DISCONTINUED | OUTPATIENT
Start: 2025-05-09 | End: 2025-05-12 | Stop reason: HOSPADM

## 2025-05-09 RX ORDER — GABAPENTIN 300 MG/1
300 CAPSULE ORAL 2 TIMES DAILY
Status: DISCONTINUED | OUTPATIENT
Start: 2025-05-09 | End: 2025-05-12 | Stop reason: HOSPADM

## 2025-05-09 RX ORDER — LATANOPROST 50 UG/ML
1 SOLUTION/ DROPS OPHTHALMIC DAILY
Status: DISCONTINUED | OUTPATIENT
Start: 2025-05-09 | End: 2025-05-12 | Stop reason: HOSPADM

## 2025-05-09 RX ORDER — SODIUM CHLORIDE 0.9 % (FLUSH) 0.9 %
5-40 SYRINGE (ML) INJECTION EVERY 12 HOURS SCHEDULED
Status: DISCONTINUED | OUTPATIENT
Start: 2025-05-09 | End: 2025-05-12 | Stop reason: HOSPADM

## 2025-05-09 RX ORDER — INSULIN LISPRO 100 [IU]/ML
0-4 INJECTION, SOLUTION INTRAVENOUS; SUBCUTANEOUS
Status: DISCONTINUED | OUTPATIENT
Start: 2025-05-09 | End: 2025-05-12 | Stop reason: HOSPADM

## 2025-05-09 RX ORDER — ONDANSETRON 4 MG/1
4 TABLET, ORALLY DISINTEGRATING ORAL EVERY 8 HOURS PRN
Status: DISCONTINUED | OUTPATIENT
Start: 2025-05-09 | End: 2025-05-12 | Stop reason: HOSPADM

## 2025-05-09 RX ORDER — LEVOTHYROXINE SODIUM 50 UG/1
50 TABLET ORAL DAILY
Status: DISCONTINUED | OUTPATIENT
Start: 2025-05-09 | End: 2025-05-12 | Stop reason: HOSPADM

## 2025-05-09 RX ORDER — SODIUM CHLORIDE 0.9 % (FLUSH) 0.9 %
10 SYRINGE (ML) INJECTION PRN
Status: DISCONTINUED | OUTPATIENT
Start: 2025-05-09 | End: 2025-05-12 | Stop reason: HOSPADM

## 2025-05-09 RX ORDER — GLUCAGON 1 MG/ML
1 KIT INJECTION PRN
Status: DISCONTINUED | OUTPATIENT
Start: 2025-05-09 | End: 2025-05-12 | Stop reason: HOSPADM

## 2025-05-09 RX ORDER — POLYETHYLENE GLYCOL 3350 17 G/17G
17 POWDER, FOR SOLUTION ORAL DAILY PRN
Status: DISCONTINUED | OUTPATIENT
Start: 2025-05-09 | End: 2025-05-12 | Stop reason: HOSPADM

## 2025-05-09 RX ORDER — SILDENAFIL CITRATE 20 MG/1
20 TABLET ORAL DAILY
Status: DISCONTINUED | OUTPATIENT
Start: 2025-05-09 | End: 2025-05-12 | Stop reason: HOSPADM

## 2025-05-09 RX ORDER — IOPAMIDOL 755 MG/ML
75 INJECTION, SOLUTION INTRAVASCULAR
Status: COMPLETED | OUTPATIENT
Start: 2025-05-09 | End: 2025-05-09

## 2025-05-09 RX ORDER — SODIUM CHLORIDE 9 MG/ML
INJECTION, SOLUTION INTRAVENOUS PRN
Status: DISCONTINUED | OUTPATIENT
Start: 2025-05-09 | End: 2025-05-12 | Stop reason: HOSPADM

## 2025-05-09 RX ORDER — HEPARIN SODIUM 10000 [USP'U]/100ML
5-30 INJECTION, SOLUTION INTRAVENOUS CONTINUOUS
Status: DISCONTINUED | OUTPATIENT
Start: 2025-05-09 | End: 2025-05-09

## 2025-05-09 RX ORDER — ACETAMINOPHEN 325 MG/1
650 TABLET ORAL EVERY 6 HOURS PRN
Status: DISCONTINUED | OUTPATIENT
Start: 2025-05-09 | End: 2025-05-12 | Stop reason: HOSPADM

## 2025-05-09 RX ORDER — HEPARIN SODIUM 1000 [USP'U]/ML
80 INJECTION, SOLUTION INTRAVENOUS; SUBCUTANEOUS PRN
Status: DISCONTINUED | OUTPATIENT
Start: 2025-05-09 | End: 2025-05-12 | Stop reason: HOSPADM

## 2025-05-09 RX ORDER — HEPARIN SODIUM 1000 [USP'U]/ML
40 INJECTION, SOLUTION INTRAVENOUS; SUBCUTANEOUS PRN
Status: DISCONTINUED | OUTPATIENT
Start: 2025-05-09 | End: 2025-05-12 | Stop reason: HOSPADM

## 2025-05-09 RX ORDER — HEPARIN SODIUM 1000 [USP'U]/ML
80 INJECTION, SOLUTION INTRAVENOUS; SUBCUTANEOUS ONCE
Status: COMPLETED | OUTPATIENT
Start: 2025-05-09 | End: 2025-05-09

## 2025-05-09 RX ORDER — ACETAMINOPHEN 650 MG/1
650 SUPPOSITORY RECTAL EVERY 6 HOURS PRN
Status: DISCONTINUED | OUTPATIENT
Start: 2025-05-09 | End: 2025-05-12 | Stop reason: HOSPADM

## 2025-05-09 RX ORDER — BUDESONIDE AND FORMOTEROL FUMARATE DIHYDRATE 160; 4.5 UG/1; UG/1
2 AEROSOL RESPIRATORY (INHALATION) 2 TIMES DAILY
Status: DISCONTINUED | OUTPATIENT
Start: 2025-05-09 | End: 2025-05-12 | Stop reason: HOSPADM

## 2025-05-09 RX ORDER — ASPIRIN 81 MG/1
81 TABLET, CHEWABLE ORAL DAILY
Status: DISCONTINUED | OUTPATIENT
Start: 2025-05-09 | End: 2025-05-12 | Stop reason: HOSPADM

## 2025-05-09 RX ADMIN — GABAPENTIN 300 MG: 300 CAPSULE ORAL at 22:05

## 2025-05-09 RX ADMIN — ACETAMINOPHEN 1000 MG: 500 TABLET ORAL at 02:23

## 2025-05-09 RX ADMIN — PHENOL 1 SPRAY: 1.5 LIQUID ORAL at 22:01

## 2025-05-09 RX ADMIN — ASPIRIN 81 MG 81 MG: 81 TABLET ORAL at 10:25

## 2025-05-09 RX ADMIN — BUDESONIDE AND FORMOTEROL FUMARATE DIHYDRATE 2 PUFF: 160; 4.5 AEROSOL RESPIRATORY (INHALATION) at 20:43

## 2025-05-09 RX ADMIN — SILDENAFIL 20 MG: 20 TABLET ORAL at 10:23

## 2025-05-09 RX ADMIN — SODIUM CHLORIDE, PRESERVATIVE FREE 10 ML: 5 INJECTION INTRAVENOUS at 22:05

## 2025-05-09 RX ADMIN — HEPARIN SODIUM 5800 UNITS: 1000 INJECTION INTRAVENOUS; SUBCUTANEOUS at 03:13

## 2025-05-09 RX ADMIN — HEPARIN SODIUM 18 UNITS/KG/HR: 10000 INJECTION, SOLUTION INTRAVENOUS at 03:13

## 2025-05-09 RX ADMIN — BENZOCAINE AND MENTHOL 1 LOZENGE: 15; 3.6 LOZENGE ORAL at 02:23

## 2025-05-09 RX ADMIN — IOPAMIDOL 75 ML: 755 INJECTION, SOLUTION INTRAVENOUS at 00:34

## 2025-05-09 RX ADMIN — PHENOL 1 SPRAY: 1.5 LIQUID ORAL at 19:24

## 2025-05-09 RX ADMIN — ACETAMINOPHEN 650 MG: 325 TABLET ORAL at 18:02

## 2025-05-09 RX ADMIN — GABAPENTIN 300 MG: 300 CAPSULE ORAL at 10:24

## 2025-05-09 RX ADMIN — LATANOPROST 1 DROP: 50 SOLUTION OPHTHALMIC at 10:24

## 2025-05-09 RX ADMIN — LEVOTHYROXINE SODIUM 50 MCG: 0.05 TABLET ORAL at 10:24

## 2025-05-09 RX ADMIN — ATORVASTATIN CALCIUM 20 MG: 20 TABLET, FILM COATED ORAL at 22:05

## 2025-05-09 RX ADMIN — BUDESONIDE AND FORMOTEROL FUMARATE DIHYDRATE 2 PUFF: 160; 4.5 AEROSOL RESPIRATORY (INHALATION) at 08:19

## 2025-05-09 RX ADMIN — FUROSEMIDE 40 MG: 40 TABLET ORAL at 10:24

## 2025-05-09 ASSESSMENT — PAIN DESCRIPTION - LOCATION: LOCATION: THROAT

## 2025-05-09 NOTE — ED NOTES
ED to inpatient nurses report      Chief Complaint:  Chief Complaint   Patient presents with    Shortness of Breath    Leg Swelling     Present to ED from: Home    MOA:     LOC: alert and orientated to name, place, date  Mobility: Requires assistance * 1  Oxygen Baseline: 2L O2    Current needs required: 3L O2 NC   Pending ED orders: NA  Present condition: Stable    Why did the patient come to the ED? pt c/o SOB, bilateral leg swelling.   Pt has hx of COPD, CHF, asthma. Pt wears 2L O2 nc at home. Per pt and her daughter they have had to increase O2 at home. Pt states she has been getting chest pain and takes 324 ASA when this happens.   Pt states she has been feeling congested the past couples days but denies any other illness at this time. Pt was taking lasix but had to start taking midodrine for BP.   What is the plan? Possible small PE, starting heparin?  Any procedures or intervention occur? None at his time.   Any safety concerns??Fall Risk    Mental Status:       Psych Assessment:   Psychosocial  Psychosocial (WDL): Within Defined Limits  Vital signs   Vitals:    05/08/25 2240 05/08/25 2314 05/08/25 2328 05/08/25 2347   BP:       Pulse: 88 90 91 92   Resp: 12 17 18 19   Temp:       SpO2:   98%         Vitals:  Patient Vitals for the past 24 hrs:   BP Temp Pulse Resp SpO2   05/08/25 2347 -- -- 92 19 --   05/08/25 2328 -- -- 91 18 98 %   05/08/25 2314 -- -- 90 17 --   05/08/25 2240 -- -- 88 12 --   05/08/25 2236 -- -- 92 20 95 %   05/08/25 2224 -- -- -- -- 96 %   05/08/25 2157 126/71 98.8 °F (37.1 °C) 97 20 (!) 88 %      Visit Vitals  /71   Pulse 92   Temp 98.8 °F (37.1 °C)   Resp 19   SpO2 98%        LDAs:   Peripheral IV 05/08/25 Distal;Left Forearm (Active)       Ambulatory Status:  Presents to emergency department  because of falls (Syncope, seizure, or loss of consciousness): No, Age > 70: Yes, Altered Mental Status, Intoxication with alcohol or substance confusion (Disorientation, impaired judgment,  (Medical): No     Lack of Transportation (Non-Medical): No    Received from [facility]   Housing Stability: Low Risk  (1/30/2025)    Housing Stability Vital Sign     Unable to Pay for Housing in the Last Year: No     Number of Times Moved in the Last Year: 1     Homeless in the Last Year: No       FAMILY HISTORY       Family History   Problem Relation Age of Onset    No Known Problems Mother     No Known Problems Father        ALLERGIES     Pcn [penicillins] and Avelox [moxifloxacin]    CURRENT MEDICATIONS       Previous Medications    ASPIRIN 81 MG CHEWABLE TABLET    Take 1 tablet by mouth daily    BUDESONIDE-FORMOTEROL (SYMBICORT) 160-4.5 MCG/ACT AERO    Inhale 2 puffs into the lungs 2 times daily    DICLOFENAC SODIUM (VOLTAREN) 1 % GEL    Apply 4 g topically 2 times daily    DOCUSATE SODIUM (COLACE, DULCOLAX) 100 MG CAPS    Take 100 mg by mouth 2 times daily    GABAPENTIN (NEURONTIN) 300 MG CAPSULE    Take 1 capsule by mouth 2 times daily for 362 days.    GLIPIZIDE (GLUCOTROL) 5 MG TABLET    Take 1 tablet by mouth daily    LEVOTHYROXINE (SYNTHROID) 112 MCG TABLET    Take 0.5 tablets by mouth Daily    LIDOCAINE 4 % EXTERNAL PATCH    Place 1 patch onto the skin daily    MIDODRINE (PROAMATINE) 10 MG TABLET    Take 1 tablet by mouth 3 times daily (with meals)    SILDENAFIL (REVATIO) 20 MG TABLET    Take 1 tablet by mouth daily    TRAVOPROST, BENZALKONIUM, (TRAVATAN) 0.004 % OPHTHALMIC SOLUTION    Place 1 drop into both eyes daily     Orders Placed This Encounter   Medications    OR Linked Order Group     ipratropium 0.5 mg-albuterol 2.5 mg (DUONEB) nebulizer solution 1 Dose      Initiate RT Bronchodilator Protocol:   Yes - ED protocol     albuterol (PROVENTIL) (2.5 MG/3ML) 0.083% nebulizer solution 2.5 mg      Initiate RT Bronchodilator Protocol:   Yes - ED protocol    AND Linked Order Group     albuterol (PROVENTIL) nebulizer solution 15 mg      Initiate RT Bronchodilator

## 2025-05-09 NOTE — ED NOTES
The following labs were labeled with appropriate pt sticker and tubed to lab:     [x] Blue     [x] Lavender   [] on ice  [x] Green/yellow  [] Green/black [] on ice  [] Thomason  [] on ice  [x] Yellow  [] Red  [] Pink  [] Type/ Screen  [] ABG  [] VBG    [] COVID-19 swab    [] Rapid  [] PCR  [] Flu swab  [] Peds Viral Panel     [] Urine Sample  [] Fecal Sample  [] Pelvic Cultures  [] Blood Cultures  [] X 2  [] STREP Cultures  [] Wound Cultures

## 2025-05-09 NOTE — ED NOTES
Pt arrives via triage, pt c/o SOB, bilateral leg swelling.   Pt has hx of COPD, CHF, asthma. Pt wears 2L O2 nc at home. Per pt and her daughter they have had to increase O2 at home. Pt states she has been getting chest pain and takes 324 ASA when this happens.   Pt states she has been feeling congested the past couples days but denies any other illness at this time.   Pt is A+Ox4, call light in reach daughter at bedside.

## 2025-05-09 NOTE — ED PROVIDER NOTES
[facility] EMERGENCY DEPARTMENT  eMERGENCY dEPARTMENT eNCOUnter   Attending Attestation     Pt Name: Luisana Goncalves  MRN: [id number]  Birthdate 7/6/1929  Date of evaluation: 5/8/25       Luisana Goncalves is a 89 y.o. female who presents with Shortness of Breath and Leg Swelling      11:27 PM EDT      History: Patient presents with shortness of breath leg swelling and chest pain.  Patient has been having chest pain at home and has been intermittently taking aspirin to help her pain.  Patient came in [facility] for evaluation.    Exam: Heart rate and rhythm are regular.  Lungs are diminished bilaterally.  Patient is awake and alert and acting appropriately.  Patient has bilateral lower extremity swelling.    Plan for labs, cardiac workup and consider admission.  Patient satting at 83% on arrival however she is not on her home O2 which she usually takes.    EKG shows sinus rhythm with PVCs.  Rate of 95, rightward axis.  There is right bundle noted.  T waves inverted in 2 3 aVF, V1 V2 V3 V4 V5 V6.  Nonspecific EKG.      I performed a history and physical examination of the patient and discussed management with the resident. I reviewed the resident’s note and agree with the documented findings and plan of care. Any areas of disagreement are noted on the chart. I was personally present for the key portions of any procedures. I have documented in the chart those procedures where I was not present during the key portions. I have personally reviewed all images and agree with the resident's interpretation. I have reviewed the emergency nurses triage note. I agree with the chief complaint, past medical history, past surgical history, allergies, medications, social and family history as documented unless otherwise noted below. Documentation of the HPI, Physical Exam and Medical Decision Making performed by medical students or scribes is based on my personal performance of the HPI, PE and MDM. For Phys Assistant/ Nurse

## 2025-05-09 NOTE — CONSULTS
Comprehensive Nutrition Assessment    Type and Reason for Visit:  Initial, Consult, Patient education    Nutrition Recommendations/Plan:   Continue NPO as medically necessary  Will f/u for continued diet education as diet is advanced     Malnutrition Assessment:  Malnutrition Status:  No malnutrition (05/09/25 1346)      Nutrition Assessment:    89 y.o.F admitted d/t SOB, LE edema. RD consulted for CHF diet education. RD visited pt this AM, currently NPO. Pt reports good appetite at home and eats what she would like (i.e. fish, pork, greens, veg). Per chart, pt has had significant wt gain x 3 mo (30#)--possibly d/t fluid? Pt has +2 BLE edema. Pt is requesting menu. RD to provide once diet order is placed. Heart Healthy diet written materials will be provided. RD will continue to monitor per protocol.    Nutrition Related Findings:    Meds/Labs reviewed. BGs 104-148 mg/dL. Wound Type: None       Current Nutrition Intake & Therapies:    Average Meal Intake: NPO  Average Supplements Intake: NPO  Diet NPO    Anthropometric Measures:  Height: 170.2 cm (5' 7.01\")  Ideal Body Weight (IBW): 135 lbs (61 kg)    Current Body Weight: 72.8 kg (160 lb 7.9 oz), 118.9 % IBW.    Current BMI (kg/m2): 25.1    Estimated Daily Nutrient Needs:  Energy Requirements Based On: Formula  Weight Used for Energy Requirements: Current  Energy (kcal/day): 1160-4076 kcals/day  Weight Used for Protein Requirements: Current  Protein (g/day): 73-83 g/day  Method Used for Fluid Requirements: Defer to provider  Fluid (ml/day): per MD    Nutrition Diagnosis:   No nutrition diagnosis at this time    Nutrition Interventions:   Food and/or Nutrient Delivery: Continue NPO  Nutrition Education/Counseling: Survival skills/brief education completed  Coordination of Nutrition Care: Continue to monitor while inpatient  Plan of Care discussed with: Pt    Nutrition Monitoring and Evaluation:   Behavioral-Environmental Outcomes: None Identified  Food/Nutrient

## 2025-05-09 NOTE — CARE COORDINATION
Case Management Assessment  Initial Evaluation    Date/Time of Evaluation: 5/9/2025 8:45AM  Assessment Completed by: Vannesa Gruber RN    If patient is discharged prior to next notation, then this note serves as note for discharge by case management.    Patient Name: Luisana Goncalves                   YOB: 1929  Diagnosis: Leg edema [R60.0]  Pulmonary embolism on right (HCC) [I26.99]  Acute pharyngitis, unspecified etiology [J02.9]  Chronic kidney disease, unspecified CKD stage [N18.9]  Acute on chronic congestive heart failure, unspecified heart failure type (HCC) [I50.9]                   Date / Time: 5/8/2025 10:04 PM    Patient Admission Status: Inpatient   Readmission Risk (Low < 19, Mod (19-27), High > 27): Readmission Risk Score: 15.1    Current PCP: Nic Salazar MD  PCP verified by CM? Yes (Nic Salazar)    Chart Reviewed: Yes      History Provided by: Patient  Patient Orientation: Alert and Oriented, Person, Place, Situation    Patient Cognition: Alert    Hospitalization in the last 30 days (Readmission):  No    If yes, Readmission Assessment in CM Navigator will be completed.    Advance Directives:      Code Status: Full Code   Patient's Primary Decision Maker is: Named in Scanned ACP Document    Primary Decision Maker: Kristy Goncalves - [place] - [phone]    Discharge Planning:    Patient lives with: Children Type of Home: House  Primary Care Giver: Self  Patient Support Systems include: Children   Current Financial resources: Medicare  Current community resources:    Current services prior to admission: Durable Medical Equipment, Oxygen Therapy            Current DME: Oxygen Therapy (Comment), Cane, Walker, Wheelchair            Type of Home Care services:  None    ADLS  Prior functional level: Independent in ADLs/IADLs  Current functional level: Independent in ADLs/IADLs    PT AM-PAC:   /24  OT AM-PAC:   /24    Family can provide assistance at DC: Yes  Would you like Case

## 2025-05-09 NOTE — ED PROVIDER NOTES
Faculty Sign-Out Attestation  Handoff taken on the following patient from prior Attending Physician: Damien    Note Started: 12:06 AM EDT    I was available and discussed any additional care issues that arose and coordinated the management plans with the resident(s) caring for the patient during my duty period. Any areas of disagreement with resident’s documentation of care or procedures are noted on the chart. I was personally present for the key portions of any/all procedures during my duty period. I have documented in the chart those procedures where I was not present during the key portions.    CT CHEST PULMONARY EMBOLISM W CONTRAST   Preliminary Result   1. In lateral subsegmental branch of lower lobar branch of right pulmonary   artery there is a tiny hypodensity on the axial images, and this hypodensity   measures 6.3 mm times 1.3 mm. On the sagittal and coronal images this   hypodensity is not distinctly identified. This finding may represent very   tiny pulmonary embolism of undetermined age or possibly scar tissues volume   averaging with the pulmonary arterial small branch. In the rest of the   pulmonary arterial systems in both sides there is no definable pulmonary   embolism.   2. No evidence of thoracic aortic aneurysm or dissection.   3. Moderate cardiomegaly.   4. Mild atelectatic/fibrotic changes at the lower posterior portions of   bilateral pulmonary lower lobe.   5. Minimal localized pleural effusion at the posteromedial aspect of base of   right lung with thickness of 1.5 cm.         XR CHEST (2 VW)   Final Result   New pacer as above.  No pneumothorax.  Cardiomegaly.               Kurt Moore MD  Attending Physician        Kurt Moore MD  05/09/25 0200

## 2025-05-09 NOTE — PROGRESS NOTES
Congestive Heart Failure Education completed and charted. CHF booklet given. Patient was receptive to education.    Discussed the  importance of medication compliance.    Discussed the importance of a heart healthy diet. Discussed 2000 mg sodium-restricted daily diet.  Patient instructed to limit fluid intake to  1.5 to 2 liters per day.    Patient instructed to weigh self at the same time of each day each morning, reinforced teaching to monitor for 3-5 lb weight increase over 1-2 days notify physician if change noted.      Signs and symptoms of CHF discussed with patient, such as feeling more tired than normal, feeling short of breath, coughing that increases when lying down, sudden weight gain, swelling of the feet, legs or belly.  Patient verbalized understanding to notify physician office if these symptoms occur.  EF 55-60%

## 2025-05-09 NOTE — H&P
[facility]  Office: [phone]  Jaylen Llanos DO, Carrington Ash DO, Emiliano Haq DO, Chad Ng DO, Mega Black MD, Albania Aguila MD, Katiuska Monroy MD, Rayne Rod MD,  Sammy Conroy MD, Baldev Felton MD, Martin Laguna MD,  Dayne Reina DO, Liz Mejia MD, Sulaiman Burgess MD, Kurt Llanos DO, Radha Lake MD,  Denver Mccauley DO, Michelle Galicia MD, Rita Beal MD, Christiano Johnson MD,  Arcadio Díaz MD, Edy Quintero MD, Kev Bethea MD, Galina Levi MD, Binh Lou MD, Dave Huddleston MD, Quique Lou DO, Ernestina Rojas MD, Roosevelt Tsai MD, Dayne Obrien MD, Mohsin Reza, MD, Elver Montejo MD, Shirley Waterhouse, CNP,  Iraida Burks, CNP, Quique Loredo, CNP,  Melissa Crenshaw, DNP, Radha Covarrubias, CNP, Flaquita Connor, CNP, Sade Wolff, CNP, Amanda Patton, CNP, Leida Eubanks, PA-C, Xiao Kelley, CNP, Samara Pelayo, CNP,  Kavita Kramer, CNP, Vannesa Sanchez, CNP, Elver Mahoney, PA-C, Elizabeth Navarro, CNP,  Whitney Purcell, CNS, Belle Lopes, CNP, Pam Beard, CNP,   Milena Martinez, CNP         [facility]   IN-PATIENT SERVICE   [facility]    HISTORY AND PHYSICAL EXAMINATION            Date:   5/9/2025  Patient name:  Luisana Goncalves  Date of admission:  5/8/2025 10:04 PM  MRN:   [id number]  Account:  [id number]  YOB: 1929  PCP:    Nic Salazar MD  Room:   [address] [place] [address]  Code Status:    Full Code    Chief Complaint:     Chief Complaint   Patient presents with    Shortness of Breath    Leg Swelling       History Obtained From:     patient, electronic medical record    History of Present Illness:     Luisana Goncalves is a 89 y.o. Non- / non  female with history of essential chronic diastolic congestive heart failure, pulmonary hypertension, type 2 diabetes mellitus with kidney complication, without long-term current use of insulin, COPD, hypertension, hyperlipidemia, and

## 2025-05-09 NOTE — ED PROVIDER NOTES
[facility] EMERGENCY DEPARTMENT  Emergency Department Encounter  Emergency Medicine Resident     Pt Name:Luisana Goncalves  MRN: [id number]  Birthdate 7/6/1929  Date of evaluation: 5/8/25  PCP:  Nic Salazar MD  Note Started: 10:05 PM EDT    CHIEF COMPLAINT       Chief Complaint   Patient presents with    Shortness of Breath    Leg Swelling     HISTORY OF PRESENT ILLNESS  (Location/Symptom, Timing/Onset, Context/Setting, Quality, Duration, Modifying Factors, Severity.)      Luisana Goncalves is a 89 y.o. female who presents with increased home oxygen needs, shortness of breath, bilateral lower extremity swelling, and intermittent chest pain over the past couple of days.  Patient reports that she has taken aspirin 325 mg each time she has chest pain, this is occurred twice over the past couple of days, and last occurred on Tuesday.  Use of aspirin has improved her symptoms.  Patient with known history of COPD, CKD, pulmonary hypertension, CHF.  Per her daughter, patient takes her medications as prescribed and does not miss these.  Patient reports recent illness (sinus infection) last week, no other illness, no issues with p.o. intake, nausea, vomiting, fevers.  Patient at baseline takes 2 L of oxygen, currently on 3 L of oxygen due to increased work of breathing at normal home oxygen per her daughter.    Patient also reports increased tenderness of her left knee secondary to the swelling, denies any recent trauma (last injured last summer), and denies paresthesias.  Patient's daughter also reports that earlier today, patient's hands were more cyanotic in nature.    PAST MEDICAL / SURGICAL / SOCIAL / FAMILY HISTORY      has a past medical history of Arthritis, Breast cancer ([facility]), CAD (coronary artery disease), CHF (congestive heart failure) ([facility]), CKD (chronic kidney disease) stage 3, GFR 30-59 ml/min ([facility]), COPD (chronic obstructive pulmonary disease) ([facility]), Gastroesophageal reflux disease,

## 2025-05-09 NOTE — ED NOTES
Pt resting on stretcher, alert, RR even and unlabored. Pt remains on cardiac monitor. Pt denies any needs at this time. Lights dimmed for comfort. Call light within reach.

## 2025-05-10 ENCOUNTER — APPOINTMENT (OUTPATIENT)
Dept: GENERAL RADIOLOGY | Age: 89
DRG: 176 | End: 2025-05-10
Payer: MEDICARE

## 2025-05-10 PROBLEM — R60.0 LEG EDEMA: Status: ACTIVE | Noted: 2025-05-10

## 2025-05-10 LAB
ALBUMIN SERPL-MCNC: 3.7 G/DL (ref 3.5–5.2)
ALBUMIN/GLOB SERPL: 1.4 {RATIO} (ref 1–2.5)
ALP SERPL-CCNC: 142 U/L (ref 35–104)
ALT SERPL-CCNC: 22 U/L (ref 10–35)
ANION GAP SERPL CALCULATED.3IONS-SCNC: 12 MMOL/L (ref 9–16)
AST SERPL-CCNC: 30 U/L (ref 10–35)
BILIRUB SERPL-MCNC: 0.8 MG/DL (ref 0–1.2)
BNP SERPL-MCNC: ABNORMAL PG/ML (ref 0–450)
BUN SERPL-MCNC: 28 MG/DL (ref 8–23)
CALCIUM SERPL-MCNC: 9 MG/DL (ref 8.6–10.4)
CHLORIDE SERPL-SCNC: 106 MMOL/L (ref 98–107)
CHOLEST SERPL-MCNC: 134 MG/DL (ref 0–199)
CHOLESTEROL/HDL RATIO: 1.6
CO2 SERPL-SCNC: 24 MMOL/L (ref 20–31)
CREAT SERPL-MCNC: 1.2 MG/DL (ref 0.6–0.9)
EKG ATRIAL RATE: 95 BPM
EKG P-R INTERVAL: 132 MS
EKG Q-T INTERVAL: 388 MS
EKG QRS DURATION: 162 MS
EKG QTC CALCULATION (BAZETT): 487 MS
EKG R AXIS: 148 DEGREES
EKG T AXIS: -29 DEGREES
EKG VENTRICULAR RATE: 95 BPM
GFR, ESTIMATED: 42 ML/MIN/1.73M2
GLUCOSE BLD-MCNC: 130 MG/DL (ref 65–105)
GLUCOSE BLD-MCNC: 146 MG/DL (ref 65–105)
GLUCOSE BLD-MCNC: 150 MG/DL (ref 65–105)
GLUCOSE BLD-MCNC: 245 MG/DL (ref 65–105)
GLUCOSE SERPL-MCNC: 138 MG/DL (ref 74–99)
HDLC SERPL-MCNC: 83 MG/DL
INR PPP: 1.2
LDLC SERPL CALC-MCNC: 37 MG/DL (ref 0–100)
MAGNESIUM SERPL-MCNC: 1.9 MG/DL (ref 1.7–2.3)
POTASSIUM SERPL-SCNC: 4 MMOL/L (ref 3.7–5.3)
PROT SERPL-MCNC: 6.4 G/DL (ref 6.6–8.7)
PROTHROMBIN TIME: 15.3 SEC (ref 11.7–14.9)
SODIUM SERPL-SCNC: 142 MMOL/L (ref 136–145)
TRIGL SERPL-MCNC: 72 MG/DL
VLDLC SERPL CALC-MCNC: 14 MG/DL (ref 1–30)

## 2025-05-10 PROCEDURE — 2500000003 HC RX 250 WO HCPCS: Performed by: NURSE PRACTITIONER

## 2025-05-10 PROCEDURE — 80053 COMPREHEN METABOLIC PANEL: CPT

## 2025-05-10 PROCEDURE — 97535 SELF CARE MNGMENT TRAINING: CPT

## 2025-05-10 PROCEDURE — 99232 SBSQ HOSP IP/OBS MODERATE 35: CPT | Performed by: STUDENT IN AN ORGANIZED HEALTH CARE EDUCATION/TRAINING PROGRAM

## 2025-05-10 PROCEDURE — 6370000000 HC RX 637 (ALT 250 FOR IP): Performed by: NURSE PRACTITIONER

## 2025-05-10 PROCEDURE — 93010 ELECTROCARDIOGRAM REPORT: CPT | Performed by: INTERNAL MEDICINE

## 2025-05-10 PROCEDURE — 97162 PT EVAL MOD COMPLEX 30 MIN: CPT

## 2025-05-10 PROCEDURE — 2700000000 HC OXYGEN THERAPY PER DAY

## 2025-05-10 PROCEDURE — 6360000002 HC RX W HCPCS: Performed by: STUDENT IN AN ORGANIZED HEALTH CARE EDUCATION/TRAINING PROGRAM

## 2025-05-10 PROCEDURE — 85610 PROTHROMBIN TIME: CPT

## 2025-05-10 PROCEDURE — 82947 ASSAY GLUCOSE BLOOD QUANT: CPT

## 2025-05-10 PROCEDURE — 83880 ASSAY OF NATRIURETIC PEPTIDE: CPT

## 2025-05-10 PROCEDURE — 6360000002 HC RX W HCPCS

## 2025-05-10 PROCEDURE — 6370000000 HC RX 637 (ALT 250 FOR IP): Performed by: STUDENT IN AN ORGANIZED HEALTH CARE EDUCATION/TRAINING PROGRAM

## 2025-05-10 PROCEDURE — 97530 THERAPEUTIC ACTIVITIES: CPT

## 2025-05-10 PROCEDURE — 83735 ASSAY OF MAGNESIUM: CPT

## 2025-05-10 PROCEDURE — 94640 AIRWAY INHALATION TREATMENT: CPT

## 2025-05-10 PROCEDURE — 6370000000 HC RX 637 (ALT 250 FOR IP)

## 2025-05-10 PROCEDURE — 71046 X-RAY EXAM CHEST 2 VIEWS: CPT

## 2025-05-10 PROCEDURE — 80061 LIPID PANEL: CPT

## 2025-05-10 PROCEDURE — 97166 OT EVAL MOD COMPLEX 45 MIN: CPT

## 2025-05-10 PROCEDURE — 2060000000 HC ICU INTERMEDIATE R&B

## 2025-05-10 PROCEDURE — 99223 1ST HOSP IP/OBS HIGH 75: CPT | Performed by: INTERNAL MEDICINE

## 2025-05-10 PROCEDURE — 36415 COLL VENOUS BLD VENIPUNCTURE: CPT

## 2025-05-10 RX ORDER — ECHINACEA PURPUREA EXTRACT 125 MG
1 TABLET ORAL PRN
Status: DISCONTINUED | OUTPATIENT
Start: 2025-05-10 | End: 2025-05-12 | Stop reason: HOSPADM

## 2025-05-10 RX ADMIN — ATORVASTATIN CALCIUM 20 MG: 20 TABLET, FILM COATED ORAL at 21:00

## 2025-05-10 RX ADMIN — DICLOFENAC SODIUM 2 G: 10 GEL TOPICAL at 21:00

## 2025-05-10 RX ADMIN — SALINE NASAL SPRAY 1 SPRAY: 1.5 SOLUTION NASAL at 05:19

## 2025-05-10 RX ADMIN — ACETAMINOPHEN 650 MG: 325 TABLET ORAL at 21:16

## 2025-05-10 RX ADMIN — ALBUTEROL SULFATE 2.5 MG: 2.5 SOLUTION RESPIRATORY (INHALATION) at 03:48

## 2025-05-10 RX ADMIN — LEVOTHYROXINE SODIUM 50 MCG: 0.05 TABLET ORAL at 05:19

## 2025-05-10 RX ADMIN — SILDENAFIL 20 MG: 20 TABLET ORAL at 08:30

## 2025-05-10 RX ADMIN — BUDESONIDE AND FORMOTEROL FUMARATE DIHYDRATE 2 PUFF: 160; 4.5 AEROSOL RESPIRATORY (INHALATION) at 21:41

## 2025-05-10 RX ADMIN — GABAPENTIN 300 MG: 300 CAPSULE ORAL at 08:30

## 2025-05-10 RX ADMIN — FUROSEMIDE 40 MG: 10 INJECTION, SOLUTION INTRAMUSCULAR; INTRAVENOUS at 09:53

## 2025-05-10 RX ADMIN — LATANOPROST 1 DROP: 50 SOLUTION OPHTHALMIC at 08:31

## 2025-05-10 RX ADMIN — ASPIRIN 81 MG 81 MG: 81 TABLET ORAL at 08:30

## 2025-05-10 RX ADMIN — PHENOL 1 SPRAY: 1.5 LIQUID ORAL at 03:19

## 2025-05-10 RX ADMIN — INSULIN LISPRO 1 UNITS: 100 INJECTION, SOLUTION INTRAVENOUS; SUBCUTANEOUS at 18:15

## 2025-05-10 RX ADMIN — SODIUM CHLORIDE, PRESERVATIVE FREE 10 ML: 5 INJECTION INTRAVENOUS at 21:00

## 2025-05-10 RX ADMIN — GABAPENTIN 300 MG: 300 CAPSULE ORAL at 20:59

## 2025-05-10 RX ADMIN — SODIUM CHLORIDE, PRESERVATIVE FREE 10 ML: 5 INJECTION INTRAVENOUS at 08:31

## 2025-05-10 ASSESSMENT — PAIN DESCRIPTION - LOCATION
LOCATION: THROAT
LOCATION: KNEE

## 2025-05-10 ASSESSMENT — PAIN DESCRIPTION - ORIENTATION: ORIENTATION: LEFT

## 2025-05-10 NOTE — CONSULTS
[facility]   Consult Note         Today's Date: 5/10/2025  Patient Name: Luisana Goncalves  Date of admission: 5/8/2025 10:04 PM  Patient's age: 89 y.o., 7/6/1929  Admission Dx: Leg edema [R60.0]  Pulmonary embolism on right ([facility]) [I26.99]  Acute pharyngitis, unspecified etiology [J02.9]  Chronic kidney disease, unspecified CKD stage [N18.9]  Acute on chronic congestive heart failure, unspecified heart failure type ([facility]) [I50.9]    Reason for Consult:  Cardiac evaluation    Requesting Physician: No admitting provider for patient encounter.    REASON FOR CONSULT: Chest pain    History Obtained From:  Patient, chart, staff, records    HISTORY OF PRESENT ILLNESS:      The patient is a 89 y.o. female with heart failure with preserved ejection fraction 55 to 60% 1/2025 sick sinus syndrome status post PPM type 2 diabetes mellitus, hypertension, hyperlipidemia, hypothyroidism, and pulmonary hypertension who is admitted to the hospital for pulmonary embolism and CHF exacerbation and pressure-like chest pain, 2 times last week took aspirin yesterday for the pain.  Troponin 38/36 and 32, proBNP 11,598  EKG showed  Last echo showed EF of 55 to 60% dilated right ventricle severely elevated RVSP 92 1/2025    Past Medical History:   has a past medical history of Arthritis, Breast cancer ([facility]), CAD (coronary artery disease), CHF (congestive heart failure) ([facility]), CKD (chronic kidney disease) stage 3, GFR 30-59 ml/min ([facility]), COPD (chronic obstructive pulmonary disease) ([facility]), Gastroesophageal reflux disease, Hyperlipidemia, Hypertension, Recurrent major depression in remission, Thyroid disease, and Type 2 diabetes mellitus with kidney complication, without long-term current use of insulin ([facility]).    Past Surgical History:   has a past surgical history that includes Thyroid surgery; Mastectomy; Hysterectomy; Cholecystectomy; Colonoscopy; knee surgery; Cataract removal (Right); and ep device procedure (Left,

## 2025-05-10 NOTE — PROGRESS NOTES
Occupational Therapy    Occupational Therapy Initial Evaluation  Facility/Department: [facility] 5C STEPDOWN   Patient Name: Luisana Goncalves        MRN: [id number]    : 1929    Date of Service: 5/10/2025    Chief Complaint   Patient presents with    Shortness of Breath    Leg Swelling     Past Medical History:  has a past medical history of Arthritis, Breast cancer (HCC), CAD (coronary artery disease), CHF (congestive heart failure) (HCC), CKD (chronic kidney disease) stage 3, GFR 30-59 ml/min (HCC), COPD (chronic obstructive pulmonary disease) (HCC), Gastroesophageal reflux disease, Hyperlipidemia, Hypertension, Recurrent major depression in remission, Thyroid disease, and Type 2 diabetes mellitus with kidney complication, without long-term current use of insulin (HCC).  Past Surgical History:  has a past surgical history that includes Thyroid surgery; Mastectomy; Hysterectomy; Cholecystectomy; Colonoscopy; knee surgery; Cataract removal (Right); and ep device procedure (Left, 2/3/2025).    Discharge Recommendations     OT Equipment Recommendations  Equipment Needed: No    Assessment  Performance deficits / Impairments: Decreased functional mobility ;Decreased ADL status;Decreased ROM;Decreased strength;Decreased safe awareness;Decreased endurance;Decreased balance;Decreased high-level IADLs;Decreased coordination  Assessment: Patient is normaly independent with functional mobility, personal ADLs and household tasks who presents to OT eval with above deficit affecting ability to particpate in ADLs/ADL related mobility. At  this time patient requires contact guard assist with functional mobility and up to mod assist with ADLs. Patient would benefit from continued therapy during acute stay and follow up at d/c. Patient is safe to return to prior living arrangement with 24/7 assistance to safely perform personal ADLs and mobility.  Prognosis: Good  Decision Making: Medium Complexity  REQUIRES OT FOLLOW-UP:  rest breaks prn  Short Term Goal 2: Patient to demonstrate static standing balance with SBA assist for 8-10 minutes WITH 0-1 hand support  Short Term Goal 3: Patient to perform functional mobility househol distances with LRAD with SBA  Short Term Goal 4: Patient to perform ther ex to improve LUE ROM and RUE strength  Short Term Goal 5: Patient to demonstarte unsupported sitting/standing Grooming/UB ADLs with supervision  Short Term Goal 6: Patient to demonstarte unsupported sitting/standing Toileting/LB ADLs with supervision/SBA    Plan  Occupational Therapy Plan  Times Per Week: 3-5x/wk  Current Treatment Recommendations: Strengthening, ROM, Balance training, Functional mobility training, Endurance training, Neuromuscular re-education, Cognitive reorientation, Pain management, Safety education & training, Patient/Caregiver education & training, Positioning, Self-Care / ADL, Home management training    Minutes  OT Individual Minutes  Time In: 1408  Time Out: 1435  Minutes: 27  Time Code Minutes   Timed Code Treatment Minutes: 8 Minutes    Electronically signed by JOHN Meza on 5/10/25 at 3:26 PM EDT

## 2025-05-10 NOTE — PROGRESS NOTES
Physical Therapy  Facility/Department: [address] STEPDOWN   Physical Therapy Initial Evaluation    Patient Name: Luisana Goncalves        MRN: [id number]    : 1929    Date of Service: 5/10/2025    Chief Complaint   Patient presents with    Shortness of Breath    Leg Swelling     Luisana Goncalves is a 89 y.o. Non- / non  female with history of essential chronic diastolic congestive heart failure, pulmonary hypertension, type 2 diabetes mellitus with kidney complication, without long-term current use of insulin, COPD, hypertension, hyperlipidemia, and hypothyroidism who presents with Shortness of Breath and Leg Swelling and is admitted to the hospital for the management of Pulmonary embolism on right (HCC).    Past Medical History:  has a past medical history of Arthritis, Breast cancer ([facility]), CAD (coronary artery disease), CHF (congestive heart failure) (HCC), CKD (chronic kidney disease) stage 3, GFR 30-59 ml/min (HCC), COPD (chronic obstructive pulmonary disease) (HCC), Gastroesophageal reflux disease, Hyperlipidemia, Hypertension, Recurrent major depression in remission, Thyroid disease, and Type 2 diabetes mellitus with kidney complication, without long-term current use of insulin ([facility]).  Past Surgical History:  has a past surgical history that includes Thyroid surgery; Mastectomy; Hysterectomy; Cholecystectomy; Colonoscopy; knee surgery; Cataract removal (Right); and ep device procedure (Left, 2/3/2025).    Discharge Recommendations   Further therapy recommended at discharge.    PT Equipment Recommendations  Equipment Needed: No (pt reports owning rollator)    Assessment  Body Structures, Functions, Activity Limitations Requiring Skilled Therapeutic Intervention: Decreased functional mobility , Decreased ADL status, Decreased body mechanics, Decreased strength, Decreased high-level IADLs, Decreased balance, Decreased endurance, Decreased safe awareness, Decreased coordination, Increased pain,

## 2025-05-10 NOTE — PROGRESS NOTES
[facility]  Office: [phone]  Jaylen Llanos DO, Carrington Ash DO, Emiliano Haq DO, Chad Ng DO, Mega Black MD, Albania Aguila MD, Katiuska Monroy MD, Rayne Rod MD,  Sammy Conroy MD, Baldev Felton MD, Mehdi Ronquillo DO, Martin Laguna MD,  Dayne Reina DO, Liz Mejia MD, Sulaiman Burgess MD, Kurt Llanos DO, Radha Lake MD,  Denver Mccauley DO, Lani Back MD, Michelle Galicia MD, Rita Beal MD, Christiano Johnson MD,  Arcadio Díaz MD, Edy Quintero MD, Kev Bethea MD, Aamir Magana DO, Tommy Barnes MD,  Roosevelt Tsai MD, Shirley Waterhouse, CNP,  Iraida Burks, CNP, Elizabeth Navarro, CNP, Quique Loredo, CNP,  Melissa Crenshaw, DNP, Radha Covarrubias, CNP, Flaquita Connor, CNP, Belle Lopes, CNP, Sade Wolff, CNP, Amanda Patton, CNP, Jamie Stapleton PA-C, Whitney Purcell, CNS, Montse Do, CNP, Pam Beard, CNP         [facility]   IN-PATIENT SERVICE   [facility]    Progress Note    5/10/2025    1:17 PM    Name:   Luisana Goncalves  MRN:     [id number]     Acct:      [id number]   Room:   0540/0540-01   Day:  1  Admit Date:  5/8/2025 10:04 PM    PCP:   Nic Salazar MD  Code Status:  Full Code    Subjective:     Patient seen and evaluated this morning, resting comfortably, saturating well on nasal oxygen.  As per chart she is 89-year-old however she does not look at it age.  Have mild lower extremity edema.  Remained afebrile, vitals within normal range.  Lab reviewed, creatinine down to 1.2, glucose 138.  proBNP 121 930, cardiology on board.  Echo pending  Currently getting diuresised with IV Lasix as per cardiology recommendation.    Discussed about CODE STATUS, currently full code  Medications:     Allergies:    Allergies   Allergen Reactions    Pcn [Penicillins]     Avelox [Moxifloxacin] Rash       Current Meds:   Scheduled Meds:    sodium chloride flush  5-40 mL IntraVENous 2 times per day    aspirin  81 mg

## 2025-05-11 LAB
ERYTHROCYTE [DISTWIDTH] IN BLOOD BY AUTOMATED COUNT: 14.5 % (ref 11.8–14.4)
GLUCOSE BLD-MCNC: 130 MG/DL (ref 65–105)
GLUCOSE BLD-MCNC: 204 MG/DL (ref 65–105)
GLUCOSE BLD-MCNC: 215 MG/DL (ref 65–105)
GLUCOSE BLD-MCNC: 240 MG/DL (ref 65–105)
HCT VFR BLD AUTO: 35.1 % (ref 36.3–47.1)
HGB BLD-MCNC: 10.8 G/DL (ref 11.9–15.1)
MCH RBC QN AUTO: 30.5 PG (ref 25.2–33.5)
MCHC RBC AUTO-ENTMCNC: 30.8 G/DL (ref 28.4–34.8)
MCV RBC AUTO: 99.2 FL (ref 82.6–102.9)
NRBC BLD-RTO: 0 PER 100 WBC
PLATELET # BLD AUTO: 180 K/UL (ref 138–453)
PMV BLD AUTO: 10.3 FL (ref 8.1–13.5)
RBC # BLD AUTO: 3.54 M/UL (ref 3.95–5.11)
WBC OTHER # BLD: 7.5 K/UL (ref 3.5–11.3)

## 2025-05-11 PROCEDURE — 2700000000 HC OXYGEN THERAPY PER DAY

## 2025-05-11 PROCEDURE — 99232 SBSQ HOSP IP/OBS MODERATE 35: CPT | Performed by: STUDENT IN AN ORGANIZED HEALTH CARE EDUCATION/TRAINING PROGRAM

## 2025-05-11 PROCEDURE — 6370000000 HC RX 637 (ALT 250 FOR IP): Performed by: NURSE PRACTITIONER

## 2025-05-11 PROCEDURE — 94761 N-INVAS EAR/PLS OXIMETRY MLT: CPT

## 2025-05-11 PROCEDURE — 6370000000 HC RX 637 (ALT 250 FOR IP)

## 2025-05-11 PROCEDURE — 82947 ASSAY GLUCOSE BLOOD QUANT: CPT

## 2025-05-11 PROCEDURE — 2500000003 HC RX 250 WO HCPCS: Performed by: NURSE PRACTITIONER

## 2025-05-11 PROCEDURE — 2060000000 HC ICU INTERMEDIATE R&B

## 2025-05-11 PROCEDURE — 85027 COMPLETE CBC AUTOMATED: CPT

## 2025-05-11 PROCEDURE — 99233 SBSQ HOSP IP/OBS HIGH 50: CPT | Performed by: NURSE PRACTITIONER

## 2025-05-11 PROCEDURE — 6360000002 HC RX W HCPCS

## 2025-05-11 PROCEDURE — 94640 AIRWAY INHALATION TREATMENT: CPT

## 2025-05-11 PROCEDURE — 36415 COLL VENOUS BLD VENIPUNCTURE: CPT

## 2025-05-11 RX ADMIN — LEVOTHYROXINE SODIUM 50 MCG: 0.05 TABLET ORAL at 09:15

## 2025-05-11 RX ADMIN — ASPIRIN 81 MG 81 MG: 81 TABLET ORAL at 09:15

## 2025-05-11 RX ADMIN — DICLOFENAC SODIUM 2 G: 10 GEL TOPICAL at 09:15

## 2025-05-11 RX ADMIN — INSULIN LISPRO 1 UNITS: 100 INJECTION, SOLUTION INTRAVENOUS; SUBCUTANEOUS at 20:14

## 2025-05-11 RX ADMIN — ATORVASTATIN CALCIUM 20 MG: 20 TABLET, FILM COATED ORAL at 20:09

## 2025-05-11 RX ADMIN — BUDESONIDE AND FORMOTEROL FUMARATE DIHYDRATE 2 PUFF: 160; 4.5 AEROSOL RESPIRATORY (INHALATION) at 21:44

## 2025-05-11 RX ADMIN — SILDENAFIL 20 MG: 20 TABLET ORAL at 09:15

## 2025-05-11 RX ADMIN — FUROSEMIDE 40 MG: 10 INJECTION, SOLUTION INTRAMUSCULAR; INTRAVENOUS at 09:34

## 2025-05-11 RX ADMIN — MIDODRINE HYDROCHLORIDE 10 MG: 5 TABLET ORAL at 17:57

## 2025-05-11 RX ADMIN — SODIUM CHLORIDE, PRESERVATIVE FREE 10 ML: 5 INJECTION INTRAVENOUS at 20:09

## 2025-05-11 RX ADMIN — ACETAMINOPHEN 650 MG: 325 TABLET ORAL at 09:34

## 2025-05-11 RX ADMIN — GABAPENTIN 300 MG: 300 CAPSULE ORAL at 09:15

## 2025-05-11 RX ADMIN — DICLOFENAC SODIUM 2 G: 10 GEL TOPICAL at 20:09

## 2025-05-11 RX ADMIN — GABAPENTIN 300 MG: 300 CAPSULE ORAL at 20:09

## 2025-05-11 RX ADMIN — SODIUM CHLORIDE, PRESERVATIVE FREE 10 ML: 5 INJECTION INTRAVENOUS at 09:17

## 2025-05-11 RX ADMIN — ACETAMINOPHEN 650 MG: 325 TABLET ORAL at 17:03

## 2025-05-11 RX ADMIN — MIDODRINE HYDROCHLORIDE 10 MG: 5 TABLET ORAL at 13:38

## 2025-05-11 RX ADMIN — POLYETHYLENE GLYCOL 3350 17 G: 17 POWDER, FOR SOLUTION ORAL at 09:17

## 2025-05-11 RX ADMIN — LATANOPROST 1 DROP: 50 SOLUTION OPHTHALMIC at 09:15

## 2025-05-11 RX ADMIN — INSULIN LISPRO 1 UNITS: 100 INJECTION, SOLUTION INTRAVENOUS; SUBCUTANEOUS at 13:38

## 2025-05-11 RX ADMIN — INSULIN LISPRO 1 UNITS: 100 INJECTION, SOLUTION INTRAVENOUS; SUBCUTANEOUS at 17:57

## 2025-05-11 RX ADMIN — BUDESONIDE AND FORMOTEROL FUMARATE DIHYDRATE 2 PUFF: 160; 4.5 AEROSOL RESPIRATORY (INHALATION) at 08:55

## 2025-05-11 ASSESSMENT — PAIN DESCRIPTION - LOCATION
LOCATION: BACK;KNEE
LOCATION: KNEE
LOCATION: ABDOMEN
LOCATION: KNEE

## 2025-05-11 ASSESSMENT — PAIN DESCRIPTION - ORIENTATION
ORIENTATION: LEFT
ORIENTATION: RIGHT;LEFT
ORIENTATION: OTHER (COMMENT)
ORIENTATION: RIGHT;LEFT

## 2025-05-11 ASSESSMENT — PAIN SCALES - GENERAL
PAINLEVEL_OUTOF10: 7
PAINLEVEL_OUTOF10: 8
PAINLEVEL_OUTOF10: 8

## 2025-05-11 ASSESSMENT — PAIN DESCRIPTION - DESCRIPTORS
DESCRIPTORS: ACHING
DESCRIPTORS: ACHING

## 2025-05-11 NOTE — PROGRESS NOTES
[facility]  Office: [phone]  Jaylen Llanos DO, Carrington Ash DO, Emiliano Haq DO, Chad Ng DO, Mega Black MD, Albania Aguila MD, Katiuska Monroy MD, Rayne Rod MD,  Sammy Conroy MD, Baldev Felton MD, Mehdi Ronquillo DO, Martin Laguna MD,  Dayne Reina DO, Liz Mejia MD, Sulaiman Burgess MD, Kurt Llanos DO, Radha Lake MD,  Denver Mccauley DO, Lani Back MD, Michelle Galicia MD, Rita Beal MD, Christiano Johnson MD,  Arcadio Díaz MD, Edy Quintero MD, Kev Bethea MD, Aamir Magana DO, Tommy Barnes MD,  Roosevelt Tsai MD, Shirley Waterhouse, CNP,  Iraida Burks, CNP, Elizabeth Navarro, CNP, Quique Loredo, CNP,  Melissa Crenshaw, DNP, Radha Covarrubias, CNP, Flaquita Connor, CNP, Belle Lopes, CNP, Sade Wolff, CNP, Amanda Patton, CNP, Jamie Stapleton PA-C, Whitney Purcell, CNS, Montse Do, CNP, Pam Beard, CNP         [facility]   IN-PATIENT SERVICE   [facility]    Progress Note    5/11/2025    12:22 PM    Name:   Luisana Goncalves  MRN:     [id number]     Acct:      [id number]   Room:   0540/0540-01   Day:  2  Admit Date:  5/8/2025 10:04 PM    PCP:   Nic Salazar MD  Code Status:  Full Code    Subjective:     Patient seen and evaluated this morning, resting comfortably on supplemental oxygen.  Stated that he is constipated, discussed with RN, will give her bowel management.  Echo did not happen, still waiting.  Cardiology on board  .  Knee pain is better after diclofenac gel.    Discussed about CODE STATUS, currently full code  Medications:     Allergies:    Allergies   Allergen Reactions    Pcn [Penicillins]     Avelox [Moxifloxacin] Rash       Current Meds:   Scheduled Meds:    diclofenac sodium  2 g Topical BID    sodium chloride flush  5-40 mL IntraVENous 2 times per day    aspirin  81 mg Oral Daily    budesonide-formoterol  2 puff Inhalation BID    gabapentin  300 mg Oral BID    levothyroxine  50 mcg

## 2025-05-11 NOTE — PROGRESS NOTES
[facility]   Progress Note                   Date:   5/11/2025  Patient name: Luisana Goncalves  Date of admission:  5/8/2025 10:04 PM  MRN:   [id number]  YOB: 1929  PCP: Nic Salazar MD    Reason for Admission: Leg edema [R60.0]  Pulmonary embolism on right (HCC) [I26.99]  Acute pharyngitis, unspecified etiology [J02.9]  Chronic kidney disease, unspecified CKD stage [N18.9]  Acute on chronic congestive heart failure, unspecified heart failure type (HCC) [I50.9]    Subjective:       Clinical Changes / Abnormalities: Pt seen and examined in the room.  Pt denies any CP or sob.  Labs, vitals and tele reviewed-      -1.1 L since admission   Medications:   Scheduled Meds:   diclofenac sodium  2 g Topical BID    sodium chloride flush  5-40 mL IntraVENous 2 times per day    aspirin  81 mg Oral Daily    budesonide-formoterol  2 puff Inhalation BID    gabapentin  300 mg Oral BID    levothyroxine  50 mcg Oral Daily    midodrine  10 mg Oral TID WC    latanoprost  1 drop Both Eyes Daily    sildenafil  20 mg Oral Daily    insulin lispro  0-4 Units SubCUTAneous 4x Daily AC & HS    furosemide  40 mg IntraVENous Daily    atorvastatin  20 mg Oral Nightly     Continuous Infusions:   dextrose      sodium chloride       CBC:   Recent Labs     05/08/25 2220 05/11/25  1025   WBC 8.4 7.5   HGB 11.7* 10.8*    180     BMP:    Recent Labs     05/08/25  2220 05/10/25  0513    142   K 4.3 4.0   * 106   CO2 23 24   BUN 34* 28*   CREATININE 1.4* 1.2*   GLUCOSE 148* 138*     Hepatic:   Recent Labs     05/08/25  2220 05/10/25  0513   AST 33 30   ALT 23 22   BILITOT 0.9 0.8   ALKPHOS 155* 142*     Troponin:   Recent Labs     05/08/25 2220 05/08/25 2312 05/09/25  0818   TROPHS 38* 36* 32*     BNP: No results for input(s): \"BNP\" in the last 72 hours.  Lipids:   Recent Labs     05/10/25  0513   CHOL 134   HDL 83     INR:   Recent Labs     05/10/25  0513   INR 1.2     ECHO 1/31/25    Left

## 2025-05-11 NOTE — PLAN OF CARE
Problem: Respiratory - Adult  Goal: Achieves optimal ventilation and oxygenation  5/11/2025 0857 by Molly Schultz RCLENARD  Outcome: Progressing  Flowsheets (Taken 5/11/2025 0855)  Achieves optimal ventilation and oxygenation:   Assess for changes in respiratory status   Assess for changes in mentation and behavior   Position to facilitate oxygenation and minimize respiratory effort   Oxygen supplementation based on oxygen saturation or arterial blood gases   Encourage broncho-pulmonary hygiene including cough, deep breathe, incentive spirometry   Assess the need for suctioning and aspirate as needed   Assess and instruct to report shortness of breath or any respiratory difficulty   Respiratory therapy support as indicated

## 2025-05-11 NOTE — PLAN OF CARE
Problem: Chronic Conditions and Co-morbidities  Goal: Patient's chronic conditions and co-morbidity symptoms are monitored and maintained or improved  Outcome: Progressing     Problem: Discharge Planning  Goal: Discharge to home or other facility with appropriate resources  Outcome: Progressing     Problem: Pain  Goal: Verbalizes/displays adequate comfort level or baseline comfort level  Outcome: Progressing     Problem: Safety - Adult  Goal: Free from fall injury  Outcome: Progressing     Problem: ABCDS Injury Assessment  Goal: Absence of physical injury  Outcome: Progressing     Problem: Neurosensory - Adult  Goal: Achieves stable or improved neurological status  Outcome: Progressing  Goal: Absence of seizures  Outcome: Progressing  Goal: Remains free of injury related to seizures activity  Outcome: Progressing  Goal: Achieves maximal functionality and self care  Outcome: Progressing     Problem: Respiratory - Adult  Goal: Achieves optimal ventilation and oxygenation  5/11/2025 1619 by Alta Ramires RN  Outcome: Progressing  5/11/2025 0857 by Molly Schultz RCP  Outcome: Progressing  Flowsheets (Taken 5/11/2025 0855)  Achieves optimal ventilation and oxygenation:   Assess for changes in respiratory status   Assess for changes in mentation and behavior   Position to facilitate oxygenation and minimize respiratory effort   Oxygen supplementation based on oxygen saturation or arterial blood gases   Encourage broncho-pulmonary hygiene including cough, deep breathe, incentive spirometry   Assess the need for suctioning and aspirate as needed   Assess and instruct to report shortness of breath or any respiratory difficulty   Respiratory therapy support as indicated     Problem: Metabolic/Fluid and Electrolytes - Adult  Goal: Electrolytes maintained within normal limits  Outcome: Progressing  Goal: Hemodynamic stability and optimal renal function maintained  Outcome: Progressing  Goal: Glucose maintained

## 2025-05-11 NOTE — PLAN OF CARE
Problem: Chronic Conditions and Co-morbidities  Goal: Patient's chronic conditions and co-morbidity symptoms are monitored and maintained or improved  5/10/2025 2338 by Jessica Fernando RN  Outcome: Progressing  5/10/2025 1722 by Alta Ramires RN  Outcome: Progressing     Problem: Discharge Planning  Goal: Discharge to home or other facility with appropriate resources  5/10/2025 2338 by Jessica Fernando RN  Outcome: Progressing  5/10/2025 1722 by Alta Ramires RN  Outcome: Progressing     Problem: Pain  Goal: Verbalizes/displays adequate comfort level or baseline comfort level  5/10/2025 2338 by Jessica Fernando RN  Outcome: Progressing  5/10/2025 1722 by Alta Ramires RN  Outcome: Progressing     Problem: Safety - Adult  Goal: Free from fall injury  5/10/2025 2338 by Jessica Fernando RN  Outcome: Progressing  5/10/2025 1722 by Alta Ramires RN  Outcome: Progressing     Problem: ABCDS Injury Assessment  Goal: Absence of physical injury  5/10/2025 2338 by Jessica Fernando RN  Outcome: Progressing  5/10/2025 1722 by Alta Ramires RN  Outcome: Progressing     Problem: Neurosensory - Adult  Goal: Achieves stable or improved neurological status  Outcome: Progressing  Goal: Absence of seizures  Outcome: Progressing  Goal: Remains free of injury related to seizures activity  Outcome: Progressing  Goal: Achieves maximal functionality and self care  Outcome: Progressing     Problem: Respiratory - Adult  Goal: Achieves optimal ventilation and oxygenation  Outcome: Progressing     Problem: Metabolic/Fluid and Electrolytes - Adult  Goal: Electrolytes maintained within normal limits  Outcome: Progressing  Goal: Hemodynamic stability and optimal renal function maintained  Outcome: Progressing  Goal: Glucose maintained within prescribed range  Outcome: Progressing

## 2025-05-12 ENCOUNTER — APPOINTMENT (OUTPATIENT)
Age: 89
DRG: 176 | End: 2025-05-12
Attending: INTERNAL MEDICINE
Payer: MEDICARE

## 2025-05-12 VITALS
RESPIRATION RATE: 19 BRPM | TEMPERATURE: 97.5 F | DIASTOLIC BLOOD PRESSURE: 62 MMHG | OXYGEN SATURATION: 95 % | BODY MASS INDEX: 25.19 KG/M2 | HEART RATE: 70 BPM | HEIGHT: 67 IN | WEIGHT: 160.5 LBS | SYSTOLIC BLOOD PRESSURE: 107 MMHG

## 2025-05-12 PROBLEM — N18.9 CHRONIC KIDNEY DISEASE: Status: ACTIVE | Noted: 2025-05-12

## 2025-05-12 PROBLEM — I50.9 ACUTE ON CHRONIC CONGESTIVE HEART FAILURE (HCC): Status: ACTIVE | Noted: 2025-05-12

## 2025-05-12 LAB
BNP SERPL-MCNC: ABNORMAL PG/ML (ref 0–450)
ECHO AO ASC DIAM: 3 CM
ECHO AO ASCENDING AORTA INDEX: 1.63 CM/M2
ECHO AO ROOT DIAM: 2.9 CM
ECHO AO ROOT INDEX: 1.58 CM/M2
ECHO AV AREA PEAK VELOCITY: 2.2 CM2
ECHO AV AREA VTI: 2.3 CM2
ECHO AV AREA/BSA PEAK VELOCITY: 1.2 CM2/M2
ECHO AV AREA/BSA VTI: 1.3 CM2/M2
ECHO AV MEAN GRADIENT: 3 MMHG
ECHO AV MEAN VELOCITY: 0.8 M/S
ECHO AV PEAK GRADIENT: 5 MMHG
ECHO AV PEAK VELOCITY: 1.2 M/S
ECHO AV VELOCITY RATIO: 0.75
ECHO AV VTI: 20.3 CM
ECHO BSA: 1.85 M2
ECHO EST RA PRESSURE: 15 MMHG
ECHO IVC PROX: 3.4 CM
ECHO LA DIAMETER INDEX: 1.68 CM/M2
ECHO LA DIAMETER: 3.1 CM
ECHO LA TO AORTIC ROOT RATIO: 1.07
ECHO LV E' LATERAL VELOCITY: 4.35 CM/S
ECHO LV E' SEPTAL VELOCITY: 4.68 CM/S
ECHO LV EF PHYSICIAN: 55 %
ECHO LV EJECTION FRACTION BIPLANE: 55 % (ref 55–100)
ECHO LV FRACTIONAL SHORTENING: 41 % (ref 28–44)
ECHO LV INTERNAL DIMENSION DIASTOLE INDEX: 2.12 CM/M2
ECHO LV INTERNAL DIMENSION DIASTOLIC: 3.9 CM (ref 3.9–5.3)
ECHO LV INTERNAL DIMENSION SYSTOLIC INDEX: 1.25 CM/M2
ECHO LV INTERNAL DIMENSION SYSTOLIC: 2.3 CM
ECHO LV IVSD: 1.3 CM (ref 0.6–0.9)
ECHO LV MASS 2D: 179.7 G (ref 67–162)
ECHO LV MASS INDEX 2D: 97.7 G/M2 (ref 43–95)
ECHO LV POSTERIOR WALL DIASTOLIC: 1.3 CM (ref 0.6–0.9)
ECHO LV RELATIVE WALL THICKNESS RATIO: 0.67
ECHO LVOT AREA: 2.8 CM2
ECHO LVOT AV VTI INDEX: 0.81
ECHO LVOT DIAM: 1.9 CM
ECHO LVOT MEAN GRADIENT: 1 MMHG
ECHO LVOT PEAK GRADIENT: 3 MMHG
ECHO LVOT PEAK VELOCITY: 0.9 M/S
ECHO LVOT STROKE VOLUME INDEX: 25.4 ML/M2
ECHO LVOT SV: 46.8 ML
ECHO LVOT VTI: 16.5 CM
ECHO MV A VELOCITY: 1.06 M/S
ECHO MV AREA VTI: 1.8 CM2
ECHO MV E DECELERATION TIME (DT): 222 MS
ECHO MV E VELOCITY: 0.46 M/S
ECHO MV E/A RATIO: 0.43
ECHO MV E/E' LATERAL: 10.57
ECHO MV E/E' RATIO (AVERAGED): 10.2
ECHO MV E/E' SEPTAL: 9.83
ECHO MV LVOT VTI INDEX: 1.57
ECHO MV MAX VELOCITY: 1.1 M/S
ECHO MV MEAN GRADIENT: 1 MMHG
ECHO MV MEAN VELOCITY: 0.5 M/S
ECHO MV PEAK GRADIENT: 5 MMHG
ECHO MV VTI: 25.9 CM
ECHO RA AREA 4C: 32.2 CM2
ECHO RA END SYSTOLIC VOLUME APICAL 4 CHAMBER INDEX BSA: 82 ML/M2
ECHO RA VOLUME: 150 ML
ECHO RIGHT VENTRICULAR SYSTOLIC PRESSURE (RVSP): 69 MMHG
ECHO RV BASAL DIMENSION: 4.8 CM
ECHO RV FREE WALL PEAK S': 9.9 CM/S
ECHO RV MID DIMENSION: 5.3 CM
ECHO RV TAPSE: 1.5 CM (ref 1.7–?)
ECHO TV REGURGITANT MAX VELOCITY: 3.69 M/S
ECHO TV REGURGITANT PEAK GRADIENT: 54 MMHG
GLUCOSE BLD-MCNC: 115 MG/DL (ref 65–105)
GLUCOSE BLD-MCNC: 117 MG/DL (ref 65–105)
GLUCOSE BLD-MCNC: 118 MG/DL (ref 65–105)
GLUCOSE BLD-MCNC: 159 MG/DL (ref 65–105)

## 2025-05-12 PROCEDURE — 97116 GAIT TRAINING THERAPY: CPT

## 2025-05-12 PROCEDURE — 82947 ASSAY GLUCOSE BLOOD QUANT: CPT

## 2025-05-12 PROCEDURE — 6370000000 HC RX 637 (ALT 250 FOR IP): Performed by: NURSE PRACTITIONER

## 2025-05-12 PROCEDURE — 83880 ASSAY OF NATRIURETIC PEPTIDE: CPT

## 2025-05-12 PROCEDURE — 93306 TTE W/DOPPLER COMPLETE: CPT

## 2025-05-12 PROCEDURE — 36415 COLL VENOUS BLD VENIPUNCTURE: CPT

## 2025-05-12 PROCEDURE — 93306 TTE W/DOPPLER COMPLETE: CPT | Performed by: INTERNAL MEDICINE

## 2025-05-12 PROCEDURE — 2500000003 HC RX 250 WO HCPCS: Performed by: NURSE PRACTITIONER

## 2025-05-12 PROCEDURE — 97112 NEUROMUSCULAR REEDUCATION: CPT

## 2025-05-12 PROCEDURE — 99239 HOSP IP/OBS DSCHRG MGMT >30: CPT | Performed by: STUDENT IN AN ORGANIZED HEALTH CARE EDUCATION/TRAINING PROGRAM

## 2025-05-12 PROCEDURE — 97530 THERAPEUTIC ACTIVITIES: CPT

## 2025-05-12 PROCEDURE — 99233 SBSQ HOSP IP/OBS HIGH 50: CPT | Performed by: NURSE PRACTITIONER

## 2025-05-12 PROCEDURE — 6360000002 HC RX W HCPCS

## 2025-05-12 PROCEDURE — 97110 THERAPEUTIC EXERCISES: CPT

## 2025-05-12 PROCEDURE — 6370000000 HC RX 637 (ALT 250 FOR IP)

## 2025-05-12 RX ORDER — FUROSEMIDE 40 MG/1
40 TABLET ORAL DAILY
Status: DISCONTINUED | OUTPATIENT
Start: 2025-05-13 | End: 2025-05-12 | Stop reason: HOSPADM

## 2025-05-12 RX ORDER — FUROSEMIDE 40 MG/1
40 TABLET ORAL DAILY
Qty: 60 TABLET | Refills: 3 | Status: SHIPPED | OUTPATIENT
Start: 2025-05-13

## 2025-05-12 RX ORDER — ATORVASTATIN CALCIUM 20 MG/1
20 TABLET, FILM COATED ORAL NIGHTLY
Qty: 30 TABLET | Refills: 3 | Status: SHIPPED | OUTPATIENT
Start: 2025-05-12

## 2025-05-12 RX ADMIN — MIDODRINE HYDROCHLORIDE 10 MG: 5 TABLET ORAL at 09:50

## 2025-05-12 RX ADMIN — ASPIRIN 81 MG 81 MG: 81 TABLET ORAL at 09:51

## 2025-05-12 RX ADMIN — ACETAMINOPHEN 650 MG: 325 TABLET ORAL at 09:52

## 2025-05-12 RX ADMIN — DICLOFENAC SODIUM 2 G: 10 GEL TOPICAL at 09:51

## 2025-05-12 RX ADMIN — GABAPENTIN 300 MG: 300 CAPSULE ORAL at 20:03

## 2025-05-12 RX ADMIN — GABAPENTIN 300 MG: 300 CAPSULE ORAL at 09:51

## 2025-05-12 RX ADMIN — FUROSEMIDE 40 MG: 10 INJECTION, SOLUTION INTRAMUSCULAR; INTRAVENOUS at 09:50

## 2025-05-12 RX ADMIN — SILDENAFIL 20 MG: 20 TABLET ORAL at 09:52

## 2025-05-12 RX ADMIN — MIDODRINE HYDROCHLORIDE 10 MG: 5 TABLET ORAL at 17:33

## 2025-05-12 RX ADMIN — LEVOTHYROXINE SODIUM 50 MCG: 0.05 TABLET ORAL at 05:37

## 2025-05-12 RX ADMIN — MIDODRINE HYDROCHLORIDE 10 MG: 5 TABLET ORAL at 12:25

## 2025-05-12 RX ADMIN — ATORVASTATIN CALCIUM 20 MG: 20 TABLET, FILM COATED ORAL at 20:03

## 2025-05-12 RX ADMIN — SODIUM CHLORIDE, PRESERVATIVE FREE 10 ML: 5 INJECTION INTRAVENOUS at 09:52

## 2025-05-12 RX ADMIN — LATANOPROST 1 DROP: 50 SOLUTION OPHTHALMIC at 09:51

## 2025-05-12 ASSESSMENT — PAIN SCALES - GENERAL
PAINLEVEL_OUTOF10: 10
PAINLEVEL_OUTOF10: 4

## 2025-05-12 ASSESSMENT — PAIN DESCRIPTION - ONSET: ONSET: GRADUAL

## 2025-05-12 ASSESSMENT — PAIN DESCRIPTION - ORIENTATION
ORIENTATION: RIGHT;LEFT
ORIENTATION: LEFT
ORIENTATION: LEFT

## 2025-05-12 ASSESSMENT — PAIN DESCRIPTION - PAIN TYPE: TYPE: ACUTE PAIN

## 2025-05-12 ASSESSMENT — PAIN DESCRIPTION - LOCATION
LOCATION: SHOULDER
LOCATION: SHOULDER
LOCATION: KNEE

## 2025-05-12 ASSESSMENT — PAIN - FUNCTIONAL ASSESSMENT: PAIN_FUNCTIONAL_ASSESSMENT: ACTIVITIES ARE NOT PREVENTED

## 2025-05-12 ASSESSMENT — PAIN DESCRIPTION - FREQUENCY: FREQUENCY: CONTINUOUS

## 2025-05-12 ASSESSMENT — PAIN DESCRIPTION - DESCRIPTORS: DESCRIPTORS: ACHING

## 2025-05-12 NOTE — CARE COORDINATION
Case Management   Daily Progress Note       Patient Name: Luisana Goncalves                   YOB: 1929  Diagnosis: Leg edema [R60.0]  Pulmonary embolism on right (HCC) [I26.99]  Acute pharyngitis, unspecified etiology [J02.9]  Chronic kidney disease, unspecified CKD stage [N18.9]  Acute on chronic congestive heart failure, unspecified heart failure type (HCC) [I50.9]                       GMLOS: 2.4 days  Length of Stay: 3  days    Anticipated Discharge Date: Ready for discharge    Readmission Risk (Low < 19, Mod (19-27), High > 27): Readmission Risk Score: 15.3        Current Transitional Plan    [x] Home Independently    [] Home with HC    [] Skilled Nursing Facility    [] Acute Rehabilitation    [] Long Term Acute Care (LTAC)    [] Other:     Plan for the Stay (Medical Management) :          Workflow Continuation (Additional Notes) :    Home with family support    YAW MATUTE RN  May 12, 2025

## 2025-05-12 NOTE — PLAN OF CARE
Problem: Chronic Conditions and Co-morbidities  Goal: Patient's chronic conditions and co-morbidity symptoms are monitored and maintained or improved  Outcome: Progressing     Problem: Discharge Planning  Goal: Discharge to home or other facility with appropriate resources  Outcome: Progressing     Problem: Pain  Goal: Verbalizes/displays adequate comfort level or baseline comfort level  Outcome: Progressing     Problem: Safety - Adult  Goal: Free from fall injury  Outcome: Progressing     Problem: ABCDS Injury Assessment  Goal: Absence of physical injury  Outcome: Progressing     Problem: Neurosensory - Adult  Goal: Achieves stable or improved neurological status  Outcome: Progressing  Goal: Absence of seizures  Outcome: Progressing  Goal: Remains free of injury related to seizures activity  Outcome: Progressing  Goal: Achieves maximal functionality and self care  Outcome: Progressing     Problem: Respiratory - Adult  Goal: Achieves optimal ventilation and oxygenation  Outcome: Progressing  Flowsheets (Taken 5/12/2025 1314 by Mayela Colivn, NASH)  Achieves optimal ventilation and oxygenation:   Assess for changes in respiratory status   Assess for changes in mentation and behavior   Position to facilitate oxygenation and minimize respiratory effort   Oxygen supplementation based on oxygen saturation or arterial blood gases   Encourage broncho-pulmonary hygiene including cough, deep breathe, incentive spirometry   Assess the need for suctioning and aspirate as needed   Assess and instruct to report shortness of breath or any respiratory difficulty   Respiratory therapy support as indicated     Problem: Metabolic/Fluid and Electrolytes - Adult  Goal: Electrolytes maintained within normal limits  Outcome: Progressing  Goal: Hemodynamic stability and optimal renal function maintained  Outcome: Progressing  Goal: Glucose maintained within prescribed range  Outcome: Progressing

## 2025-05-12 NOTE — PROGRESS NOTES
[facility]   Progress Note                   Date:   5/12/2025  Patient name: Luisana Goncalves  Date of admission:  5/8/2025 10:04 PM  MRN:   [id number]  YOB: 1929  PCP: Nic Salazar MD    Reason for Admission: Leg edema [R60.0]  Pulmonary embolism on right (HCC) [I26.99]  Acute pharyngitis, unspecified etiology [J02.9]  Chronic kidney disease, unspecified CKD stage [N18.9]  Acute on chronic congestive heart failure, unspecified heart failure type (HCC) [I50.9]    Subjective:       Clinical Changes / Abnormalities: Pt seen and examined in the room.  Pt denies any CP or sob.  Labs, vitals and tele reviewed-SR      Medications:   Scheduled Meds:   diclofenac sodium  2 g Topical BID    sodium chloride flush  5-40 mL IntraVENous 2 times per day    aspirin  81 mg Oral Daily    budesonide-formoterol  2 puff Inhalation BID    gabapentin  300 mg Oral BID    levothyroxine  50 mcg Oral Daily    midodrine  10 mg Oral TID WC    latanoprost  1 drop Both Eyes Daily    sildenafil  20 mg Oral Daily    insulin lispro  0-4 Units SubCUTAneous 4x Daily AC & HS    furosemide  40 mg IntraVENous Daily    atorvastatin  20 mg Oral Nightly     Continuous Infusions:   dextrose      sodium chloride       CBC:   Recent Labs     05/11/25  1025   WBC 7.5   HGB 10.8*        BMP:    Recent Labs     05/10/25  0513      K 4.0      CO2 24   BUN 28*   CREATININE 1.2*   GLUCOSE 138*     Hepatic:   Recent Labs     05/10/25  0513   AST 30   ALT 22   BILITOT 0.8   ALKPHOS 142*     Troponin:   No results for input(s): \"TROPHS\" in the last 72 hours.    BNP: No results for input(s): \"BNP\" in the last 72 hours.  Lipids:   Recent Labs     05/10/25  0513   CHOL 134   HDL 83     INR:   Recent Labs     05/10/25  0513   INR 1.2     ECHO 1/31/25    Left Ventricle: Normal left ventricular systolic function with a visually estimated EF of 55 - 60%. EF by 2D Simpsons Biplane is 58%. Left ventricle size is

## 2025-05-12 NOTE — PLAN OF CARE
Problem: Chronic Conditions and Co-morbidities  Goal: Patient's chronic conditions and co-morbidity symptoms are monitored and maintained or improved  5/11/2025 2110 by Mihaela Almazan RN  Outcome: Progressing     Problem: Discharge Planning  Goal: Discharge to home or other facility with appropriate resources  5/11/2025 2110 by Mihaela Almazan RN  Outcome: Progressing     Problem: Pain  Goal: Verbalizes/displays adequate comfort level or baseline comfort level  5/11/2025 2110 by Mihaela Almazan RN  Outcome: Progressing     Problem: Safety - Adult  Goal: Free from fall injury  5/11/2025 2110 by Mihaela Almazan RN  Outcome: Progressing     Problem: ABCDS Injury Assessment  Goal: Absence of physical injury  5/11/2025 2110 by Mihaela Almazan RN  Outcome: Progressing     Problem: Neurosensory - Adult  Goal: Achieves stable or improved neurological status  5/11/2025 2110 by Mihaela Almazan RN  Outcome: Progressing     Problem: Neurosensory - Adult  Goal: Absence of seizures  5/11/2025 2110 by Mihaela Almazan RN  Outcome: Progressing     Problem: Neurosensory - Adult  Goal: Remains free of injury related to seizures activity  5/11/2025 2110 by Mihaela Almazan RN  Outcome: Progressing     Problem: Neurosensory - Adult  Goal: Achieves maximal functionality and self care  5/11/2025 2110 by Mihaela Almazan RN  Outcome: Progressing     Problem: Respiratory - Adult  Goal: Achieves optimal ventilation and oxygenation  5/11/2025 2110 by Mihaela Almazan RN  Outcome: Progressing     Problem: Metabolic/Fluid and Electrolytes - Adult  Goal: Electrolytes maintained within normal limits  5/11/2025 2110 by Mihaela Almazan RN  Outcome: Progressing     Problem: Metabolic/Fluid and Electrolytes - Adult  Goal: Hemodynamic stability and optimal renal function maintained  5/11/2025 2110 by Mihaela Almazan RN  Outcome: Progressing     Problem: Metabolic/Fluid and Electrolytes - Adult  Goal: Glucose maintained within prescribed

## 2025-05-12 NOTE — DISCHARGE INSTRUCTIONS
Follow-up outpatient with PCP and cardiology.  Continue medications as prescribed, continue diuretics.  Echo was done and reviewed by cardiologist and they were okay with following outpatient.  Follow-up low-salt diet with water restriction 1500 mL/day.

## 2025-05-12 NOTE — PROGRESS NOTES
Physical Therapy  Facility/Department: [address] STEPDOWN   Physical Therapy Daily Treatment Note    Patient Name: Luisana Goncalves        MRN: [id number]    : 1929    Date of Service: 2025    Chief Complaint   Patient presents with    Shortness of Breath    Leg Swelling     Past Medical History:  has a past medical history of Arthritis, Breast cancer (HCC), CAD (coronary artery disease), CHF (congestive heart failure) (HCC), CKD (chronic kidney disease) stage 3, GFR 30-59 ml/min (HCC), COPD (chronic obstructive pulmonary disease) (HCC), Gastroesophageal reflux disease, Hyperlipidemia, Hypertension, Recurrent major depression in remission, Thyroid disease, and Type 2 diabetes mellitus with kidney complication, without long-term current use of insulin (HCC).  Past Surgical History:  has a past surgical history that includes Thyroid surgery; Mastectomy; Hysterectomy; Cholecystectomy; Colonoscopy; knee surgery; Cataract removal (Right); and ep device procedure (Left, 2/3/2025).    Discharge Recommendations  Discharge Recommendations: Patient would benefit from continued therapy after discharge  PT Equipment Recommendations  Equipment Needed: No (pt reports owning rollator)    Assessment  Body Structures, Functions, Activity Limitations Requiring Skilled Therapeutic Intervention: Decreased functional mobility ;Decreased ADL status;Decreased body mechanics;Decreased strength;Decreased high-level IADLs;Decreased balance;Decreased endurance;Decreased safe awareness;Decreased coordination;Increased pain;Decreased posture  Assessment: Patient completed transfers with use of RW, CGA. Patient ambulated a total of 25ft with use of RW, CGA. Patient demo unsteadiness during dynamic standing activites without UE support, requiring Josy to prevent LOB. Patient demo minor unsteadiness throughout all mobility requiring CGA for safety. Patietn demo audiable SOB and wheezing, requiring multiple seated and standing rest breaks

## 2025-05-12 NOTE — DISCHARGE INSTR - COC
Continuity of Care Form    Patient Name: Luisana Goncalves   :  1929  MRN:  [id number]    Admit date:  2025  Discharge date:  ***    Code Status Order: Full Code   Advance Directives:     Admitting Physician:  No admitting provider for patient encounter.  PCP: Nic Salazar MD    Discharging Nurse: ***  Discharging Hospital Unit/Room#: 0540/0540-01  Discharging Unit Phone Number: ***    Emergency Contact:   Extended Emergency Contact Information  Primary Emergency Contact: Kristy Goncalves  Home Phone: [phone]  Mobile Phone: [phone]  Relation: Child   needed? No  Secondary Emergency Contact: Verenice Goncalves  Mobile Phone: [phone]  Relation: Child    Past Surgical History:  Past Surgical History:   Procedure Laterality Date    CATARACT REMOVAL Right     CHOLECYSTECTOMY      COLONOSCOPY      EP DEVICE PROCEDURE Left 2/3/2025    beatriz / Insert PPM dual / rm 161 performed by Javed Horan MD at [facility] CARDIAC CATH LAB    HYSTERECTOMY (CERVIX STATUS UNKNOWN)      KNEE SURGERY      MASTECTOMY      THYROID SURGERY         Immunization History:     There is no immunization history on file for this patient.    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

## 2025-05-12 NOTE — CARE COORDINATION
Dr. Lou requests assistance with cardio clearance for DC.  CHRISSY Navarro NP, who relates echo is stable, patient may follow with [facility] after DC

## 2025-05-12 NOTE — PROGRESS NOTES
05/12/25 1314   Care Plan - Respiratory Goals   Achieves optimal ventilation and oxygenation Assess for changes in respiratory status;Assess for changes in mentation and behavior;Position to facilitate oxygenation and minimize respiratory effort;Oxygen supplementation based on oxygen saturation or arterial blood gases;Encourage broncho-pulmonary hygiene including cough, deep breathe, incentive spirometry;Assess the need for suctioning and aspirate as needed;Assess and instruct to report shortness of breath or any respiratory difficulty;Respiratory therapy support as indicated

## 2025-05-12 NOTE — PROGRESS NOTES
Physician Progress Note      PATIENT:               CATHY HAGAN  CSN #:                  [id number]  :                       1929  ADMIT DATE:       2025 10:04 PM  DISCH DATE:  RESPONDING  PROVIDER #:        MAGY TRE APRN - NP          QUERY TEXT:    Type II MI or demand ischemia  is documented in the medical record per   cardiology from notes -.  Please provide additional clinical indicators   supportive of your documentation. Or please document if the Type II MI has   been ruled out after study.    The clinical indicators include:  Admitted with Acute PE. presented with SOB.Troponins 38>36>32. documented type   2 NSTEMI or demand ischemia . Primary notes on  \" She did have elevated   troponins however etiology was thought to be more due to demand   ischemia/myocardial injury from heart failure and chronic kidney disease\" risk   factor of age of 89  Options provided:  -- MI type II ruled out after study and demand ischemia related to PE   confirmed  -- MI type II related to PE present as evidenced by, Please document   indicators of myocardial infarction.  -- Other - I will add my own diagnosis  -- Disagree - Not applicable / Not valid  -- Disagree - Clinically unable to determine / Unknown  -- Refer to Clinical Documentation Reviewer    PROVIDER RESPONSE TEXT:    Type II MI was ruled out after study and demand ischemia related to PE   confirmed.    Query created by: Dinorah Esparza on 2025 2:12 PM      Electronically signed by:  MAGY Nicole NP 2025 2:36 PM

## 2025-05-12 NOTE — PROGRESS NOTES
Comprehensive Nutrition Assessment    Type and Reason for Visit:  Reassess    Nutrition Recommendations/Plan:   Continue current diet as tolerated--preferences noted  Consider bowel regimen 2/2 no documented BM x 3 days  Will monitor labs, GI status, PO intake, wt, and POC     Malnutrition Assessment:  Malnutrition Status:  No malnutrition (05/09/25 1346)      Nutrition Assessment:    RD f/u for further diet education, written materials left at bedside. Pt reports decreased intake r/t hospital food. Pt would like to receive more fresh fruit/veg vs CHOs. RD encouraged pt to order these items when meal order is taken. RD also provided pt a menu to plan ahead. RD to order cottage cheese/ fresh fruit at lunch per pt request. RD encouraged pt to each out to RD w/ any questions regarding heart healthy diet prior to dc. Pt v/u. RD will continue to monitor per protocol.    Nutrition Related Findings:    NO BM documented x 3 days--consider bowel regimen. BGs 117-240 mg/dL x 24 hrs. Meds: insulin, lasix. Wound Type: None       Current Nutrition Intake & Therapies:    Average Meal Intake: %  Average Supplements Intake: None Ordered  ADULT DIET; Regular  ADULT ORAL NUTRITION SUPPLEMENT; Lunch; Snack; cottage cheese & fresh fruit cup    Anthropometric Measures:  Height: 170.2 cm (5' 7.01\")  Ideal Body Weight (IBW): 135 lbs (61 kg)    Current Body Weight: 72.8 kg (160 lb 7.9 oz), 118.9 % IBW.    Current BMI (kg/m2): 25.1    Estimated Daily Nutrient Needs:  Energy Requirements Based On: Formula  Weight Used for Energy Requirements: Current  Energy (kcal/day): 2310-4615 kcals/day  Weight Used for Protein Requirements: Current  Protein (g/day): 73-83 g/day  Method Used for Fluid Requirements: Defer to provider  Fluid (ml/day): per MD    Nutrition Diagnosis:   No nutrition diagnosis at this time     Nutrition Interventions:   Food and/or Nutrient Delivery: Continue Current Diet  Nutrition Education/Counseling: Survival

## 2025-05-12 NOTE — DISCHARGE SUMMARY
[facility]  Office: [phone]  Jaylen Llanos DO, Carrington Ash DO, Emiliano Haq DO, Chad Ng DO, Mega Black MD, Albania Aguila MD, Katiuska Monroy MD, Rayne Rod MD,  Sammy Conroy MD, Baldev Felton MD, Mehdi Ronquillo DO, Martin Laguna MD,  Dayne Reina DO, Liz Mejia MD, Sulaiman Burgess MD, Kurt Llanos DO, Rdaha Lake MD,  Denver Mccauley DO, Lani Back MD, Michelle Galicia MD, Rita Beal MD, Christiano Johnson MD,  Arcadio Díaz MD, Edy Quintero MD, Kev Bethea MD, Aamir Magana DO, Tommy Barnes MD,  Roosevelt Tsai MD, Shirley Waterhouse, CNP,  Iraida Burks, CNP, Elizabeth Navarro, CNP, Quique Loredo, CNP,  Melissa Crenshaw, DNP, Radha Covarrubias, CNP, Flaquita Connor, CNP, Belle Lopes, CNP, Sade Wolff, CNP, Amanda Patton, CNP, Jamie Stapleton PA-C, Whitney Purcell, CNS, Montse Do, CNP, Pam Beard, CNP         [facility]   IN-PATIENT SERVICE   [facility]    Discharge Summary     Patient ID: Luisana Goncalves  :  1929   MRN: [id number]     ACCOUNT:  [id number]   Patient's PCP: Nic Salazar MD  Admit Date: 2025   Discharge Date: 2025  Length of Stay: 3  Code Status:  Full Code  Admitting Physician: No admitting provider for patient encounter.  Discharge Physician: Binh Lou MD     Active Discharge Diagnoses:     Hospital Problem Lists:  Principal Problem:    Pulmonary embolism on right (HCC)  Active Problems:    Syncope    Hypothyroidism    Essential hypertension    Type 2 diabetes mellitus with kidney complication, without long-term current use of insulin (HCC)    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 angina pectoris    Mixed hyperlipidemia    Pulmonary hypertension (HCC)    COPD without exacerbation (HCC)    Leg edema  Resolved Problems:    * No resolved hospital problems. *      Admission Condition:  fair     Discharged Condition: good    Hospital Stay:

## 2025-05-13 NOTE — PROGRESS NOTES
2100 Pt discharged. Writer reviewed discharge instructions with patient. Pt verbalizes understanding. No questions from pt. Ancillary staff wheeled the pt out. Vital signs stable and no complains.  Family member picked patient up.

## 2025-05-14 NOTE — PROGRESS NOTES
Patient's daughter Verenice Goncalves called asking if patient was discharged home or to a facility. Verenice is listed in chart at emergency contact and answered patient identifiers. Verenice was informed that patient was discharged home.   Electronically signed by LIV LAGUNAS RN on 5/14/2025 at 11:06 AM

## 2025-06-30 NOTE — PROGRESS NOTES
Physician Progress Note      PATIENT:               CATHY HAGAN  CSN #:                  [id number]  :                       1929  ADMIT DATE:       2025 10:04 PM  DISCH DATE:        2025 9:42 PM  RESPONDING  PROVIDER #:        MAGY TRE APRN - NP          QUERY TEXT:    Congestive Heart Failure is documented in the medical record H&P and discharge   summary.  Please document the type and acuity:    The clinical indicators include:  patient presents with SOB and noted to have documentation of \"CHF   exacerbation\" per H&P cardiovascular assessment noted \"bilateral 2+ pitting   edema in lower extremities. EF on 2025 notes \" normal systolic function with   EF 55% dilated rt ventricle and severe pulm htn\" BNP of 21,013. Treated with   iv lasix.  Options provided:  -- Acute on Chronic Diastolic CHF/HFpEF  -- Other - I will add my own diagnosis  -- Disagree - Not applicable / Not valid  -- Disagree - Clinically unable to determine / Unknown  -- Refer to Clinical Documentation Reviewer    PROVIDER RESPONSE TEXT:    This patient is in acute on chronic diastolic CHF/HFpEF.    Query created by: Dinorah Esparza on 2025 7:42 AM      Electronically signed by:  MAGY Nicole NP 2025 8:46 AM

## 2025-07-27 NOTE — CONSULTS
[facility]   Consult Note         Today's Date: 2/11/2021  Patient Name: James Vargas  Date of admission: 2/10/2021  6:44 PM  Patient's age: 80 y.o., 7/6/1929  Admission Dx: Decompensated heart failure ([facility] [I50.9]    Reason for Consult:  Cardiac evaluation    Requesting Physician: Davida Sam DO    REASON FOR CONSULT:  Bradycardia, new bifascicular block    History Obtained From:  Patient, chart, staff, records    HISTORY OF PRESENT ILLNESS:      The patient is a 80 y.o. female with PMH CHFpEF, HTN who was recently admitted 1 week ago for mechanical fall, found to be in sinus bradycardia. Beta blocker at that time was held and outpatient holter monitor was recommended. Today, patient returns after having syncopal fall at home. Patient states she was standing and began \"feeling sick\" before falling to the ground. It is unclear how long she was down. Patient did not have any loss of bladder/bowel function or seizure -like activity. EMS called and they state patient was hypotensive with SBP in 80s and also bradycardic in 40s. In ED, CT head negative for acute abnormalities. troponins 38-30. proBNP 12k - 11k. EKG showing sinus bradycardia with bifascicular block. Patient was to follow up with Dr. Demetria Yoder at [facility] clinic but had yet to do so. Past Medical History:   has a past medical history of Arthritis, Breast cancer ([address] CAD (coronary artery disease), CHF (congestive heart failure) ([address] CKD (chronic kidney disease) stage 3, GFR 30-59 ml/min, COPD (chronic obstructive pulmonary disease) ([address] Gastroesophageal reflux disease, Hyperlipidemia, Hypertension, Recurrent major depression in remission ([address] Thyroid disease, and Type 2 diabetes mellitus with kidney complication, without long-term current use of insulin ([address] Past Surgical History:   has a past surgical history that includes Thyroid surgery; Mastectomy; Hysterectomy;  Cholecystectomy; Colonoscopy; knee surgery; and Cataract removal (Right). Home Medications:    Prior to Admission medications    Medication Sig Start Date End Date Taking? Authorizing Provider   furosemide (LASIX) 40 MG tablet Take 1 tablet by mouth daily 2/5/21   Evorn Band Orlop, DO   sildenafil (REVATIO) 20 MG tablet Take 1 tablet by mouth 3 times daily 2/5/21   Evorn Band Orlop, DO   trimethoprim-polymyxin b (POLYTRIM) 84164-2.1 UNIT/ML-% ophthalmic solution Place 1 drop into the left eye every 6 hours for 10 days 2/5/21 2/15/21  Evorn Band Orlop, DO   glipiZIDE (GLUCOTROL) 5 MG tablet Take 0.5 tablets by mouth daily 8/1/20   Rodriguez Queen MD   aspirin 81 MG chewable tablet Take 81 mg by mouth daily    Historical Provider, MD   budesonide-formoterol (SYMBICORT) 160-4.5 MCG/ACT AERO Inhale 2 puffs into the lungs 2 times daily    Historical Provider, MD   gabapentin (NEURONTIN) 300 MG capsule Take 300 mg by mouth 3 times daily. Historical Provider, MD   travoprost, benzalkonium, (TRAVATAN) 0.004 % ophthalmic solution Place 1 drop into both eyes daily    Historical Provider, MD       carboxymethylcellulose PF, 1 drop, Left Eye, 4x Daily    furosemide, 20 mg, Intravenous, Once    furosemide, 40 mg, Intravenous, BID    aspirin, 81 mg, Oral, Daily    budesonide-formoterol, 2 puff, Inhalation, BID    gabapentin, 300 mg, Oral, TID    latanoprost, 1 drop, Both Eyes, Nightly    sildenafil, 20 mg, Oral, TID    sodium chloride flush, 10 mL, Intravenous, 2 times per day    enoxaparin, 40 mg, Subcutaneous, Daily    [Held by provider] lisinopril, 5 mg, Oral, Daily    [Held by provider] carvedilol, 3.125 mg, Oral, BID WC    insulin lispro, 0-6 Units, Subcutaneous, TID WC    insulin lispro, 0-3 Units, Subcutaneous, Nightly      Allergies:  Pcn [penicillins] and Avelox [moxifloxacin]    Social History:   reports that she has never smoked. She has never used smokeless tobacco. She reports that she does not drink alcohol or use drugs.      Family History: family history includes No Known Problems in her father and mother. No h/o sudden cardiac death. REVIEW OF SYSTEMS:    · Constitutional: there has been no unanticipated weight loss. There's been No change in energy level, No change in activity level. · Eyes: No visual changes or diplopia. No scleral icterus. · ENT: No Headaches, hearing loss or vertigo. No mouth sores or sore throat. · Cardiovascular: per HPI  · Respiratory: per HPI  · Gastrointestinal: No abdominal pain, appetite loss, blood in stools. No change in bowel or bladder habits. · Genitourinary: No dysuria, trouble voiding, or hematuria. · Musculoskeletal:  No gait disturbance, No weakness or joint complaints. · Integumentary: No rash or pruritis. · Neurological: No headache, diplopia, change in muscle strength, numbness or tingling. No change in gait, balance, coordination, mood, affect, memory, mentation, behavior. · Psychiatric: No anxiety, or depression. · Endocrine: No temperature intolerance. No excessive thirst, fluid intake, or urination. No tremor. · Hematologic/Lymphatic: No abnormal bruising or bleeding, blood clots or swollen lymph nodes. · Allergic/Immunologic: No nasal congestion or hives. PHYSICAL EXAM:      /78   Pulse 60   Temp 97.6 °F (36.4 °C) (Oral)   Resp 14   Ht 5' 7\" (1.702 m)   Wt 214 lb 1.1 oz (97.1 kg)   LMP  (LMP Unknown)   SpO2 99%   BMI 33.53 kg/m²    Constitutional and General Appearance: alert, cooperative, no distress and appears stated age  HEENT: PERRL, no cervical lymphadenopathy. No masses palpable. Normal oral mucosa  Respiratory:  · CTAB. No accessory muscle use. Cardiovascular:  · The apical impulse is not displaced  · bradycardia.  regular S1 and S2.  · Jugular venous pulsation Normal  · The carotid upstroke is normal in amplitude and contour without delay or bruit  · Peripheral pulses are symmetrical and full   Abdomen:   · No masses or tenderness  · Bowel sounds Speaking Coherently present  Extremities:  ·  No Cyanosis or Clubbing  ·  Lower extremity edema: No  ·  Skin: Warm and dry  Neurological:  · Alert and oriented. · Moves all extremities well  · No abnormalities of mood, affect, memory, mentation, or behavior are noted        EKG:    Date: 02/11/21  Reading: No acute ischemia      LAST ECHO:  Date: 1/23/21  Findings Summary: EF 55-60%, DD            Labs:     CBC:   Recent Labs     02/10/21  1902 02/11/21  0633   WBC 4.8 4.9   HGB 12.8 12.8   HCT 42.0 40.8    See Reflexed IPF Result     BMP:   Recent Labs     02/10/21  1902      K 4.3   CO2 23   BUN 34*   CREATININE 1.41*   LABGLOM 35*   GLUCOSE 154*     BNP: No results for input(s): BNP in the last 72 hours. PT/INR:   Recent Labs     02/10/21  1902   PROTIME 13.3*   INR 1.3     APTT:  Recent Labs     02/10/21  1902   APTT 24.4     CARDIAC ENZYMES:No results for input(s): CKTOTAL, CKMB, CKMBINDEX, TROPONINI in the last 72 hours.   FASTING LIPID PANEL:  Lab Results   Component Value Date    HDL 88 02/11/2021    TRIG 64 02/11/2021     LIVER PROFILE:  Recent Labs     02/10/21  1902   AST 41*   ALT 28   LABALBU 3.6     Troponins: Invalid input(s): TROPONIN     Other Current Problems  Patient Active Problem List   Diagnosis    Syncope and collapse    Hypothyroidism    Right-sided heart failure (HCC)    Pulmonary hypertension due to COPD ([address]    Essential hypertension    Autonomic neuropathy    CKD (chronic kidney disease) stage 3, GFR 30-59 ml/min    Type 2 diabetes mellitus with kidney complication, without long-term current use of insulin ([address]    CRIS (acute kidney injury) ([address]    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    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 angina pectoris    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 (HCC)    Bradycardia    Mixed hyperlipidemia    Laryngopharyngeal reflux    Peripheral venous insufficiency    Pulmonary hypertension ([address]    Recurrent major depression in remission ([facility]    Vocal cord palsy    Chronic renal insufficiency, stage III (moderate)    COPD without exacerbation (HCC)    Chronic diastolic heart failure ([facility]    Fall at home, initial encounter    Hyperglycemia    Falls frequently    Symptomatic bradycardia    Fall    Subconjunctival hemorrhage of left eye    Decompensated heart failure (HCC)           IMPRESSION  Symptomatic bradycardia  CHFpEF  HTN      Recommendations:    Evaluate holter report  Hold all beta blockers  Continue lasix 40 mg BID  Will consider pacemaker placement  Keep K>4, mag >2  Supportive care  Remaining management per primary team    Discussed with patient, family, and Nurse. Electronically signed by Jose Eason MD on 2/11/2021 at 9:00 AM    Attending note,   The patient was seen and examined, agree with above, presented with recurrent falls, sinus bradycardia with bifascicular block. Has been off BB. D/C Revatio. Obtain Holter results. EP consult to evaluate for PM insertion.

## 2025-08-12 ENCOUNTER — HOSPITAL ENCOUNTER (EMERGENCY)
Age: 89
Discharge: HOME | End: 2025-08-12
Attending: STUDENT IN AN ORGANIZED HEALTH CARE EDUCATION/TRAINING PROGRAM
Payer: MEDICARE

## 2025-08-12 VITALS
DIASTOLIC BLOOD PRESSURE: 90 MMHG | WEIGHT: 168 LBS | BODY MASS INDEX: 26.37 KG/M2 | HEART RATE: 99 BPM | OXYGEN SATURATION: 96 % | RESPIRATION RATE: 18 BRPM | SYSTOLIC BLOOD PRESSURE: 109 MMHG | TEMPERATURE: 97.3 F | HEIGHT: 67 IN

## 2025-08-12 DIAGNOSIS — T14.8XXA SKIN ABRASION: Primary | ICD-10-CM

## 2025-08-12 LAB
BASOPHILS # BLD: 0 K/UL (ref 0–0.2)
BASOPHILS NFR BLD: 0 % (ref 0–2)
EOSINOPHIL # BLD: 0.06 K/UL (ref 0–0.44)
EOSINOPHILS RELATIVE PERCENT: 1 % (ref 1–4)
ERYTHROCYTE [DISTWIDTH] IN BLOOD BY AUTOMATED COUNT: 18.1 % (ref 11.8–14.4)
HCT VFR BLD AUTO: 29.8 % (ref 36.3–47.1)
HGB BLD-MCNC: 9.5 G/DL (ref 11.9–15.1)
IMM GRANULOCYTES # BLD AUTO: 0 K/UL (ref 0–0.3)
IMM GRANULOCYTES NFR BLD: 0 %
LYMPHOCYTES NFR BLD: 0.32 K/UL (ref 1.1–3.7)
LYMPHOCYTES RELATIVE PERCENT: 5 % (ref 24–43)
MCH RBC QN AUTO: 29 PG (ref 25.2–33.5)
MCHC RBC AUTO-ENTMCNC: 31.9 G/DL (ref 28.4–34.8)
MCV RBC AUTO: 90.9 FL (ref 82.6–102.9)
MONOCYTES NFR BLD: 0.5 K/UL (ref 0.1–1.2)
MONOCYTES NFR BLD: 8 % (ref 3–12)
MORPHOLOGY: ABNORMAL
MORPHOLOGY: ABNORMAL
NEUTROPHILS NFR BLD: 86 % (ref 36–65)
NEUTS SEG NFR BLD: 5.42 K/UL (ref 1.5–8.1)
NRBC BLD-RTO: 0 PER 100 WBC
PLATELET # BLD AUTO: 110 K/UL (ref 138–453)
PMV BLD AUTO: 10.5 FL (ref 8.1–13.5)
RBC # BLD AUTO: 3.28 M/UL (ref 3.95–5.11)
WBC OTHER # BLD: 6.3 K/UL (ref 3.5–11.3)

## 2025-08-12 PROCEDURE — 85025 COMPLETE CBC W/AUTO DIFF WBC: CPT

## 2025-08-12 PROCEDURE — 99283 EMERGENCY DEPT VISIT LOW MDM: CPT

## 2025-08-12 ASSESSMENT — PAIN SCALES - GENERAL: PAINLEVEL_OUTOF10: 0

## 2025-08-28 ENCOUNTER — HOSPITAL ENCOUNTER (INPATIENT)
Age: 89
LOS: 6 days | Discharge: NURSING, REHAB OR LONG-TERM CARE | DRG: 291 | End: 2025-09-03
Attending: EMERGENCY MEDICINE | Admitting: INTERNAL MEDICINE
Payer: MEDICARE

## 2025-08-28 ENCOUNTER — APPOINTMENT (OUTPATIENT)
Dept: GENERAL RADIOLOGY | Age: 89
DRG: 291 | End: 2025-08-28
Payer: MEDICARE

## 2025-08-28 DIAGNOSIS — I13.0 CARDIORENAL SYNDROME WITH RENAL FAILURE, STAGE 1-4 OR UNSPECIFIED CHRONIC KIDNEY DISEASE, WITH HEART FAILURE (HCC): ICD-10-CM

## 2025-08-28 DIAGNOSIS — I50.9 DECOMPENSATED HEART FAILURE (HCC): Primary | ICD-10-CM

## 2025-08-28 DIAGNOSIS — N17.9 AKI (ACUTE KIDNEY INJURY): ICD-10-CM

## 2025-08-28 DIAGNOSIS — I50.813 ACUTE ON CHRONIC RIGHT-SIDED CONGESTIVE HEART FAILURE (HCC): ICD-10-CM

## 2025-08-28 PROBLEM — N18.9 ACUTE KIDNEY INJURY SUPERIMPOSED ON CHRONIC KIDNEY DISEASE: Status: ACTIVE | Noted: 2020-07-31

## 2025-08-28 PROBLEM — I50.33 ACUTE ON CHRONIC HEART FAILURE WITH PRESERVED EJECTION FRACTION (HCC): Status: ACTIVE | Noted: 2025-05-12

## 2025-08-28 PROBLEM — J96.01 ACUTE HYPOXIC RESPIRATORY FAILURE (HCC): Status: ACTIVE | Noted: 2025-08-28

## 2025-08-28 LAB
ALBUMIN SERPL-MCNC: 3.9 G/DL (ref 3.5–5.2)
ALBUMIN/GLOB SERPL: 1.5 {RATIO} (ref 1–2.5)
ALP SERPL-CCNC: 174 U/L (ref 35–104)
ALT SERPL-CCNC: 23 U/L (ref 10–35)
ANION GAP SERPL CALCULATED.3IONS-SCNC: 15 MMOL/L (ref 9–16)
AST SERPL-CCNC: 31 U/L (ref 10–35)
BASOPHILS # BLD: 0.06 K/UL (ref 0–0.2)
BASOPHILS NFR BLD: 1 % (ref 0–2)
BILIRUB SERPL-MCNC: 1 MG/DL (ref 0–1.2)
BNP SERPL-MCNC: 9540 PG/ML (ref 0–450)
BODY TEMPERATURE: 37
BUN SERPL-MCNC: 60 MG/DL (ref 8–23)
CALCIUM SERPL-MCNC: 9 MG/DL (ref 8.6–10.4)
CHLORIDE SERPL-SCNC: 102 MMOL/L (ref 98–107)
CO2 SERPL-SCNC: 24 MMOL/L (ref 20–31)
COHGB MFR BLD: 0.6 % (ref 0–5)
CREAT SERPL-MCNC: 2.1 MG/DL (ref 0.6–0.9)
EOSINOPHIL # BLD: 0 K/UL (ref 0–0.44)
EOSINOPHILS RELATIVE PERCENT: 0 % (ref 1–4)
ERYTHROCYTE [DISTWIDTH] IN BLOOD BY AUTOMATED COUNT: 18.5 % (ref 11.8–14.4)
FIO2 ON VENT: ABNORMAL %
GFR, ESTIMATED: 21 ML/MIN/1.73M2
GLUCOSE BLD-MCNC: 102 MG/DL (ref 65–105)
GLUCOSE SERPL-MCNC: 159 MG/DL (ref 74–99)
HCO3 VENOUS: 23.3 MMOL/L (ref 24–30)
HCT VFR BLD AUTO: 28.6 % (ref 36.3–47.1)
HGB BLD-MCNC: 8.5 G/DL (ref 11.9–15.1)
IMM GRANULOCYTES # BLD AUTO: 0 K/UL (ref 0–0.3)
IMM GRANULOCYTES NFR BLD: 0 %
INR PPP: 1.4
LYMPHOCYTES NFR BLD: 0.6 K/UL (ref 1.1–3.7)
LYMPHOCYTES RELATIVE PERCENT: 10 % (ref 24–43)
MCH RBC QN AUTO: 27.8 PG (ref 25.2–33.5)
MCHC RBC AUTO-ENTMCNC: 29.7 G/DL (ref 28.4–34.8)
MCV RBC AUTO: 93.5 FL (ref 82.6–102.9)
MONOCYTES NFR BLD: 0.66 K/UL (ref 0.1–1.2)
MONOCYTES NFR BLD: 11 % (ref 3–12)
MORPHOLOGY: ABNORMAL
NEGATIVE BASE EXCESS, VEN: 1.7 MMOL/L (ref 0–2)
NEUTROPHILS NFR BLD: 78 % (ref 36–65)
NEUTS SEG NFR BLD: 4.68 K/UL (ref 1.5–8.1)
NRBC BLD-RTO: 0.7 PER 100 WBC
O2 SAT, VEN: 30.9 % (ref 60–85)
PARTIAL THROMBOPLASTIN TIME: 32 SEC (ref 23–36.5)
PCO2 VENOUS: 40.3 MM HG (ref 39–55)
PH VENOUS: 7.38 (ref 7.32–7.42)
PLATELET # BLD AUTO: 202 K/UL (ref 138–453)
PMV BLD AUTO: 10.6 FL (ref 8.1–13.5)
PO2 VENOUS: 19.8 MM HG (ref 30–50)
POTASSIUM SERPL-SCNC: 3.8 MMOL/L (ref 3.7–5.3)
PROT SERPL-MCNC: 6.5 G/DL (ref 6.6–8.7)
PROTHROMBIN TIME: 17.9 SEC (ref 11.7–14.9)
RBC # BLD AUTO: 3.06 M/UL (ref 3.95–5.11)
SODIUM SERPL-SCNC: 141 MMOL/L (ref 136–145)
TROPONIN I SERPL HS-MCNC: 55 NG/L (ref 0–14)
TROPONIN I SERPL HS-MCNC: 61 NG/L (ref 0–14)
WBC OTHER # BLD: 6 K/UL (ref 3.5–11.3)

## 2025-08-28 PROCEDURE — 36415 COLL VENOUS BLD VENIPUNCTURE: CPT

## 2025-08-28 PROCEDURE — 85610 PROTHROMBIN TIME: CPT

## 2025-08-28 PROCEDURE — 83880 ASSAY OF NATRIURETIC PEPTIDE: CPT

## 2025-08-28 PROCEDURE — 85730 THROMBOPLASTIN TIME PARTIAL: CPT

## 2025-08-28 PROCEDURE — 2500000003 HC RX 250 WO HCPCS

## 2025-08-28 PROCEDURE — 82805 BLOOD GASES W/O2 SATURATION: CPT

## 2025-08-28 PROCEDURE — 6370000000 HC RX 637 (ALT 250 FOR IP)

## 2025-08-28 PROCEDURE — 82947 ASSAY GLUCOSE BLOOD QUANT: CPT

## 2025-08-28 PROCEDURE — 93005 ELECTROCARDIOGRAM TRACING: CPT

## 2025-08-28 PROCEDURE — 71045 X-RAY EXAM CHEST 1 VIEW: CPT

## 2025-08-28 PROCEDURE — 2060000000 HC ICU INTERMEDIATE R&B

## 2025-08-28 PROCEDURE — 6360000002 HC RX W HCPCS

## 2025-08-28 PROCEDURE — 84484 ASSAY OF TROPONIN QUANT: CPT

## 2025-08-28 PROCEDURE — 5A09357 ASSISTANCE WITH RESPIRATORY VENTILATION, LESS THAN 24 CONSECUTIVE HOURS, CONTINUOUS POSITIVE AIRWAY PRESSURE: ICD-10-PCS | Performed by: EMERGENCY MEDICINE

## 2025-08-28 PROCEDURE — 2700000000 HC OXYGEN THERAPY PER DAY

## 2025-08-28 PROCEDURE — 94660 CPAP INITIATION&MGMT: CPT

## 2025-08-28 PROCEDURE — 99285 EMERGENCY DEPT VISIT HI MDM: CPT

## 2025-08-28 PROCEDURE — 94761 N-INVAS EAR/PLS OXIMETRY MLT: CPT

## 2025-08-28 PROCEDURE — 85025 COMPLETE CBC W/AUTO DIFF WBC: CPT

## 2025-08-28 PROCEDURE — 80053 COMPREHEN METABOLIC PANEL: CPT

## 2025-08-28 RX ORDER — ONDANSETRON 2 MG/ML
4 INJECTION INTRAMUSCULAR; INTRAVENOUS EVERY 6 HOURS PRN
Status: DISCONTINUED | OUTPATIENT
Start: 2025-08-28 | End: 2025-09-03 | Stop reason: HOSPADM

## 2025-08-28 RX ORDER — ATORVASTATIN CALCIUM 20 MG/1
20 TABLET, FILM COATED ORAL NIGHTLY
Status: DISCONTINUED | OUTPATIENT
Start: 2025-08-28 | End: 2025-09-03 | Stop reason: HOSPADM

## 2025-08-28 RX ORDER — SODIUM CHLORIDE 0.9 % (FLUSH) 0.9 %
5-40 SYRINGE (ML) INJECTION EVERY 12 HOURS SCHEDULED
Status: DISCONTINUED | OUTPATIENT
Start: 2025-08-28 | End: 2025-09-03 | Stop reason: HOSPADM

## 2025-08-28 RX ORDER — ONDANSETRON 4 MG/1
4 TABLET, ORALLY DISINTEGRATING ORAL EVERY 8 HOURS PRN
Status: DISCONTINUED | OUTPATIENT
Start: 2025-08-28 | End: 2025-09-03 | Stop reason: HOSPADM

## 2025-08-28 RX ORDER — ACETAMINOPHEN 325 MG/1
650 TABLET ORAL EVERY 6 HOURS PRN
Status: DISCONTINUED | OUTPATIENT
Start: 2025-08-28 | End: 2025-09-03 | Stop reason: HOSPADM

## 2025-08-28 RX ORDER — MIDODRINE HYDROCHLORIDE 10 MG/1
10 TABLET ORAL
Status: DISCONTINUED | OUTPATIENT
Start: 2025-08-29 | End: 2025-09-03 | Stop reason: HOSPADM

## 2025-08-28 RX ORDER — BUDESONIDE AND FORMOTEROL FUMARATE DIHYDRATE 160; 4.5 UG/1; UG/1
2 AEROSOL RESPIRATORY (INHALATION) 2 TIMES DAILY
Status: DISCONTINUED | OUTPATIENT
Start: 2025-08-28 | End: 2025-09-03 | Stop reason: HOSPADM

## 2025-08-28 RX ORDER — SODIUM CHLORIDE 0.9 % (FLUSH) 0.9 %
5-40 SYRINGE (ML) INJECTION PRN
Status: DISCONTINUED | OUTPATIENT
Start: 2025-08-28 | End: 2025-09-03 | Stop reason: HOSPADM

## 2025-08-28 RX ORDER — MAGNESIUM SULFATE IN WATER 40 MG/ML
2000 INJECTION, SOLUTION INTRAVENOUS PRN
Status: DISCONTINUED | OUTPATIENT
Start: 2025-08-28 | End: 2025-08-28

## 2025-08-28 RX ORDER — SILDENAFIL CITRATE 20 MG/1
20 TABLET ORAL DAILY
Status: DISCONTINUED | OUTPATIENT
Start: 2025-08-29 | End: 2025-09-03 | Stop reason: HOSPADM

## 2025-08-28 RX ORDER — POTASSIUM CHLORIDE 1500 MG/1
40 TABLET, EXTENDED RELEASE ORAL PRN
Status: DISCONTINUED | OUTPATIENT
Start: 2025-08-28 | End: 2025-08-28

## 2025-08-28 RX ORDER — POLYETHYLENE GLYCOL 3350 17 G/17G
17 POWDER, FOR SOLUTION ORAL DAILY PRN
Status: DISCONTINUED | OUTPATIENT
Start: 2025-08-28 | End: 2025-09-03 | Stop reason: HOSPADM

## 2025-08-28 RX ORDER — GLUCAGON 1 MG/ML
1 KIT INJECTION PRN
Status: DISCONTINUED | OUTPATIENT
Start: 2025-08-28 | End: 2025-09-03 | Stop reason: HOSPADM

## 2025-08-28 RX ORDER — LEVOTHYROXINE SODIUM 50 UG/1
50 TABLET ORAL DAILY
Status: DISCONTINUED | OUTPATIENT
Start: 2025-08-29 | End: 2025-09-03 | Stop reason: HOSPADM

## 2025-08-28 RX ORDER — FUROSEMIDE 10 MG/ML
40 INJECTION INTRAMUSCULAR; INTRAVENOUS DAILY
Status: DISCONTINUED | OUTPATIENT
Start: 2025-08-28 | End: 2025-08-30

## 2025-08-28 RX ORDER — SODIUM CHLORIDE 9 MG/ML
INJECTION, SOLUTION INTRAVENOUS PRN
Status: DISCONTINUED | OUTPATIENT
Start: 2025-08-28 | End: 2025-09-03 | Stop reason: HOSPADM

## 2025-08-28 RX ORDER — ASPIRIN 81 MG/1
81 TABLET, CHEWABLE ORAL DAILY
Status: DISCONTINUED | OUTPATIENT
Start: 2025-08-29 | End: 2025-08-28

## 2025-08-28 RX ORDER — ACETAMINOPHEN 650 MG/1
650 SUPPOSITORY RECTAL EVERY 6 HOURS PRN
Status: DISCONTINUED | OUTPATIENT
Start: 2025-08-28 | End: 2025-09-03 | Stop reason: HOSPADM

## 2025-08-28 RX ORDER — INSULIN LISPRO 100 [IU]/ML
0-4 INJECTION, SOLUTION INTRAVENOUS; SUBCUTANEOUS
Status: DISCONTINUED | OUTPATIENT
Start: 2025-08-28 | End: 2025-09-03 | Stop reason: HOSPADM

## 2025-08-28 RX ORDER — POTASSIUM CHLORIDE 7.45 MG/ML
10 INJECTION INTRAVENOUS PRN
Status: DISCONTINUED | OUTPATIENT
Start: 2025-08-28 | End: 2025-08-28

## 2025-08-28 RX ORDER — DEXTROSE MONOHYDRATE 100 MG/ML
INJECTION, SOLUTION INTRAVENOUS CONTINUOUS PRN
Status: DISCONTINUED | OUTPATIENT
Start: 2025-08-28 | End: 2025-09-03 | Stop reason: HOSPADM

## 2025-08-28 RX ADMIN — SODIUM CHLORIDE, PRESERVATIVE FREE 10 ML: 5 INJECTION INTRAVENOUS at 21:38

## 2025-08-28 RX ADMIN — FUROSEMIDE 40 MG: 10 INJECTION, SOLUTION INTRAMUSCULAR; INTRAVENOUS at 21:24

## 2025-08-28 RX ADMIN — ATORVASTATIN CALCIUM 20 MG: 20 TABLET, FILM COATED ORAL at 21:37

## 2025-08-28 ASSESSMENT — LIFESTYLE VARIABLES
HOW OFTEN DO YOU HAVE A DRINK CONTAINING ALCOHOL: NEVER
HOW MANY STANDARD DRINKS CONTAINING ALCOHOL DO YOU HAVE ON A TYPICAL DAY: PATIENT DOES NOT DRINK

## 2025-08-28 ASSESSMENT — PAIN SCALES - GENERAL
PAINLEVEL_OUTOF10: 0
PAINLEVEL_OUTOF10: 0

## 2025-08-29 ENCOUNTER — APPOINTMENT (OUTPATIENT)
Age: 89
DRG: 291 | End: 2025-08-29
Payer: MEDICARE

## 2025-08-29 PROBLEM — I13.10 CARDIORENAL SYNDROME: Status: ACTIVE | Noted: 2025-08-29

## 2025-08-29 LAB
ANION GAP SERPL CALCULATED.3IONS-SCNC: 15 MMOL/L (ref 9–16)
BACTERIA URNS QL MICRO: ABNORMAL
BASOPHILS # BLD: 0 K/UL (ref 0–0.2)
BASOPHILS NFR BLD: 0 % (ref 0–2)
BILIRUB UR QL STRIP: NEGATIVE
BUN SERPL-MCNC: 57 MG/DL (ref 8–23)
CALCIUM SERPL-MCNC: 8.9 MG/DL (ref 8.6–10.4)
CASTS #/AREA URNS LPF: ABNORMAL /LPF (ref 0–8)
CHLORIDE SERPL-SCNC: 104 MMOL/L (ref 98–107)
CLARITY UR: CLEAR
CO2 SERPL-SCNC: 22 MMOL/L (ref 20–31)
COLOR UR: YELLOW
CREAT SERPL-MCNC: 1.9 MG/DL (ref 0.6–0.9)
CREAT UR-MCNC: 46.5 MG/DL (ref 28–217)
ECHO BSA: 1.89 M2
ECHO IVC PROX: 4 CM
ECHO LV EF PHYSICIAN: 50 %
ECHO TV REGURGITANT MAX VELOCITY: 3.41 M/S
ECHO TV REGURGITANT PEAK GRADIENT: 47 MMHG
EKG ATRIAL RATE: 71 BPM
EKG ATRIAL RATE: 72 BPM
EKG ATRIAL RATE: 80 BPM
EKG P AXIS: 63 DEGREES
EKG P AXIS: 64 DEGREES
EKG P-R INTERVAL: 140 MS
EKG P-R INTERVAL: 224 MS
EKG P-R INTERVAL: 244 MS
EKG Q-T INTERVAL: 430 MS
EKG Q-T INTERVAL: 450 MS
EKG Q-T INTERVAL: 458 MS
EKG QRS DURATION: 164 MS
EKG QRS DURATION: 168 MS
EKG QRS DURATION: 170 MS
EKG QTC CALCULATION (BAZETT): 492 MS
EKG QTC CALCULATION (BAZETT): 495 MS
EKG QTC CALCULATION (BAZETT): 511 MS
EKG R AXIS: 107 DEGREES
EKG R AXIS: 147 DEGREES
EKG R AXIS: 88 DEGREES
EKG T AXIS: -28 DEGREES
EKG T AXIS: -3 DEGREES
EKG T AXIS: -6 DEGREES
EKG VENTRICULAR RATE: 72 BPM
EKG VENTRICULAR RATE: 75 BPM
EKG VENTRICULAR RATE: 80 BPM
EOSINOPHIL # BLD: 0.06 K/UL (ref 0–0.44)
EOSINOPHILS RELATIVE PERCENT: 1 % (ref 1–4)
EPI CELLS #/AREA URNS HPF: ABNORMAL /HPF (ref 0–5)
ERYTHROCYTE [DISTWIDTH] IN BLOOD BY AUTOMATED COUNT: 18.5 % (ref 11.8–14.4)
GFR, ESTIMATED: 24 ML/MIN/1.73M2
GLUCOSE BLD-MCNC: 138 MG/DL (ref 65–105)
GLUCOSE BLD-MCNC: 142 MG/DL (ref 65–105)
GLUCOSE BLD-MCNC: 182 MG/DL (ref 65–105)
GLUCOSE SERPL-MCNC: 122 MG/DL (ref 74–99)
GLUCOSE UR STRIP-MCNC: NEGATIVE MG/DL
HCT VFR BLD AUTO: 27.9 % (ref 36.3–47.1)
HGB BLD-MCNC: 8.6 G/DL (ref 11.9–15.1)
HGB UR QL STRIP.AUTO: NEGATIVE
IMM GRANULOCYTES # BLD AUTO: 0 K/UL (ref 0–0.3)
IMM GRANULOCYTES NFR BLD: 0 %
KETONES UR STRIP-MCNC: NEGATIVE MG/DL
LEUKOCYTE ESTERASE UR QL STRIP: NEGATIVE
LYMPHOCYTES NFR BLD: 0.58 K/UL (ref 1.1–3.7)
LYMPHOCYTES RELATIVE PERCENT: 10 % (ref 24–43)
MAGNESIUM SERPL-MCNC: 2.2 MG/DL (ref 1.7–2.3)
MCH RBC QN AUTO: 28.1 PG (ref 25.2–33.5)
MCHC RBC AUTO-ENTMCNC: 30.8 G/DL (ref 28.4–34.8)
MCV RBC AUTO: 91.2 FL (ref 82.6–102.9)
MONOCYTES NFR BLD: 0.7 K/UL (ref 0.1–1.2)
MONOCYTES NFR BLD: 12 % (ref 3–12)
MORPHOLOGY: ABNORMAL
NEUTROPHILS NFR BLD: 77 % (ref 36–65)
NEUTS SEG NFR BLD: 4.46 K/UL (ref 1.5–8.1)
NITRITE UR QL STRIP: NEGATIVE
NRBC BLD-RTO: 0.5 PER 100 WBC
PH UR STRIP: 5.5 [PH] (ref 5–8)
PLATELET # BLD AUTO: 199 K/UL (ref 138–453)
PMV BLD AUTO: 11.3 FL (ref 8.1–13.5)
POTASSIUM SERPL-SCNC: 3.7 MMOL/L (ref 3.7–5.3)
PROT UR STRIP-MCNC: ABNORMAL MG/DL
RBC # BLD AUTO: 3.06 M/UL (ref 3.95–5.11)
RBC #/AREA URNS HPF: ABNORMAL /HPF (ref 0–4)
SODIUM SERPL-SCNC: 141 MMOL/L (ref 136–145)
SODIUM UR-SCNC: 77 MMOL/L
SP GR UR STRIP: 1.01 (ref 1–1.03)
TROPONIN I SERPL HS-MCNC: 53 NG/L (ref 0–14)
UROBILINOGEN UR STRIP-ACNC: NORMAL EU/DL (ref 0–1)
WBC #/AREA URNS HPF: ABNORMAL /HPF (ref 0–5)
WBC OTHER # BLD: 5.8 K/UL (ref 3.5–11.3)

## 2025-08-29 PROCEDURE — 93321 DOPPLER ECHO F-UP/LMTD STD: CPT

## 2025-08-29 PROCEDURE — 2700000000 HC OXYGEN THERAPY PER DAY

## 2025-08-29 PROCEDURE — 6370000000 HC RX 637 (ALT 250 FOR IP)

## 2025-08-29 PROCEDURE — 93321 DOPPLER ECHO F-UP/LMTD STD: CPT | Performed by: STUDENT IN AN ORGANIZED HEALTH CARE EDUCATION/TRAINING PROGRAM

## 2025-08-29 PROCEDURE — 2500000003 HC RX 250 WO HCPCS

## 2025-08-29 PROCEDURE — 99223 1ST HOSP IP/OBS HIGH 75: CPT | Performed by: INTERNAL MEDICINE

## 2025-08-29 PROCEDURE — 2060000000 HC ICU INTERMEDIATE R&B

## 2025-08-29 PROCEDURE — 93010 ELECTROCARDIOGRAM REPORT: CPT | Performed by: INTERNAL MEDICINE

## 2025-08-29 PROCEDURE — 97530 THERAPEUTIC ACTIVITIES: CPT

## 2025-08-29 PROCEDURE — 82947 ASSAY GLUCOSE BLOOD QUANT: CPT

## 2025-08-29 PROCEDURE — 36415 COLL VENOUS BLD VENIPUNCTURE: CPT

## 2025-08-29 PROCEDURE — 93005 ELECTROCARDIOGRAM TRACING: CPT | Performed by: NURSE PRACTITIONER

## 2025-08-29 PROCEDURE — 81001 URINALYSIS AUTO W/SCOPE: CPT

## 2025-08-29 PROCEDURE — 97162 PT EVAL MOD COMPLEX 30 MIN: CPT

## 2025-08-29 PROCEDURE — 93308 TTE F-UP OR LMTD: CPT | Performed by: STUDENT IN AN ORGANIZED HEALTH CARE EDUCATION/TRAINING PROGRAM

## 2025-08-29 PROCEDURE — 6360000002 HC RX W HCPCS

## 2025-08-29 PROCEDURE — 97535 SELF CARE MNGMENT TRAINING: CPT

## 2025-08-29 PROCEDURE — 85025 COMPLETE CBC W/AUTO DIFF WBC: CPT

## 2025-08-29 PROCEDURE — 94640 AIRWAY INHALATION TREATMENT: CPT

## 2025-08-29 PROCEDURE — 83735 ASSAY OF MAGNESIUM: CPT

## 2025-08-29 PROCEDURE — 94761 N-INVAS EAR/PLS OXIMETRY MLT: CPT

## 2025-08-29 PROCEDURE — 80048 BASIC METABOLIC PNL TOTAL CA: CPT

## 2025-08-29 PROCEDURE — 97166 OT EVAL MOD COMPLEX 45 MIN: CPT

## 2025-08-29 PROCEDURE — 82570 ASSAY OF URINE CREATININE: CPT

## 2025-08-29 PROCEDURE — 84300 ASSAY OF URINE SODIUM: CPT

## 2025-08-29 PROCEDURE — 93325 DOPPLER ECHO COLOR FLOW MAPG: CPT | Performed by: STUDENT IN AN ORGANIZED HEALTH CARE EDUCATION/TRAINING PROGRAM

## 2025-08-29 PROCEDURE — 84484 ASSAY OF TROPONIN QUANT: CPT

## 2025-08-29 RX ORDER — MULTIVITAMIN WITH IRON
1 TABLET ORAL DAILY
COMMUNITY

## 2025-08-29 RX ORDER — OMEPRAZOLE 20 MG/1
20 CAPSULE, DELAYED RELEASE ORAL DAILY
COMMUNITY

## 2025-08-29 RX ADMIN — FUROSEMIDE 40 MG: 10 INJECTION, SOLUTION INTRAMUSCULAR; INTRAVENOUS at 09:01

## 2025-08-29 RX ADMIN — BUDESONIDE AND FORMOTEROL FUMARATE DIHYDRATE 2 PUFF: 160; 4.5 AEROSOL RESPIRATORY (INHALATION) at 08:02

## 2025-08-29 RX ADMIN — APIXABAN 2.5 MG: 2.5 TABLET, FILM COATED ORAL at 20:27

## 2025-08-29 RX ADMIN — BUDESONIDE AND FORMOTEROL FUMARATE DIHYDRATE 2 PUFF: 160; 4.5 AEROSOL RESPIRATORY (INHALATION) at 20:21

## 2025-08-29 RX ADMIN — SILDENAFIL 20 MG: 20 TABLET ORAL at 09:01

## 2025-08-29 RX ADMIN — INSULIN LISPRO 1 UNITS: 100 INJECTION, SOLUTION INTRAVENOUS; SUBCUTANEOUS at 20:27

## 2025-08-29 RX ADMIN — APIXABAN 2.5 MG: 2.5 TABLET, FILM COATED ORAL at 09:01

## 2025-08-29 RX ADMIN — SODIUM CHLORIDE, PRESERVATIVE FREE 10 ML: 5 INJECTION INTRAVENOUS at 09:02

## 2025-08-29 RX ADMIN — LEVOTHYROXINE SODIUM 50 MCG: 0.05 TABLET ORAL at 05:42

## 2025-08-29 RX ADMIN — ATORVASTATIN CALCIUM 20 MG: 20 TABLET, FILM COATED ORAL at 20:26

## 2025-08-29 RX ADMIN — SODIUM CHLORIDE, PRESERVATIVE FREE 10 ML: 5 INJECTION INTRAVENOUS at 20:27

## 2025-08-29 ASSESSMENT — PAIN SCALES - GENERAL
PAINLEVEL_OUTOF10: 0

## 2025-08-30 LAB
ANION GAP SERPL CALCULATED.3IONS-SCNC: 13 MMOL/L (ref 9–16)
BASOPHILS # BLD: <0.03 K/UL (ref 0–0.2)
BASOPHILS NFR BLD: 0 % (ref 0–2)
BUN SERPL-MCNC: 56 MG/DL (ref 8–23)
CALCIUM SERPL-MCNC: 8.7 MG/DL (ref 8.6–10.4)
CHLORIDE SERPL-SCNC: 106 MMOL/L (ref 98–107)
CO2 SERPL-SCNC: 24 MMOL/L (ref 20–31)
CREAT SERPL-MCNC: 2 MG/DL (ref 0.6–0.9)
EOSINOPHIL # BLD: 0.03 K/UL (ref 0–0.44)
EOSINOPHILS RELATIVE PERCENT: 1 % (ref 1–4)
ERYTHROCYTE [DISTWIDTH] IN BLOOD BY AUTOMATED COUNT: 18.4 % (ref 11.8–14.4)
GFR, ESTIMATED: 22 ML/MIN/1.73M2
GLUCOSE BLD-MCNC: 101 MG/DL (ref 65–105)
GLUCOSE BLD-MCNC: 175 MG/DL (ref 65–105)
GLUCOSE BLD-MCNC: 180 MG/DL (ref 65–105)
GLUCOSE BLD-MCNC: 239 MG/DL (ref 65–105)
GLUCOSE SERPL-MCNC: 104 MG/DL (ref 74–99)
HCT VFR BLD AUTO: 26.1 % (ref 36.3–47.1)
HGB BLD-MCNC: 8.1 G/DL (ref 11.9–15.1)
IMM GRANULOCYTES # BLD AUTO: <0.03 K/UL (ref 0–0.3)
IMM GRANULOCYTES NFR BLD: 0 %
LYMPHOCYTES NFR BLD: 0.72 K/UL (ref 1.1–3.7)
LYMPHOCYTES RELATIVE PERCENT: 12 % (ref 24–43)
MCH RBC QN AUTO: 28.4 PG (ref 25.2–33.5)
MCHC RBC AUTO-ENTMCNC: 31 G/DL (ref 28.4–34.8)
MCV RBC AUTO: 91.6 FL (ref 82.6–102.9)
MONOCYTES NFR BLD: 0.78 K/UL (ref 0.1–1.2)
MONOCYTES NFR BLD: 13 % (ref 3–12)
NEUTROPHILS NFR BLD: 74 % (ref 36–65)
NEUTS SEG NFR BLD: 4.51 K/UL (ref 1.5–8.1)
NRBC BLD-RTO: 0.7 PER 100 WBC
PLATELET # BLD AUTO: 171 K/UL (ref 138–453)
PMV BLD AUTO: 11 FL (ref 8.1–13.5)
POTASSIUM SERPL-SCNC: 4 MMOL/L (ref 3.7–5.3)
RBC # BLD AUTO: 2.85 M/UL (ref 3.95–5.11)
RBC # BLD: ABNORMAL 10*6/UL
SODIUM SERPL-SCNC: 143 MMOL/L (ref 136–145)
WBC OTHER # BLD: 6.1 K/UL (ref 3.5–11.3)

## 2025-08-30 PROCEDURE — 2060000000 HC ICU INTERMEDIATE R&B

## 2025-08-30 PROCEDURE — 2500000003 HC RX 250 WO HCPCS

## 2025-08-30 PROCEDURE — 80048 BASIC METABOLIC PNL TOTAL CA: CPT

## 2025-08-30 PROCEDURE — 97530 THERAPEUTIC ACTIVITIES: CPT

## 2025-08-30 PROCEDURE — 99232 SBSQ HOSP IP/OBS MODERATE 35: CPT | Performed by: INTERNAL MEDICINE

## 2025-08-30 PROCEDURE — 82947 ASSAY GLUCOSE BLOOD QUANT: CPT

## 2025-08-30 PROCEDURE — 6370000000 HC RX 637 (ALT 250 FOR IP)

## 2025-08-30 PROCEDURE — 85025 COMPLETE CBC W/AUTO DIFF WBC: CPT

## 2025-08-30 PROCEDURE — 6360000002 HC RX W HCPCS

## 2025-08-30 PROCEDURE — 2700000000 HC OXYGEN THERAPY PER DAY

## 2025-08-30 PROCEDURE — 36415 COLL VENOUS BLD VENIPUNCTURE: CPT

## 2025-08-30 PROCEDURE — 94640 AIRWAY INHALATION TREATMENT: CPT

## 2025-08-30 PROCEDURE — 94761 N-INVAS EAR/PLS OXIMETRY MLT: CPT

## 2025-08-30 PROCEDURE — 97110 THERAPEUTIC EXERCISES: CPT

## 2025-08-30 RX ORDER — FUROSEMIDE 40 MG/1
40 TABLET ORAL DAILY
Status: DISCONTINUED | OUTPATIENT
Start: 2025-08-30 | End: 2025-08-30

## 2025-08-30 RX ORDER — FUROSEMIDE 40 MG/1
40 TABLET ORAL DAILY
Status: DISCONTINUED | OUTPATIENT
Start: 2025-08-31 | End: 2025-09-03 | Stop reason: HOSPADM

## 2025-08-30 RX ADMIN — ACETAMINOPHEN 650 MG: 325 TABLET ORAL at 01:27

## 2025-08-30 RX ADMIN — FUROSEMIDE 40 MG: 10 INJECTION, SOLUTION INTRAMUSCULAR; INTRAVENOUS at 08:14

## 2025-08-30 RX ADMIN — SODIUM CHLORIDE, PRESERVATIVE FREE 10 ML: 5 INJECTION INTRAVENOUS at 20:29

## 2025-08-30 RX ADMIN — LEVOTHYROXINE SODIUM 50 MCG: 0.05 TABLET ORAL at 08:14

## 2025-08-30 RX ADMIN — INSULIN LISPRO 1 UNITS: 100 INJECTION, SOLUTION INTRAVENOUS; SUBCUTANEOUS at 17:06

## 2025-08-30 RX ADMIN — APIXABAN 2.5 MG: 2.5 TABLET, FILM COATED ORAL at 08:14

## 2025-08-30 RX ADMIN — BUDESONIDE AND FORMOTEROL FUMARATE DIHYDRATE 2 PUFF: 160; 4.5 AEROSOL RESPIRATORY (INHALATION) at 08:19

## 2025-08-30 RX ADMIN — INSULIN LISPRO 1 UNITS: 100 INJECTION, SOLUTION INTRAVENOUS; SUBCUTANEOUS at 20:21

## 2025-08-30 RX ADMIN — APIXABAN 2.5 MG: 2.5 TABLET, FILM COATED ORAL at 20:21

## 2025-08-30 RX ADMIN — SILDENAFIL 20 MG: 20 TABLET ORAL at 08:30

## 2025-08-30 RX ADMIN — SODIUM CHLORIDE, PRESERVATIVE FREE 10 ML: 5 INJECTION INTRAVENOUS at 08:15

## 2025-08-30 RX ADMIN — BUDESONIDE AND FORMOTEROL FUMARATE DIHYDRATE 2 PUFF: 160; 4.5 AEROSOL RESPIRATORY (INHALATION) at 19:51

## 2025-08-30 RX ADMIN — ATORVASTATIN CALCIUM 20 MG: 20 TABLET, FILM COATED ORAL at 20:21

## 2025-08-30 ASSESSMENT — PAIN SCALES - GENERAL
PAINLEVEL_OUTOF10: 0
PAINLEVEL_OUTOF10: 0
PAINLEVEL_OUTOF10: 5
PAINLEVEL_OUTOF10: 0
PAINLEVEL_OUTOF10: 3
PAINLEVEL_OUTOF10: 0

## 2025-08-30 ASSESSMENT — PAIN - FUNCTIONAL ASSESSMENT: PAIN_FUNCTIONAL_ASSESSMENT: 0-10

## 2025-08-31 LAB
ANION GAP SERPL CALCULATED.3IONS-SCNC: 13 MMOL/L (ref 9–16)
BASOPHILS # BLD: 0.03 K/UL (ref 0–0.2)
BASOPHILS NFR BLD: 0 % (ref 0–2)
BNP SERPL-MCNC: ABNORMAL PG/ML (ref 0–450)
BUN SERPL-MCNC: 51 MG/DL (ref 8–23)
CALCIUM SERPL-MCNC: 8.8 MG/DL (ref 8.6–10.4)
CHLORIDE SERPL-SCNC: 104 MMOL/L (ref 98–107)
CO2 SERPL-SCNC: 23 MMOL/L (ref 20–31)
CREAT SERPL-MCNC: 1.7 MG/DL (ref 0.6–0.9)
EOSINOPHIL # BLD: 0.03 K/UL (ref 0–0.44)
EOSINOPHILS RELATIVE PERCENT: 0 % (ref 1–4)
ERYTHROCYTE [DISTWIDTH] IN BLOOD BY AUTOMATED COUNT: 18.2 % (ref 11.8–14.4)
GFR, ESTIMATED: 27 ML/MIN/1.73M2
GLUCOSE BLD-MCNC: 111 MG/DL (ref 65–105)
GLUCOSE BLD-MCNC: 131 MG/DL (ref 65–105)
GLUCOSE BLD-MCNC: 179 MG/DL (ref 65–105)
GLUCOSE BLD-MCNC: 229 MG/DL (ref 65–105)
GLUCOSE SERPL-MCNC: 116 MG/DL (ref 74–99)
HCT VFR BLD AUTO: 26.1 % (ref 36.3–47.1)
HGB BLD-MCNC: 8 G/DL (ref 11.9–15.1)
IMM GRANULOCYTES # BLD AUTO: <0.03 K/UL (ref 0–0.3)
IMM GRANULOCYTES NFR BLD: 0 %
LYMPHOCYTES NFR BLD: 0.73 K/UL (ref 1.1–3.7)
LYMPHOCYTES RELATIVE PERCENT: 11 % (ref 24–43)
MCH RBC QN AUTO: 27.7 PG (ref 25.2–33.5)
MCHC RBC AUTO-ENTMCNC: 30.7 G/DL (ref 28.4–34.8)
MCV RBC AUTO: 90.3 FL (ref 82.6–102.9)
MONOCYTES NFR BLD: 0.78 K/UL (ref 0.1–1.2)
MONOCYTES NFR BLD: 12 % (ref 3–12)
NEUTROPHILS NFR BLD: 77 % (ref 36–65)
NEUTS SEG NFR BLD: 5.18 K/UL (ref 1.5–8.1)
NRBC BLD-RTO: 0 PER 100 WBC
PLATELET # BLD AUTO: 153 K/UL (ref 138–453)
PMV BLD AUTO: 10.3 FL (ref 8.1–13.5)
POTASSIUM SERPL-SCNC: 3.7 MMOL/L (ref 3.7–5.3)
RBC # BLD AUTO: 2.89 M/UL (ref 3.95–5.11)
RBC # BLD: ABNORMAL 10*6/UL
SODIUM SERPL-SCNC: 140 MMOL/L (ref 136–145)
WBC OTHER # BLD: 6.8 K/UL (ref 3.5–11.3)

## 2025-08-31 PROCEDURE — 6370000000 HC RX 637 (ALT 250 FOR IP)

## 2025-08-31 PROCEDURE — 82947 ASSAY GLUCOSE BLOOD QUANT: CPT

## 2025-08-31 PROCEDURE — 2700000000 HC OXYGEN THERAPY PER DAY

## 2025-08-31 PROCEDURE — 2500000003 HC RX 250 WO HCPCS

## 2025-08-31 PROCEDURE — 36415 COLL VENOUS BLD VENIPUNCTURE: CPT

## 2025-08-31 PROCEDURE — 99232 SBSQ HOSP IP/OBS MODERATE 35: CPT | Performed by: INTERNAL MEDICINE

## 2025-08-31 PROCEDURE — 85025 COMPLETE CBC W/AUTO DIFF WBC: CPT

## 2025-08-31 PROCEDURE — 2060000000 HC ICU INTERMEDIATE R&B

## 2025-08-31 PROCEDURE — 94761 N-INVAS EAR/PLS OXIMETRY MLT: CPT

## 2025-08-31 PROCEDURE — 83880 ASSAY OF NATRIURETIC PEPTIDE: CPT

## 2025-08-31 PROCEDURE — 80048 BASIC METABOLIC PNL TOTAL CA: CPT

## 2025-08-31 PROCEDURE — 94640 AIRWAY INHALATION TREATMENT: CPT

## 2025-08-31 RX ORDER — DOCUSATE SODIUM 100 MG/1
100 CAPSULE, LIQUID FILLED ORAL 2 TIMES DAILY
Status: DISCONTINUED | OUTPATIENT
Start: 2025-08-31 | End: 2025-09-03 | Stop reason: HOSPADM

## 2025-08-31 RX ORDER — GABAPENTIN 300 MG/1
300 CAPSULE ORAL 2 TIMES DAILY
Status: DISCONTINUED | OUTPATIENT
Start: 2025-08-31 | End: 2025-09-03 | Stop reason: HOSPADM

## 2025-08-31 RX ORDER — SENNOSIDES 8.6 MG/1
1 TABLET ORAL NIGHTLY
Status: DISCONTINUED | OUTPATIENT
Start: 2025-08-31 | End: 2025-09-03 | Stop reason: HOSPADM

## 2025-08-31 RX ADMIN — LEVOTHYROXINE SODIUM 50 MCG: 0.05 TABLET ORAL at 05:12

## 2025-08-31 RX ADMIN — SENNOSIDES 8.6 MG: 8.6 TABLET, FILM COATED ORAL at 20:34

## 2025-08-31 RX ADMIN — POLYETHYLENE GLYCOL 3350 17 G: 17 POWDER, FOR SOLUTION ORAL at 18:33

## 2025-08-31 RX ADMIN — APIXABAN 2.5 MG: 2.5 TABLET, FILM COATED ORAL at 20:34

## 2025-08-31 RX ADMIN — SODIUM CHLORIDE, PRESERVATIVE FREE 10 ML: 5 INJECTION INTRAVENOUS at 20:35

## 2025-08-31 RX ADMIN — BUDESONIDE AND FORMOTEROL FUMARATE DIHYDRATE 2 PUFF: 160; 4.5 AEROSOL RESPIRATORY (INHALATION) at 08:08

## 2025-08-31 RX ADMIN — BUDESONIDE AND FORMOTEROL FUMARATE DIHYDRATE 2 PUFF: 160; 4.5 AEROSOL RESPIRATORY (INHALATION) at 21:06

## 2025-08-31 RX ADMIN — APIXABAN 2.5 MG: 2.5 TABLET, FILM COATED ORAL at 08:55

## 2025-08-31 RX ADMIN — SILDENAFIL 20 MG: 20 TABLET ORAL at 08:56

## 2025-08-31 RX ADMIN — GABAPENTIN 300 MG: 300 CAPSULE ORAL at 20:34

## 2025-08-31 RX ADMIN — ATORVASTATIN CALCIUM 20 MG: 20 TABLET, FILM COATED ORAL at 20:34

## 2025-08-31 RX ADMIN — FUROSEMIDE 40 MG: 40 TABLET ORAL at 08:55

## 2025-08-31 RX ADMIN — INSULIN LISPRO 1 UNITS: 100 INJECTION, SOLUTION INTRAVENOUS; SUBCUTANEOUS at 18:07

## 2025-08-31 RX ADMIN — SODIUM CHLORIDE, PRESERVATIVE FREE 10 ML: 5 INJECTION INTRAVENOUS at 08:56

## 2025-08-31 RX ADMIN — DOCUSATE SODIUM 100 MG: 100 CAPSULE, LIQUID FILLED ORAL at 20:34

## 2025-08-31 ASSESSMENT — PAIN SCALES - GENERAL
PAINLEVEL_OUTOF10: 0
PAINLEVEL_OUTOF10: 0

## 2025-09-01 LAB
ANION GAP SERPL CALCULATED.3IONS-SCNC: 13 MMOL/L (ref 9–16)
BUN SERPL-MCNC: 45 MG/DL (ref 8–23)
CALCIUM SERPL-MCNC: 9.2 MG/DL (ref 8.6–10.4)
CHLORIDE SERPL-SCNC: 104 MMOL/L (ref 98–107)
CO2 SERPL-SCNC: 24 MMOL/L (ref 20–31)
CREAT SERPL-MCNC: 1.6 MG/DL (ref 0.6–0.9)
GFR, ESTIMATED: 29 ML/MIN/1.73M2
GLUCOSE BLD-MCNC: 103 MG/DL (ref 65–105)
GLUCOSE BLD-MCNC: 152 MG/DL (ref 65–105)
GLUCOSE BLD-MCNC: 168 MG/DL (ref 65–105)
GLUCOSE BLD-MCNC: 278 MG/DL (ref 65–105)
GLUCOSE SERPL-MCNC: 101 MG/DL (ref 74–99)
POTASSIUM SERPL-SCNC: 4 MMOL/L (ref 3.7–5.3)
SODIUM SERPL-SCNC: 141 MMOL/L (ref 136–145)

## 2025-09-01 PROCEDURE — 6370000000 HC RX 637 (ALT 250 FOR IP)

## 2025-09-01 PROCEDURE — 99232 SBSQ HOSP IP/OBS MODERATE 35: CPT | Performed by: INTERNAL MEDICINE

## 2025-09-01 PROCEDURE — 82947 ASSAY GLUCOSE BLOOD QUANT: CPT

## 2025-09-01 PROCEDURE — 2700000000 HC OXYGEN THERAPY PER DAY

## 2025-09-01 PROCEDURE — 2500000003 HC RX 250 WO HCPCS

## 2025-09-01 PROCEDURE — 94761 N-INVAS EAR/PLS OXIMETRY MLT: CPT

## 2025-09-01 PROCEDURE — 2060000000 HC ICU INTERMEDIATE R&B

## 2025-09-01 PROCEDURE — 6360000002 HC RX W HCPCS

## 2025-09-01 PROCEDURE — 80048 BASIC METABOLIC PNL TOTAL CA: CPT

## 2025-09-01 PROCEDURE — 36415 COLL VENOUS BLD VENIPUNCTURE: CPT

## 2025-09-01 PROCEDURE — 94640 AIRWAY INHALATION TREATMENT: CPT

## 2025-09-01 RX ORDER — GABAPENTIN 100 MG/1
100 CAPSULE ORAL ONCE
Status: COMPLETED | OUTPATIENT
Start: 2025-09-01 | End: 2025-09-01

## 2025-09-01 RX ORDER — FUROSEMIDE 10 MG/ML
20 INJECTION INTRAMUSCULAR; INTRAVENOUS ONCE
Status: COMPLETED | OUTPATIENT
Start: 2025-09-01 | End: 2025-09-01

## 2025-09-01 RX ADMIN — SODIUM CHLORIDE, PRESERVATIVE FREE 10 ML: 5 INJECTION INTRAVENOUS at 08:04

## 2025-09-01 RX ADMIN — ATORVASTATIN CALCIUM 20 MG: 20 TABLET, FILM COATED ORAL at 20:12

## 2025-09-01 RX ADMIN — BUDESONIDE AND FORMOTEROL FUMARATE DIHYDRATE 2 PUFF: 160; 4.5 AEROSOL RESPIRATORY (INHALATION) at 07:35

## 2025-09-01 RX ADMIN — SILDENAFIL 20 MG: 20 TABLET ORAL at 08:03

## 2025-09-01 RX ADMIN — DOCUSATE SODIUM 100 MG: 100 CAPSULE, LIQUID FILLED ORAL at 20:12

## 2025-09-01 RX ADMIN — SENNOSIDES 8.6 MG: 8.6 TABLET, FILM COATED ORAL at 20:12

## 2025-09-01 RX ADMIN — SODIUM CHLORIDE, PRESERVATIVE FREE 10 ML: 5 INJECTION INTRAVENOUS at 20:16

## 2025-09-01 RX ADMIN — FUROSEMIDE 20 MG: 10 INJECTION, SOLUTION INTRAMUSCULAR; INTRAVENOUS at 12:21

## 2025-09-01 RX ADMIN — BUDESONIDE AND FORMOTEROL FUMARATE DIHYDRATE 2 PUFF: 160; 4.5 AEROSOL RESPIRATORY (INHALATION) at 20:30

## 2025-09-01 RX ADMIN — DOCUSATE SODIUM 100 MG: 100 CAPSULE, LIQUID FILLED ORAL at 08:04

## 2025-09-01 RX ADMIN — FUROSEMIDE 40 MG: 40 TABLET ORAL at 08:03

## 2025-09-01 RX ADMIN — GABAPENTIN 300 MG: 300 CAPSULE ORAL at 20:12

## 2025-09-01 RX ADMIN — INSULIN LISPRO 2 UNITS: 100 INJECTION, SOLUTION INTRAVENOUS; SUBCUTANEOUS at 20:13

## 2025-09-01 RX ADMIN — APIXABAN 2.5 MG: 2.5 TABLET, FILM COATED ORAL at 08:02

## 2025-09-01 RX ADMIN — LEVOTHYROXINE SODIUM 50 MCG: 0.05 TABLET ORAL at 05:18

## 2025-09-01 RX ADMIN — APIXABAN 2.5 MG: 2.5 TABLET, FILM COATED ORAL at 20:12

## 2025-09-01 RX ADMIN — GABAPENTIN 300 MG: 300 CAPSULE ORAL at 08:04

## 2025-09-01 RX ADMIN — GABAPENTIN 100 MG: 100 CAPSULE ORAL at 12:21

## 2025-09-01 ASSESSMENT — ENCOUNTER SYMPTOMS
ABDOMINAL DISTENTION: 0
DIARRHEA: 0
STRIDOR: 0
BLOOD IN STOOL: 0
COUGH: 0
CONSTIPATION: 0
SHORTNESS OF BREATH: 0

## 2025-09-01 ASSESSMENT — PAIN SCALES - GENERAL
PAINLEVEL_OUTOF10: 0
PAINLEVEL_OUTOF10: 0

## 2025-09-02 LAB
ANION GAP SERPL CALCULATED.3IONS-SCNC: 12 MMOL/L (ref 9–16)
BUN SERPL-MCNC: 48 MG/DL (ref 8–23)
CALCIUM SERPL-MCNC: 9.1 MG/DL (ref 8.6–10.4)
CHLORIDE SERPL-SCNC: 105 MMOL/L (ref 98–107)
CO2 SERPL-SCNC: 23 MMOL/L (ref 20–31)
CREAT SERPL-MCNC: 1.7 MG/DL (ref 0.6–0.9)
GFR, ESTIMATED: 27 ML/MIN/1.73M2
GLUCOSE BLD-MCNC: 125 MG/DL (ref 65–105)
GLUCOSE BLD-MCNC: 169 MG/DL (ref 65–105)
GLUCOSE BLD-MCNC: 175 MG/DL (ref 65–105)
GLUCOSE BLD-MCNC: 233 MG/DL (ref 65–105)
GLUCOSE SERPL-MCNC: 140 MG/DL (ref 74–99)
POTASSIUM SERPL-SCNC: 4.4 MMOL/L (ref 3.7–5.3)
SODIUM SERPL-SCNC: 140 MMOL/L (ref 136–145)

## 2025-09-02 PROCEDURE — 2500000003 HC RX 250 WO HCPCS

## 2025-09-02 PROCEDURE — 82947 ASSAY GLUCOSE BLOOD QUANT: CPT

## 2025-09-02 PROCEDURE — 2060000000 HC ICU INTERMEDIATE R&B

## 2025-09-02 PROCEDURE — 80048 BASIC METABOLIC PNL TOTAL CA: CPT

## 2025-09-02 PROCEDURE — 6370000000 HC RX 637 (ALT 250 FOR IP)

## 2025-09-02 PROCEDURE — 51798 US URINE CAPACITY MEASURE: CPT

## 2025-09-02 PROCEDURE — 36415 COLL VENOUS BLD VENIPUNCTURE: CPT

## 2025-09-02 PROCEDURE — 2700000000 HC OXYGEN THERAPY PER DAY

## 2025-09-02 PROCEDURE — 94761 N-INVAS EAR/PLS OXIMETRY MLT: CPT

## 2025-09-02 PROCEDURE — 99232 SBSQ HOSP IP/OBS MODERATE 35: CPT | Performed by: INTERNAL MEDICINE

## 2025-09-02 PROCEDURE — 94640 AIRWAY INHALATION TREATMENT: CPT

## 2025-09-02 RX ADMIN — APIXABAN 2.5 MG: 2.5 TABLET, FILM COATED ORAL at 08:19

## 2025-09-02 RX ADMIN — GABAPENTIN 300 MG: 300 CAPSULE ORAL at 20:39

## 2025-09-02 RX ADMIN — ATORVASTATIN CALCIUM 20 MG: 20 TABLET, FILM COATED ORAL at 20:39

## 2025-09-02 RX ADMIN — BUDESONIDE AND FORMOTEROL FUMARATE DIHYDRATE 2 PUFF: 160; 4.5 AEROSOL RESPIRATORY (INHALATION) at 07:32

## 2025-09-02 RX ADMIN — DOCUSATE SODIUM 100 MG: 100 CAPSULE, LIQUID FILLED ORAL at 20:39

## 2025-09-02 RX ADMIN — SENNOSIDES 8.6 MG: 8.6 TABLET, FILM COATED ORAL at 20:39

## 2025-09-02 RX ADMIN — SILDENAFIL 20 MG: 20 TABLET ORAL at 08:19

## 2025-09-02 RX ADMIN — GABAPENTIN 300 MG: 300 CAPSULE ORAL at 08:19

## 2025-09-02 RX ADMIN — FUROSEMIDE 40 MG: 40 TABLET ORAL at 08:19

## 2025-09-02 RX ADMIN — SODIUM CHLORIDE, PRESERVATIVE FREE 10 ML: 5 INJECTION INTRAVENOUS at 08:19

## 2025-09-02 RX ADMIN — DOCUSATE SODIUM 100 MG: 100 CAPSULE, LIQUID FILLED ORAL at 08:19

## 2025-09-02 RX ADMIN — LEVOTHYROXINE SODIUM 50 MCG: 0.05 TABLET ORAL at 06:13

## 2025-09-02 RX ADMIN — SODIUM CHLORIDE, PRESERVATIVE FREE 10 ML: 5 INJECTION INTRAVENOUS at 20:42

## 2025-09-02 RX ADMIN — APIXABAN 2.5 MG: 2.5 TABLET, FILM COATED ORAL at 20:39

## 2025-09-02 RX ADMIN — BUDESONIDE AND FORMOTEROL FUMARATE DIHYDRATE 2 PUFF: 160; 4.5 AEROSOL RESPIRATORY (INHALATION) at 19:52

## 2025-09-02 ASSESSMENT — PAIN SCALES - GENERAL
PAINLEVEL_OUTOF10: 0

## 2025-09-02 ASSESSMENT — ENCOUNTER SYMPTOMS
ABDOMINAL DISTENTION: 0
SHORTNESS OF BREATH: 0
DIARRHEA: 0
COUGH: 0
STRIDOR: 0
BLOOD IN STOOL: 0

## 2025-09-03 VITALS
TEMPERATURE: 98.1 F | DIASTOLIC BLOOD PRESSURE: 68 MMHG | RESPIRATION RATE: 15 BRPM | HEIGHT: 67 IN | OXYGEN SATURATION: 93 % | WEIGHT: 163.36 LBS | HEART RATE: 81 BPM | SYSTOLIC BLOOD PRESSURE: 95 MMHG | BODY MASS INDEX: 25.64 KG/M2

## 2025-09-03 LAB
ANION GAP SERPL CALCULATED.3IONS-SCNC: 12 MMOL/L (ref 9–16)
BUN SERPL-MCNC: 51 MG/DL (ref 8–23)
CALCIUM SERPL-MCNC: 9 MG/DL (ref 8.6–10.4)
CHLORIDE SERPL-SCNC: 103 MMOL/L (ref 98–107)
CO2 SERPL-SCNC: 23 MMOL/L (ref 20–31)
CREAT SERPL-MCNC: 1.5 MG/DL (ref 0.6–0.9)
GFR, ESTIMATED: 32 ML/MIN/1.73M2
GLUCOSE SERPL-MCNC: 152 MG/DL (ref 74–99)
POTASSIUM SERPL-SCNC: 4.2 MMOL/L (ref 3.7–5.3)
SODIUM SERPL-SCNC: 138 MMOL/L (ref 136–145)

## 2025-09-03 PROCEDURE — 6370000000 HC RX 637 (ALT 250 FOR IP)

## 2025-09-03 PROCEDURE — 80048 BASIC METABOLIC PNL TOTAL CA: CPT

## 2025-09-03 PROCEDURE — 99239 HOSP IP/OBS DSCHRG MGMT >30: CPT | Performed by: INTERNAL MEDICINE

## 2025-09-03 PROCEDURE — 36415 COLL VENOUS BLD VENIPUNCTURE: CPT

## 2025-09-03 RX ADMIN — LEVOTHYROXINE SODIUM 50 MCG: 0.05 TABLET ORAL at 05:42

## 2025-09-03 ASSESSMENT — ENCOUNTER SYMPTOMS
COUGH: 0
DIARRHEA: 0
ABDOMINAL DISTENTION: 0
STRIDOR: 0
SHORTNESS OF BREATH: 0
BLOOD IN STOOL: 0

## 2025-09-05 ENCOUNTER — HOSPITAL ENCOUNTER (OUTPATIENT)
Age: 89
Setting detail: SPECIMEN
Discharge: HOME | End: 2025-09-05

## 2025-09-05 LAB
ANION GAP SERPL CALCULATED.3IONS-SCNC: 12 MMOL/L (ref 9–16)
BUN SERPL-MCNC: 50 MG/DL (ref 8–23)
CALCIUM SERPL-MCNC: 8.9 MG/DL (ref 8.2–9.6)
CHLORIDE SERPL-SCNC: 103 MMOL/L (ref 98–107)
CO2 SERPL-SCNC: 25 MMOL/L (ref 20–31)
CREAT SERPL-MCNC: 1.5 MG/DL (ref 0.5–0.9)
GFR, ESTIMATED: 31 ML/MIN/1.73M2
GLUCOSE SERPL-MCNC: 75 MG/DL (ref 75–121)
POTASSIUM SERPL-SCNC: 4.5 MMOL/L (ref 3.7–5.3)
SODIUM SERPL-SCNC: 139 MMOL/L (ref 136–145)

## 2025-09-05 PROCEDURE — 36415 COLL VENOUS BLD VENIPUNCTURE: CPT

## 2025-09-05 PROCEDURE — 80048 BASIC METABOLIC PNL TOTAL CA: CPT

## (undated) DEVICE — SUTURE ETHIBOND EXCEL SZ 0 L18IN NONABSORBABLE GRN L26MM CT-2 CX27D

## (undated) DEVICE — Z DISCONTINUED USE 2220190 SUTURE VICRYL SZ 3-0 L27IN ABSRB UD L26MM SH 1/2 CIR J416H

## (undated) DEVICE — SUTURE PERMA-HAND SZ 0 L30IN NONABSORBABLE BLK L36MM CT-1 424H

## (undated) DEVICE — GLOVE ORANGE PI 7 1/2   MSG9075

## (undated) DEVICE — SYRINGE MEDICAL 3ML CLEAR PLASTIC STANDARD NON CONTROL LUERLOCK TIP DISPOSABLE

## (undated) DEVICE — DEVICE VASC CLSR 24CM FEM OR RAD ART EXT MECH PRSS DSG POST

## (undated) DEVICE — SYRINGE MED 10ML LUERLOCK TIP W/O SFTY DISP

## (undated) DEVICE — SYRINGE MED 20ML STD CLR PLAS LUERLOCK TIP N CTRL DISP

## (undated) DEVICE — NEEDLE SYRINGE 18GA L1.5IN RED PLAS HUB S STL BLNT FILL W/O

## (undated) DEVICE — SUTURE VICRYL SZ 2-0 L27IN ABSRB UD L26MM SH 1/2 CIR J417H

## (undated) DEVICE — SET ADMIN 25ML L117IN PMP MOD CK VLV RLER CLMP 2 SMRTSITE

## (undated) DEVICE — PROVE COVER: Brand: UNBRANDED

## (undated) DEVICE — PAD N ADH W3XL4IN POLY COT SFT PERF FLM EASILY CUT ABSRB

## (undated) DEVICE — SET EXTN PRIMING 6ML L41IN 2 PINCH CLMP 2 SMRTSITE NDL FREE

## (undated) DEVICE — ELECTRODE,ECG,STRESS,FOAM,3PK: Brand: MEDLINE

## (undated) DEVICE — SUTURE VICRYL 2-0 L27IN ABSRB CT BRAID COAT UD J275H

## (undated) DEVICE — SLING ARM AD ULT

## (undated) DEVICE — ELECTRODE PT RET AD L9FT HI MOIST COND ADH HYDRGEL CORDED

## (undated) DEVICE — LIMB HOLDER, WRIST/ANKLE: Brand: DEROYAL

## (undated) DEVICE — DRAPE,UTILITY,XL,4/PK,STERILE: Brand: MEDLINE

## (undated) DEVICE — SUTURE VICRYL SZ 4-0 L18IN ABSRB UD L19MM PS-2 3/8 CIR PRIM J496H

## (undated) DEVICE — TUBING O2 STAR LUMEN 7 FT W/ STD CONN CLR LF

## (undated) DEVICE — DECANTER BAG 9": Brand: MEDLINE INDUSTRIES, INC.

## (undated) DEVICE — Device

## (undated) DEVICE — CLEANER,CAUTERY TIP,2X2",STERILE: Brand: MEDLINE

## (undated) DEVICE — TUBING, SUCTION, 9/32" X 20', STRAIGHT: Brand: MEDLINE INDUSTRIES, INC.

## (undated) DEVICE — DRESSING TRNSPAR W5XL4.5IN FLM SHT SEMIPERMEABLE WIND

## (undated) DEVICE — PROTECTOR ULN NRV PUR FOAM HK LOOP STRP ANATOMICALLY

## (undated) DEVICE — BAG C ARM 22 IN LEN 22 IN W LTX FREE ST SNAP

## (undated) DEVICE — STRIP,CLOSURE,WOUND,MEDI-STRIP,1/2X4: Brand: MEDLINE

## (undated) DEVICE — Z DISCONTINUED USE 2859063 SUTURE VICRYL 3-0 L27IN ABSRB UD PSL L30MM 1/2 CIR TAPERPOINT J502H

## (undated) DEVICE — CO2 CANN,MICROFILTER,ADULT,7',ORL/NSL: Brand: MEDLINE

## (undated) DEVICE — 3M™ IOBAN™ 2 ANTIMICROBIAL INCISE DRAPE 6640EZ: Brand: IOBAN™ 2

## (undated) DEVICE — SENSOR PLSE OXMTR AD CBL L3FT ADH TRANSMISSIVE

## (undated) DEVICE — MEDI-TRACE CADENCE ADULT, DEFIBRILLATION ELECTRODE -RTS  (10 PR/PK) - PHYSIO-CONTROL: Brand: MEDI-TRACE CADENCE

## (undated) DEVICE — INTRODUCER SPLIT SHEATH PRELUDE SNAP 6FR X 13CM

## (undated) DEVICE — TOWEL,OR,DSP,ST,BLUE,DLX,XR,4/PK,20PK/CS: Brand: MEDLINE